# Patient Record
Sex: MALE | Race: WHITE | NOT HISPANIC OR LATINO | Employment: UNEMPLOYED | URBAN - METROPOLITAN AREA
[De-identification: names, ages, dates, MRNs, and addresses within clinical notes are randomized per-mention and may not be internally consistent; named-entity substitution may affect disease eponyms.]

---

## 2017-01-31 ENCOUNTER — OFFICE VISIT (OUTPATIENT)
Dept: FAMILY MEDICINE | Facility: CLINIC | Age: 42
End: 2017-01-31
Payer: MEDICARE

## 2017-01-31 VITALS
RESPIRATION RATE: 14 BRPM | SYSTOLIC BLOOD PRESSURE: 101 MMHG | TEMPERATURE: 98.1 F | DIASTOLIC BLOOD PRESSURE: 64 MMHG | HEART RATE: 68 BPM

## 2017-01-31 DIAGNOSIS — Z96.651 STATUS POST TOTAL RIGHT KNEE REPLACEMENT: ICD-10-CM

## 2017-01-31 DIAGNOSIS — G89.18 ACUTE POST-OPERATIVE PAIN: Primary | ICD-10-CM

## 2017-01-31 PROCEDURE — 99213 OFFICE O/P EST LOW 20 MIN: CPT | Performed by: PHYSICIAN ASSISTANT

## 2017-01-31 RX ORDER — CYCLOBENZAPRINE HCL 5 MG
TABLET ORAL
Refills: 0 | COMMUNITY
Start: 2017-01-06 | End: 2017-05-02

## 2017-01-31 RX ORDER — TRAMADOL HYDROCHLORIDE 50 MG/1
TABLET ORAL
Refills: 0 | COMMUNITY
Start: 2017-01-25 | End: 2017-04-18

## 2017-01-31 ASSESSMENT — ANXIETY QUESTIONNAIRES
7. FEELING AFRAID AS IF SOMETHING AWFUL MIGHT HAPPEN: NOT AT ALL
6. BECOMING EASILY ANNOYED OR IRRITABLE: NEARLY EVERY DAY
IF YOU CHECKED OFF ANY PROBLEMS ON THIS QUESTIONNAIRE, HOW DIFFICULT HAVE THESE PROBLEMS MADE IT FOR YOU TO DO YOUR WORK, TAKE CARE OF THINGS AT HOME, OR GET ALONG WITH OTHER PEOPLE: SOMEWHAT DIFFICULT
5. BEING SO RESTLESS THAT IT IS HARD TO SIT STILL: NOT AT ALL
1. FEELING NERVOUS, ANXIOUS, OR ON EDGE: SEVERAL DAYS
GAD7 TOTAL SCORE: 6
3. WORRYING TOO MUCH ABOUT DIFFERENT THINGS: SEVERAL DAYS
2. NOT BEING ABLE TO STOP OR CONTROL WORRYING: SEVERAL DAYS

## 2017-01-31 ASSESSMENT — PATIENT HEALTH QUESTIONNAIRE - PHQ9: 5. POOR APPETITE OR OVEREATING: NOT AT ALL

## 2017-01-31 NOTE — Clinical Note
[facility]  [id number] [place] [id number]  Phone: [phone]    January 31, 2017        Andrea Uribe  [address] [facility] [address]          To whom it may concern:    Gabriella Uribe was seen in clinic today for a visit for her 's appointment. She missed work secondary to this. Please excuse her for this.     Please contact me for questions or concerns.      Sincerely,        Alberto Medley PA-C

## 2017-01-31 NOTE — PROGRESS NOTES
SUBJECTIVE:                                                    Andrea Uribe is a 41 year old male who presents to clinic today for the following health issues:      Joint Pain     Onset:     Description:   Location: rt knee pain  Character:sharp/stabbing, unable to put any pressure, leg is not straight, close to fall this morning    Intensity: constant; severe pain    Progression of Symptoms: worse    Accompanying Signs & Symptoms:  Other symptoms: swelling   History:   Previous similar pain: YES- recently had surgery. Saw surgeon on 1/25. Notes are not available to review.       Precipitating factors:   Trauma or overuse: no     Alleviating factors:  Improved by: nothing       Therapies Tried and outcome: patient currently in phystical thearpy, oxycodone and tramadol       Problem list and histories reviewed & adjusted, as indicated.  Additional history: as documented    Problem list, Medication list, Allergies, and Medical/Social/Surgical histories reviewed in EPIC and updated as appropriate.    ROS:  Constitutional, msk, skin, neuro systems are negative, except as otherwise noted.    OBJECTIVE:                                                    /64 mmHg  Pulse 68  Temp(Src) 98.1  F (36.7  C) (Oral)  Resp 14  There is no weight on file to calculate BMI.  GENERAL: healthy, alert and no distress  MS: right knee can actively extend to 165 degrees and flex to 90 degrees. Passively flex to 110 degrees with pain.   PSYCH: mentation appears normal, affect normal/bright    Diagnostic Test Results:  none      ASSESSMENT/PLAN:                                                      (G89.18) Acute post-operative pain  (primary encounter diagnosis)  Comment: Though unable to refer to surgeon's notes, discussed with patient how this is post-operative pain and management is typically done through the surgeon. If the surgeon deems this inappropriate, a pain clinic can be consulted. Recommending follow up with surgeon  and also to sign release to have notes sent to us.   Plan:     (Z96.651) Status post total right knee replacement  Comment: as above. ROM seems appropriate in my opinion at this time out of surgery. Especially given this being a revision. To follow ortho.   Plan:     Follow up: per ortho    Alberto Medley PA-C  [facility]

## 2017-01-31 NOTE — NURSING NOTE
"Chief Complaint   Patient presents with     Knee Pain     Initial /64 mmHg  Pulse 68  Temp(Src) 98.1  F (36.7  C) (Oral)  Resp 14 Estimated body mass index is 29.44 kg/(m^2) as calculated from the following:    Height as of 12/13/16: 5' 11\" (1.803 m).    Weight as of 12/2/16: 211 lb (95.709 kg)..  BP completed using cuff size large-RA      "

## 2017-02-01 ASSESSMENT — PATIENT HEALTH QUESTIONNAIRE - PHQ9: SUM OF ALL RESPONSES TO PHQ QUESTIONS 1-9: 5

## 2017-02-01 ASSESSMENT — ASTHMA QUESTIONNAIRES: ACT_TOTALSCORE: 17

## 2017-02-01 ASSESSMENT — ANXIETY QUESTIONNAIRES: GAD7 TOTAL SCORE: 6

## 2017-04-18 ENCOUNTER — TELEPHONE (OUTPATIENT)
Dept: NURSING | Facility: CLINIC | Age: 42
End: 2017-04-18

## 2017-04-18 ENCOUNTER — HOSPITAL ENCOUNTER (EMERGENCY)
Facility: CLINIC | Age: 42
Discharge: HOME | End: 2017-04-18
Attending: EMERGENCY MEDICINE | Admitting: EMERGENCY MEDICINE
Payer: MEDICARE

## 2017-04-18 ENCOUNTER — APPOINTMENT (OUTPATIENT)
Dept: CT IMAGING | Facility: CLINIC | Age: 42
End: 2017-04-18
Attending: EMERGENCY MEDICINE
Payer: MEDICARE

## 2017-04-18 VITALS
DIASTOLIC BLOOD PRESSURE: 74 MMHG | BODY MASS INDEX: 29.29 KG/M2 | WEIGHT: 210 LBS | SYSTOLIC BLOOD PRESSURE: 110 MMHG | HEART RATE: 71 BPM | TEMPERATURE: 98 F | RESPIRATION RATE: 18 BRPM | OXYGEN SATURATION: 91 %

## 2017-04-18 DIAGNOSIS — R10.84 ABDOMINAL PAIN, GENERALIZED: ICD-10-CM

## 2017-04-18 LAB
ALBUMIN SERPL-MCNC: 3.1 G/DL (ref 3.4–5)
ALP SERPL-CCNC: 162 U/L (ref 40–150)
ALT SERPL W P-5'-P-CCNC: 18 U/L (ref 0–70)
ANION GAP SERPL CALCULATED.3IONS-SCNC: 6 MMOL/L (ref 3–14)
AST SERPL W P-5'-P-CCNC: 16 U/L (ref 0–45)
BASOPHILS # BLD AUTO: 0 10E9/L (ref 0–0.2)
BASOPHILS NFR BLD AUTO: 0.7 %
BILIRUB SERPL-MCNC: 0.2 MG/DL (ref 0.2–1.3)
BUN SERPL-MCNC: 13 MG/DL (ref 7–30)
CALCIUM SERPL-MCNC: 8.4 MG/DL (ref 8.5–10.1)
CHLORIDE SERPL-SCNC: 105 MMOL/L (ref 94–109)
CO2 SERPL-SCNC: 29 MMOL/L (ref 20–32)
CREAT SERPL-MCNC: 0.88 MG/DL (ref 0.66–1.25)
DIFFERENTIAL METHOD BLD: ABNORMAL
EOSINOPHIL # BLD AUTO: 0.2 10E9/L (ref 0–0.7)
EOSINOPHIL NFR BLD AUTO: 3.5 %
ERYTHROCYTE [DISTWIDTH] IN BLOOD BY AUTOMATED COUNT: 14.5 % (ref 10–15)
GFR SERPL CREATININE-BSD FRML MDRD: ABNORMAL ML/MIN/1.7M2
GLUCOSE SERPL-MCNC: 87 MG/DL (ref 70–99)
HCT VFR BLD AUTO: 39.5 % (ref 40–53)
HGB BLD-MCNC: 12.5 G/DL (ref 13.3–17.7)
IMM GRANULOCYTES # BLD: 0 10E9/L (ref 0–0.4)
IMM GRANULOCYTES NFR BLD: 0.2 %
LIPASE SERPL-CCNC: 89 U/L (ref 73–393)
LYMPHOCYTES # BLD AUTO: 1.8 10E9/L (ref 0.8–5.3)
LYMPHOCYTES NFR BLD AUTO: 32.6 %
MCH RBC QN AUTO: 29.1 PG (ref 26.5–33)
MCHC RBC AUTO-ENTMCNC: 31.6 G/DL (ref 31.5–36.5)
MCV RBC AUTO: 92 FL (ref 78–100)
MONOCYTES # BLD AUTO: 0.5 10E9/L (ref 0–1.3)
MONOCYTES NFR BLD AUTO: 9.1 %
NEUTROPHILS # BLD AUTO: 2.9 10E9/L (ref 1.6–8.3)
NEUTROPHILS NFR BLD AUTO: 53.9 %
NRBC # BLD AUTO: 0 10*3/UL
NRBC BLD AUTO-RTO: 0 /100
PLATELET # BLD AUTO: 195 10E9/L (ref 150–450)
POTASSIUM SERPL-SCNC: 4 MMOL/L (ref 3.4–5.3)
PROT SERPL-MCNC: 7.9 G/DL (ref 6.8–8.8)
RBC # BLD AUTO: 4.29 10E12/L (ref 4.4–5.9)
SODIUM SERPL-SCNC: 140 MMOL/L (ref 133–144)
WBC # BLD AUTO: 5.4 10E9/L (ref 4–11)

## 2017-04-18 PROCEDURE — 80053 COMPREHEN METABOLIC PANEL: CPT | Performed by: EMERGENCY MEDICINE

## 2017-04-18 PROCEDURE — 25000128 H RX IP 250 OP 636: Performed by: EMERGENCY MEDICINE

## 2017-04-18 PROCEDURE — 83690 ASSAY OF LIPASE: CPT | Performed by: EMERGENCY MEDICINE

## 2017-04-18 PROCEDURE — 85025 COMPLETE CBC W/AUTO DIFF WBC: CPT | Performed by: EMERGENCY MEDICINE

## 2017-04-18 PROCEDURE — 99285 EMERGENCY DEPT VISIT HI MDM: CPT | Mod: 25

## 2017-04-18 PROCEDURE — 96374 THER/PROPH/DIAG INJ IV PUSH: CPT | Mod: 59

## 2017-04-18 PROCEDURE — 25500064 ZZH RX 255 OP 636: Performed by: EMERGENCY MEDICINE

## 2017-04-18 PROCEDURE — 36415 COLL VENOUS BLD VENIPUNCTURE: CPT | Performed by: EMERGENCY MEDICINE

## 2017-04-18 PROCEDURE — 74177 CT ABD & PELVIS W/CONTRAST: CPT

## 2017-04-18 PROCEDURE — 96375 TX/PRO/DX INJ NEW DRUG ADDON: CPT

## 2017-04-18 RX ORDER — ACETAMINOPHEN 500 MG
TABLET ORAL
Status: DISCONTINUED
Start: 2017-04-18 | End: 2017-04-18 | Stop reason: HOSPADM

## 2017-04-18 RX ORDER — LIDOCAINE 40 MG/G
CREAM TOPICAL
Status: DISCONTINUED | OUTPATIENT
Start: 2017-04-18 | End: 2017-04-18 | Stop reason: HOSPADM

## 2017-04-18 RX ORDER — MORPHINE SULFATE 4 MG/ML
4 INJECTION, SOLUTION INTRAMUSCULAR; INTRAVENOUS ONCE
Status: COMPLETED | OUTPATIENT
Start: 2017-04-18 | End: 2017-04-18

## 2017-04-18 RX ORDER — IOPAMIDOL 755 MG/ML
500 INJECTION, SOLUTION INTRAVASCULAR ONCE
Status: COMPLETED | OUTPATIENT
Start: 2017-04-18 | End: 2017-04-18

## 2017-04-18 RX ORDER — TRAMADOL HYDROCHLORIDE 50 MG/1
50-100 TABLET ORAL EVERY 6 HOURS PRN
Qty: 20 TABLET | Refills: 0 | Status: SHIPPED | OUTPATIENT
Start: 2017-04-18 | End: 2017-05-10

## 2017-04-18 RX ORDER — POLYETHYLENE GLYCOL 3350 17 G/17G
1 POWDER, FOR SOLUTION ORAL DAILY
Qty: 527 G | Refills: 0 | Status: SHIPPED | OUTPATIENT
Start: 2017-04-18 | End: 2017-05-18

## 2017-04-18 RX ORDER — ONDANSETRON 2 MG/ML
4 INJECTION INTRAMUSCULAR; INTRAVENOUS ONCE
Status: COMPLETED | OUTPATIENT
Start: 2017-04-18 | End: 2017-04-18

## 2017-04-18 RX ADMIN — MORPHINE SULFATE 4 MG: 4 INJECTION, SOLUTION INTRAMUSCULAR; INTRAVENOUS at 16:44

## 2017-04-18 RX ADMIN — SODIUM CHLORIDE 65 ML: 9 INJECTION, SOLUTION INTRAVENOUS at 18:14

## 2017-04-18 RX ADMIN — ONDANSETRON 4 MG: 2 INJECTION INTRAMUSCULAR; INTRAVENOUS at 16:44

## 2017-04-18 RX ADMIN — IOPAMIDOL 100 ML: 755 INJECTION, SOLUTION INTRAVENOUS at 18:14

## 2017-04-18 ASSESSMENT — ENCOUNTER SYMPTOMS
ABDOMINAL PAIN: 1
VOMITING: 1
FEVER: 0
WHEEZING: 1
SHORTNESS OF BREATH: 1
LIGHT-HEADEDNESS: 1
NAUSEA: 1
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
## 2017-04-18 NOTE — TELEPHONE ENCOUNTER
Call Type: Triage Call    Presenting Problem:  Had implant in vocal . Had  hernia repair- last  spring of 2016  by surgeon John Mcfarlane @ [facility] .  72  hours ago  started  having abdominal pain and bulging  at this  surgery site , that is interfering with his  breathing- painful with  deep breath or cough or some movements   .  Page    Gabe gave  him surgeon's phone number =  [phone] .  Triage Note:  Guideline Title: Postoperative Problems  Recommended Disposition: See ED Immediately  Original Inclination: Did not know what to do  Override Disposition:  Intended Action: Go to Hospital / ED  Physician Contacted: No  New or worsening breathing problems ?  YES  Unconscious now ? NO  Abdominal bloating ? NO  Depression and no other symptoms ? NO  Severe breathing problems ? NO  Wound separation AND internal organs protrude through wound or surgical incision  (evisceration) ? NO  Hives /Urticaria /Rash ? NO  New or worsening signs and symptoms that may indicate shock ? NO  Neck lump/swelling ? NO  Coughing up large amount of obvious blood (not blood-streaked sputum) ? NO  New seizure now or within last 6 hours ? NO  Continuous or heavy bleeding from operative site and NOT controlled with 10  minutes of steady pressure ? NO  Passing red, black or tarry material from rectum AND onset of new signs and  symptoms of hypovolemia ? NO  Vomiting red, bloody or coffee-ground material, more than streaks of blood or  scant amount (not following nosebleed within past day) ? NO  New onset severe pain and pale, discolored or cool below the surgical site  compared to the other extremity ? NO  Chest discomfort associated with shortness of breath, sweating, odd heartbeats or  different heart rate, nausea, vomiting, lightheadedness, or fainting lasting 5 or  more minutes now or within the last hour ? NO  Chest pain spreading to the shoulders, neck, jaw, in one or both arms, stomach or  back lasting 5 or more  minutes now or within the last hour. Pain is NOT  associated with taking a deep breath or a productive cough, movement, or touch to  a localized area. ? NO  Signs and symptoms of anaphylaxis ? NO  Questions or concerns about postoperative wound ? NO  Pressure, fullness, squeezing sensation or pain anywhere in the chest lasting 5 or  more minutes now or within the last hour. Pain is NOT associated with taking a  deep breath or a productive cough, movement, or touch to a localized area on the  chest. ? NO  Sudden onset of focal neurological changes (difficulty speaking, numbness,  weakness, paralysis, loss of coordination, or change in vision such as double  vision or loss of visual field) ? NO  Sudden onset of shortness of breath, chest pain and cough with blood tinged sputum  ? NO  Physician Instructions:  Care Advice: Another adult should drive.  Call  if sudden onset or sudden worsening of breathing problems,  struggling to breathe, high pitched noise when breathing in (stridor),  unable to speak, grasping at throat, or panic/anxiety because of breathing  problems.  IMMEDIATE ACTION  Take sips of clear liquids (such as water, clear fruit juices without pulp,  soda, tea or coffee without dairy or non-dairy creamer, clear broth or  bouillon, oral hydration solution, or plain gelatin, fruit ices/popsicles,  hard candy) as tolerated. Sucking on ice chips is another option.

## 2017-04-18 NOTE — ED AVS SNAPSHOT
[facility] Emergency Department    [address]    [facility] [address]    Phone:  [phone]    Fax:  [phone]                                       Andrea Uribe   MRN: [id number]    Department:  [facility] Emergency Department   Date of Visit:  4/18/2017           After Visit Summary Signature Page     I have received my discharge instructions, and my questions have been answered. I have discussed any challenges I see with this plan with the nurse or doctor.    ..........................................................................................................................................  Patient/Patient Representative Signature      ..........................................................................................................................................  Patient Representative Print Name and Relationship to Patient    ..................................................               ................................................  Date                                            Time    ..........................................................................................................................................  Reviewed by Signature/Title    ...................................................              ..............................................  Date                                                            Time

## 2017-04-18 NOTE — ED AVS SNAPSHOT
[facility] Emergency Department    [address]    [facility] [address]    Phone:  [phone]    Fax:  [phone]                                       Andrea Uribe   MRN: [id number]    Department:  [facility] Emergency Department   Date of Visit:  4/18/2017           Patient Information     Date Of Birth          1975        Your diagnoses for this visit were:     Abdominal pain, generalized        You were seen by Eliud Porter DO.      Follow-up Information     Follow up with Alberto Medley PA-C. Call in 2 days.    Specialty:  Physician Assistant    Why:  As needed    Contact information:    [facility]  [address] [address] [phone]          Follow up with [facility] Emergency Department.    Specialty:  EMERGENCY MEDICINE    Why:  If symptoms worsen    Contact information:    [address]  [facility] [id number] [phone]        Discharge Instructions         * Abdominal Pain,Uncertain Cause [Male]  Based on your visit today, the exact cause of your abdominal (stomach) pain is not clear. Your exam and tests do not indicate a dangerous cause at this time. However, the signs of a serious problem may take more time to appear. Although your exam was reassuring today, sometimes early in the course of many conditions, exam and lab tests can appear normal. Therefore, it is important for you to watch for any new symptoms or worsening of your condition.  Causes:  It may not be obvious what caused your symptoms. Pay attention to things that do seem to make your symptoms worse or better and discuss this with your doctor when you follow up.  Diagnosis:  The evaluation of abdominal pain in the emergency department may only require an exam by the doctor or it may include blood, urine or imaging studies, depending on many factors. Sometimes exams and tests can identify a cause but in  many cases, a clear cause is not found. Further testing at follow up visits may help to suggest a clear diagnosis.  Home Care:    Rest as much as possible until your next exam.    Try to avoid any medications (unless otherwise directed by your doctor), foods, activities, or other factors that you may have contributed to your symptoms.    Try to eat foods that you know that you have tolerated well in the past. Certain diets may be recommended for some conditions that cause abdominal pain. However, since the cause of your symptoms may not be clear, discuss your diet more with your primary care provider or specialist for further recommendations.     Eating several small meals per day as opposed to 2 or 3 larger meals may help.    Monitor closely for anything that may make your symptoms worse or better. Pay close attention to symptoms below that may indicate worsening of your condition.  Follow Up And Precautions:  See your doctor or this facility as instructed (or sooner, if your symptoms are not improving). In some cases, you may need more testing.  Contact Your Doctor Or Seek Medical Attention  if any of the following occur:    Pain is becoming worse    You are unable to take your medications because of too much vomiting    Swelling of the abdomen    Fever of 101 F (38.3 C) or higher, or as directed by your health care provider    Blood in vomit or bowel movements (dark red or black color)    Jaundice (yellow color of eyes and skin)    New onset of weakness, dizziness or fainting    New onset of chest, arm, back, neck or jaw pain    1064-1531 [facility], [address] [place] [address]. All rights reserved. This information is not intended as a substitute for professional medical care. Always follow your healthcare professional's instructions.          24 Hour Appointment Hotline       To make an appointment at any [facility], call [phone] ([phone]). If you don't have a family doctor  or clinic, we will help you find one. [facility] are conveniently located to serve the needs of you and your family.             Review of your medicines      START taking        Dose / Directions Last dose taken    polyethylene glycol powder   Commonly known as:  MIRALAX   Dose:  1 capful   Quantity:  527 g        Take 17 g (1 capful) by mouth daily   Refills:  0          CONTINUE these medicines which may have CHANGED, or have new prescriptions. If we are uncertain of the size of tablets/capsules you have at home, strength may be listed as something that might have changed.        Dose / Directions Last dose taken    traMADol 50 MG tablet   Commonly known as:  ULTRAM   Dose:   mg   What changed:  See the new instructions.   Quantity:  20 tablet        Take 1-2 tablets ( mg) by mouth every 6 hours as needed for moderate pain Do not drive or operate machinery while taking this medication.   Refills:  0          Our records show that you are taking the medicines listed below. If these are incorrect, please call your family doctor or clinic.        Dose / Directions Last dose taken    albuterol 108 (90 BASE) MCG/ACT Inhaler   Commonly known as:  PROAIR HFA/PROVENTIL HFA/VENTOLIN HFA   Dose:  2 puff   Quantity:  1 Inhaler        Inhale 2 puffs into the lungs every 6 hours as needed for shortness of breath / dyspnea   Refills:  3        cyclobenzaprine 5 MG tablet   Commonly known as:  FLEXERIL        TAKE 1 TABLET BY MOUTH 3 TIMES A DAY AS NEEDED   Refills:  0        DILANTIN 100 MG CR capsule   Dose:  300 mg   Generic drug:  phenytoin        Take 300 mg by mouth 2 times daily at 900 and 2100   Refills:  0        KEPPRA 1000 MG Tabs   Dose:  2000 mg   Generic drug:  levETIRAcetam        Take 2,000 mg by mouth 2 times daily At 0900 and 2100   Refills:  0        lisinopril 5 MG tablet   Commonly known as:  PRINIVIL/ZESTRIL   Dose:  5 mg   Quantity:  90 tablet        Take 1 tablet (5 mg) by mouth daily    Refills:  2        oxyCODONE-acetaminophen 5-325 MG per tablet   Commonly known as:  PERCOCET   Dose:  1-2 tablet   Quantity:  75 tablet        Take 1-2 tablets by mouth every 4 hours as needed for pain (moderate to severe)   Refills:  0        simvastatin 20 MG tablet   Commonly known as:  ZOCOR   Dose:  20 mg   Quantity:  90 tablet        Take 1 tablet (20 mg) by mouth At Bedtime   Refills:  3        venlafaxine 37.5 MG Tb24 24 hr tablet   Commonly known as:  EFFEXOR-ER   Dose:  37.5 mg   Quantity:  90 tablet        Take 1 tablet (37.5 mg) by mouth daily (with breakfast) Overdue for visit.  #30 only   Refills:  3                Prescriptions were sent or printed at these locations (2 Prescriptions)                   Other Prescriptions                Printed at Department/Unit printer (2 of 2)         traMADol (ULTRAM) 50 MG tablet               polyethylene glycol (MIRALAX) powder                Procedures and tests performed during your visit     CBC with platelets differential    CT Abdomen Pelvis w Contrast    Comprehensive metabolic panel    Lipase    Peripheral IV catheter      Orders Needing Specimen Collection     None      Pending Results     No orders found from 4/16/2017 to 4/19/2017.            Pending Culture Results     No orders found from 4/16/2017 to 4/19/2017.            Test Results From Your Hospital Stay        4/18/2017  7:22 PM      Narrative     CT ABDOMEN AND PELVIS WITH CONTRAST  4/18/2017 6:30 PM    HISTORY: Right-sided abdominal pain with recent hernia surgery.    COMPARISON: A CT on 5/17/2016.    TECHNIQUE: Routine transverse CT imaging of the abdomen and pelvis was  performed following the administration of 100mL Isovue-370 intravenous  contrast. Apparently only 70 mL of contrast was administered before  the IV access failed. Radiation dose for this scan was reduced using  automated exposure control, adjustment of the mA and/or kV according  to patient size, or iterative  reconstruction technique.    FINDINGS: The visualized lung bases are clear. The liver, spleen, and  pancreas are normal. Again seen is an approximately 1 cm gallstone. No  other gallbladder pathology is demonstrated. No enlarged lymph node or  other abnormal mass is demonstrated. No free fluid is seen. No free  intraperitoneal gas is identified. There are a few scattered  diverticula of the colon with no evidence of diverticulitis. No other  gastrointestinal tract pathology is seen. The appendix is not  definitely seen. There is no additional evidence of appendicitis.  Again seen is an IVC filter. No other vascular pathology is  demonstrated. There are degenerative changes in the spine. There has  been interval repair of the previously seen paraumbilical hernia. No  other abdominal or pelvic wall pathology is seen.         Impression     IMPRESSION:  1. Interval repair of the previously seen paraumbilical hernia.  2. Mild colonic diverticulosis with no evidence of diverticulitis.  3. Cholelithiasis with no evidence of cholecystitis.    CONNOR FINN MD         4/18/2017  5:11 PM      Component Results     Component Value Ref Range & Units Status    WBC 5.4 4.0 - 11.0 10e9/L Final    RBC Count 4.29 (L) 4.4 - 5.9 10e12/L Final    Hemoglobin 12.5 (L) 13.3 - 17.7 g/dL Final    Hematocrit 39.5 (L) 40.0 - 53.0 % Final    MCV 92 78 - 100 fl Final    MCH 29.1 26.5 - 33.0 pg Final    MCHC 31.6 31.5 - 36.5 g/dL Final    RDW 14.5 10.0 - 15.0 % Final    Platelet Count 195 150 - 450 10e9/L Final    Diff Method Automated Method  Final    % Neutrophils 53.9 % Final    % Lymphocytes 32.6 % Final    % Monocytes 9.1 % Final    % Eosinophils 3.5 % Final    % Basophils 0.7 % Final    % Immature Granulocytes 0.2 % Final    Nucleated RBCs 0 0 /100 Final    Absolute Neutrophil 2.9 1.6 - 8.3 10e9/L Final    Absolute Lymphocytes 1.8 0.8 - 5.3 10e9/L Final    Absolute Monocytes 0.5 0.0 - 1.3 10e9/L Final    Absolute Eosinophils 0.2 0.0  - 0.7 10e9/L Final    Absolute Basophils 0.0 0.0 - 0.2 10e9/L Final    Abs Immature Granulocytes 0.0 0 - 0.4 10e9/L Final    Absolute Nucleated RBC 0.0  Final         4/18/2017  5:28 PM      Component Results     Component Value Ref Range & Units Status    Sodium 140 133 - 144 mmol/L Final    Potassium 4.0 3.4 - 5.3 mmol/L Final    Chloride 105 94 - 109 mmol/L Final    Carbon Dioxide 29 20 - 32 mmol/L Final    Anion Gap 6 3 - 14 mmol/L Final    Glucose 87 70 - 99 mg/dL Final    Urea Nitrogen 13 7 - 30 mg/dL Final    Creatinine 0.88 0.66 - 1.25 mg/dL Final    GFR Estimate >90  Non  GFR Calc   >60 mL/min/1.7m2 Final    GFR Estimate If Black >90   GFR Calc   >60 mL/min/1.7m2 Final    Calcium 8.4 (L) 8.5 - 10.1 mg/dL Final    Bilirubin Total 0.2 0.2 - 1.3 mg/dL Final    Albumin 3.1 (L) 3.4 - 5.0 g/dL Final    Protein Total 7.9 6.8 - 8.8 g/dL Final    Alkaline Phosphatase 162 (H) 40 - 150 U/L Final    ALT 18 0 - 70 U/L Final    AST 16 0 - 45 U/L Final         4/18/2017  5:28 PM      Component Results     Component Value Ref Range & Units Status    Lipase 89 73 - 393 U/L Final                Clinical Quality Measure: Blood Pressure Screening     Your blood pressure was checked while you were in the emergency department today. The last reading we obtained was  BP: 117/86 . Please read the guidelines below about what these numbers mean and what you should do about them.  If your systolic blood pressure (the top number) is less than 120 and your diastolic blood pressure (the bottom number) is less than 80, then your blood pressure is normal. There is nothing more that you need to do about it.  If your systolic blood pressure (the top number) is 120-139 or your diastolic blood pressure (the bottom number) is 80-89, your blood pressure may be higher than it should be. You should have your blood pressure rechecked within a year by a primary care provider.  If your systolic blood pressure (the top  "number) is 140 or greater or your diastolic blood pressure (the bottom number) is 90 or greater, you may have high blood pressure. High blood pressure is treatable, but if left untreated over time it can put you at risk for heart attack, stroke, or kidney failure. You should have your blood pressure rechecked by a primary care provider within the next 4 weeks.  If your provider in the emergency department today gave you specific instructions to follow-up with your doctor or provider even sooner than that, you should follow that instruction and not wait for up to 4 weeks for your follow-up visit.        Thank you for choosing [facility]       Thank you for choosing [facility] for your care. Our goal is always to provide you with excellent care. Hearing back from our patients is one way we can continue to improve our services. Please take a few minutes to complete the written survey that you may receive in the mail after you visit with us. Thank you!        [employer] Information     [employer] lets you send messages to your doctor, view your test results, renew your prescriptions, schedule appointments and more. To sign up, go to [email or web address] . Click on \"Log in\" on the left side of the screen, which will take you to the Welcome page. Then click on \"Sign up Now\" on the right side of the page.     You will be asked to enter the access code listed below, as well as some personal information. Please follow the directions to create your username and password.     Your access code is: [email or web address]  Expires: 2017  8:40 PM     Your access code will  in 90 days. If you need help or a new code, please call your [facility] clinic or [phone].        Care EveryWhere ID     This is your Care EveryWhere ID. This could be used by other organizations to access your [facility] medical records  [id number]        After Visit Summary       This is your record. Keep this with you and show to your community pharmacist(s) and " doctor(s) at your next visit.

## 2017-04-18 NOTE — ED NOTES
CT Tech brought patient back from scan and noted that the patients IV had infiltrated with CT contrast. IV had been pulled and was wrapped with Kerlix. Patient reports pain at the site and requested warm pack versus cold pack. Warm pack was given to the patient and offered to discuss medication with the MD. Patient denied requests for medication, despite spouse encouraging. MD notified that patient was upset. Will continue to monitor left arm and treat as patient allows.

## 2017-04-18 NOTE — ED NOTES
Pt presents to ED reporting beginning of march he had 7 hernias repaired, reports three days ago he began developing abdominal discomfort and decreased appetite, pt reports today his pain is much worse, concerned that his hernia has popped open or also reports he has an IVC filter and concerned that that has broke and punctured his intestine as this has happened in the past. Pt reports he has been having increased BM that are very skinny in nature. Hx of TBI. ABCs intact. A/Ox3

## 2017-04-18 NOTE — LETTER
[facility] EMERGENCY DEPARTMENT  [address] [facility] [address]  [phone]    2017    Andrea Uribe  [address] [facility] [address]  [phone] (home) NONE (work)    : 1975      To Whom it may concern:    Andrea Uribe was seen in our Emergency Department today, 2017.  I expect his condition to improve over the next 1-2 days.  He may return to work when improved.    Sincerely,        Eliud Porter D.O.

## 2017-04-19 NOTE — DISCHARGE INSTRUCTIONS
* Abdominal Pain,Uncertain Cause [Male]  Based on your visit today, the exact cause of your abdominal (stomach) pain is not clear. Your exam and tests do not indicate a dangerous cause at this time. However, the signs of a serious problem may take more time to appear. Although your exam was reassuring today, sometimes early in the course of many conditions, exam and lab tests can appear normal. Therefore, it is important for you to watch for any new symptoms or worsening of your condition.  Causes:  It may not be obvious what caused your symptoms. Pay attention to things that do seem to make your symptoms worse or better and discuss this with your doctor when you follow up.  Diagnosis:  The evaluation of abdominal pain in the emergency department may only require an exam by the doctor or it may include blood, urine or imaging studies, depending on many factors. Sometimes exams and tests can identify a cause but in many cases, a clear cause is not found. Further testing at follow up visits may help to suggest a clear diagnosis.  Home Care:    Rest as much as possible until your next exam.    Try to avoid any medications (unless otherwise directed by your doctor), foods, activities, or other factors that you may have contributed to your symptoms.    Try to eat foods that you know that you have tolerated well in the past. Certain diets may be recommended for some conditions that cause abdominal pain. However, since the cause of your symptoms may not be clear, discuss your diet more with your primary care provider or specialist for further recommendations.     Eating several small meals per day as opposed to 2 or 3 larger meals may help.    Monitor closely for anything that may make your symptoms worse or better. Pay close attention to symptoms below that may indicate worsening of your condition.  Follow Up And Precautions:  See your doctor or this facility as instructed (or sooner, if your symptoms are not  improving). In some cases, you may need more testing.  Contact Your Doctor Or Seek Medical Attention  if any of the following occur:    Pain is becoming worse    You are unable to take your medications because of too much vomiting    Swelling of the abdomen    Fever of 101 F (38.3 C) or higher, or as directed by your health care provider    Blood in vomit or bowel movements (dark red or black color)    Jaundice (yellow color of eyes and skin)    New onset of weakness, dizziness or fainting    New onset of chest, arm, back, neck or jaw pain    [id number] [address] [address]. All rights reserved. This information is not intended as a substitute for professional medical care. Always follow your healthcare professional's instructions.

## 2017-04-19 NOTE — ED NOTES
"Patient laying on cot appears to be upset. Requesting to speak to management. States, \" I am upset with with what happened with my arm and that lady at CT scan. She was a trainee, she couldn't seen what she was doing\" This RN kindly explained to patient, he would be able to speak to charge nurse, offered patient care representitive number. Patient requested number. This RN kindly educated patient in regards to infiltration to L arm from IV. Verbalized understanding. Offered reassurance, elevated L arm and warm back was given.   Significant other at bedside appears to be upset, report pain is not being addressed. This RN kindly explained and educated significant other POC that included several options for pain, but patient refused. Unable to make patient take medication if he does not want to. Patient and significant verbalized understanding. This RN kindly asked patient rate pain, and if he would now like pain medication. Patient refused, patient once again offered comfort care, by giving him warm blanket.   Patient care representative number given, by HUBER Sol  "

## 2017-04-20 ENCOUNTER — HOSPITAL ENCOUNTER (EMERGENCY)
Facility: CLINIC | Age: 42
Discharge: HOME | End: 2017-04-21
Attending: EMERGENCY MEDICINE | Admitting: EMERGENCY MEDICINE
Payer: MEDICARE

## 2017-04-20 DIAGNOSIS — R10.11 RUQ ABDOMINAL PAIN: ICD-10-CM

## 2017-04-20 PROCEDURE — 96375 TX/PRO/DX INJ NEW DRUG ADDON: CPT

## 2017-04-20 PROCEDURE — 99285 EMERGENCY DEPT VISIT HI MDM: CPT | Mod: 25

## 2017-04-20 PROCEDURE — 96361 HYDRATE IV INFUSION ADD-ON: CPT

## 2017-04-20 PROCEDURE — 84484 ASSAY OF TROPONIN QUANT: CPT | Performed by: EMERGENCY MEDICINE

## 2017-04-20 PROCEDURE — 96374 THER/PROPH/DIAG INJ IV PUSH: CPT

## 2017-04-20 PROCEDURE — 83690 ASSAY OF LIPASE: CPT | Performed by: EMERGENCY MEDICINE

## 2017-04-20 PROCEDURE — 96376 TX/PRO/DX INJ SAME DRUG ADON: CPT

## 2017-04-20 PROCEDURE — 93005 ELECTROCARDIOGRAM TRACING: CPT

## 2017-04-20 PROCEDURE — 80053 COMPREHEN METABOLIC PANEL: CPT | Performed by: EMERGENCY MEDICINE

## 2017-04-20 PROCEDURE — 85025 COMPLETE CBC W/AUTO DIFF WBC: CPT | Performed by: EMERGENCY MEDICINE

## 2017-04-20 RX ORDER — HYDROMORPHONE HYDROCHLORIDE 1 MG/ML
0.5 INJECTION, SOLUTION INTRAMUSCULAR; INTRAVENOUS; SUBCUTANEOUS ONCE
Status: COMPLETED | OUTPATIENT
Start: 2017-04-20 | End: 2017-04-21

## 2017-04-20 RX ORDER — ONDANSETRON 2 MG/ML
4 INJECTION INTRAMUSCULAR; INTRAVENOUS ONCE
Status: COMPLETED | OUTPATIENT
Start: 2017-04-20 | End: 2017-04-21

## 2017-04-20 ASSESSMENT — ENCOUNTER SYMPTOMS
FEVER: 0
HEMATURIA: 0
CONSTIPATION: 0
VOMITING: 1
DYSURIA: 0
ABDOMINAL DISTENTION: 1
NAUSEA: 1
DIARRHEA: 0
ABDOMINAL PAIN: 1

## 2017-04-20 NOTE — LETTER
[facility] EMERGENCY DEPARTMENT  [address]  [facility] [address]  [phone]    2017    Andrea Uribe  [address] [facility] [address]  [phone] (home) NONE (work)    : 1975      To Whom it may concern:    Andrea Uribe was seen in our Emergency Department today, 2017 and 17. He may return to work on 17.    Sincerely,        Mckenzie Dee

## 2017-04-20 NOTE — LETTER
[facility] EMERGENCY DEPARTMENT  [address]  [facility] [address]  [phone]    2017    Andrea Uribe  [address] [employer] [address]  [phone] (home) NONE (work)    : 1975      To Whom it may concern:    Andrea Uribe was seen in our Emergency Department today, 2017.  He may return to work Monday, 2017.     Sincerely,        Nathalie Barahona, RN, BSN

## 2017-04-20 NOTE — ED AVS SNAPSHOT
[facility] Emergency Department    [address]    [facility] [address]    Phone:  [phone]    Fax:  [phone]                                       Andrea Uribe   MRN: [id number]    Department:  [facility] Emergency Department   Date of Visit:  4/20/2017           Patient Information     Date Of Birth          1975        Your diagnoses for this visit were:     RUQ abdominal pain        You were seen by Mckenzie Dee MD.      Follow-up Information     Follow up with Alberto Medley PA-C In 3 days.    Specialty:  Physician Assistant    Contact information:    [facility]  [address] [facility] [address] [phone]          Follow up with [facility] In 1 week.    Contact information:    [address]  [facility] [address]  [phone]          Discharge Instructions       Please follow up closely with your regular physician. Please return to the ED if your symptoms worsen or if you develop new or concerning symptoms.     Discharge Instructions  Abdominal Pain    Abdominal pain can be caused by many things. Your evaluation today does not show the exact cause for your pain. Your doctor today has decided that it is unlikely your pain is due to a life threatening problem, or a problem requiring surgery or hospital admission. Sometimes those problems cannot be found right away, so it is very important that you follow up as directed.  Sometimes only the changes which occur over time allow the cause of your pain to be found.    Return to the Emergency Department for a recheck in 8-12 hours if your pain continues.  If your pain gets worse, changes in location, or feels different, return to the Emergency Department right away.    ADULTS:  Return to the Emergency Department right away if:      You get an oral temperature above 102oF or as directed by your doctor.    You have blood in your stools (bright  red or black, tarry stools).    You keep throwing up or can t drink liquids.    You see blood when you throw up.    You can t have a bowel movement or you can t pass gas.    Your stomach gets bloated or bigger.    Your skin or the whites of your eyes look yellow.    You faint.    You have bloody, frequent or painful urination.    You have new symptoms or anything that worries you.    CHILDREN:  Return to the Emergency Department right away if your child has any of the above-listed symptoms or the following:      Pushes your hand away or screams/cries when his/her belly is touched.    You notice your child is very fussy or weak.    Your child is very tired and is too tired to eat or drink.    Your child is dehydrated.  Signs of dehydration can be:  o Your infant has had no wet diapers in 4-5 hours.  o Your older child has not passed urine in 6-8 hours.  o Your infant or child starts to have dry mouth and lips, or no saliva or tears.    PREGNANT WOMEN:  Return to the Emergency Department right away if you have any of the above-listed symptoms or the following:      You have bleeding, leaking fluid or passing tissue from the vagina.    You have worse pain or cramping, or pain in your shoulder or back.    You have vomiting that will not stop.    You have painful or bloody urination.    You have a temperature of 100oF or more.    Your baby is not moving as much as usual.    You faint.    You get a bad headache with or without eye problems and abdominal pain.    You have a convulsion or seizure.    You have unusual discharge from your vagina and abdominal pain.    Abdominal pain is pretty common during pregnancy.  Your pain may or may not be related to your pregnancy. You should follow-up closely with your OB doctor so they can evaluate you and your baby.  Until you follow-up with your regular doctor, do the following:       Avoid sex and do not put anything in your vagina.    Drink clear fluids.    Only take medications  "approved by your doctor.    MORE INFORMATION:    Appendicitis:  A possible cause of abdominal pain in any person who still has their appendix is acute appendicitis. Appendicitis is often hard to diagnose.  Testing does not always rule out early appendicitis or other causes of abdominal pain. Close follow-up with your doctor and re-evaluations may be needed to figure out the reason for your abdominal pain.    Follow-up:  It is very important that you make an appointment with your clinic and go to the appointment.  If you do not follow-up with your primary doctor, it may result in missing an important development which could result in permanent injury or disability and/or lasting pain.  If there is any problem keeping your appointment, call your doctor or return to the Emergency Department.    Medications:  Take your medications as directed by your doctor today.  Before using over-the-counter medications, ask your doctor and make sure to take the medications as directed.  If you have any questions about medications, ask your doctor.    Diet:  Resume your normal diet as much as possible, but do not eat fried, fatty or spicy foods while you have pain.  Do not drink alcohol or have caffeine.  Do not smoke tobacco.    Probiotics: If you have been given an antibiotic, you may want to also take a probiotic pill or eat yogurt with live cultures. Probiotics have \"good bacteria\" to help your intestines stay healthy. Studies have shown that probiotics help prevent diarrhea and other intestine problems (including C. diff infection) when you take antibiotics. You can buy these without a prescription in the pharmacy section of the store.     If you were given a prescription for medicine here today, be sure to read all of the information (including the package insert) that comes with your prescription.  This will include important information about the medicine, its side effects, and any warnings that you need to know about.  The " pharmacist who fills the prescription can provide more information and answer questions you may have about the medicine.  If you have questions or concerns that the pharmacist cannot address, please call or return to the Emergency Department.         Opioid Medication Information    Pain medications are among the most commonly prescribed medicines, so we are including this information for all our patients. If you did not receive pain medication or get a prescription for pain medicine, you can ignore it.     You may have been given a prescription for an opioid (narcotic) pain medicine and/or have received a pain medicine while here in the Emergency Department. These medicines can make you drowsy or impaired. You must not drive, operate dangerous equipment, or engage in any other dangerous activities while taking these medications. If you drive while taking these medications, you could be arrested for DUI, or driving under the influence. Do not drink any alcohol while you are taking these medications.     Opioid pain medications can cause addiction. If you have a history of chemical dependency of any type, you are at a higher risk of becoming addicted to pain medications.  Only take these prescribed medications to treat your pain when all other options have been tried. Take it for as short a time and as few doses as possible. Store your pain pills in a secure place, as they are frequently stolen and provide a dangerous opportunity for children or visitors in your house to start abusing these powerful medications. We will not replace any lost or stolen medicine.  As soon as your pain is better, you should flush all your remaining medication.     Many prescription pain medications contain Tylenol  (acetaminophen), including Vicodin , Tylenol #3 , Norco , Lortab , and Percocet .  You should not take any extra pills of Tylenol  if you are using these prescription medications or you can get very sick.  Do not ever take  more than 3000 mg of acetaminophen in any 24 hour period.    All opioids tend to cause constipation. Drink plenty of water and eat foods that have a lot of fiber, such as fruits, vegetables, prune juice, apple juice and high fiber cereal.  Take a laxative if you don t move your bowels at least every other day. Miralax , Milk of Magnesia, Colace , or Senna  can be used to keep you regular.      Remember that you can always come back to the Emergency Department if you are not able to see your regular doctor in the amount of time listed above, if you get any new symptoms, or if there is anything that worries you.        Gallstones with Biliary Colic    You have abdominal pain due to irritation and spasm of the gallbladder. This is called biliary colic. The gallbladder is a small sac under the liver, which stores and releases a fluid that aids in the digestion of fat. A collection of crystals may form stones inside the gallbladder (gallstones). Gallstones can cause the gallbladder to spasm. If they block the duct out of the gallbladder, they can cause pain and even an infection.   A number of factors increase the risk for having gallstones:    Being female    Being severely overweight (obese)    Older age    Losing or gaining weight quickly    Eating a high-calorie diet    Being pregnant    Taking hormone therapy    Having diabetes  Home care    Rest in bed.    Drink only clear liquids until you feel better.    You may have been prescribed medicine for pain or nausea. Take these as directed.    Fat in your diet makes the gallbladder contract and may cause increased pain. Avoid foods that are high in fat (such as full-fat dairy, fried foods, and fatty meats) for at least two days.    If you are overweight, talk to your healthcare provider about losing weight.  Follow-up care  Follow up with your healthcare provider or as advise. There is a chance that you will have another episode of pain from your gallstones at some  point. Removal of the gallbladder is an option to prevent this. Talk with your healthcare provider about your treatment options.  When to seek medical advice  Call your healthcare provider if any of the following occur:    Worsening pain or pain lasting for longer than 6 hours    Pain moving to the right lower abdomen    Repeated vomiting    Swollen abdomen    Fever of 100.4 F (38 C) or higher, or as directed by your healthcare provider    Very dark urine, light colored stools, or yellow color of the skin or eyes    Chest, arm, back, neck or jaw pain    2716-6444 [employer] [address] [place] [address]. All rights reserved. This information is not intended as a substitute for professional medical care. Always follow your healthcare professional's instructions.          Future Appointments        Provider Department Dept Phone Center    4/21/2017 11:30 AM Bella Reece NP [facility] [phone] [facility]      24 Hour Appointment Hotline       To make an appointment at any [facility], call [phone] ([phone]). If you don't have a family doctor or clinic, we will help you find one. [facility] are conveniently located to serve the needs of you and your family.             Review of your medicines      START taking        Dose / Directions Last dose taken    oxyCODONE 5 MG IR tablet   Commonly known as:  ROXICODONE   Dose:  5 mg   Quantity:  12 tablet        Take 1 tablet (5 mg) by mouth every 6 hours as needed for pain   Refills:  0          Our records show that you are taking the medicines listed below. If these are incorrect, please call your family doctor or clinic.        Dose / Directions Last dose taken    albuterol 108 (90 BASE) MCG/ACT Inhaler   Commonly known as:  PROAIR HFA/PROVENTIL HFA/VENTOLIN HFA   Dose:  2 puff   Quantity:  1 Inhaler        Inhale 2 puffs into the lungs every 6 hours as needed for shortness of breath / dyspnea    Refills:  3        cyclobenzaprine 5 MG tablet   Commonly known as:  FLEXERIL        TAKE 1 TABLET BY MOUTH 3 TIMES A DAY AS NEEDED   Refills:  0        DILANTIN 100 MG CR capsule   Dose:  300 mg   Generic drug:  phenytoin        Take 300 mg by mouth 2 times daily at 900 and 2100   Refills:  0        KEPPRA 1000 MG Tabs   Dose:  2000 mg   Generic drug:  levETIRAcetam        Take 2,000 mg by mouth 2 times daily At 0900 and 2100   Refills:  0        lisinopril 5 MG tablet   Commonly known as:  PRINIVIL/ZESTRIL   Dose:  5 mg   Quantity:  90 tablet        Take 1 tablet (5 mg) by mouth daily   Refills:  2        oxyCODONE-acetaminophen 5-325 MG per tablet   Commonly known as:  PERCOCET   Dose:  1-2 tablet   Quantity:  75 tablet        Take 1-2 tablets by mouth every 4 hours as needed for pain (moderate to severe)   Refills:  0        polyethylene glycol powder   Commonly known as:  MIRALAX   Dose:  1 capful   Quantity:  527 g        Take 17 g (1 capful) by mouth daily   Refills:  0        simvastatin 20 MG tablet   Commonly known as:  ZOCOR   Dose:  20 mg   Quantity:  90 tablet        Take 1 tablet (20 mg) by mouth At Bedtime   Refills:  3        traMADol 50 MG tablet   Commonly known as:  ULTRAM   Dose:   mg   Quantity:  20 tablet        Take 1-2 tablets ( mg) by mouth every 6 hours as needed for moderate pain Do not drive or operate machinery while taking this medication.   Refills:  0        venlafaxine 37.5 MG Tb24 24 hr tablet   Commonly known as:  EFFEXOR-ER   Dose:  37.5 mg   Quantity:  90 tablet        Take 1 tablet (37.5 mg) by mouth daily (with breakfast) Overdue for visit.  #30 only   Refills:  3                Prescriptions were sent or printed at these locations (1 Prescription)                   Other Prescriptions                Printed at Department/Unit printer (1 of 1)         oxyCODONE (ROXICODONE) 5 MG IR tablet                Procedures and tests performed during your visit      Procedure/Test Number of Times Performed    Abdomen US, limited (RUQ only) 1    CBC with platelets differential 1    Comprehensive metabolic panel 1    EKG 12 lead 1    Lipase 1    Troponin I 1    UA with Microscopic 2      Orders Needing Specimen Collection     None      Pending Results     Date and Time Order Name Status Description    4/20/2017 2335 Abdomen US, limited (RUQ only) Preliminary             Pending Culture Results     No orders found for last 3 day(s).            Test Results From Your Hospital Stay        4/21/2017  2:48 AM      Narrative     ULTRASOUND ABDOMEN LIMITED RIGHT UPPER QUADRANT  4/21/2017 12:52 AM     HISTORY: Right upper quadrant pain.    COMPARISON: 4/18/2017 - CT abdomen and pelvis.    FINDINGS: Normal hepatic echogenicity. No hepatic masses. 1.5 cm  gallstone within a nondistended gallbladder. No gallbladder wall  thickening, pericholecystic fluid, or focal tenderness over the  gallbladder. No intra- or extrahepatic biliary dilatation. The common  duct measures 0.5 cm in diameter. The right kidney has normal  echogenicity, measuring 8.7 cm in length. No right intrarenal  collecting system dilatation, calculi or masses. No free fluid in the  upper right hemiabdomen.        Impression     IMPRESSION:  1. 1.5 cm gallstone in the gallbladder. No evidence of acute  cholecystitis.  2. Unremarkable appearance of the liver.  3. No biliary dilatation.         4/21/2017 12:18 AM      Component Results     Component Value Ref Range & Units Status    WBC 9.0 4.0 - 11.0 10e9/L Final    RBC Count 4.03 (L) 4.4 - 5.9 10e12/L Final    Hemoglobin 11.9 (L) 13.3 - 17.7 g/dL Final    Hematocrit 36.9 (L) 40.0 - 53.0 % Final    MCV 92 78 - 100 fl Final    MCH 29.5 26.5 - 33.0 pg Final    MCHC 32.2 31.5 - 36.5 g/dL Final    RDW 14.6 10.0 - 15.0 % Final    Platelet Count 202 150 - 450 10e9/L Final    Diff Method Automated Method  Final    % Neutrophils 64.4 % Final    % Lymphocytes 24.6 % Final    %  Monocytes 7.3 % Final    % Eosinophils 3.2 % Final    % Basophils 0.3 % Final    % Immature Granulocytes 0.2 % Final    Nucleated RBCs 0 0 /100 Final    Absolute Neutrophil 5.8 1.6 - 8.3 10e9/L Final    Absolute Lymphocytes 2.2 0.8 - 5.3 10e9/L Final    Absolute Monocytes 0.7 0.0 - 1.3 10e9/L Final    Absolute Eosinophils 0.3 0.0 - 0.7 10e9/L Final    Absolute Basophils 0.0 0.0 - 0.2 10e9/L Final    Abs Immature Granulocytes 0.0 0 - 0.4 10e9/L Final    Absolute Nucleated RBC 0.0  Final         4/21/2017 12:35 AM      Component Results     Component Value Ref Range & Units Status    Sodium 141 133 - 144 mmol/L Final    Potassium 3.8 3.4 - 5.3 mmol/L Final    Chloride 105 94 - 109 mmol/L Final    Carbon Dioxide 29 20 - 32 mmol/L Final    Anion Gap 7 3 - 14 mmol/L Final    Glucose 96 70 - 99 mg/dL Final    Urea Nitrogen 20 7 - 30 mg/dL Final    Creatinine 1.00 0.66 - 1.25 mg/dL Final    GFR Estimate 82 >60 mL/min/1.7m2 Final    Non  GFR Calc    GFR Estimate If Black >90   GFR Calc   >60 mL/min/1.7m2 Final    Calcium 8.2 (L) 8.5 - 10.1 mg/dL Final    Bilirubin Total 0.1 (L) 0.2 - 1.3 mg/dL Final    Albumin 3.0 (L) 3.4 - 5.0 g/dL Final    Protein Total 7.4 6.8 - 8.8 g/dL Final    Alkaline Phosphatase 153 (H) 40 - 150 U/L Final    ALT 17 0 - 70 U/L Final    AST 11 0 - 45 U/L Final         4/21/2017 12:35 AM      Component Results     Component Value Ref Range & Units Status    Lipase 141 73 - 393 U/L Final         4/21/2017  2:50 AM      Component Results     Component Value Ref Range & Units Status    Troponin I ES  0.000 - 0.045 ug/L Final    <0.015  The 99th percentile for upper reference range is 0.045 ug/L.  Troponin values in   the range of 0.045 - 0.120 ug/L may be associated with risks of adverse   clinical events.           4/21/2017  2:54 AM      Component Results     Component Value Ref Range & Units Status    Color Urine Yellow  Final    Appearance Urine Clear  Final    Glucose  Urine Negative NEG mg/dL Final    Bilirubin Urine Negative NEG Final    Ketones Urine Negative NEG mg/dL Final    Specific Gravity Urine 1.021 1.003 - 1.035 Final    Blood Urine Negative NEG Final    pH Urine 5.0 5.0 - 7.0 pH Final    Protein Albumin Urine Negative NEG mg/dL Final    Urobilinogen mg/dL 0.0 0.0 - 2.0 mg/dL Final    Nitrite Urine Negative NEG Final    Leukocyte Esterase Urine Negative NEG Final    Source Midstream Urine  Final    WBC Urine <1 0 - 2 /HPF Final    RBC Urine <1 0 - 2 /HPF Final    Bacteria Urine Few (A) NEG /HPF Final    Squamous Epithelial /HPF Urine <1 0 - 1 /HPF Final    Mucous Urine Present (A) NEG /LPF Final    Hyaline Casts 3 (H) 0 - 2 /LPF Final                Clinical Quality Measure: Blood Pressure Screening     Your blood pressure was checked while you were in the emergency department today. The last reading we obtained was  BP: 102/68 . Please read the guidelines below about what these numbers mean and what you should do about them.  If your systolic blood pressure (the top number) is less than 120 and your diastolic blood pressure (the bottom number) is less than 80, then your blood pressure is normal. There is nothing more that you need to do about it.  If your systolic blood pressure (the top number) is 120-139 or your diastolic blood pressure (the bottom number) is 80-89, your blood pressure may be higher than it should be. You should have your blood pressure rechecked within a year by a primary care provider.  If your systolic blood pressure (the top number) is 140 or greater or your diastolic blood pressure (the bottom number) is 90 or greater, you may have high blood pressure. High blood pressure is treatable, but if left untreated over time it can put you at risk for heart attack, stroke, or kidney failure. You should have your blood pressure rechecked by a primary care provider within the next 4 weeks.  If your provider in the emergency department today gave you  "specific instructions to follow-up with your doctor or provider even sooner than that, you should follow that instruction and not wait for up to 4 weeks for your follow-up visit.        Thank you for choosing [facility]       Thank you for choosing [facility] for your care. Our goal is always to provide you with excellent care. Hearing back from our patients is one way we can continue to improve our services. Please take a few minutes to complete the written survey that you may receive in the mail after you visit with us. Thank you!        [employer] Information     [employer] lets you send messages to your doctor, view your test results, renew your prescriptions, schedule appointments and more. To sign up, go to [email or web address] . Click on \"Log in\" on the left side of the screen, which will take you to the Welcome page. Then click on \"Sign up Now\" on the right side of the page.     You will be asked to enter the access code listed below, as well as some personal information. Please follow the directions to create your username and password.     Your access code is: [email or web address]  Expires: 2017  8:40 PM     Your access code will  in 90 days. If you need help or a new code, please call your [facility] clinic or [phone].        Care EveryWhere ID     This is your Care EveryWhere ID. This could be used by other organizations to access your [facility] medical records  [id number]        After Visit Summary       This is your record. Keep this with you and show to your community pharmacist(s) and doctor(s) at your next visit.                  "

## 2017-04-20 NOTE — ED AVS SNAPSHOT
[facility] Emergency Department    [address]    [facility] [address]    Phone:  [phone]    Fax:  [phone]                                       Andrea Uribe   MRN: [id number]    Department:  [facility] Emergency Department   Date of Visit:  4/20/2017           After Visit Summary Signature Page     I have received my discharge instructions, and my questions have been answered. I have discussed any challenges I see with this plan with the nurse or doctor.    ..........................................................................................................................................  Patient/Patient Representative Signature      ..........................................................................................................................................  Patient Representative Print Name and Relationship to Patient    ..................................................               ................................................  Date                                            Time    ..........................................................................................................................................  Reviewed by Signature/Title    ...................................................              ..............................................  Date                                                            Time

## 2017-04-21 ENCOUNTER — APPOINTMENT (OUTPATIENT)
Dept: ULTRASOUND IMAGING | Facility: CLINIC | Age: 42
End: 2017-04-21
Attending: EMERGENCY MEDICINE
Payer: MEDICARE

## 2017-04-21 ENCOUNTER — OFFICE VISIT (OUTPATIENT)
Dept: FAMILY MEDICINE | Facility: CLINIC | Age: 42
End: 2017-04-21
Payer: MEDICARE

## 2017-04-21 VITALS
SYSTOLIC BLOOD PRESSURE: 100 MMHG | HEIGHT: 71 IN | WEIGHT: 210 LBS | HEART RATE: 73 BPM | DIASTOLIC BLOOD PRESSURE: 70 MMHG | OXYGEN SATURATION: 96 % | BODY MASS INDEX: 29.4 KG/M2

## 2017-04-21 VITALS
RESPIRATION RATE: 15 BRPM | DIASTOLIC BLOOD PRESSURE: 68 MMHG | SYSTOLIC BLOOD PRESSURE: 102 MMHG | TEMPERATURE: 98 F | HEART RATE: 60 BPM | OXYGEN SATURATION: 95 %

## 2017-04-21 DIAGNOSIS — K80.20 GALLSTONES: Primary | ICD-10-CM

## 2017-04-21 LAB
ALBUMIN SERPL-MCNC: 3 G/DL (ref 3.4–5)
ALBUMIN UR-MCNC: NEGATIVE MG/DL
ALP SERPL-CCNC: 153 U/L (ref 40–150)
ALT SERPL W P-5'-P-CCNC: 17 U/L (ref 0–70)
ANION GAP SERPL CALCULATED.3IONS-SCNC: 7 MMOL/L (ref 3–14)
APPEARANCE UR: CLEAR
AST SERPL W P-5'-P-CCNC: 11 U/L (ref 0–45)
BACTERIA #/AREA URNS HPF: ABNORMAL /HPF
BASOPHILS # BLD AUTO: 0 10E9/L (ref 0–0.2)
BASOPHILS NFR BLD AUTO: 0.3 %
BILIRUB SERPL-MCNC: 0.1 MG/DL (ref 0.2–1.3)
BILIRUB UR QL STRIP: NEGATIVE
BUN SERPL-MCNC: 20 MG/DL (ref 7–30)
CALCIUM SERPL-MCNC: 8.2 MG/DL (ref 8.5–10.1)
CHLORIDE SERPL-SCNC: 105 MMOL/L (ref 94–109)
CO2 SERPL-SCNC: 29 MMOL/L (ref 20–32)
COLOR UR AUTO: YELLOW
CREAT SERPL-MCNC: 1 MG/DL (ref 0.66–1.25)
DIFFERENTIAL METHOD BLD: ABNORMAL
EOSINOPHIL # BLD AUTO: 0.3 10E9/L (ref 0–0.7)
EOSINOPHIL NFR BLD AUTO: 3.2 %
ERYTHROCYTE [DISTWIDTH] IN BLOOD BY AUTOMATED COUNT: 14.6 % (ref 10–15)
GFR SERPL CREATININE-BSD FRML MDRD: 82 ML/MIN/1.7M2
GLUCOSE SERPL-MCNC: 96 MG/DL (ref 70–99)
GLUCOSE UR STRIP-MCNC: NEGATIVE MG/DL
HCT VFR BLD AUTO: 36.9 % (ref 40–53)
HGB BLD-MCNC: 11.9 G/DL (ref 13.3–17.7)
HGB UR QL STRIP: NEGATIVE
HYALINE CASTS #/AREA URNS LPF: 3 /LPF (ref 0–2)
IMM GRANULOCYTES # BLD: 0 10E9/L (ref 0–0.4)
IMM GRANULOCYTES NFR BLD: 0.2 %
INTERPRETATION ECG - MUSE: NORMAL
KETONES UR STRIP-MCNC: NEGATIVE MG/DL
LEUKOCYTE ESTERASE UR QL STRIP: NEGATIVE
LIPASE SERPL-CCNC: 141 U/L (ref 73–393)
LYMPHOCYTES # BLD AUTO: 2.2 10E9/L (ref 0.8–5.3)
LYMPHOCYTES NFR BLD AUTO: 24.6 %
MCH RBC QN AUTO: 29.5 PG (ref 26.5–33)
MCHC RBC AUTO-ENTMCNC: 32.2 G/DL (ref 31.5–36.5)
MCV RBC AUTO: 92 FL (ref 78–100)
MONOCYTES # BLD AUTO: 0.7 10E9/L (ref 0–1.3)
MONOCYTES NFR BLD AUTO: 7.3 %
MUCOUS THREADS #/AREA URNS LPF: PRESENT /LPF
NEUTROPHILS # BLD AUTO: 5.8 10E9/L (ref 1.6–8.3)
NEUTROPHILS NFR BLD AUTO: 64.4 %
NITRATE UR QL: NEGATIVE
NRBC # BLD AUTO: 0 10*3/UL
NRBC BLD AUTO-RTO: 0 /100
PH UR STRIP: 5 PH (ref 5–7)
PLATELET # BLD AUTO: 202 10E9/L (ref 150–450)
POTASSIUM SERPL-SCNC: 3.8 MMOL/L (ref 3.4–5.3)
PROT SERPL-MCNC: 7.4 G/DL (ref 6.8–8.8)
RBC # BLD AUTO: 4.03 10E12/L (ref 4.4–5.9)
RBC #/AREA URNS AUTO: <1 /HPF (ref 0–2)
SODIUM SERPL-SCNC: 141 MMOL/L (ref 133–144)
SP GR UR STRIP: 1.02 (ref 1–1.03)
SQUAMOUS #/AREA URNS AUTO: <1 /HPF (ref 0–1)
TROPONIN I SERPL-MCNC: NORMAL UG/L (ref 0–0.04)
URN SPEC COLLECT METH UR: ABNORMAL
UROBILINOGEN UR STRIP-MCNC: 0 MG/DL (ref 0–2)
WBC # BLD AUTO: 9 10E9/L (ref 4–11)
WBC #/AREA URNS AUTO: <1 /HPF (ref 0–2)

## 2017-04-21 PROCEDURE — 25000128 H RX IP 250 OP 636: Performed by: EMERGENCY MEDICINE

## 2017-04-21 PROCEDURE — 96374 THER/PROPH/DIAG INJ IV PUSH: CPT

## 2017-04-21 PROCEDURE — 99214 OFFICE O/P EST MOD 30 MIN: CPT | Performed by: NURSE PRACTITIONER

## 2017-04-21 PROCEDURE — 96375 TX/PRO/DX INJ NEW DRUG ADDON: CPT

## 2017-04-21 PROCEDURE — 81001 URINALYSIS AUTO W/SCOPE: CPT | Performed by: EMERGENCY MEDICINE

## 2017-04-21 PROCEDURE — 76705 ECHO EXAM OF ABDOMEN: CPT

## 2017-04-21 PROCEDURE — 96361 HYDRATE IV INFUSION ADD-ON: CPT

## 2017-04-21 RX ORDER — OXYCODONE HYDROCHLORIDE 5 MG/1
5 TABLET ORAL EVERY 6 HOURS PRN
Qty: 12 TABLET | Refills: 0 | Status: ON HOLD | OUTPATIENT
Start: 2017-04-21 | End: 2017-05-04

## 2017-04-21 RX ORDER — OXYCODONE AND ACETAMINOPHEN 5; 325 MG/1; MG/1
1 TABLET ORAL EVERY 6 HOURS PRN
Qty: 30 TABLET | Refills: 0 | Status: ON HOLD | OUTPATIENT
Start: 2017-04-21 | End: 2017-05-04

## 2017-04-21 RX ADMIN — SODIUM CHLORIDE 1000 ML: 9 INJECTION, SOLUTION INTRAVENOUS at 00:01

## 2017-04-21 RX ADMIN — ONDANSETRON 4 MG: 2 INJECTION INTRAMUSCULAR; INTRAVENOUS at 00:01

## 2017-04-21 RX ADMIN — HYDROMORPHONE HYDROCHLORIDE 0.5 MG: 1 INJECTION, SOLUTION INTRAMUSCULAR; INTRAVENOUS; SUBCUTANEOUS at 00:02

## 2017-04-21 NOTE — MR AVS SNAPSHOT
After Visit Summary   4/21/2017    Andrea Uribe    MRN: [id number]           Patient Information     Date Of Birth          1975        Visit Information        Provider Department      4/21/2017 11:30 AM Bella Reece NP [facility]        Today's Diagnoses     Gallstones    -  1       Follow-ups after your visit        Additional Services     GENERAL SURG ADULT REFERRAL       Your provider has referred you to: [facility]: [facility] [phone]   [email or web address]    Please be aware that coverage of these services is subject to the terms and limitations of your health insurance plan.  Call member services at your health plan with any benefit or coverage questions.      Please bring the following with you to your appointment:    (1) Any X-Rays, CTs or MRIs which have been performed.  Contact the facility where they were done to arrange for  prior to your scheduled appointment.   (2) List of current medications   (3) This referral request   (4) Any documents/labs given to you for this referral                  Who to contact     If you have questions or need follow up information about today's clinic visit or your schedule please contact [facility] directly at [phone].  Normal or non-critical lab and imaging results will be communicated to you by MyChart, letter or phone within 4 business days after the clinic has received the results. If you do not hear from us within 7 days, please contact the clinic through MyChart or phone. If you have a critical or abnormal lab result, we will notify you by phone as soon as possible.  Submit refill requests through [employer] or call your pharmacy and they will forward the refill request to us. Please allow 3 business days for your refill to be completed.          Additional Information About Your Visit        [employer] Information     [employer] lets you send  "messages to your doctor, view your test results, renew your prescriptions, schedule appointments and more. To sign up, go to [email or web address] . Click on \"Log in\" on the left side of the screen, which will take you to the Welcome page. Then click on \"Sign up Now\" on the right side of the page.     You will be asked to enter the access code listed below, as well as some personal information. Please follow the directions to create your username and password.     Your access code is: [email or web address]  Expires: 2017  8:40 PM     Your access code will  in 90 days. If you need help or a new code, please call your [place] clinic or [phone].        Care EveryWhere ID     This is your Care EveryWhere ID. This could be used by other organizations to access your [place] medical records  [id number]        Your Vitals Were     Pulse Height Pulse Oximetry BMI (Body Mass Index)          73 5' 11\" (1.803 m) 96% 29.29 kg/m2         Blood Pressure from Last 3 Encounters:   17 100/70   17 102/68   17 110/74    Weight from Last 3 Encounters:   17 210 lb (95.3 kg)   17 210 lb (95.3 kg)   16 211 lb (95.7 kg)              We Performed the Following     GENERAL SURG ADULT REFERRAL          Today's Medication Changes          These changes are accurate as of: 17 11:38 AM.  If you have any questions, ask your nurse or doctor.               These medicines have changed or have updated prescriptions.        Dose/Directions    * oxyCODONE-acetaminophen 5-325 MG per tablet   Commonly known as:  PERCOCET   This may have changed:  Another medication with the same name was added. Make sure you understand how and when to take each.   Used for:  Status post total right knee replacement        Dose:  1-2 tablet   Take 1-2 tablets by mouth every 4 hours as needed for pain (moderate to severe)   Quantity:  75 tablet   Refills:  0       * oxyCODONE-acetaminophen 5-325 MG per tablet   Commonly " known as:  PERCOCET   This may have changed:  You were already taking a medication with the same name, and this prescription was added. Make sure you understand how and when to take each.   Used for:  Gallstones        Dose:  1 tablet   Take 1 tablet by mouth every 6 hours as needed for pain maximum 4 tablet(s) per day   Quantity:  30 tablet   Refills:  0       * Notice:  This list has 2 medication(s) that are the same as other medications prescribed for you. Read the directions carefully, and ask your doctor or other care provider to review them with you.         Where to get your medicines      Some of these will need a paper prescription and others can be bought over the counter.  Ask your nurse if you have questions.     Bring a paper prescription for each of these medications     oxyCODONE-acetaminophen 5-325 MG per tablet                Primary Care Provider Office Phone # Fax #    Alberto Shon Medley PA-C [phone]       [address] [address]        Thank you!     Thank you for choosing [facility]  for your care. Our goal is always to provide you with excellent care. Hearing back from our patients is one way we can continue to improve our services. Please take a few minutes to complete the written survey that you may receive in the mail after your visit with us. Thank you!             Your Updated Medication List - Protect others around you: Learn how to safely use, store and throw away your medicines at www.disposemymeds.org.          This list is accurate as of: 4/21/17 11:38 AM.  Always use your most recent med list.                   Brand Name Dispense Instructions for use    albuterol 108 (90 BASE) MCG/ACT Inhaler    PROAIR HFA/PROVENTIL HFA/VENTOLIN HFA    1 Inhaler    Inhale 2 puffs into the lungs every 6 hours as needed for shortness of breath / dyspnea       cyclobenzaprine 5 MG tablet    FLEXERIL     TAKE 1 TABLET BY  MOUTH 3 TIMES A DAY AS NEEDED       DILANTIN 100 MG CR capsule   Generic drug:  phenytoin      Take 300 mg by mouth 2 times daily at 900 and 2100       KEPPRA 1000 MG Tabs   Generic drug:  levETIRAcetam      Take 2,000 mg by mouth 2 times daily At 0900 and 2100       lisinopril 5 MG tablet    PRINIVIL/ZESTRIL    90 tablet    Take 1 tablet (5 mg) by mouth daily       oxyCODONE 5 MG IR tablet    ROXICODONE    12 tablet    Take 1 tablet (5 mg) by mouth every 6 hours as needed for pain       * oxyCODONE-acetaminophen 5-325 MG per tablet    PERCOCET    75 tablet    Take 1-2 tablets by mouth every 4 hours as needed for pain (moderate to severe)       * oxyCODONE-acetaminophen 5-325 MG per tablet    PERCOCET    30 tablet    Take 1 tablet by mouth every 6 hours as needed for pain maximum 4 tablet(s) per day       polyethylene glycol powder    MIRALAX    527 g    Take 17 g (1 capful) by mouth daily       simvastatin 20 MG tablet    ZOCOR    90 tablet    Take 1 tablet (20 mg) by mouth At Bedtime       traMADol 50 MG tablet    ULTRAM    20 tablet    Take 1-2 tablets ( mg) by mouth every 6 hours as needed for moderate pain Do not drive or operate machinery while taking this medication.       venlafaxine 37.5 MG Tb24 24 hr tablet    EFFEXOR-ER    90 tablet    Take 1 tablet (37.5 mg) by mouth daily (with breakfast) Overdue for visit.  #30 only       * Notice:  This list has 2 medication(s) that are the same as other medications prescribed for you. Read the directions carefully, and ask your doctor or other care provider to review them with you.

## 2017-04-21 NOTE — PROGRESS NOTES
SUBJECTIVE:                                                    Andrea Uribe is a 41 year old male who presents to clinic today for the following health issues:      ED/UC Followup:    Facility:  [facility]   Date of visit: 04/18 and 04/20/2017  Reason for visit: right sided abdominal pain   Current Status: severe pain    Patient was seen in the ER at [facility] for the past two days. CT of abdomen shows gallstones. Worsening abdominal pain and nausea for the past two days. Unable to eat but is able to drink fluids. Pain is in the right upper quadrant and radiating towards the back and to the right flank. History of hernia repair (7 at one time). Given tramadol for pain in the ER and sent home. Family is very upset.         Problem list and histories reviewed & adjusted, as indicated.  Additional history: none    Patient Active Problem List   Diagnosis     Seizure disorder (H)     TBI (traumatic brain injury) (H)     Moderate major depression (H)     Obesity     Anxiety     Sleep apnea     GERD (gastroesophageal reflux disease)     Intermittent asthma     CKD (chronic kidney disease) stage 2, GFR 60-89 ml/min     MRSA cellulitis     Anemia     Femur fracture, right (H)     Physical deconditioning     S/P total knee replacement     Health Care Home     Hyperlipidemia LDL goal <130     Incisional hernia, without obstruction or gangrene     Ataxic gait     S/P total knee arthroplasty     Acute post-operative pain     Past Surgical History:   Procedure Laterality Date     APPENDECTOMY OPEN  8/11/2012    Procedure: APPENDECTOMY OPEN;  Exploratory Laparotomy, Appendectomy, Remove part IVC filter from duodenum with repair;  Surgeon: Michael Jimenez MD;  Location:  OR     ARTHROPLASTY KNEE Right 8/27/2014    Procedure: ARTHROPLASTY KNEE;  Surgeon: David Mckay MD;  Location:  OR     ARTHROPLASTY REVISION KNEE Right 12/13/2016    Procedure: ARTHROPLASTY REVISION KNEE;  Surgeon: David Mckay  MD Lai;  Location:  OR     ENT SURGERY      tracheostomy     ESOPHAGOSCOPY, GASTROSCOPY, DUODENOSCOPY (EGD), COMBINED  8/11/2012    Procedure: COMBINED ESOPHAGOSCOPY, GASTROSCOPY, DUODENOSCOPY (EGD);   ESOPHAGOSCOPY, GASTROSCOPY, DUODENOSCOPY (EGD);  Surgeon: Holland Lawson MD;  Location:  GI     HERNIORRHAPHY INCISIONAL (LOCATION) N/A 5/26/2016    Procedure: HERNIORRHAPHY INCISIONAL (LOCATION);  Surgeon: John Mcfarlane MD;  Location:  OR     IMPLANT STIMULATOR VAGUS NERVE  1/16/2012    Procedure:IMPLANT STIMULATOR VAGUS NERVE; VAGUS NERVE STIMULATOR IMPLANT; Surgeon:LEILANI CORTÉS; Location: [facility]     LAPAROTOMY EXPLORATORY  8/11/2012    Procedure: LAPAROTOMY EXPLORATORY;;  Surgeon: Michael Jimenez MD;  Location:  OR     OPEN REDUCTION INTERNAL FIXATION FEMUR DISTAL  1/22/2014    Procedure: OPEN REDUCTION INTERNAL FIXATION FEMUR DISTAL;  right distal femur Open reduction internal fixation        ORTHOPEDIC SURGERY      removal of femur bone growth,hand surgery, knee surgery     REMOVE HARDWARE KNEE Right 8/27/2014    Procedure: REMOVE HARDWARE KNEE;  Surgeon: David Mckay MD;  Location:  OR     REMOVE HARDWARE LOWER EXTREMITY Right 10/14/2016    Procedure: REMOVE HARDWARE LOWER EXTREMITY;  Surgeon: David Mckay MD;  Location:  OR     spleen surgery      repaired     SURGICAL HISTORY OF -  Left 2009    selam in left femur     SURGICAL HISTORY OF -       screws in right knee     SURGICAL HISTORY OF -       .IVC filter, parts removed       Social History   Substance Use Topics     Smoking status: Never Smoker     Smokeless tobacco: Never Used     Alcohol use No     Family History   Problem Relation Age of Onset     Cardiovascular Mother      GASTROINTESTINAL DISEASE Father      Colitis     DIABETES Sister      CEREBROVASCULAR DISEASE Mother            Reviewed and updated as needed this visit by clinical staff  Allergies       Reviewed and updated as needed this  "visit by Provider         ROS:  Constitutional, HEENT, cardiovascular, pulmonary, gi and gu systems are negative, except as otherwise noted.    OBJECTIVE:                                                    /70 (BP Location: Right arm, Patient Position: Chair, Cuff Size: Adult Large)  Pulse 73  Ht 5' 11\" (1.803 m)  Wt 210 lb (95.3 kg)  SpO2 96%  BMI 29.29 kg/m2  Body mass index is 29.29 kg/(m^2).  GENERAL: alert, mild distress, over weight and fatigued  RESP: lungs clear to auscultation - no rales, rhonchi or wheezes  CV: regular rate and rhythm, normal S1 S2, no S3 or S4, no murmur, click or rub, no peripheral edema and peripheral pulses strong  ABDOMEN: bowel sounds hypoactive, tender in the RUQ,   PSYCH: anxious and fatigued         ASSESSMENT/PLAN:                                                            1. Gallstones  Percocet for pain and have called [facility] surgery consultants for an appointment as soon as possible. Going on Monday to see his surgeon who knows him the best and has done numerous surgeries on him. - GENERAL SURG ADULT REFERRAL  - oxyCODONE-acetaminophen (PERCOCET) 5-325 MG per tablet; Take 1 tablet by mouth every 6 hours as needed for pain maximum 4 tablet(s) per day  Dispense: 30 tablet; Refill: 0    If pain worsens, will need to go back to the ER once again.     Bella Reece, NP  [facility]    "

## 2017-04-21 NOTE — LETTER
[facility]   [address] [place] [address]  [phone]      April 21, 2017    RE:Andrea Uribe  [address] [facility] [id number]    1975                To whom it may concern:    Andrea Uribe is under my professional care for gallstones and severe pain. He will need his wife to help bring him to the surgeon on Monday. She may need to be absent from work as likely  will need surgery. .        Sincerely,      Bella Reece NP

## 2017-04-21 NOTE — ED NOTES
Reports worsening RUQ pain to right flank pain worsening since seen in ED Tuesday and found to have gallstones; states pain and nausea is much worse tonight; ABCs intact.

## 2017-04-21 NOTE — NURSING NOTE
"Chief Complaint   Patient presents with     Hospital F/U       Initial /70 (BP Location: Right arm, Patient Position: Chair, Cuff Size: Adult Large)  Pulse 73  Ht 5' 11\" (1.803 m)  Wt 210 lb (95.3 kg)  SpO2 96%  BMI 29.29 kg/m2 Estimated body mass index is 29.29 kg/(m^2) as calculated from the following:    Height as of this encounter: 5' 11\" (1.803 m).    Weight as of this encounter: 210 lb (95.3 kg).  Medication Reconciliation: complete   BROCK Caruso      "

## 2017-04-21 NOTE — DISCHARGE INSTRUCTIONS
Please follow up closely with your regular physician. Please return to the ED if your symptoms worsen or if you develop new or concerning symptoms.     Discharge Instructions  Abdominal Pain    Abdominal pain can be caused by many things. Your evaluation today does not show the exact cause for your pain. Your doctor today has decided that it is unlikely your pain is due to a life threatening problem, or a problem requiring surgery or hospital admission. Sometimes those problems cannot be found right away, so it is very important that you follow up as directed.  Sometimes only the changes which occur over time allow the cause of your pain to be found.    Return to the Emergency Department for a recheck in 8-12 hours if your pain continues.  If your pain gets worse, changes in location, or feels different, return to the Emergency Department right away.    ADULTS:  Return to the Emergency Department right away if:      You get an oral temperature above 102oF or as directed by your doctor.    You have blood in your stools (bright red or black, tarry stools).    You keep throwing up or can t drink liquids.    You see blood when you throw up.    You can t have a bowel movement or you can t pass gas.    Your stomach gets bloated or bigger.    Your skin or the whites of your eyes look yellow.    You faint.    You have bloody, frequent or painful urination.    You have new symptoms or anything that worries you.    CHILDREN:  Return to the Emergency Department right away if your child has any of the above-listed symptoms or the following:      Pushes your hand away or screams/cries when his/her belly is touched.    You notice your child is very fussy or weak.    Your child is very tired and is too tired to eat or drink.    Your child is dehydrated.  Signs of dehydration can be:  o Your infant has had no wet diapers in 4-5 hours.  o Your older child has not passed urine in 6-8 hours.  o Your infant or child starts to have dry  mouth and lips, or no saliva or tears.    PREGNANT WOMEN:  Return to the Emergency Department right away if you have any of the above-listed symptoms or the following:      You have bleeding, leaking fluid or passing tissue from the vagina.    You have worse pain or cramping, or pain in your shoulder or back.    You have vomiting that will not stop.    You have painful or bloody urination.    You have a temperature of 100oF or more.    Your baby is not moving as much as usual.    You faint.    You get a bad headache with or without eye problems and abdominal pain.    You have a convulsion or seizure.    You have unusual discharge from your vagina and abdominal pain.    Abdominal pain is pretty common during pregnancy.  Your pain may or may not be related to your pregnancy. You should follow-up closely with your OB doctor so they can evaluate you and your baby.  Until you follow-up with your regular doctor, do the following:       Avoid sex and do not put anything in your vagina.    Drink clear fluids.    Only take medications approved by your doctor.    MORE INFORMATION:    Appendicitis:  A possible cause of abdominal pain in any person who still has their appendix is acute appendicitis. Appendicitis is often hard to diagnose.  Testing does not always rule out early appendicitis or other causes of abdominal pain. Close follow-up with your doctor and re-evaluations may be needed to figure out the reason for your abdominal pain.    Follow-up:  It is very important that you make an appointment with your clinic and go to the appointment.  If you do not follow-up with your primary doctor, it may result in missing an important development which could result in permanent injury or disability and/or lasting pain.  If there is any problem keeping your appointment, call your doctor or return to the Emergency Department.    Medications:  Take your medications as directed by your doctor today.  Before using over-the-counter  "medications, ask your doctor and make sure to take the medications as directed.  If you have any questions about medications, ask your doctor.    Diet:  Resume your normal diet as much as possible, but do not eat fried, fatty or spicy foods while you have pain.  Do not drink alcohol or have caffeine.  Do not smoke tobacco.    Probiotics: If you have been given an antibiotic, you may want to also take a probiotic pill or eat yogurt with live cultures. Probiotics have \"good bacteria\" to help your intestines stay healthy. Studies have shown that probiotics help prevent diarrhea and other intestine problems (including C. diff infection) when you take antibiotics. You can buy these without a prescription in the pharmacy section of the store.     If you were given a prescription for medicine here today, be sure to read all of the information (including the package insert) that comes with your prescription.  This will include important information about the medicine, its side effects, and any warnings that you need to know about.  The pharmacist who fills the prescription can provide more information and answer questions you may have about the medicine.  If you have questions or concerns that the pharmacist cannot address, please call or return to the Emergency Department.         Opioid Medication Information    Pain medications are among the most commonly prescribed medicines, so we are including this information for all our patients. If you did not receive pain medication or get a prescription for pain medicine, you can ignore it.     You may have been given a prescription for an opioid (narcotic) pain medicine and/or have received a pain medicine while here in the Emergency Department. These medicines can make you drowsy or impaired. You must not drive, operate dangerous equipment, or engage in any other dangerous activities while taking these medications. If you drive while taking these medications, you could be " arrested for DUI, or driving under the influence. Do not drink any alcohol while you are taking these medications.     Opioid pain medications can cause addiction. If you have a history of chemical dependency of any type, you are at a higher risk of becoming addicted to pain medications.  Only take these prescribed medications to treat your pain when all other options have been tried. Take it for as short a time and as few doses as possible. Store your pain pills in a secure place, as they are frequently stolen and provide a dangerous opportunity for children or visitors in your house to start abusing these powerful medications. We will not replace any lost or stolen medicine.  As soon as your pain is better, you should flush all your remaining medication.     Many prescription pain medications contain Tylenol  (acetaminophen), including Vicodin , Tylenol #3 , Norco , Lortab , and Percocet .  You should not take any extra pills of Tylenol  if you are using these prescription medications or you can get very sick.  Do not ever take more than 3000 mg of acetaminophen in any 24 hour period.    All opioids tend to cause constipation. Drink plenty of water and eat foods that have a lot of fiber, such as fruits, vegetables, prune juice, apple juice and high fiber cereal.  Take a laxative if you don t move your bowels at least every other day. Miralax , Milk of Magnesia, Colace , or Senna  can be used to keep you regular.      Remember that you can always come back to the Emergency Department if you are not able to see your regular doctor in the amount of time listed above, if you get any new symptoms, or if there is anything that worries you.        Gallstones with Biliary Colic    You have abdominal pain due to irritation and spasm of the gallbladder. This is called biliary colic. The gallbladder is a small sac under the liver, which stores and releases a fluid that aids in the digestion of fat. A collection of crystals  may form stones inside the gallbladder (gallstones). Gallstones can cause the gallbladder to spasm. If they block the duct out of the gallbladder, they can cause pain and even an infection.   A number of factors increase the risk for having gallstones:    Being female    Being severely overweight (obese)    Older age    Losing or gaining weight quickly    Eating a high-calorie diet    Being pregnant    Taking hormone therapy    Having diabetes  Home care    Rest in bed.    Drink only clear liquids until you feel better.    You may have been prescribed medicine for pain or nausea. Take these as directed.    Fat in your diet makes the gallbladder contract and may cause increased pain. Avoid foods that are high in fat (such as full-fat dairy, fried foods, and fatty meats) for at least two days.    If you are overweight, talk to your healthcare provider about losing weight.  Follow-up care  Follow up with your healthcare provider or as advise. There is a chance that you will have another episode of pain from your gallstones at some point. Removal of the gallbladder is an option to prevent this. Talk with your healthcare provider about your treatment options.  When to seek medical advice  Call your healthcare provider if any of the following occur:    Worsening pain or pain lasting for longer than 6 hours    Pain moving to the right lower abdomen    Repeated vomiting    Swollen abdomen    Fever of 100.4 F (38 C) or higher, or as directed by your healthcare provider    Very dark urine, light colored stools, or yellow color of the skin or eyes    Chest, arm, back, neck or jaw pain    7475-1842 [employer]. [address] [place] [address], [place] [address]. All rights reserved. This information is not intended as a substitute for professional medical care. Always follow your healthcare professional's instructions.

## 2017-04-21 NOTE — ED PROVIDER NOTES
History     Chief Complaint:  Abdominal pain    HPI   Andrea Uribe is a 41 year old male with a history of GERD, CKD, seizure disorder, and TBI who presents with abdominal pain. A week ago, the patient began experiencing waxing and waning right upper quadrant abdominal pain that radiates to his right flank and abdominal distention. Eating exacerbates the pain. He was seen in the ED on 4/18 underwent imaging and blood work, was prescribed tramadol, and was told to come back if his symptoms worsened. Since that time, the patient's pain increased and he has started experiencing nausea and vomiting. No pain with urination or blood in his urine. No fevers. No diarrhea or constipation. No other concerns noted at this time.    CT Abdomen Pelvis with contrast (4/18):  IMPRESSION:  1. Interval repair of the previously seen paraumbilical hernia.  2. Mild colonic diverticulosis with no evidence of diverticulitis.  3. Cholelithiasis with no evidence of cholecystitis.  Preliminary radiology read.     Laboratory (4/18):  CBC: WBC 5.4, HGB 12.5 (L),    CMP: Ca 8.4 (L), albumin 3.1 (L), alkphos 162 (H) otherwise WNL (Creatinine 0.88)   Lipase: 89    Cardiac Risk Factors   Sex: male   Tobacco: Negative   Hypertension: positive  Diabetes: Negative  Hyperlipidemia: positive  Family History: positive    Allergies:  Iodine  Aspirin  Ibuprofen sodium     Medications:    Tramadol  Miralax  Flexeril  Percocet  Dilantin  Lisinopril  Simvastatin  Effexor  Albuterol inhaler  Keppra     Past Medical History:    Hyperlipidemia  Chronic infection  CKD  Complication of anesthesia  CSAP  History of blood transfusion   Hypertension  MRSA  Obesity  Osteoarthritis  Ptosis, right eyelid  Renal insufficiency  Seizure disorder  Sleep apnea  TBI  Thyroid disease  Asthma  GERD    Past Surgical History:    Appendectomy  Arthroplasty knee  Arthroplasty revision knee  Tracheostomy  EDG  Herniorrhaphy incisional  Implant stimulator vagus  nerve  Laparotomy exploratory  Open reduction internal fixation femur distal  Orthopedic surgery  Remove hardware knee  Spleen surgery  Lonnie in left femur  Screws in right knee  IVC filter, parts removed    Family History:    Cardiovascular - mother  GI disease - father  Diabetes - sister  Cerebrovascular disease - mother    Social History:  Marital Status:   Presents to the ED with his wife   Tobacco Use: negative  Alcohol Use: negative  PCP: Alberto Medley     Review of Systems   Constitutional: Negative for fever.   Gastrointestinal: Positive for abdominal distention, abdominal pain, nausea and vomiting. Negative for constipation and diarrhea.   Genitourinary: Negative for dysuria and hematuria.   All other systems reviewed and are negative.    Physical Exam   First Vitals:  BP: 120/72  Pulse: 77  Heart Rate: 77  Temp: 98  F (36.7  C)  Resp: 20  SpO2: 93 %    Physical Exam  Constitutional: The patient is oriented to person, place, and time. Alert and cooperative.  HENT:   Right Ear: External ear normal.   Left Ear: External ear normal.   Nose: Nose normal.   Mouth/Throat: Uvula is midline, oropharynx is clear and moist and mucous membranes are normal. No posterior oropharyngeal edema or erythema.   Eyes: Conjunctivae, EOM and lids are normal. Pupils are equal, round, and reactive to light.   Neck: Trachea normal. Normal range of motion. Neck supple.   Cardiovascular: Normal rate, regular rhythm, normal heart sounds, and intact distal pulses.    Pulmonary/Chest: Effort normal and breath sounds equal bilaterally. No crackles or wheezing.   Abdominal: Soft. Tenderness to palpation in the RUQ. No rebound and no guarding.   Musculoskeletal: Normal range of motion.  No extremity tenderness or edema.  Neurological: Alert and Oriented. Strength 5/5 in upper and lower extremities bilaterally. Sensation intact to light touch throughout.  Skin: Skin is dry. No rash noted.          Emergency Department Course    ECG:  @ 0118  Indication: abdominal pain  Vent. Rate 60 bpm. MN interval 174 ms. QRS duration 110 ms. QT/QTc 400/400 ms. P-R-T axis 56 38 40.   Normal sinus rhythm. Normal ECG.    Read @ 0124 by Dr. Dee.    Imaging:  Radiographic findings were communicated with the patient who voiced understanding of the findings.    Abdomen US, limited (RUQ only)  1. 1.5 cm gallstone in the gallbladder. No evidence of acute  cholecystitis.  2. Unremarkable appearance of the liver.  3. No biliary dilatation.  Preliminary radiology read.      Laboratory:  CBC:  WBC 9.0, HGB 11.9 (L),   CMP: calcium 8.2 (L), bilirubin total 0.1 (L), albumin 3.0 (L), alkphos 153 (H), otherwise WNL (Creatinine 1.00)  Lipase: 141   Troponin: <0.015    UA: Clear yellow urine, bacteria few, mucous present, hyaline casts 3 (H), otherwise WNL    Interventions:  (0002) Dilaudid, 0.5 mg, IV injection  (0159) Normal Saline, 1 liter, IV bolus  (0001) Zofran, 4 mg, IV injection    Emergency Department Course:  Nursing notes and vitals reviewed.  I performed an exam of the patient as documented above.   EKG was done, interpretation as above.  A peripheral IV was established. Blood was drawn from the patient. This was sent for laboratory testing, findings above.   The patient was sent for a abdomen ultrasound while in the emergency department, findings above.   (0029) I rechecked the patient.  Findings and plan explained to the patient. Patient discharged home with instructions regarding supportive care, medications, and reasons to return. The importance of close follow-up was reviewed. The patient was prescribed oxycodone  I personally reviewed the laboratory results with the patient and answered all related questions prior to discharge.     Impression & Plan    Medical Decision Making:  Andrea Uribe is a 41 year old male with a history of TBI and seizure disorder who presents to the ED for evaluation of right upper quadrant abdominal pain. Upon  presentation to the ED, the patient is nontoxic appearing and vital signs are within normal limits and stable. On exam, he is well appearing. He is alert, oriented, and neuro exam is nonfocal. Cardiopulmonary exam is unremarkable. On abdominal examination, he does endorse tenderness to palpation in the right upper quadrant. There is no rebound or guarding. The rest of his exam is as mentioned above. EKG was obtained and demonstrates sinus rhythm. There are no concerning acute ischemic changes. Labs were obtained and are as mentioned above. Notably, he does not have a leukocytosis. Ultrasound of the right upper quadrant was obtained and demonstrates a 1.5 cm gallstone in the gallbladder, but there is no evidence of acute cholecystitis. There is no biliary dilatation. These results were discussed with the patient and he notes understanding. Of note, per review of records, the patient was evaluated in the emergency department for similar symptoms 2 days ago. At that time, he did have a relatively unremarkable lab evaluation and CT of the abdomen demonstrated no evidence of an acute abnormality. Given that the patient had a CT scan 2 days ago for the same symptoms that was unremrakable, I do not feel that this needs to be repeated at this time. Overall, the patient's work up is relatively unremarkable. It does demonstrate findings consistent with cholelithiasis, but no other acute findings. These results were discussed with the patient and he notes understanding. Upon my repeat evaluation, the patient does note improvement in his symptoms. Given that he is well appearing, I do feel that he can be discharged to home. However, I did discuss with the patient that I recommend close follow up with his primary care physician, and he notes understanding and agreement with this plan. He was provided a referral to follow up closely with general surgery regarding his gallstones. Return instructions were given. He was  stable/improved at the time of discharge.    Diagnosis:    ICD-10-CM    1. RUQ abdominal pain R10.11        Disposition:  Discharge to home.    Discharge Medications:  New Prescriptions    OXYCODONE (ROXICODONE) 5 MG IR TABLET    Take 1 tablet (5 mg) by mouth every 6 hours as needed for pain     Oseas SWEENEY, am serving as a scribe on 4/20/2017 at 11:24 PM to personally document services performed by Dr. Dee based on my observations and the provider's statements to me.        Mckenzie Dee MD  04/21/17 1945

## 2017-04-21 NOTE — ED NOTES
Oxygen saturation 88% on room air. Patient asleep, arouses to writer entering room, but falls asleep again quickly. Oxygen applied at 2 liters via NC, and saturations improved to 95%. MD notified.

## 2017-04-24 ENCOUNTER — OFFICE VISIT (OUTPATIENT)
Dept: SURGERY | Facility: CLINIC | Age: 42
End: 2017-04-24
Payer: MEDICARE

## 2017-04-24 VITALS
SYSTOLIC BLOOD PRESSURE: 120 MMHG | WEIGHT: 228 LBS | DIASTOLIC BLOOD PRESSURE: 68 MMHG | HEART RATE: 63 BPM | BODY MASS INDEX: 31.8 KG/M2 | OXYGEN SATURATION: 94 %

## 2017-04-24 DIAGNOSIS — K80.20 GALLSTONES: Primary | ICD-10-CM

## 2017-04-24 PROCEDURE — 99214 OFFICE O/P EST MOD 30 MIN: CPT | Performed by: SURGERY
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
## 2017-04-24 NOTE — MR AVS SNAPSHOT
"              After Visit Summary   2017    Andrea Uribe    MRN: [id number]           Patient Information     Date Of Birth          1975        Visit Information        Provider Department      2017 2:00 PM John Mcfarlane MD [facility] General Surgery      Today's Diagnoses     Gallstones    -  1       Follow-ups after your visit        Who to contact     If you have questions or need follow up information about today's clinic visit or your schedule please contact [facility] directly at [phone].  Normal or non-critical lab and imaging results will be communicated to you by One Codex[employer], letter or phone within 4 business days after the clinic has received the results. If you do not hear from us within 7 days, please contact the clinic through Lumenist or phone. If you have a critical or abnormal lab result, we will notify you by phone as soon as possible.  Submit refill requests through [employer] or call your pharmacy and they will forward the refill request to us. Please allow 3 business days for your refill to be completed.          Additional Information About Your Visit        MyChart Information     [employer] lets you send messages to your doctor, view your test results, renew your prescriptions, schedule appointments and more. To sign up, go to [email or web address] . Click on \"Log in\" on the left side of the screen, which will take you to the Welcome page. Then click on \"Sign up Now\" on the right side of the page.     You will be asked to enter the access code listed below, as well as some personal information. Please follow the directions to create your username and password.     Your access code is: [email or web address]  Expires: 2017  8:40 PM     Your access code will  in 90 days. If you need help or a new code, please call your [place] clinic or [phone].        Care EveryWhere ID     This is your Care EveryWhere ID. " This could be used by other organizations to access your [place] medical records  [id number]        Your Vitals Were     Pulse Pulse Oximetry BMI (Body Mass Index)             63 94% 31.8 kg/m2          Blood Pressure from Last 3 Encounters:   04/24/17 120/68   04/21/17 100/70   04/21/17 102/68    Weight from Last 3 Encounters:   04/24/17 228 lb (103.4 kg)   04/21/17 210 lb (95.3 kg)   04/18/17 210 lb (95.3 kg)              Today, you had the following     No orders found for display       Primary Care Provider Office Phone # Fax #    Alberto Shon Medley PA-C [phone]       [address] [address]        Thank you!     Thank you for choosing [facility]  for your care. Our goal is always to provide you with excellent care. Hearing back from our patients is one way we can continue to improve our services. Please take a few minutes to complete the written survey that you may receive in the mail after your visit with us. Thank you!             Your Updated Medication List - Protect others around you: Learn how to safely use, store and throw away your medicines at www.disposemymeds.org.          This list is accurate as of: 4/24/17  2:10 PM.  Always use your most recent med list.                   Brand Name Dispense Instructions for use    albuterol 108 (90 BASE) MCG/ACT Inhaler    PROAIR HFA/PROVENTIL HFA/VENTOLIN HFA    1 Inhaler    Inhale 2 puffs into the lungs every 6 hours as needed for shortness of breath / dyspnea       cyclobenzaprine 5 MG tablet    FLEXERIL     TAKE 1 TABLET BY MOUTH 3 TIMES A DAY AS NEEDED       DILANTIN 100 MG CR capsule   Generic drug:  phenytoin      Take 300 mg by mouth 2 times daily at 900 and 2100       KEPPRA 1000 MG Tabs   Generic drug:  levETIRAcetam      Take 2,000 mg by mouth 2 times daily At 0900 and 2100       lisinopril 5 MG tablet    PRINIVIL/ZESTRIL    90 tablet    Take 1 tablet (5 mg) by mouth  daily       oxyCODONE 5 MG IR tablet    ROXICODONE    12 tablet    Take 1 tablet (5 mg) by mouth every 6 hours as needed for pain       oxyCODONE-acetaminophen 5-325 MG per tablet    PERCOCET    30 tablet    Take 1 tablet by mouth every 6 hours as needed for pain maximum 4 tablet(s) per day       polyethylene glycol powder    MIRALAX    527 g    Take 17 g (1 capful) by mouth daily       simvastatin 20 MG tablet    ZOCOR    90 tablet    Take 1 tablet (20 mg) by mouth At Bedtime       traMADol 50 MG tablet    ULTRAM    20 tablet    Take 1-2 tablets ( mg) by mouth every 6 hours as needed for moderate pain Do not drive or operate machinery while taking this medication.       venlafaxine 37.5 MG Tb24 24 hr tablet    EFFEXOR-ER    90 tablet    Take 1 tablet (37.5 mg) by mouth daily (with breakfast) Overdue for visit.  #30 only
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
## 2017-04-24 NOTE — NURSING NOTE
"Chief Complaint   Patient presents with     Consult       Initial /68  Pulse 63  Wt 228 lb (103.4 kg)  SpO2 94%  BMI 31.8 kg/m2 Estimated body mass index is 31.8 kg/(m^2) as calculated from the following:    Height as of 4/21/17: 5' 11\" (1.803 m).    Weight as of this encounter: 228 lb (103.4 kg).  Medication Reconciliation: complete       Pain on right side      Jazzmine Pacheco CMA       "

## 2017-04-26 ENCOUNTER — TELEPHONE (OUTPATIENT)
Dept: NURSING | Facility: CLINIC | Age: 42
End: 2017-04-26

## 2017-04-26 NOTE — TELEPHONE ENCOUNTER
"Call Type: Triage Call    Presenting Problem: \"Watery stools and that is new; I am dizzy and  short of breath, I think I'm dehydrated.\"  Triage Note:  Guideline Title: Diarrhea or Other Change in Bowel Habits  Recommended Disposition: See ED Immediately  Original Inclination: Wanted to speak with a nurse  Override Disposition:  Intended Action: Follow advice given  Physician Contacted: No  Signs of dehydration ?  YES  New or worsening signs and symptoms that may indicate shock ? NO  Passing red, black or tarry material from rectum AND onset of new signs and  symptoms of hypovolemia ? NO  Rectal bleeding (blood in or on the stool, more than scant; black tarry stools) ?  NO  Known diabetic and diarrhea or other change in bowel habits ? NO  High to low (but not zero) risk of exposure to Ebola within the past 21 days ? NO  Physician Instructions:  Care Advice: Another adult should drive.  Call  if signs and symptoms of shock develop (such as unable to  stand due to faintness, dizziness, or lightheadedness  new onset of confusion  slow to respond or difficult to awaken  skin is pale, gray, cool, or moist to touch  severe weakness  loss of consciousness).  Change position slowly.  Sit for a couple of minutes before standing to  walk.  Quick position changes may cause or worsen symptoms.  Take sips of clear liquids (such as water, clear fruit juices without pulp,  soda, tea or coffee without dairy or non-dairy creamer, clear broth or  bouillon, oral hydration solution, or plain gelatin, fruit ices/popsicles,  hard candy) as tolerated. Sucking on ice chips is another option.  "

## 2017-04-26 NOTE — TELEPHONE ENCOUNTER
"Call Type: Triage Call    Presenting Problem: Caller is patient's wife following up on previous  triage; she states that patient has newly diagnosed gall stones and  for 2 days has had diarrhea stool after eating; continues to eat  regular diet that consist of \"[employer]\" sodas and meats, fruits  andvegetables.  Abdominal pain persits but is not using prescribed  roxicodone.  Urine has been  erin colored but shethought that was  from gall bladder problem.  Advised to change diet to BRAT; avoid  sugary fluids and  stay hydrated with water, diluted gatorade, herb  tea, etc.  to increase urine output . Advised to take pain meds s  prescribed.   Contact PCP in a.m. if diarrhea persists ;call if  fever, increased pain or any other new or wosrening symptoms.  Triage Note:  Guideline Title: Abdominal or Pelvic Pain  Recommended Disposition: See Provider within 24 hours  Original Inclination: Wanted to speak with a nurse  Override Disposition:  Intended Action: Call PCP/HCP  Physician Contacted: No  New onset constant mild or aching lower abdominal pain not improving or worsening  after 24 hours of home care                      See Provider within 24 hours ?  YES  Pain described as deep, boring, or  tearing ? NO  Swallowed alkali or button battery ? NO  Signs of dehydration ? NO  Swallowed sharp object (pin, needle, piece of glass) ? NO  Following a therapeutic OR elective  ? NO  New or worsening signs and symptoms that may indicate shock ? NO  Pregnant AND injury to abdomen ? NO  Pregnant AND abdominal pain/cramping OR back pain ? NO  Pregnant, less than 20 weeks gestation AND vaginal bleeding ? NO  Pregnant, more than 20 weeks gestation AND vaginal bleeding ? NO  Pregnant, gestation less than 20 weeks AND signs of labor ? NO  Pregnant, 20-37 weeks gestation AND signs of labor ? NO  Pregnant, gestation more than 37 weeks AND signs of labor ? NO  Pregnant AND heartburn ? NO  Female of childbearing age having " "unprotected sexual intercourse or inconsistently  using birth control AND absent, delayed or unusual menstrual period or abnormal  vaginal bleeding ? NO  Sudden onset of pain in testicle(s), groin, or lower abdomen AND testicle(s)  swollen or tender to touch ? NO  Known or suspected food poisoning and symptoms do not improve after 24 hours of  home care ? NO  Following ingestion of toxic or caustic substance ? NO  Pain/discomfort over bladder AND urinary tract symptoms ? NO  Diarrhea lasting longer than 72 hours AND not improving with home care ? NO  Over 50 years of age AND new onset (first episode) unbearable back or abdominal  pain ? NO  Known abdominal aneurysm (swollen or ballooning aorta) AND sudden onset of  unbearable abdominal pain ? NO  Unable to reduce diagnosed hernia AND has severe pain, nausea/vomiting or any  fever ? NO  Abdominal pain and sickle cell disease, porphyria, or diabetes ? NO  Diagnosed hernia with new or increasing bulging/swelling AND continues to bulge  even when lying down ? NO  Unbearable abdominal/pelvic pain ? NO  Temperature of 101.5 F (38.6 C) or greater that has not responded to 24 hours of  home care measures ? NO  Generalized abdominal pain that progresses to localized pain AND any of the  following: loss of appetite, vomiting starting after pain, any fever, OR unable  to carry out normal activities ? NO  Abdominal pain that has steadily worsened over hours OR has been continuous for 3  hours or more AND any of the following: loss of appetite, vomiting starting after  pain, any fever, OR unable to carry out normal activities ? NO  Sudden onset or recurring abdominal pain that radiates to upper back or shoulder  AND any of the following: loss of appetite, vomiting starting after pain, any  fever, OR unable to carry out normal activities ? NO  Pain in flank, low back, over bladder, groin or perineum AND sharp pain with  urination, or felt something \"pass\" with urination, or blood " in urine ? NO  Food or foreign body feels stuck in esophagus. ? NO  Foul smelling or unusual vaginal discharge (such as change in color, odor, or  amount of vaginal discharge) ? NO  Jaundice (yellowish tint to skin or whites of eyes) that is worsening or not  previously evaluated. ? NO  GI bleeding, more than streaks or scant amount of blood or passing black tarry  stool(s) ? NO  Chest discomfort associated with shortness of breath, sweating, odd heartbeats or  different heart rate, nausea, vomiting, lightheadedness, or fainting lasting 5 or  more minutes now or within the last hour ? NO  Chest pain spreading to the shoulders, neck, jaw, in one or both arms, stomach or  back lasting 5 or more minutes now or within the last hour. Pain is NOT  associated with taking a deep breath or a productive cough, movement, or touch to  a localized area. ? NO  Pressure, fullness, squeezing sensation or pain anywhere in the chest lasting 5 or  more minutes now or within the last hour. Pain is NOT associated with taking a  deep breath or a productive cough, movement, or touch to a localized area on the  chest. ? NO  Recurring episodes of hiccups or an episode that can't be stopped (more than 24  hours) ? NO  Abdominal pain, any blood in vomitus or stool, abdominal bloating, new fever or  other cautionary symptoms began after GI surgery or procedure and is lasting  longer than defined in provider specified discharge information. ? NO  Swallowed an object longer than 2 inches (5 cm) or wider than 3/4 inch (2 cm) wide  ? NO  Colicky pain followed by episodes of vomiting over 8 hours or more. ? NO  Generalized or localized pain that becomes severe with movement, standing up  straight or coughing ? NO  Injury to abdomen or pelvis ? NO  New or worsening breathing problems that have not been evaluated OR without a  treatment plan ? NO  Recent childbirth or miscarriage ? NO  Any temperature elevation in an immunocompromised individual OR  frail elderly with  signs of dehydration ? NO  Physician Instructions:  Care Advice: Pain medication or laxatives should not be taken until symptoms  are evaluated.  Call provider immediately if pain gets worse, localized to one spot or  lasts more than 4 hours and prevents normal activity  tell provider if vomiting begins after onset of pain or if having any  fever.

## 2017-04-26 NOTE — TELEPHONE ENCOUNTER
"Call Type: Triage Call    Presenting Problem: Caller is patient's wife following up on previous  triage; she states that patient has newly diagnosed gall stones and  for 2 days has had diarrhea stool after eating; continues to eat  regular diet that consist of \"[employer]\" sodas and meats, fruits  andvegetables.  Abdominal pain persits but is not using prescribed  roxicodone.  Urine has been  erin colored but shethought that was  from gall bladder problem.  Advised to change diet to BRAT; avoid  sugary fluids and  stay hydrated with water, diluted gatorade, herb  tea, etc.  to increase urine output . Advised to take pain meds s  prescribed.   Contact PCP in a.m. if diarrhea persists ;call if  fever, increased pain or any other new or wosrening symptoms.  Triage Note:  Guideline Title: Diarrhea or Other Change in Bowel Habits  Recommended Disposition: See Provider within 24 hours  Original Inclination:  Override Disposition:  Intended Action:  Physician Contacted: No  Evaluated by provider AND symptoms not improving or worsening with suggested  treatment or home care ?  YES  Signs of dehydration ? NO  New or worsening signs and symptoms that may indicate shock ? NO  New onset of diarrhea AND any temperature elevation in an immunocompromised  individual or frail elderly ? NO  Severe pain/cramping in abdomen or rectum that interferes with ability to carry  out normal activities or sleep ? NO  Diarrhea associated with new abdominal bloating, distension or swelling ? NO  Diarrhea lasting longer than 72 hours AND not improving with home care ? NO  Passing red, black or tarry material from rectum AND onset of new signs and  symptoms of hypovolemia ? NO  Diarrhea (without blood in stool) following crampy abdominal pain AND repeated  vomiting that has not improved with 3 days of home care ? NO  Worms visible in stool/toilet AND not previously evaluated ? NO  Rectal bleeding (blood in or on the stool, more than scant; black " tarry stools) ?  NO  Diarrhea began 3-5 days after starting an antibiotic ? NO  Bloody diarrhea AND has not been evaluated ? NO  Diarrhea began after any gastrointestinal (GI) surgery or procedure and is lasting  longer than defined in provider specified discharge information ? NO  Known diabetic and diarrhea or other change in bowel habits ? NO  High to low (but not zero) risk of exposure to Ebola within the past 21 days ? NO  Traveled out of country in past 2 months and new onset of unexplained symptom(s) ?  NO  New onset of diarrhea with temperature of 101.5 F (38.6 C) or greater AND symptoms  not improving with 12 hours of home care ? NO  Diarrhea began with current or recent radiation or chemotherapy AND is not  improving with 24 hours of home care ? NO  Physician Instructions:  Care Advice: Do not take aspirin, ibuprofen, ketoprofen, naproxen, etc., or  other pain relieving medications until consulting with provider.  Call provider if symptoms worsen, such as high fever, dizziness, severe  abdominal cramping, or confusion or lethargy.  CAUTIONS  SYMPTOM / CONDITION MANAGEMENT  Go to the ED if you have developed signs and symptoms of dehydration such  as very dry mouth and tongue  increased pulse rate at rest  no urine output for 12 hours or more  increasing weakness or drowsiness, or lightheadedness when trying to sit  upright or standing.  Diarrheal Care - Intake: - Required fluid intake is based on individual  needs. If you are taking in enough fluid to maintain hydration urine will  be light yellow in color. - Drink water, over the counter oral hydration  solution, sports drinks, diluted fruit juice, and soft drinks supplemented  with broth and soup to replace the fluid and sodium losses associated with  diarrhea. Drink 1 cup of fluid each time you have a loose bowel movement.  - If you are also vomiting, take the fluids in frequent small sips or suck  on ice chips. - Continue to eat solid foods. Eat easily  digested foods  (such as bananas, rice, applesauce, toast, cooked cereals, soup, crackers,  baked or boiled potato, or baked chicken or turkey without skin for a  couple of days). - High fiber, high fat, high sugar content foods, or  highly seasoned foods may worsen diarrhea. - Do not drink caffeinated or  alcoholic beverages. - Avoid milk and milk products containing lactose  while having symptoms. As symptoms improve, gradually add back to your  diet. Yogurt is more easily tolerated.  Diarrheal Care - Medications - Antidiarrheal medications are usually  unnecessary. If symptoms are severe, consider nonprescription antidiarrheal  and anti-motility drugs as directed by label or a provider. Do not take  these medications if have high fever or bloody diarrhea. If pregnant, do  not take any medications not approved by your provider. - Consult your  provider for advice regarding continuing prescription medication.

## 2017-04-27 ENCOUNTER — TELEPHONE (OUTPATIENT)
Dept: NURSING | Facility: CLINIC | Age: 42
End: 2017-04-27

## 2017-04-27 ENCOUNTER — HOSPITAL ENCOUNTER (EMERGENCY)
Facility: CLINIC | Age: 42
Discharge: HOME | End: 2017-04-27
Attending: EMERGENCY MEDICINE | Admitting: EMERGENCY MEDICINE
Payer: MEDICARE

## 2017-04-27 VITALS
SYSTOLIC BLOOD PRESSURE: 118 MMHG | OXYGEN SATURATION: 96 % | WEIGHT: 221 LBS | HEIGHT: 71 IN | HEART RATE: 68 BPM | TEMPERATURE: 98 F | RESPIRATION RATE: 18 BRPM | BODY MASS INDEX: 30.94 KG/M2 | DIASTOLIC BLOOD PRESSURE: 76 MMHG

## 2017-04-27 DIAGNOSIS — K80.50 BILIARY COLIC: ICD-10-CM

## 2017-04-27 LAB
ALBUMIN SERPL-MCNC: 3.6 G/DL (ref 3.4–5)
ALP SERPL-CCNC: 182 U/L (ref 40–150)
ALT SERPL W P-5'-P-CCNC: 15 U/L (ref 0–70)
ANION GAP SERPL CALCULATED.3IONS-SCNC: 6 MMOL/L (ref 3–14)
AST SERPL W P-5'-P-CCNC: 12 U/L (ref 0–45)
BASOPHILS # BLD AUTO: 0.1 10E9/L (ref 0–0.2)
BASOPHILS NFR BLD AUTO: 0.9 %
BILIRUB SERPL-MCNC: 0.2 MG/DL (ref 0.2–1.3)
BUN SERPL-MCNC: 15 MG/DL (ref 7–30)
CALCIUM SERPL-MCNC: 8.8 MG/DL (ref 8.5–10.1)
CHLORIDE SERPL-SCNC: 103 MMOL/L (ref 94–109)
CO2 SERPL-SCNC: 28 MMOL/L (ref 20–32)
CREAT SERPL-MCNC: 0.98 MG/DL (ref 0.66–1.25)
DIFFERENTIAL METHOD BLD: NORMAL
EOSINOPHIL # BLD AUTO: 0.2 10E9/L (ref 0–0.7)
EOSINOPHIL NFR BLD AUTO: 3.4 %
ERYTHROCYTE [DISTWIDTH] IN BLOOD BY AUTOMATED COUNT: 14.7 % (ref 10–15)
GFR SERPL CREATININE-BSD FRML MDRD: 84 ML/MIN/1.7M2
GLUCOSE SERPL-MCNC: 83 MG/DL (ref 70–99)
HCT VFR BLD AUTO: 42.7 % (ref 40–53)
HGB BLD-MCNC: 13.8 G/DL (ref 13.3–17.7)
IMM GRANULOCYTES # BLD: 0 10E9/L (ref 0–0.4)
IMM GRANULOCYTES NFR BLD: 0.4 %
LIPASE SERPL-CCNC: 104 U/L (ref 73–393)
LYMPHOCYTES # BLD AUTO: 2.1 10E9/L (ref 0.8–5.3)
LYMPHOCYTES NFR BLD AUTO: 30.8 %
MCH RBC QN AUTO: 29.5 PG (ref 26.5–33)
MCHC RBC AUTO-ENTMCNC: 32.3 G/DL (ref 31.5–36.5)
MCV RBC AUTO: 91 FL (ref 78–100)
MONOCYTES # BLD AUTO: 0.6 10E9/L (ref 0–1.3)
MONOCYTES NFR BLD AUTO: 8.3 %
NEUTROPHILS # BLD AUTO: 3.8 10E9/L (ref 1.6–8.3)
NEUTROPHILS NFR BLD AUTO: 56.2 %
NRBC # BLD AUTO: 0 10*3/UL
NRBC BLD AUTO-RTO: 0 /100
PLATELET # BLD AUTO: 219 10E9/L (ref 150–450)
POTASSIUM SERPL-SCNC: 4.2 MMOL/L (ref 3.4–5.3)
PROT SERPL-MCNC: 8.8 G/DL (ref 6.8–8.8)
RBC # BLD AUTO: 4.68 10E12/L (ref 4.4–5.9)
SODIUM SERPL-SCNC: 137 MMOL/L (ref 133–144)
WBC # BLD AUTO: 6.7 10E9/L (ref 4–11)

## 2017-04-27 PROCEDURE — 96361 HYDRATE IV INFUSION ADD-ON: CPT

## 2017-04-27 PROCEDURE — 25000128 H RX IP 250 OP 636: Performed by: EMERGENCY MEDICINE

## 2017-04-27 PROCEDURE — A9270 NON-COVERED ITEM OR SERVICE: HCPCS | Mod: GY | Performed by: EMERGENCY MEDICINE

## 2017-04-27 PROCEDURE — 99285 EMERGENCY DEPT VISIT HI MDM: CPT | Mod: 25

## 2017-04-27 PROCEDURE — 96374 THER/PROPH/DIAG INJ IV PUSH: CPT

## 2017-04-27 PROCEDURE — 96376 TX/PRO/DX INJ SAME DRUG ADON: CPT

## 2017-04-27 PROCEDURE — 85025 COMPLETE CBC W/AUTO DIFF WBC: CPT | Performed by: EMERGENCY MEDICINE

## 2017-04-27 PROCEDURE — 80053 COMPREHEN METABOLIC PANEL: CPT | Performed by: EMERGENCY MEDICINE

## 2017-04-27 PROCEDURE — 83690 ASSAY OF LIPASE: CPT | Performed by: EMERGENCY MEDICINE

## 2017-04-27 PROCEDURE — 96375 TX/PRO/DX INJ NEW DRUG ADDON: CPT

## 2017-04-27 PROCEDURE — 25000132 ZZH RX MED GY IP 250 OP 250 PS 637: Mod: GY | Performed by: EMERGENCY MEDICINE

## 2017-04-27 RX ORDER — OXYCODONE AND ACETAMINOPHEN 5; 325 MG/1; MG/1
2 TABLET ORAL ONCE
Status: COMPLETED | OUTPATIENT
Start: 2017-04-27 | End: 2017-04-27

## 2017-04-27 RX ORDER — ONDANSETRON 2 MG/ML
8 INJECTION INTRAMUSCULAR; INTRAVENOUS ONCE
Status: COMPLETED | OUTPATIENT
Start: 2017-04-27 | End: 2017-04-27

## 2017-04-27 RX ORDER — ONDANSETRON 4 MG/1
4 TABLET, ORALLY DISINTEGRATING ORAL EVERY 6 HOURS PRN
Qty: 15 TABLET | Refills: 0 | Status: SHIPPED | OUTPATIENT
Start: 2017-04-27 | End: 2017-04-30

## 2017-04-27 RX ORDER — HYDROMORPHONE HYDROCHLORIDE 1 MG/ML
0.5 INJECTION, SOLUTION INTRAMUSCULAR; INTRAVENOUS; SUBCUTANEOUS ONCE
Status: COMPLETED | OUTPATIENT
Start: 2017-04-27 | End: 2017-04-27

## 2017-04-27 RX ORDER — PROMETHAZINE HYDROCHLORIDE 25 MG/1
25 TABLET ORAL EVERY 6 HOURS PRN
Qty: 15 TABLET | Refills: 0 | Status: SHIPPED | OUTPATIENT
Start: 2017-04-27 | End: 2017-09-08

## 2017-04-27 RX ORDER — OXYCODONE AND ACETAMINOPHEN 5; 325 MG/1; MG/1
1-2 TABLET ORAL EVERY 4 HOURS PRN
Qty: 15 TABLET | Refills: 0 | Status: ON HOLD | OUTPATIENT
Start: 2017-04-27 | End: 2017-05-04

## 2017-04-27 RX ADMIN — OXYCODONE HYDROCHLORIDE AND ACETAMINOPHEN 2 TABLET: 5; 325 TABLET ORAL at 21:59

## 2017-04-27 RX ADMIN — ONDANSETRON 8 MG: 2 INJECTION INTRAMUSCULAR; INTRAVENOUS at 19:09

## 2017-04-27 RX ADMIN — HYDROMORPHONE HYDROCHLORIDE 1 MG: 1 INJECTION, SOLUTION INTRAMUSCULAR; INTRAVENOUS; SUBCUTANEOUS at 19:08

## 2017-04-27 RX ADMIN — SODIUM CHLORIDE 1000 ML: 9 INJECTION, SOLUTION INTRAVENOUS at 19:08

## 2017-04-27 RX ADMIN — HYDROMORPHONE HYDROCHLORIDE 0.5 MG: 1 INJECTION, SOLUTION INTRAMUSCULAR; INTRAVENOUS; SUBCUTANEOUS at 20:50

## 2017-04-27 ASSESSMENT — ENCOUNTER SYMPTOMS
DIARRHEA: 1
NAUSEA: 1
VOMITING: 1
ABDOMINAL PAIN: 1

## 2017-04-27 NOTE — ED AVS SNAPSHOT
[facility] Emergency Department    [address]    [facility] [address]    Phone:  [phone]    Fax:  [phone]                                       Andrea Uribe   MRN: [id number]    Department:  [facility] Emergency Department   Date of Visit:  4/27/2017           After Visit Summary Signature Page     I have received my discharge instructions, and my questions have been answered. I have discussed any challenges I see with this plan with the nurse or doctor.    ..........................................................................................................................................  Patient/Patient Representative Signature      ..........................................................................................................................................  Patient Representative Print Name and Relationship to Patient    ..................................................               ................................................  Date                                            Time    ..........................................................................................................................................  Reviewed by Signature/Title    ...................................................              ..............................................  Date                                                            Time

## 2017-04-27 NOTE — LETTER
[facility] EMERGENCY DEPARTMENT  [address]  [facility] [address]  [phone]    2017    Andrea Uribe  [address] [facility] [place] [address]  [phone] (home) NONE (work)    : 1975      To Whom it may concern:    Andrea Uribe was seen in our Emergency Department today, 2017.   He may return to work on 17.    Sincerely,        Arben Dangelo
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
## 2017-04-27 NOTE — LETTER
[facility] EMERGENCY DEPARTMENT  [address]  [facility] [address]  [phone]    Andrea Uribe  [address] [facility] [address]  [phone] (home) NONE (work)    : 1975      To Whom it may concern:    Andrea Uribe was seen in our Emergency Department today, 2017.  I expect his condition to improve over the next 2 days.  He may return to work/school when improved.    Sincerely,        Qian Mishra

## 2017-04-27 NOTE — ED AVS SNAPSHOT
[facility] Emergency Department    [address]    [facility] [address]    Phone:  [phone]    Fax:  [phone]                                       Andrea Uribe   MRN: [id number]    Department:  [facility] Emergency Department   Date of Visit:  4/27/2017           Patient Information     Date Of Birth          1975        Your diagnoses for this visit were:     Biliary colic        You were seen by Arben Dangelo MD.      Follow-up Information     Follow up with Alberto Medley PA-C. Schedule an appointment as soon as possible for a visit in 2 days.    Specialty:  Physician Assistant    Contact information:    [facility]  [id number] [place] [id number] [phone]          Discharge Instructions         Discharge Instructions  Biliary Colic    You have been seen today for biliary colic. Biliary colic is the pain that happens when gallstones block the normal flow of bile from the gallbladder.  It usually is a steady or crampy pain in the upper abdomen, most often under the right side of the rib cage where the gallbladder is. Sometimes you get pain from the gallbladder in your back or shoulder. It is common to have nausea and vomiting with biliary colic.    Bile is a liquid the body makes to help with digesting fat.  It is made by the liver and stored in the gallbladder and released from the gall bladder when you eat fatty foods. Gallstones can form for a variety of reasons. Risk factors for gallstones include being female, having a family history of gallstones, being older, being pregnant or having been pregnant, having diabetes, having rapid weight loss, and others.      Once gallstones form, surgeons usually tell you to have your gallbladder removed. There is medicine that can dissolve gallstones, but it can be unpleasant to take, and gallstones tend to come back when you quit taking the medicine. Your regular physician  can help decide on the right treatment for you, and may refer you to a surgeon to discuss whether surgery is right in your case.     Complications of gallstones include infection, jaundice, inflammation of the pancreas, and rupture of the gallbladder. One of these complications will happen to about one out of every four patients with gallstones over the next 10-20 years if they are not treated.       Return to the Emergency Department if you develop:    Fever greater than 100.5 degrees Fahrenheit, or 38.1 degrees Celsius.    Persistent nausea and vomiting.    Pain that won t go away with the medicines you were given here.    Yellow skin or eye color (jaundice).    Other new concerning symptoms.    What can I do to help myself?    Eat regular meals at least three times a day, to make the gallbladder empty before it gets too full.    Avoid fatty foods.    Drink plenty of clear fluids.    Take over the counter or prescribed pain medications.    See your doctor within 1 week (or as directed by your doctor today) for follow up.            If you were given a prescription for medicine here today, be sure to read all of the information (including the package insert) that comes with your prescription.  This will include important information about the medicine, its side effects, and any warnings that you need to know about.  The pharmacist who fills the prescription can provide more information and answer questions you may have about the medicine.  If you have questions or concerns that the pharmacist cannot address, please call or return to the Emergency Department.   Opioid Medication Information    Pain medications are among the most commonly prescribed medicines, so we are including this information for all our patients. If you did not receive pain medication or get a prescription for pain medicine, you can ignore it.     You may have been given a prescription for an opioid (narcotic) pain medicine and/or have received  a pain medicine while here in the Emergency Department. These medicines can make you drowsy or impaired. You must not drive, operate dangerous equipment, or engage in any other dangerous activities while taking these medications. If you drive while taking these medications, you could be arrested for DUI, or driving under the influence. Do not drink any alcohol while you are taking these medications.     Opioid pain medications can cause addiction. If you have a history of chemical dependency of any type, you are at a higher risk of becoming addicted to pain medications.  Only take these prescribed medications to treat your pain when all other options have been tried. Take it for as short a time and as few doses as possible. Store your pain pills in a secure place, as they are frequently stolen and provide a dangerous opportunity for children or visitors in your house to start abusing these powerful medications. We will not replace any lost or stolen medicine.  As soon as your pain is better, you should flush all your remaining medication.     Many prescription pain medications contain Tylenol  (acetaminophen), including Vicodin , Tylenol #3 , Norco , Lortab , and Percocet .  You should not take any extra pills of Tylenol  if you are using these prescription medications or you can get very sick.  Do not ever take more than 3000 mg of acetaminophen in any 24 hour period.    All opioids tend to cause constipation. Drink plenty of water and eat foods that have a lot of fiber, such as fruits, vegetables, prune juice, apple juice and high fiber cereal.  Take a laxative if you don t move your bowels at least every other day. Miralax , Milk of Magnesia, Colace , or Senna  can be used to keep you regular.      Remember that you can always come back to the Emergency Department if you are not able to see your regular doctor in the amount of time listed above, if you get any new symptoms, or if there is anything that worries  you.        Future Appointments        Provider Department Dept Phone Center    4/28/2017 1:30 PM Bella Reece NP [facility] [phone] [facility]    5/4/2017 10:00 AM Kayla Reed PA-C; John Mcfarlane MD [facility] Surgery Scheduling [phone] [facility]      24 Hour Appointment Hotline       To make an appointment at any [facility] call [phone] ([phone]). If you don't have a family doctor or clinic, we will help you find one. [facility] are conveniently located to serve the needs of you and your family.             Review of your medicines      START taking        Dose / Directions Last dose taken    ondansetron 4 MG ODT tab   Commonly known as:  ZOFRAN ODT   Dose:  4 mg   Quantity:  15 tablet        Take 1 tablet (4 mg) by mouth every 6 hours as needed for nausea   Refills:  0        promethazine 25 MG tablet   Commonly known as:  PHENERGAN   Dose:  25 mg   Quantity:  15 tablet        Take 1 tablet (25 mg) by mouth every 6 hours as needed for nausea   Refills:  0          CONTINUE these medicines which may have CHANGED, or have new prescriptions. If we are uncertain of the size of tablets/capsules you have at home, strength may be listed as something that might have changed.        Dose / Directions Last dose taken    * oxyCODONE-acetaminophen 5-325 MG per tablet   Commonly known as:  PERCOCET   Dose:  1 tablet   What changed:  Another medication with the same name was added. Make sure you understand how and when to take each.   Quantity:  30 tablet        Take 1 tablet by mouth every 6 hours as needed for pain maximum 4 tablet(s) per day   Refills:  0        * oxyCODONE-acetaminophen 5-325 MG per tablet   Commonly known as:  PERCOCET   Dose:  1-2 tablet   What changed:  You were already taking a medication with the same name, and this prescription was added. Make sure you understand how and when to take each.   Quantity:  15 tablet        Take  1-2 tablets by mouth every 4 hours as needed for pain   Refills:  0        * Notice:  This list has 2 medication(s) that are the same as other medications prescribed for you. Read the directions carefully, and ask your doctor or other care provider to review them with you.      Our records show that you are taking the medicines listed below. If these are incorrect, please call your family doctor or clinic.        Dose / Directions Last dose taken    albuterol 108 (90 BASE) MCG/ACT Inhaler   Commonly known as:  PROAIR HFA/PROVENTIL HFA/VENTOLIN HFA   Dose:  2 puff   Quantity:  1 Inhaler        Inhale 2 puffs into the lungs every 6 hours as needed for shortness of breath / dyspnea   Refills:  3        cyclobenzaprine 5 MG tablet   Commonly known as:  FLEXERIL        TAKE 1 TABLET BY MOUTH 3 TIMES A DAY AS NEEDED   Refills:  0        DILANTIN 100 MG CR capsule   Dose:  300 mg   Generic drug:  phenytoin        Take 300 mg by mouth 2 times daily at 900 and 2100   Refills:  0        KEPPRA 1000 MG Tabs   Dose:  2000 mg   Generic drug:  levETIRAcetam        Take 2,000 mg by mouth 2 times daily At 0900 and 2100   Refills:  0        lisinopril 5 MG tablet   Commonly known as:  PRINIVIL/ZESTRIL   Dose:  5 mg   Quantity:  90 tablet        Take 1 tablet (5 mg) by mouth daily   Refills:  2        oxyCODONE 5 MG IR tablet   Commonly known as:  ROXICODONE   Dose:  5 mg   Quantity:  12 tablet        Take 1 tablet (5 mg) by mouth every 6 hours as needed for pain   Refills:  0        polyethylene glycol powder   Commonly known as:  MIRALAX   Dose:  1 capful   Quantity:  527 g        Take 17 g (1 capful) by mouth daily   Refills:  0        simvastatin 20 MG tablet   Commonly known as:  ZOCOR   Dose:  20 mg   Quantity:  90 tablet        Take 1 tablet (20 mg) by mouth At Bedtime   Refills:  3        traMADol 50 MG tablet   Commonly known as:  ULTRAM   Dose:   mg   Quantity:  20 tablet        Take 1-2 tablets ( mg) by mouth  every 6 hours as needed for moderate pain Do not drive or operate machinery while taking this medication.   Refills:  0        venlafaxine 37.5 MG Tb24 24 hr tablet   Commonly known as:  EFFEXOR-ER   Dose:  37.5 mg   Quantity:  90 tablet        Take 1 tablet (37.5 mg) by mouth daily (with breakfast) Overdue for visit.  #30 only   Refills:  3                Prescriptions were sent or printed at these locations (3 Prescriptions)                   Other Prescriptions                Printed at Department/Unit printer (3 of 3)         oxyCODONE-acetaminophen (PERCOCET) 5-325 MG per tablet               ondansetron (ZOFRAN ODT) 4 MG ODT tab               promethazine (PHENERGAN) 25 MG tablet                Procedures and tests performed during your visit     CBC with platelets differential    Comprehensive metabolic panel    Lipase      Orders Needing Specimen Collection     None      Pending Results     No orders found from 4/25/2017 to 4/28/2017.            Pending Culture Results     No orders found from 4/25/2017 to 4/28/2017.            Test Results From Your Hospital Stay        4/27/2017  6:46 PM      Component Results     Component Value Ref Range & Units Status    WBC 6.7 4.0 - 11.0 10e9/L Final    RBC Count 4.68 4.4 - 5.9 10e12/L Final    Hemoglobin 13.8 13.3 - 17.7 g/dL Final    Hematocrit 42.7 40.0 - 53.0 % Final    MCV 91 78 - 100 fl Final    MCH 29.5 26.5 - 33.0 pg Final    MCHC 32.3 31.5 - 36.5 g/dL Final    RDW 14.7 10.0 - 15.0 % Final    Platelet Count 219 150 - 450 10e9/L Final    Diff Method Automated Method  Final    % Neutrophils 56.2 % Final    % Lymphocytes 30.8 % Final    % Monocytes 8.3 % Final    % Eosinophils 3.4 % Final    % Basophils 0.9 % Final    % Immature Granulocytes 0.4 % Final    Nucleated RBCs 0 0 /100 Final    Absolute Neutrophil 3.8 1.6 - 8.3 10e9/L Final    Absolute Lymphocytes 2.1 0.8 - 5.3 10e9/L Final    Absolute Monocytes 0.6 0.0 - 1.3 10e9/L Final    Absolute Eosinophils 0.2  0.0 - 0.7 10e9/L Final    Absolute Basophils 0.1 0.0 - 0.2 10e9/L Final    Abs Immature Granulocytes 0.0 0 - 0.4 10e9/L Final    Absolute Nucleated RBC 0.0  Final         4/27/2017  7:05 PM      Component Results     Component Value Ref Range & Units Status    Sodium 137 133 - 144 mmol/L Final    Potassium 4.2 3.4 - 5.3 mmol/L Final    Chloride 103 94 - 109 mmol/L Final    Carbon Dioxide 28 20 - 32 mmol/L Final    Anion Gap 6 3 - 14 mmol/L Final    Glucose 83 70 - 99 mg/dL Final    Urea Nitrogen 15 7 - 30 mg/dL Final    Creatinine 0.98 0.66 - 1.25 mg/dL Final    GFR Estimate 84 >60 mL/min/1.7m2 Final    Non  GFR Calc    GFR Estimate If Black >90   GFR Calc   >60 mL/min/1.7m2 Final    Calcium 8.8 8.5 - 10.1 mg/dL Final    Bilirubin Total 0.2 0.2 - 1.3 mg/dL Final    Albumin 3.6 3.4 - 5.0 g/dL Final    Protein Total 8.8 6.8 - 8.8 g/dL Final    Alkaline Phosphatase 182 (H) 40 - 150 U/L Final    ALT 15 0 - 70 U/L Final    AST 12 0 - 45 U/L Final         4/27/2017  7:05 PM      Component Results     Component Value Ref Range & Units Status    Lipase 104 73 - 393 U/L Final                Clinical Quality Measure: Blood Pressure Screening     Your blood pressure was checked while you were in the emergency department today. The last reading we obtained was  BP: 118/76 . Please read the guidelines below about what these numbers mean and what you should do about them.  If your systolic blood pressure (the top number) is less than 120 and your diastolic blood pressure (the bottom number) is less than 80, then your blood pressure is normal. There is nothing more that you need to do about it.  If your systolic blood pressure (the top number) is 120-139 or your diastolic blood pressure (the bottom number) is 80-89, your blood pressure may be higher than it should be. You should have your blood pressure rechecked within a year by a primary care provider.  If your systolic blood pressure (the top  "number) is 140 or greater or your diastolic blood pressure (the bottom number) is 90 or greater, you may have high blood pressure. High blood pressure is treatable, but if left untreated over time it can put you at risk for heart attack, stroke, or kidney failure. You should have your blood pressure rechecked by a primary care provider within the next 4 weeks.  If your provider in the emergency department today gave you specific instructions to follow-up with your doctor or provider even sooner than that, you should follow that instruction and not wait for up to 4 weeks for your follow-up visit.        Thank you for choosing [facility]       Thank you for choosing [facility] for your care. Our goal is always to provide you with excellent care. Hearing back from our patients is one way we can continue to improve our services. Please take a few minutes to complete the written survey that you may receive in the mail after you visit with us. Thank you!        [employer] Information     [employer] lets you send messages to your doctor, view your test results, renew your prescriptions, schedule appointments and more. To sign up, go to [email or web address] . Click on \"Log in\" on the left side of the screen, which will take you to the Welcome page. Then click on \"Sign up Now\" on the right side of the page.     You will be asked to enter the access code listed below, as well as some personal information. Please follow the directions to create your username and password.     Your access code is: [email or web address]  Expires: 2017  8:40 PM     Your access code will  in 90 days. If you need help or a new code, please call your [facility] clinic or [phone].        Care EveryWhere ID     This is your Care EveryWhere ID. This could be used by other organizations to access your [facility] medical records  [id number]        After Visit Summary       This is your record. Keep this with you and show to your community pharmacist(s) and " doctor(s) at your next visit.

## 2017-04-27 NOTE — ED NOTES
RUQ pain, Unable to keep food or fluids down x 36 hours. Blood in water and on tissue after stooling. Hst of gallstones. ABCD's intact.

## 2017-04-27 NOTE — TELEPHONE ENCOUNTER
"Call Type: Triage Call    Presenting Problem: \"I am having my gallbladder out of 5/4. But the  pain (upper right) is getting a lot worse. It hurts to even talk.  Rates pain 9/10.I have been vomiting(yellow) and had diarrhea(times  2) yellow with blood in it. I feel hot, but havent' check for a  fever.\" Denies other sx at this time. Is urinating but not as much  as usual. Trying to eat crackers, not staying down. Is taking both  Tramadol and Oxycodone with no relief. Triaged and advised ER.  Triage Note:  Guideline Title: Abdominal or Pelvic Pain  Recommended Disposition: See ED Immediately  Original Inclination: Wanted to speak with a nurse  Override Disposition:  Intended Action: Follow advice given  Physician Contacted: No  Unbearable abdominal/pelvic pain ?  YES  Following a therapeutic OR elective  ? NO  New or worsening signs and symptoms that may indicate shock ? NO  Pregnant AND injury to abdomen ? NO  Pregnant AND abdominal pain/cramping OR back pain ? NO  Pregnant, less than 20 weeks gestation AND vaginal bleeding ? NO  Pregnant, more than 20 weeks gestation AND vaginal bleeding ? NO  Pregnant, gestation less than 20 weeks AND signs of labor ? NO  Pregnant, 20-37 weeks gestation AND signs of labor ? NO  Pregnant, gestation more than 37 weeks AND signs of labor ? NO  Pregnant AND heartburn ? NO  Following ingestion of toxic or caustic substance ? NO  Over 50 years of age AND new onset (first episode) unbearable back or abdominal  pain ? NO  Known abdominal aneurysm (swollen or ballooning aorta) AND sudden onset of  unbearable abdominal pain ? NO  Food or foreign body feels stuck in esophagus. ? NO  GI bleeding, more than streaks or scant amount of blood or passing black tarry  stool(s) ? NO  Chest discomfort associated with shortness of breath, sweating, odd heartbeats or  different heart rate, nausea, vomiting, lightheadedness, or fainting lasting 5 or  more minutes now or within the last hour ? " NO  Chest pain spreading to the shoulders, neck, jaw, in one or both arms, stomach or  back lasting 5 or more minutes now or within the last hour. Pain is NOT  associated with taking a deep breath or a productive cough, movement, or touch to  a localized area. ? NO  Pressure, fullness, squeezing sensation or pain anywhere in the chest lasting 5 or  more minutes now or within the last hour. Pain is NOT associated with taking a  deep breath or a productive cough, movement, or touch to a localized area on the  chest. ? NO  Injury to abdomen or pelvis ? NO  Recent childbirth or miscarriage ? NO  Physician Instructions:  Care Advice: IMMEDIATE ACTION

## 2017-04-28 ENCOUNTER — OFFICE VISIT (OUTPATIENT)
Dept: FAMILY MEDICINE | Facility: CLINIC | Age: 42
End: 2017-04-28
Payer: MEDICARE

## 2017-04-28 VITALS
TEMPERATURE: 98.2 F | WEIGHT: 221 LBS | SYSTOLIC BLOOD PRESSURE: 106 MMHG | BODY MASS INDEX: 30.94 KG/M2 | RESPIRATION RATE: 18 BRPM | HEIGHT: 71 IN | HEART RATE: 64 BPM | DIASTOLIC BLOOD PRESSURE: 60 MMHG | OXYGEN SATURATION: 97 %

## 2017-04-28 DIAGNOSIS — Z01.818 PREOP GENERAL PHYSICAL EXAM: Primary | ICD-10-CM

## 2017-04-28 DIAGNOSIS — K81.0 ACUTE CHOLECYSTITIS: ICD-10-CM

## 2017-04-28 PROCEDURE — 99214 OFFICE O/P EST MOD 30 MIN: CPT | Performed by: NURSE PRACTITIONER
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
## 2017-04-28 NOTE — MR AVS SNAPSHOT
After Visit Summary   4/28/2017    Andrea Uribe    MRN: [id number]           Patient Information     Date Of Birth          1975        Visit Information        Provider Department      4/28/2017 1:30 PM Bella Reece NP [facility]        Today's Diagnoses     Preop general physical exam    -  1    Acute cholecystitis          Care Instructions      Before Your Surgery      Call your surgeon if there is any change in your health. This includes signs of a cold or flu (such as a sore throat, runny nose, cough, rash or fever).    Do not smoke, drink alcohol or take over the counter medicine (unless your surgeon or primary care doctor tells you to) for the 24 hours before and after surgery.    If you take prescribed drugs: Follow your doctor s orders about which medicines to take and which to stop until after surgery.    Eating and drinking prior to surgery: follow the instructions from your surgeon    Take a shower or bath the night before surgery. Use the soap your surgeon gave you to gently clean your skin. If you do not have soap from your surgeon, use your regular soap. Do not shave or scrub the surgery site.  Wear clean pajamas and have clean sheets on your bed.         Follow-ups after your visit        Your next 10 appointments already scheduled     May 04, 2017   Procedure with John Mcfarlane MD   [facility] (--)    [address] [facility] [address]   [phone]            May 04, 2017 10:00 AM CDT   [facility] Same Day Surgery with John Mcfarlane MD, Kayla Reed PA-C   [facility] ([facility])    [facility] ([facility])   [phone]              Who to contact     If you have questions or need follow up information about today's clinic visit or your schedule please contact [facility] directly at  "[phone].  Normal or non-critical lab and imaging results will be communicated to you by MyChart, letter or phone within 4 business days after the clinic has received the results. If you do not hear from us within 7 days, please contact the clinic through [facility] or phone. If you have a critical or abnormal lab result, we will notify you by phone as soon as possible.  Submit refill requests through [employer] or call your pharmacy and they will forward the refill request to us. Please allow 3 business days for your refill to be completed.          Additional Information About Your Visit        [facility]ar[facility]eb[facility] Information     [employer] lets you send messages to your doctor, view your test results, renew your prescriptions, schedule appointments and more. To sign up, go to [email or web address] . Click on \"Log in\" on the left side of the screen, which will take you to the Welcome page. Then click on \"Sign up Now\" on the right side of the page.     You will be asked to enter the access code listed below, as well as some personal information. Please follow the directions to create your username and password.     Your access code is: [email or web address]  Expires: 2017  8:40 PM     Your access code will  in 90 days. If you need help or a new code, please call your [facility] clinic or [phone].        Care EveryWhere ID     This is your Care EveryWhere ID. This could be used by other organizations to access your [facility] medical records  [id number]        Your Vitals Were     Pulse Temperature Respirations Height Pulse Oximetry BMI (Body Mass Index)    64 98.2  F (36.8  C) (Oral) 18 5' 11\" (1.803 m) 97% 30.82 kg/m2       Blood Pressure from Last 3 Encounters:   17 106/60   17 118/76   17 120/68    Weight from Last 3 Encounters:   17 221 lb (100.2 kg)   17 221 lb (100.2 kg)   17 228 lb (103.4 kg)              Today, you had the following     No orders found for display       Primary " Care Provider Office Phone # Fax #    Alberto Shon Medley PA-C [phone]       [facility] [address] [place] [id number]        Thank you!     Thank you for choosing [facility]  for your care. Our goal is always to provide you with excellent care. Hearing back from our patients is one way we can continue to improve our services. Please take a few minutes to complete the written survey that you may receive in the mail after your visit with us. Thank you!             Your Updated Medication List - Protect others around you: Learn how to safely use, store and throw away your medicines at www.disposemymeds.org.          This list is accurate as of: 4/28/17  4:05 PM.  Always use your most recent med list.                   Brand Name Dispense Instructions for use    albuterol 108 (90 BASE) MCG/ACT Inhaler    PROAIR HFA/PROVENTIL HFA/VENTOLIN HFA    1 Inhaler    Inhale 2 puffs into the lungs every 6 hours as needed for shortness of breath / dyspnea       cyclobenzaprine 5 MG tablet    FLEXERIL     TAKE 1 TABLET BY MOUTH 3 TIMES A DAY AS NEEDED       DILANTIN 100 MG CR capsule   Generic drug:  phenytoin      Take 300 mg by mouth 2 times daily at 900 and 2100       KEPPRA 1000 MG Tabs   Generic drug:  levETIRAcetam      Take 2,000 mg by mouth 2 times daily At 0900 and 2100       lisinopril 5 MG tablet    PRINIVIL/ZESTRIL    90 tablet    Take 1 tablet (5 mg) by mouth daily       ondansetron 4 MG ODT tab    ZOFRAN ODT    15 tablet    Take 1 tablet (4 mg) by mouth every 6 hours as needed for nausea       oxyCODONE 5 MG IR tablet    ROXICODONE    12 tablet    Take 1 tablet (5 mg) by mouth every 6 hours as needed for pain       * oxyCODONE-acetaminophen 5-325 MG per tablet    PERCOCET    30 tablet    Take 1 tablet by mouth every 6 hours as needed for pain maximum 4 tablet(s) per day       * oxyCODONE-acetaminophen 5-325 MG per tablet    PERCOCET    15 tablet    Take  1-2 tablets by mouth every 4 hours as needed for pain       polyethylene glycol powder    MIRALAX    527 g    Take 17 g (1 capful) by mouth daily       promethazine 25 MG tablet    PHENERGAN    15 tablet    Take 1 tablet (25 mg) by mouth every 6 hours as needed for nausea       simvastatin 20 MG tablet    ZOCOR    90 tablet    Take 1 tablet (20 mg) by mouth At Bedtime       traMADol 50 MG tablet    ULTRAM    20 tablet    Take 1-2 tablets ( mg) by mouth every 6 hours as needed for moderate pain Do not drive or operate machinery while taking this medication.       venlafaxine 37.5 MG Tb24 24 hr tablet    EFFEXOR-ER    90 tablet    Take 1 tablet (37.5 mg) by mouth daily (with breakfast) Overdue for visit.  #30 only       * Notice:  This list has 2 medication(s) that are the same as other medications prescribed for you. Read the directions carefully, and ask your doctor or other care provider to review them with you.

## 2017-04-28 NOTE — NURSING NOTE
"Chief Complaint   Patient presents with     Pre-Op Exam       Initial /60 (BP Location: Right arm, Patient Position: Chair, Cuff Size: Adult Large)  Pulse 64  Temp 98.2  F (36.8  C) (Oral)  Resp 18  Ht 5' 11\" (1.803 m)  Wt 221 lb (100.2 kg)  SpO2 97%  BMI 30.82 kg/m2 Estimated body mass index is 30.82 kg/(m^2) as calculated from the following:    Height as of this encounter: 5' 11\" (1.803 m).    Weight as of this encounter: 221 lb (100.2 kg).  Medication Reconciliation: complete.Suzanne KUMAR MA      "

## 2017-04-30 ASSESSMENT — ENCOUNTER SYMPTOMS
FEVER: 0
SHORTNESS OF BREATH: 0
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
## 2017-05-04 ENCOUNTER — SURGERY (OUTPATIENT)
Age: 42
End: 2017-05-04

## 2017-05-04 ENCOUNTER — APPOINTMENT (OUTPATIENT)
Dept: SURGERY | Facility: PHYSICIAN GROUP | Age: 42
End: 2017-05-04
Payer: MEDICARE

## 2017-05-04 ENCOUNTER — ANESTHESIA (OUTPATIENT)
Dept: SURGERY | Facility: CLINIC | Age: 42
End: 2017-05-04
Payer: MEDICARE

## 2017-05-04 ENCOUNTER — ANESTHESIA EVENT (OUTPATIENT)
Dept: SURGERY | Facility: CLINIC | Age: 42
End: 2017-05-04
Payer: MEDICARE

## 2017-05-04 ENCOUNTER — HOSPITAL ENCOUNTER (OUTPATIENT)
Facility: CLINIC | Age: 42
Discharge: HOME | End: 2017-05-04
Attending: SURGERY | Admitting: SURGERY
Payer: MEDICARE

## 2017-05-04 VITALS
DIASTOLIC BLOOD PRESSURE: 63 MMHG | TEMPERATURE: 98.6 F | HEIGHT: 71 IN | SYSTOLIC BLOOD PRESSURE: 109 MMHG | BODY MASS INDEX: 31.78 KG/M2 | OXYGEN SATURATION: 95 % | WEIGHT: 227 LBS | RESPIRATION RATE: 16 BRPM

## 2017-05-04 DIAGNOSIS — K80.20 GALLSTONES: Primary | ICD-10-CM

## 2017-05-04 PROCEDURE — 71000013 ZZH RECOVERY PHASE 1 LEVEL 1 EA ADDTL HR: Performed by: SURGERY

## 2017-05-04 PROCEDURE — 37000008 ZZH ANESTHESIA TECHNICAL FEE, 1ST 30 MIN: Performed by: SURGERY

## 2017-05-04 PROCEDURE — 88304 TISSUE EXAM BY PATHOLOGIST: CPT | Mod: 26 | Performed by: SURGERY

## 2017-05-04 PROCEDURE — 40000306 ZZH STATISTIC PRE PROC ASSESS II: Performed by: SURGERY

## 2017-05-04 PROCEDURE — 25000128 H RX IP 250 OP 636: Performed by: NURSE ANESTHETIST, CERTIFIED REGISTERED

## 2017-05-04 PROCEDURE — 27210794 ZZH OR GENERAL SUPPLY STERILE: Performed by: SURGERY

## 2017-05-04 PROCEDURE — 37000009 ZZH ANESTHESIA TECHNICAL FEE, EACH ADDTL 15 MIN: Performed by: SURGERY

## 2017-05-04 PROCEDURE — 25000128 H RX IP 250 OP 636: Performed by: SURGERY

## 2017-05-04 PROCEDURE — 36000056 ZZH SURGERY LEVEL 3 1ST 30 MIN: Performed by: SURGERY

## 2017-05-04 PROCEDURE — 25800025 ZZH RX 258: Performed by: ANESTHESIOLOGY

## 2017-05-04 PROCEDURE — 25000125 ZZHC RX 250: Performed by: ANESTHESIOLOGY

## 2017-05-04 PROCEDURE — 25000125 ZZHC RX 250: Performed by: SURGERY

## 2017-05-04 PROCEDURE — 47562 LAPAROSCOPIC CHOLECYSTECTOMY: CPT | Performed by: SURGERY

## 2017-05-04 PROCEDURE — 47562 LAPAROSCOPIC CHOLECYSTECTOMY: CPT | Mod: AS | Performed by: PHYSICIAN ASSISTANT

## 2017-05-04 PROCEDURE — 25000132 ZZH RX MED GY IP 250 OP 250 PS 637: Mod: GY | Performed by: SURGERY

## 2017-05-04 PROCEDURE — 71000027 ZZH RECOVERY PHASE 2 EACH 15 MINS: Performed by: SURGERY

## 2017-05-04 PROCEDURE — 25000125 ZZHC RX 250: Performed by: NURSE ANESTHETIST, CERTIFIED REGISTERED

## 2017-05-04 PROCEDURE — 36000058 ZZH SURGERY LEVEL 3 EA 15 ADDTL MIN: Performed by: SURGERY

## 2017-05-04 PROCEDURE — A9270 NON-COVERED ITEM OR SERVICE: HCPCS | Mod: GY | Performed by: SURGERY

## 2017-05-04 PROCEDURE — 71000012 ZZH RECOVERY PHASE 1 LEVEL 1 FIRST HR: Performed by: SURGERY

## 2017-05-04 PROCEDURE — 88304 TISSUE EXAM BY PATHOLOGIST: CPT | Performed by: SURGERY

## 2017-05-04 PROCEDURE — 25000566 ZZH SEVOFLURANE, EA 15 MIN: Performed by: SURGERY

## 2017-05-04 RX ORDER — PROPOFOL 10 MG/ML
INJECTION, EMULSION INTRAVENOUS PRN
Status: DISCONTINUED | OUTPATIENT
Start: 2017-05-04 | End: 2017-05-04

## 2017-05-04 RX ORDER — SODIUM CHLORIDE, SODIUM LACTATE, POTASSIUM CHLORIDE, CALCIUM CHLORIDE 600; 310; 30; 20 MG/100ML; MG/100ML; MG/100ML; MG/100ML
INJECTION, SOLUTION INTRAVENOUS CONTINUOUS
Status: DISCONTINUED | OUTPATIENT
Start: 2017-05-04 | End: 2017-05-04 | Stop reason: HOSPADM

## 2017-05-04 RX ORDER — LIDOCAINE HYDROCHLORIDE 10 MG/ML
INJECTION, SOLUTION INFILTRATION; PERINEURAL PRN
Status: DISCONTINUED | OUTPATIENT
Start: 2017-05-04 | End: 2017-05-04

## 2017-05-04 RX ORDER — LIDOCAINE 40 MG/G
CREAM TOPICAL
Status: DISCONTINUED | OUTPATIENT
Start: 2017-05-04 | End: 2017-05-04 | Stop reason: HOSPADM

## 2017-05-04 RX ORDER — OXYCODONE AND ACETAMINOPHEN 5; 325 MG/1; MG/1
1-2 TABLET ORAL
Status: COMPLETED | OUTPATIENT
Start: 2017-05-04 | End: 2017-05-04

## 2017-05-04 RX ORDER — NEOSTIGMINE METHYLSULFATE 1 MG/ML
VIAL (ML) INJECTION PRN
Status: DISCONTINUED | OUTPATIENT
Start: 2017-05-04 | End: 2017-05-04

## 2017-05-04 RX ORDER — HYDROMORPHONE HYDROCHLORIDE 1 MG/ML
.3-.5 INJECTION, SOLUTION INTRAMUSCULAR; INTRAVENOUS; SUBCUTANEOUS EVERY 10 MIN PRN
Status: DISCONTINUED | OUTPATIENT
Start: 2017-05-04 | End: 2017-05-04 | Stop reason: HOSPADM

## 2017-05-04 RX ORDER — FENTANYL CITRATE 50 UG/ML
25-50 INJECTION, SOLUTION INTRAMUSCULAR; INTRAVENOUS
Status: DISCONTINUED | OUTPATIENT
Start: 2017-05-04 | End: 2017-05-04 | Stop reason: HOSPADM

## 2017-05-04 RX ORDER — ONDANSETRON 2 MG/ML
4 INJECTION INTRAMUSCULAR; INTRAVENOUS EVERY 30 MIN PRN
Status: DISCONTINUED | OUTPATIENT
Start: 2017-05-04 | End: 2017-05-04 | Stop reason: HOSPADM

## 2017-05-04 RX ORDER — ONDANSETRON 2 MG/ML
INJECTION INTRAMUSCULAR; INTRAVENOUS PRN
Status: DISCONTINUED | OUTPATIENT
Start: 2017-05-04 | End: 2017-05-04

## 2017-05-04 RX ORDER — OXYCODONE AND ACETAMINOPHEN 5; 325 MG/1; MG/1
1-2 TABLET ORAL EVERY 4 HOURS PRN
Qty: 30 TABLET | Refills: 0 | Status: SHIPPED | OUTPATIENT
Start: 2017-05-04 | End: 2017-09-08

## 2017-05-04 RX ORDER — DIMENHYDRINATE 50 MG/ML
25 INJECTION, SOLUTION INTRAMUSCULAR; INTRAVENOUS
Status: DISCONTINUED | OUTPATIENT
Start: 2017-05-04 | End: 2017-05-04 | Stop reason: HOSPADM

## 2017-05-04 RX ORDER — NALOXONE HYDROCHLORIDE 0.4 MG/ML
.1-.4 INJECTION, SOLUTION INTRAMUSCULAR; INTRAVENOUS; SUBCUTANEOUS
Status: DISCONTINUED | OUTPATIENT
Start: 2017-05-04 | End: 2017-05-04 | Stop reason: HOSPADM

## 2017-05-04 RX ORDER — LABETALOL HYDROCHLORIDE 5 MG/ML
10 INJECTION, SOLUTION INTRAVENOUS
Status: DISCONTINUED | OUTPATIENT
Start: 2017-05-04 | End: 2017-05-04 | Stop reason: HOSPADM

## 2017-05-04 RX ORDER — HYDRALAZINE HYDROCHLORIDE 20 MG/ML
2.5-5 INJECTION INTRAMUSCULAR; INTRAVENOUS EVERY 10 MIN PRN
Status: DISCONTINUED | OUTPATIENT
Start: 2017-05-04 | End: 2017-05-04 | Stop reason: HOSPADM

## 2017-05-04 RX ORDER — DEXAMETHASONE SODIUM PHOSPHATE 4 MG/ML
INJECTION, SOLUTION INTRA-ARTICULAR; INTRALESIONAL; INTRAMUSCULAR; INTRAVENOUS; SOFT TISSUE PRN
Status: DISCONTINUED | OUTPATIENT
Start: 2017-05-04 | End: 2017-05-04

## 2017-05-04 RX ORDER — BUPIVACAINE HYDROCHLORIDE AND EPINEPHRINE 5; 5 MG/ML; UG/ML
INJECTION, SOLUTION PERINEURAL PRN
Status: DISCONTINUED | OUTPATIENT
Start: 2017-05-04 | End: 2017-05-04 | Stop reason: HOSPADM

## 2017-05-04 RX ORDER — MEPERIDINE HYDROCHLORIDE 25 MG/ML
12.5 INJECTION INTRAMUSCULAR; INTRAVENOUS; SUBCUTANEOUS
Status: DISCONTINUED | OUTPATIENT
Start: 2017-05-04 | End: 2017-05-04 | Stop reason: HOSPADM

## 2017-05-04 RX ORDER — ONDANSETRON 4 MG/1
4 TABLET, ORALLY DISINTEGRATING ORAL EVERY 30 MIN PRN
Status: DISCONTINUED | OUTPATIENT
Start: 2017-05-04 | End: 2017-05-04 | Stop reason: HOSPADM

## 2017-05-04 RX ORDER — FENTANYL CITRATE 50 UG/ML
INJECTION, SOLUTION INTRAMUSCULAR; INTRAVENOUS PRN
Status: DISCONTINUED | OUTPATIENT
Start: 2017-05-04 | End: 2017-05-04

## 2017-05-04 RX ORDER — GLYCOPYRROLATE 0.2 MG/ML
INJECTION, SOLUTION INTRAMUSCULAR; INTRAVENOUS PRN
Status: DISCONTINUED | OUTPATIENT
Start: 2017-05-04 | End: 2017-05-04

## 2017-05-04 RX ADMIN — GLYCOPYRROLATE 0.8 MG: 0.2 INJECTION, SOLUTION INTRAMUSCULAR; INTRAVENOUS at 11:44

## 2017-05-04 RX ADMIN — FENTANYL CITRATE 50 MCG: 50 INJECTION, SOLUTION INTRAMUSCULAR; INTRAVENOUS at 11:48

## 2017-05-04 RX ADMIN — PHENYLEPHRINE HYDROCHLORIDE 100 MCG: 10 INJECTION, SOLUTION INTRAMUSCULAR; INTRAVENOUS; SUBCUTANEOUS at 10:48

## 2017-05-04 RX ADMIN — ROCURONIUM BROMIDE 10 MG: 10 INJECTION INTRAVENOUS at 11:21

## 2017-05-04 RX ADMIN — ROCURONIUM BROMIDE 10 MG: 10 INJECTION INTRAVENOUS at 11:29

## 2017-05-04 RX ADMIN — ROCURONIUM BROMIDE 35 MG: 10 INJECTION INTRAVENOUS at 10:37

## 2017-05-04 RX ADMIN — PROPOFOL 200 MG: 10 INJECTION, EMULSION INTRAVENOUS at 10:37

## 2017-05-04 RX ADMIN — ROCURONIUM BROMIDE 10 MG: 10 INJECTION INTRAVENOUS at 11:39

## 2017-05-04 RX ADMIN — DEXAMETHASONE SODIUM PHOSPHATE 4 MG: 4 INJECTION, SOLUTION INTRA-ARTICULAR; INTRALESIONAL; INTRAMUSCULAR; INTRAVENOUS; SOFT TISSUE at 10:37

## 2017-05-04 RX ADMIN — LIDOCAINE HYDROCHLORIDE 50 MG: 10 INJECTION, SOLUTION INFILTRATION; PERINEURAL at 10:37

## 2017-05-04 RX ADMIN — SODIUM CHLORIDE, POTASSIUM CHLORIDE, SODIUM LACTATE AND CALCIUM CHLORIDE: 600; 310; 30; 20 INJECTION, SOLUTION INTRAVENOUS at 10:32

## 2017-05-04 RX ADMIN — BUPIVACAINE HYDROCHLORIDE AND EPINEPHRINE BITARTRATE 20 ML: 5; .005 INJECTION, SOLUTION EPIDURAL; INTRACAUDAL; PERINEURAL at 11:42

## 2017-05-04 RX ADMIN — Medication 5 MG: at 11:44

## 2017-05-04 RX ADMIN — OXYCODONE HYDROCHLORIDE AND ACETAMINOPHEN 1 TABLET: 5; 325 TABLET ORAL at 13:56

## 2017-05-04 RX ADMIN — FENTANYL CITRATE 50 MCG: 50 INJECTION, SOLUTION INTRAMUSCULAR; INTRAVENOUS at 11:37

## 2017-05-04 RX ADMIN — FENTANYL CITRATE 50 MCG: 50 INJECTION INTRAMUSCULAR; INTRAVENOUS at 12:48

## 2017-05-04 RX ADMIN — ROCURONIUM BROMIDE 15 MG: 10 INJECTION INTRAVENOUS at 11:04

## 2017-05-04 RX ADMIN — FENTANYL CITRATE 50 MCG: 50 INJECTION INTRAMUSCULAR; INTRAVENOUS at 12:20

## 2017-05-04 RX ADMIN — GLYCOPYRROLATE 0.2 MG: 0.2 INJECTION, SOLUTION INTRAMUSCULAR; INTRAVENOUS at 10:37

## 2017-05-04 RX ADMIN — FENTANYL CITRATE 100 MCG: 50 INJECTION, SOLUTION INTRAMUSCULAR; INTRAVENOUS at 10:37

## 2017-05-04 RX ADMIN — FENTANYL CITRATE 50 MCG: 50 INJECTION INTRAMUSCULAR; INTRAVENOUS at 13:04

## 2017-05-04 RX ADMIN — FENTANYL CITRATE 50 MCG: 50 INJECTION INTRAMUSCULAR; INTRAVENOUS at 12:13

## 2017-05-04 RX ADMIN — VANCOMYCIN HYDROCHLORIDE 1500 MG: 10 INJECTION, POWDER, LYOPHILIZED, FOR SOLUTION INTRAVENOUS at 09:10

## 2017-05-04 RX ADMIN — SODIUM CHLORIDE, POTASSIUM CHLORIDE, SODIUM LACTATE AND CALCIUM CHLORIDE: 600; 310; 30; 20 INJECTION, SOLUTION INTRAVENOUS at 11:44

## 2017-05-04 RX ADMIN — FENTANYL CITRATE 50 MCG: 50 INJECTION INTRAMUSCULAR; INTRAVENOUS at 13:48

## 2017-05-04 RX ADMIN — FENTANYL CITRATE 50 MCG: 50 INJECTION, SOLUTION INTRAMUSCULAR; INTRAVENOUS at 11:56

## 2017-05-04 RX ADMIN — ONDANSETRON 4 MG: 2 INJECTION INTRAMUSCULAR; INTRAVENOUS at 10:37

## 2017-05-04 RX ADMIN — ROCURONIUM BROMIDE 10 MG: 10 INJECTION INTRAVENOUS at 11:12

## 2017-05-04 ASSESSMENT — LIFESTYLE VARIABLES: TOBACCO_USE: 0

## 2017-05-04 ASSESSMENT — ENCOUNTER SYMPTOMS
SEIZURES: 1
STRIDOR: 0
DYSRHYTHMIAS: 0

## 2017-05-04 ASSESSMENT — COPD QUESTIONNAIRES: COPD: 0

## 2017-05-04 NOTE — DISCHARGE INSTRUCTIONS
HOME CARE FOLLOWING LAPAROSCOPIC CHOLECYSTECTOMY  TI Martinez E. Gavin, N. Guttormson, D. Maurer, ANNELISE Conklin, RAUDEL Menendez    INCISIONAL CARE:  Replace the bandage over your incisions until all drainage stops, or if more comfortable to have in place.  If present, leave the steri-strips (white paper tapes) in place for 14 days after surgery.  If Dermabond (a type of skin glue) is present, leave in place until it wears/flakes off.     BATHING:  Avoid baths for 1 week after surgery.  Showers are okay.  You may wash your hair at any time.  Gently pat your incisions dry after bathing.    ACTIVITY:  Light Activity -- you may immediately be up and about as tolerated.  Driving -- you may drive when comfortable and off narcotic pain medications.  Light Work -- resume when comfortable off pain medications.  (If you can drive, you probably can work.)  Strenuous Work/Activity -- limit lifting to 20 pounds for 1 week.  Progressively increase with time.  Active Sports (running, biking, etc.) -- cautiously resume after 2 weeks.    DISCOMFORT:  Use pain medications as prescribed by your surgeon.  Take the pain medication with some food, when possible, to minimize side effects.  Intermittent use of ice packs at the incision sites may help during the first 48 hours.  Expect gradual improvement.  You may experience shoulder pain, which is due to the air placed within your abdomen during the procedure.  This is temporary and usually passes within 2 days.    DIET:  Drink plenty of fluids.  While taking pain medications, increase dietary fiber or add a fiber supplementation like Metamucil or Citrucel to help prevent constipation - a possible side effect of pain medications.  It is not uncommon to experience some bowel changes (loose stools or constipation) after surgery.  Your body has to adapt to you no longer having a gall bladder.  To help minimize this side effect, avoid fatty foods for the first week after surgery.   You may then slowly increase the amount of fatty foods in your diet.      NAUSEA:  If nauseated from the anesthetic/pain meds; rest in bed, get up cautiously with assistance, and drink clear liquids (juice, tea, broth).    RETURN APPOINTMENT:  Schedule a follow-up visit 2-3 weeks post-op.  Office Phone:  [phone]     CONTACT US IF THE FOLLOWING DEVELOPS:   1. A fever that is above 101     2. If there is a large amount of drainage, bleeding, or swelling.   3. Severe pain that is not relieved by your prescription.   4. Drainage that is thick, cloudy, yellow, green or white.   5. Any other questions not answered by  Frequently Asked Questions  sheet.      FREQUENTLY ASKED QUESTIONS:    Q:  How should my incision look?    A:  Normally your incision will appear slightly swollen with light redness directly along the incision itself as it heals.  It may feel like a bump or ridge as the healing/scarring happens, and over time (3-4 months) this bump or ridge feeling should slowly go away.  In general, clear or pink watery drainage can be normal at first as your incision heals, but should decrease over time.    Q:  How do I know if my incision is infected?  A:  Look at your incision for signs of infection, like redness around the incision spreading to surrounding skin, or drainage of cloudy or foul-smelling drainage.  If you feel warm, check your temperature to see if you are running a fever.    **If any of these things occur, please notify the nurse at our office.  We may need you to come into the office for an incision check.      Q:  How do I take care of my incision?  A:  If you have a dressing in place - Starting the day after surgery, replace the dressing 1-2 times a day until there is no further drainage from the incision.  At that time, a dressing is no longer needed.  Try to minimize tape on the skin if irritation is occurring at the tape sites.  If you have significant irritation from tape on the skin, please  call the office to discuss other method of dressing your incision.    Small pieces of tape called  steri-strips  may be present directly overlying your incision; these may be removed 10 days after surgery unless otherwise specified by your surgeon.  If these tapes start to loosen at the ends, you may trim them back until they fall off or are removed.    A:  If you had  Dermabond  tissue glue used as a dressing (this causes your incision to look shiny with a clear covering over it) - This type of dressing wears off with time and does not require more dressings over the top unless it is draining around the glue as it wears off.  Do not apply ointments or lotions over the incisions until the glue has completely worn off.    Q:  There is a piece of tape or a sticky  lead  still on my skin.  Can I remove this?  A:  Sometimes the sticky  leads  used for monitoring during surgery or for evaluation in the emergency department are not all removed while you are in the hospital.  These sometimes have a tab or metal dot on them.  You can easily remove these on your own, like taking off a band-aid.  If there is a gel substance under the  lead , simply wipe/clean it off with a washcloth or paper towel.      Q:  What can I do to minimize constipation (very hard stools, or lack of stools)?  A:  Stay well hydrated.  Increase your dietary fiber intake or take a fiber supplement -with plenty of water.  Walk around frequently.  You may consider an over-the-counter stool-softener.  Your Pharmacist can assist you with choosing one that is stocked at your pharmacy.  Constipation is also one of the most common side effects of pain medication.  If you are using pain medication, be pro-active and try to PREVENT problems with constipation by taking the steps above BEFORE constipation becomes a problem.    Q:  What do I do if I need more pain medications?  A:  Call the office to receive refills.  Be aware that certain pain meds cannot be  called into a pharmacy and actually require a paper prescription.  A change may be made in your pain med as you progress thru your recovery period or if you have side effects to certain meds.    --Pain meds are NOT refilled after 5pm on weekdays, and NOT AT ALL on the weekends, so please look ahead to prevent problems.      Q:  Why am I having a hard time sleeping now that I am at home?  A:  Many medications you receive while you are in the hospital can impact your sleep for a number of days after your surgery/hospitalization.  Decreased level of activity and naps during the day may also make sleeping at night difficult.  Try to minimize day-time naps, and get up frequently during the day to walk around your home during your recovery time.  Sleep aides may be of some help, but are not recommended for long-term use.      Q:  I am having some back discomfort.  What should I do?  A:  This may be related to certain positioning that was required for your surgery, extended periods of time in bed, or other changes in your overall activity level.  You may try ice, heat, acetaminophen, or ibuprofen to treat this temporarily.  Note that many pain medications have acetaminophen in them and would state this on the prescription bottle.  Be sure not to exceed the maximum of 4000mg per day of acetaminophen.     **If the pain you are having does not resolve, is severe, or is a flare of back pain you have had on other occasions prior to surgery, please contact your primary physician for further recommendations or for an appointment to be examined at their office.    Q:  Why am I having headaches?  A:  Headaches can be caused by many things:  caffeine withdrawal, use of pain meds, dehydration, high blood pressure, lack of sleep, over-activity/exhaustion, flare-up of usual migraine headaches.  If you feel this is related to muscle tension (a band-like feeling around the head, or a pressure at the low-back of the head) you may try ice  or heat to this area.  You may need to drink more fluids (try electrolyte drink like Gatorade), rest, or take your usual migraine medications.   **If your headaches do not resolve, worsen, are accompanied by other symptoms, or if your blood pressure is high, please call your primary physician for recommendation and/or examination.    Q:  I am unable to urinate.  What do I do?  A:  A small percentage of people can have difficulty urinating initially after surgery.  This includes being able to urinate only a very small amount at a time and feeling discomfort or pressure in the very low abdomen.  This is called  urinary retention , and is actually an urgent situation.  Proceed to your nearest Emergency department for evaluation (not an Urgent Care Center).  Sometimes the bladder does not work correctly after certain medications you receive during surgery, or related to certain procedures.  You may need to have a catheter placed until your bladder recovers.  When planning to go to an Emergency department, it may help to call the ER to let them know you are coming in for this problem after a surgery.  This may help you get in quicker to be evaluated.  **If you have symptoms of a urinary tract infection, please contact your primary physician for the proper evaluation and treatment.    If you have other questions, please call the office Monday thru Friday between 8am and 5pm to discuss with the nurse or physician assistant.  #[phone]    There is a surgeon ON CALL on weekday evenings and over the weekend in case of urgent need only, and may be contacted at the same number.    If you are having an emergency, call 911 or proceed to your nearest emergency department.      GENERAL ANESTHESIA OR SEDATION ADULT DISCHARGE INSTRUCTIONS   SPECIAL PRECAUTIONS FOR 24 HOURS AFTER SURGERY    IT IS NOT UNUSUAL TO FEEL LIGHT-HEADED OR FAINT, UP TO 24 HOURS AFTER SURGERY OR WHILE TAKING PAIN MEDICATION.  IF YOU HAVE THESE SYMPTOMS;  SIT FOR A FEW MINUTES BEFORE STANDING AND HAVE SOMEONE ASSIST YOU WHEN YOU GET UP TO WALK OR USE THE BATHROOM.    YOU SHOULD REST AND RELAX FOR THE NEXT 24 HOURS AND YOU MUST MAKE ARRANGEMENTS TO HAVE SOMEONE STAY WITH YOU FOR AT LEAST 24 HOURS AFTER YOUR DISCHARGE.  AVOID HAZARDOUS AND STRENUOUS ACTIVITIES.  DO NOT MAKE IMPORTANT DECISIONS FOR 24 HOURS.    DO NOT DRIVE ANY VEHICLE OR OPERATE MECHANICAL EQUIPMENT FOR 24 HOURS FOLLOWING THE END OF YOUR SURGERY.  EVEN THOUGH YOU MAY FEEL NORMAL, YOUR REACTIONS MAY BE AFFECTED BY THE MEDICATION YOU HAVE RECEIVED.    DO NOT DRINK ALCOHOLIC BEVERAGES FOR 24 HOURS FOLLOWING YOUR SURGERY.    DRINK CLEAR LIQUIDS (APPLE JUICE, GINGER ALE, 7-UP, BROTH, ETC.).  PROGRESS TO YOUR REGULAR DIET AS YOU FEEL ABLE.    YOU MAY HAVE A DRY MOUTH, A SORE THROAT, MUSCLES ACHES OR TROUBLE SLEEPING.  THESE SHOULD GO AWAY AFTER 24 HOURS.    CALL YOUR DOCTOR FOR ANY OF THE FOLLOWING:  SIGNS OF INFECTION (FEVER, GROWING TENDERNESS AT THE SURGERY SITE, A LARGE AMOUNT OF DRAINAGE OR BLEEDING, SEVERE PAIN, FOUL-SMELLING DRAINAGE, REDNESS OR SWELLING.    IT HAS BEEN OVER 8 TO 10 HOURS SINCE SURGERY AND YOU ARE STILL NOT ABLE TO URINATE (PASS WATER).       You had one percocet at 2:00 pm.

## 2017-05-04 NOTE — IP AVS SNAPSHOT
MRN:[id number]                      After Visit Summary   5/4/2017    Andrea Uribe    MRN: [id number]           Thank you!     Thank you for choosing [facility] for your care. Our goal is always to provide you with excellent care. Hearing back from our patients is one way we can continue to improve our services. Please take a few minutes to complete the written survey that you may receive in the mail after you visit. If you would like to speak to someone directly about your visit please contact Patient Relations at [phone]. Thank you!          Patient Information     Date Of Birth          1975        About your hospital stay     You were admitted on:  May 4, 2017 You last received care in the:  [facility] PreOP/PostOP    You were discharged on:  May 4, 2017       Who to Call     For medical emergencies, please call 911.  For non-urgent questions about your medical care, please call your primary care provider or clinic, [phone]  For questions related to your surgery, please call your surgery clinic        Attending Provider     Provider Specialty    John Mcfarlane MD General Surgery       Primary Care Provider Office Phone # Fax #    Alberto Shon Medley PA-C [phone]       [address] [address]        Further instructions from your care team       HOME CARE FOLLOWING LAPAROSCOPIC CHOLECYSTECTOMY  TI Martinez E. Gavin, N. Guttormson, D. Maurer, ANNELISE Conklin, RAUDEL Menendez    INCISIONAL CARE:  Replace the bandage over your incisions until all drainage stops, or if more comfortable to have in place.  If present, leave the steri-strips (white paper tapes) in place for 14 days after surgery.  If Dermabond (a type of skin glue) is present, leave in place until it wears/flakes off.     BATHING:  Avoid baths for 1 week after surgery.  Showers are okay.  You may wash your hair at any time.   Gently pat your incisions dry after bathing.    ACTIVITY:  Light Activity -- you may immediately be up and about as tolerated.  Driving -- you may drive when comfortable and off narcotic pain medications.  Light Work -- resume when comfortable off pain medications.  (If you can drive, you probably can work.)  Strenuous Work/Activity -- limit lifting to 20 pounds for 1 week.  Progressively increase with time.  Active Sports (running, biking, etc.) -- cautiously resume after 2 weeks.    DISCOMFORT:  Use pain medications as prescribed by your surgeon.  Take the pain medication with some food, when possible, to minimize side effects.  Intermittent use of ice packs at the incision sites may help during the first 48 hours.  Expect gradual improvement.  You may experience shoulder pain, which is due to the air placed within your abdomen during the procedure.  This is temporary and usually passes within 2 days.    DIET:  Drink plenty of fluids.  While taking pain medications, increase dietary fiber or add a fiber supplementation like Metamucil or Citrucel to help prevent constipation - a possible side effect of pain medications.  It is not uncommon to experience some bowel changes (loose stools or constipation) after surgery.  Your body has to adapt to you no longer having a gall bladder.  To help minimize this side effect, avoid fatty foods for the first week after surgery.  You may then slowly increase the amount of fatty foods in your diet.      NAUSEA:  If nauseated from the anesthetic/pain meds; rest in bed, get up cautiously with assistance, and drink clear liquids (juice, tea, broth).    RETURN APPOINTMENT:  Schedule a follow-up visit 2-3 weeks post-op.  Office Phone:  [phone]     CONTACT US IF THE FOLLOWING DEVELOPS:   1. A fever that is above 101     2. If there is a large amount of drainage, bleeding, or swelling.   3. Severe pain that is not relieved by your prescription.   4. Drainage that is thick, cloudy,  yellow, green or white.   5. Any other questions not answered by  Frequently Asked Questions  sheet.      FREQUENTLY ASKED QUESTIONS:    Q:  How should my incision look?    A:  Normally your incision will appear slightly swollen with light redness directly along the incision itself as it heals.  It may feel like a bump or ridge as the healing/scarring happens, and over time (3-4 months) this bump or ridge feeling should slowly go away.  In general, clear or pink watery drainage can be normal at first as your incision heals, but should decrease over time.    Q:  How do I know if my incision is infected?  A:  Look at your incision for signs of infection, like redness around the incision spreading to surrounding skin, or drainage of cloudy or foul-smelling drainage.  If you feel warm, check your temperature to see if you are running a fever.    **If any of these things occur, please notify the nurse at our office.  We may need you to come into the office for an incision check.      Q:  How do I take care of my incision?  A:  If you have a dressing in place - Starting the day after surgery, replace the dressing 1-2 times a day until there is no further drainage from the incision.  At that time, a dressing is no longer needed.  Try to minimize tape on the skin if irritation is occurring at the tape sites.  If you have significant irritation from tape on the skin, please call the office to discuss other method of dressing your incision.    Small pieces of tape called  steri-strips  may be present directly overlying your incision; these may be removed 10 days after surgery unless otherwise specified by your surgeon.  If these tapes start to loosen at the ends, you may trim them back until they fall off or are removed.    A:  If you had  Dermabond  tissue glue used as a dressing (this causes your incision to look shiny with a clear covering over it) - This type of dressing wears off with time and does not require more  dressings over the top unless it is draining around the glue as it wears off.  Do not apply ointments or lotions over the incisions until the glue has completely worn off.    Q:  There is a piece of tape or a sticky  lead  still on my skin.  Can I remove this?  A:  Sometimes the sticky  leads  used for monitoring during surgery or for evaluation in the emergency department are not all removed while you are in the hospital.  These sometimes have a tab or metal dot on them.  You can easily remove these on your own, like taking off a band-aid.  If there is a gel substance under the  lead , simply wipe/clean it off with a washcloth or paper towel.      Q:  What can I do to minimize constipation (very hard stools, or lack of stools)?  A:  Stay well hydrated.  Increase your dietary fiber intake or take a fiber supplement -with plenty of water.  Walk around frequently.  You may consider an over-the-counter stool-softener.  Your Pharmacist can assist you with choosing one that is stocked at your pharmacy.  Constipation is also one of the most common side effects of pain medication.  If you are using pain medication, be pro-active and try to PREVENT problems with constipation by taking the steps above BEFORE constipation becomes a problem.    Q:  What do I do if I need more pain medications?  A:  Call the office to receive refills.  Be aware that certain pain meds cannot be called into a pharmacy and actually require a paper prescription.  A change may be made in your pain med as you progress thru your recovery period or if you have side effects to certain meds.    --Pain meds are NOT refilled after 5pm on weekdays, and NOT AT ALL on the weekends, so please look ahead to prevent problems.      Q:  Why am I having a hard time sleeping now that I am at home?  A:  Many medications you receive while you are in the hospital can impact your sleep for a number of days after your surgery/hospitalization.  Decreased level of activity  and naps during the day may also make sleeping at night difficult.  Try to minimize day-time naps, and get up frequently during the day to walk around your home during your recovery time.  Sleep aides may be of some help, but are not recommended for long-term use.      Q:  I am having some back discomfort.  What should I do?  A:  This may be related to certain positioning that was required for your surgery, extended periods of time in bed, or other changes in your overall activity level.  You may try ice, heat, acetaminophen, or ibuprofen to treat this temporarily.  Note that many pain medications have acetaminophen in them and would state this on the prescription bottle.  Be sure not to exceed the maximum of 4000mg per day of acetaminophen.     **If the pain you are having does not resolve, is severe, or is a flare of back pain you have had on other occasions prior to surgery, please contact your primary physician for further recommendations or for an appointment to be examined at their office.    Q:  Why am I having headaches?  A:  Headaches can be caused by many things:  caffeine withdrawal, use of pain meds, dehydration, high blood pressure, lack of sleep, over-activity/exhaustion, flare-up of usual migraine headaches.  If you feel this is related to muscle tension (a band-like feeling around the head, or a pressure at the low-back of the head) you may try ice or heat to this area.  You may need to drink more fluids (try electrolyte drink like Gatorade), rest, or take your usual migraine medications.   **If your headaches do not resolve, worsen, are accompanied by other symptoms, or if your blood pressure is high, please call your primary physician for recommendation and/or examination.    Q:  I am unable to urinate.  What do I do?  A:  A small percentage of people can have difficulty urinating initially after surgery.  This includes being able to urinate only a very small amount at a time and feeling discomfort  or pressure in the very low abdomen.  This is called  urinary retention , and is actually an urgent situation.  Proceed to your nearest Emergency department for evaluation (not an Urgent Care Center).  Sometimes the bladder does not work correctly after certain medications you receive during surgery, or related to certain procedures.  You may need to have a catheter placed until your bladder recovers.  When planning to go to an Emergency department, it may help to call the ER to let them know you are coming in for this problem after a surgery.  This may help you get in quicker to be evaluated.  **If you have symptoms of a urinary tract infection, please contact your primary physician for the proper evaluation and treatment.    If you have other questions, please call the office Monday thru Friday between 8am and 5pm to discuss with the nurse or physician assistant.  #[phone]    There is a surgeon ON CALL on weekday evenings and over the weekend in case of urgent need only, and may be contacted at the same number.    If you are having an emergency, call 911 or proceed to your nearest emergency department.      GENERAL ANESTHESIA OR SEDATION ADULT DISCHARGE INSTRUCTIONS   SPECIAL PRECAUTIONS FOR 24 HOURS AFTER SURGERY    IT IS NOT UNUSUAL TO FEEL LIGHT-HEADED OR FAINT, UP TO 24 HOURS AFTER SURGERY OR WHILE TAKING PAIN MEDICATION.  IF YOU HAVE THESE SYMPTOMS; SIT FOR A FEW MINUTES BEFORE STANDING AND HAVE SOMEONE ASSIST YOU WHEN YOU GET UP TO WALK OR USE THE BATHROOM.    YOU SHOULD REST AND RELAX FOR THE NEXT 24 HOURS AND YOU MUST MAKE ARRANGEMENTS TO HAVE SOMEONE STAY WITH YOU FOR AT LEAST 24 HOURS AFTER YOUR DISCHARGE.  AVOID HAZARDOUS AND STRENUOUS ACTIVITIES.  DO NOT MAKE IMPORTANT DECISIONS FOR 24 HOURS.    DO NOT DRIVE ANY VEHICLE OR OPERATE MECHANICAL EQUIPMENT FOR 24 HOURS FOLLOWING THE END OF YOUR SURGERY.  EVEN THOUGH YOU MAY FEEL NORMAL, YOUR REACTIONS MAY BE AFFECTED BY THE MEDICATION YOU HAVE  "RECEIVED.    DO NOT DRINK ALCOHOLIC BEVERAGES FOR 24 HOURS FOLLOWING YOUR SURGERY.    DRINK CLEAR LIQUIDS (APPLE JUICE, GINGER ALE, 7-UP, BROTH, ETC.).  PROGRESS TO YOUR REGULAR DIET AS YOU FEEL ABLE.    YOU MAY HAVE A DRY MOUTH, A SORE THROAT, MUSCLES ACHES OR TROUBLE SLEEPING.  THESE SHOULD GO AWAY AFTER 24 HOURS.    CALL YOUR DOCTOR FOR ANY OF THE FOLLOWING:  SIGNS OF INFECTION (FEVER, GROWING TENDERNESS AT THE SURGERY SITE, A LARGE AMOUNT OF DRAINAGE OR BLEEDING, SEVERE PAIN, FOUL-SMELLING DRAINAGE, REDNESS OR SWELLING.    IT HAS BEEN OVER 8 TO 10 HOURS SINCE SURGERY AND YOU ARE STILL NOT ABLE TO URINATE (PASS WATER).       You had one percocet at 2:00 pm.            Pending Results     Date and Time Order Name Status Description    2017 1134 Surgical pathology exam In process             Admission Information     Date & Time Provider Department Dept. Phone    2017 John Mcfarlane MD [facility] PreOP/PostOP [phone]      Your Vitals Were     Blood Pressure Temperature Respirations Height Weight Pulse Oximetry    99/60 98.6  F (37  C) 18 1.803 m (5' 10.98\") 103 kg (227 lb) 95%    BMI (Body Mass Index)                   31.67 kg/m2           MyChart Information     [employer] lets you send messages to your doctor, view your test results, renew your prescriptions, schedule appointments and more. To sign up, go to [email or web address] . Click on \"Log in\" on the left side of the screen, which will take you to the Welcome page. Then click on \"Sign up Now\" on the right side of the page.     You will be asked to enter the access code listed below, as well as some personal information. Please follow the directions to create your username and password.     Your access code is: [email or web address]  Expires: 2017  8:40 PM     Your access code will  in 90 days. If you need help or a new code, please call your [place] clinic or [phone].        Care EveryWhere ID     This is your Care EveryWhere " ID. This could be used by other organizations to access your [place] medical records  [id number]           Review of your medicines      CONTINUE these medicines which may have CHANGED, or have new prescriptions. If we are uncertain of the size of tablets/capsules you have at home, strength may be listed as something that might have changed.        Dose / Directions    oxyCODONE-acetaminophen 5-325 MG per tablet   Commonly known as:  PERCOCET   This may have changed:    - how much to take  - when to take this  - reasons to take this  - additional instructions  - Another medication with the same name was removed. Continue taking this medication, and follow the directions you see here.   Used for:  Gallstones        Dose:  1-2 tablet   Take 1-2 tablets by mouth every 4 hours as needed for pain (moderate to severe)   Quantity:  30 tablet   Refills:  0         CONTINUE these medicines which have NOT CHANGED        Dose / Directions    albuterol 108 (90 BASE) MCG/ACT Inhaler   Commonly known as:  PROAIR HFA/PROVENTIL HFA/VENTOLIN HFA   Used for:  Intermittent asthma, uncomplicated        Dose:  2 puff   Inhale 2 puffs into the lungs every 6 hours as needed for shortness of breath / dyspnea   Quantity:  1 Inhaler   Refills:  3       DILANTIN 100 MG CR capsule   Generic drug:  phenytoin        Dose:  300 mg   Take 300 mg by mouth 2 times daily at 900 and 2100   Refills:  0       KEPPRA 1000 MG Tabs   Generic drug:  levETIRAcetam        Dose:  2000 mg   Take 2,000 mg by mouth 2 times daily At 0900 and 2100   Refills:  0       lisinopril 5 MG tablet   Commonly known as:  PRINIVIL/ZESTRIL   Used for:  CKD (chronic kidney disease) stage 3, GFR 30-59 ml/min        Dose:  5 mg   Take 1 tablet (5 mg) by mouth daily   Quantity:  90 tablet   Refills:  2       polyethylene glycol powder   Commonly known as:  MIRALAX        Dose:  1 capful   Take 17 g (1 capful) by mouth daily   Quantity:  527 g   Refills:  0       promethazine 25  MG tablet   Commonly known as:  PHENERGAN        Dose:  25 mg   Take 1 tablet (25 mg) by mouth every 6 hours as needed for nausea   Quantity:  15 tablet   Refills:  0       simvastatin 20 MG tablet   Commonly known as:  ZOCOR   Used for:  Hyperlipidemia LDL goal <100        Dose:  20 mg   Take 1 tablet (20 mg) by mouth At Bedtime   Quantity:  90 tablet   Refills:  3       traMADol 50 MG tablet   Commonly known as:  ULTRAM        Dose:   mg   Take 1-2 tablets ( mg) by mouth every 6 hours as needed for moderate pain Do not drive or operate machinery while taking this medication.   Quantity:  20 tablet   Refills:  0       venlafaxine 37.5 MG Tb24 24 hr tablet   Commonly known as:  EFFEXOR-ER   Used for:  Major depressive disorder, recurrent episode, moderate (H), Anxiety        Dose:  37.5 mg   Take 1 tablet (37.5 mg) by mouth daily (with breakfast) Overdue for visit.  #30 only   Quantity:  90 tablet   Refills:  3         STOP taking     oxyCODONE 5 MG IR tablet   Commonly known as:  ROXICODONE                Where to get your medicines      Some of these will need a paper prescription and others can be bought over the counter. Ask your nurse if you have questions.     Bring a paper prescription for each of these medications     oxyCODONE-acetaminophen 5-325 MG per tablet                Protect others around you: Learn how to safely use, store and throw away your medicines at www.disposemymeds.org.             Medication List: This is a list of all your medications and when to take them. Check marks below indicate your daily home schedule. Keep this list as a reference.      Medications           Morning Afternoon Evening Bedtime As Needed    albuterol 108 (90 BASE) MCG/ACT Inhaler   Commonly known as:  PROAIR HFA/PROVENTIL HFA/VENTOLIN HFA   Inhale 2 puffs into the lungs every 6 hours as needed for shortness of breath / dyspnea                                DILANTIN 100 MG CR capsule   Take 300 mg by  mouth 2 times daily at 900 and 2100   Generic drug:  phenytoin                                KEPPRA 1000 MG Tabs   Take 2,000 mg by mouth 2 times daily At 0900 and 2100   Generic drug:  levETIRAcetam                                lisinopril 5 MG tablet   Commonly known as:  PRINIVIL/ZESTRIL   Take 1 tablet (5 mg) by mouth daily                                oxyCODONE-acetaminophen 5-325 MG per tablet   Commonly known as:  PERCOCET   Take 1-2 tablets by mouth every 4 hours as needed for pain (moderate to severe)   Last time this was given:  1 tablet on 5/4/2017  1:56 PM                                polyethylene glycol powder   Commonly known as:  MIRALAX   Take 17 g (1 capful) by mouth daily                                promethazine 25 MG tablet   Commonly known as:  PHENERGAN   Take 1 tablet (25 mg) by mouth every 6 hours as needed for nausea                                simvastatin 20 MG tablet   Commonly known as:  ZOCOR   Take 1 tablet (20 mg) by mouth At Bedtime                                traMADol 50 MG tablet   Commonly known as:  ULTRAM   Take 1-2 tablets ( mg) by mouth every 6 hours as needed for moderate pain Do not drive or operate machinery while taking this medication.                                venlafaxine 37.5 MG Tb24 24 hr tablet   Commonly known as:  EFFEXOR-ER   Take 1 tablet (37.5 mg) by mouth daily (with breakfast) Overdue for visit.  #30 only

## 2017-05-04 NOTE — OP NOTE
General Surgery Operative Note    Pre-operative diagnosis:  Cholelithaisis   Post-operative diagnosis: same   Procedure: Laparoscopic Cholecystectomy   Surgeon: John Mcfarlane MD   Assistant(s): Kayla Reed PA-C - the physician assistant was medically necessary to assist in prepping, positioning, camera operation, retraction/exposure and closure of the port site.    Anesthesia: General    Estimated blood loss: 5 cc's   Drains placed: None   Complications:  None   Findings:  Significant adhesions over the gallbladder, suggesting previous inflammation.  Significant adhesions in the midline of the abdomen, necessitating all incisions made off of the midline.     INDICATIONS FOR OPERATION: This is a patient with upper abdominal pain and gallstones.  Laparoscopic cholecystectomy was recommended.  The patient's previous midline hernia repair, as well as his upper abdominal incision add degree of complexity to the case.  The procedure along with its risks and complications was discussed in detail and the patient agreed to proceed.    DETAILS OF THE OPERATION: After informed consent the patient was taken to the operating room where he underwent satisfactory induction of general anesthesia.  The patient was then sterilely prepped and draped.  An incision was made superior and to the right of the umbilicus, outside the expected profile of the patient's mesh.  The dissection was carried bluntly down to the fascia.  The anterior fascia was opened using electrocautery the rectus muscle fibers were spread, exposing the posterior fascia. The posterior fascia was opened using electrocautery, and the peritoneum was entered without difficulty. We did find a nice open space.  The Bejarano trocar was then inserted and fixed in place using stay sutures.  Pneumoperitoneum was achieved using CO2 insufflation, and under direct visualization  three 5 mm upper abdominal ports were placed.  There were dense adhesions over the  expected area of the gallbladder, and we proceeded to take down a dense veil of adhesions, eventually exposing the dome of the gallbladder.  The dome of the gallbladder was grasped.  It was pulled up over the liver and the cystic duct was exposed.  The cystic duct was skeletonized, triple clipped and divided.  The cystic artery was likewise triple clipped and divided.  The gallbladder was then dissected away from the liver using electrocautery.  The gallbladder was then placed in an Endo Catch bag and removed through the supraumbilical incision.  The gallbladder fossa was irrigated out.  There was excellent hemostasis and the clips were in good position.  The trocar sites were now infiltrated with half percent Marcaine with epinephrine.  The trochars were removed under direct visualization.  The Bejarano port fascia was then closed using 0 Vicryl suture in two layers.  Skin incisions were closed using 4-0 subcuticular Vicryl followed by Steri-Strips.    The patient was transferred to the recovery room in satisfactory condition.  Sponge and needle counts were correct at the close of the case.      Specimens:   ID Type Source Tests Collected by Time Destination   A : gallbladder and contents Tissue Gallbladder and Contents SURGICAL PATHOLOGY EXAM John Mcfarlane MD 5/4/2017 11:34 AM            John Mcfarlane MD

## 2017-05-04 NOTE — ANESTHESIA PREPROCEDURE EVALUATION
Anesthesia Evaluation     . Pt has had prior anesthetic. Type: General    No history of anesthetic complications          ROS/MED HX    ENT/Pulmonary:     (+)sleep apnea, Mild Persistent asthma uses CPAP , . .   (-) tobacco use, COPD and recent URI   Neurologic:  - neg neurologic ROS   (+)migraines, seizures last seizure: 2011    (-) CVA   Cardiovascular:     (+) Dyslipidemia, hypertension----. : . . . :. . Previous cardiac testing date:results:date: results:ECG reviewed date:4/17 results:NSR date: results:         (-) CAD, arrhythmias and valvular problems/murmurs   METS/Exercise Tolerance:     Hematologic: Comments: Lab Test        04/27/17 04/20/17 04/18/17      --          01/21/14      --          09/02/12      --          02/11/11                       1835          2353          1645           --           0620           --           1500           --           0620          WBC          6.7          9.0          5.4            < >         --            < >        5.5            < >        12.5*         HGB          13.8         11.9*        12.5*          < >         --            < >        10.8*          < >        13.8          MCV          91           92           92             < >         --            < >        94             < >        96            PLT          219          202          195            < >         --            < >        171            < >        180           INR           --           --           --           --          1.00          --          0.97          --          0.95           < > = values in this interval not displayed.                  Lab Test        04/27/17 04/20/17 04/18/17                       1835          2353          1645          NA           137          141          140           POTASSIUM    4.2          3.8          4.0           CHLORIDE     103          105          105           CO2          28           29           29             BUN          15           20           13            CR           0.98         1.00         0.88          ANIONGAP     6            7            6             LAURA          8.8          8.2*         8.4*          GLC          83           96           87              (+) History of Transfusion -      Musculoskeletal:   (+) arthritis, , , -       GI/Hepatic:     (+) GERD Asymptomatic on medication,      (-) hepatitis and liver disease   Renal/Genitourinary:     (+) chronic renal disease, type: CRI, Pt does not require dialysis, Pt has no history of transplant,       Endo:     (+) thyroid problem hypothyroidism, Obesity, .   (-) Type I DM and Type II DM   Psychiatric:  - neg psychiatric ROS       Infectious Disease:  - neg infectious disease ROS       Malignancy:      - no malignancy   Other:    (+) H/O Chronic Pain,                   Physical Exam  Normal systems: cardiovascular, pulmonary and dental    Airway   Mallampati: II  TM distance: >3 FB  Neck ROM: full    Dental     Cardiovascular   Rhythm and rate: regular and normal  (-) no friction rub, no systolic click and no murmur    Pulmonary    breath sounds clear to auscultation(-) no rhonchi, no decreased breath sounds, no wheezes, no rales and no stridor                    Anesthesia Plan      History & Physical Review  History and physical reviewed and following examination; no interval change.    ASA Status:  3 .    NPO Status:  > 8 hours    Plan for General and ETT with Intravenous induction. Maintenance will be Balanced.    PONV prophylaxis:  Ondansetron (or other 5HT-3) and Dexamethasone or Solumedrol       Postoperative Care  Postoperative pain management:  IV analgesics.      Consents  Anesthetic plan, risks, benefits and alternatives discussed with:  Patient or representative, Patient and Spouse..                          .

## 2017-05-04 NOTE — IP AVS SNAPSHOT
[facility] PreOP/PostOP    [address]    [facility] [address]    Phone:  [phone]    Fax:  [phone]                                       After Visit Summary   5/4/2017    Andrea Uribe    MRN: [id number]           After Visit Summary Signature Page     I have received my discharge instructions, and my questions have been answered. I have discussed any challenges I see with this plan with the nurse or doctor.    ..........................................................................................................................................  Patient/Patient Representative Signature      ..........................................................................................................................................  Patient Representative Print Name and Relationship to Patient    ..................................................               ................................................  Date                                            Time    ..........................................................................................................................................  Reviewed by Signature/Title    ...................................................              ..............................................  Date                                                            Time

## 2017-05-04 NOTE — ANESTHESIA CARE TRANSFER NOTE
Patient: Andrea Uribe    Procedure(s):  LAPAROSCOPIC CHOLECYSTECTOMY  - Wound Class: II-Clean Contaminated    Diagnosis: Cholelithaisis  Diagnosis Additional Information: No value filed.    Anesthesia Type:   General, ETT     Note:  Airway :Face Mask  Patient transferred to:PACU  Comments: Spont Resps. Follows commands. Extubated. Maintains Resps. Tx to pacu. Report to RN      Vitals: (Last set prior to Anesthesia Care Transfer)    CRNA VITALS  5/4/2017 1125 - 5/4/2017 1201      5/4/2017             Pulse: 87    SpO2: 90 %    Resp Rate (observed): 18                Electronically Signed By: JANNA Rashid CRNA  May 4, 2017  12:01 PM

## 2017-05-04 NOTE — ANESTHESIA POSTPROCEDURE EVALUATION
Patient: Andrea Uribe    Procedure(s):  LAPAROSCOPIC CHOLECYSTECTOMY  - Wound Class: II-Clean Contaminated    Diagnosis:Cholelithaisis  Diagnosis Additional Information: Cholelithaisis    Anesthesia Type:  General, ETT    Note:  Anesthesia Post Evaluation    Patient location during evaluation: PACU  Patient participation: Able to fully participate in evaluation  Level of consciousness: sleepy but conscious  Pain management: adequate  Airway patency: patent  Cardiovascular status: acceptable  Respiratory status: acceptable  Hydration status: acceptable  PONV: controlled     Anesthetic complications: None          Last vitals:  Vitals:    05/04/17 1230 05/04/17 1248 05/04/17 1256   BP: 97/56     Resp: 18 16 12   Temp:      SpO2: 95% 100% 95%         Electronically Signed By: Stew Downing MD  May 4, 2017  12:57 PM

## 2017-05-05 LAB — COPATH REPORT: NORMAL

## 2017-05-10 DIAGNOSIS — R10.84 ABDOMINAL PAIN, GENERALIZED: Primary | ICD-10-CM

## 2017-05-10 RX ORDER — TRAMADOL HYDROCHLORIDE 50 MG/1
50-100 TABLET ORAL EVERY 6 HOURS PRN
Qty: 20 TABLET | Refills: 0 | Status: SHIPPED | OUTPATIENT
Start: 2017-05-10 | End: 2017-09-08

## 2017-05-10 NOTE — TELEPHONE ENCOUNTER
RX [place] ([place]) reviewed:  reviewed- recommend provider review    [place]:  [email or web address]

## 2017-05-10 NOTE — TELEPHONE ENCOUNTER
traMADol (ULTRAM) 50 MG tablet      Last Written Prescription Date: 04/18/2017  Last Fill Quantity: 20,  # refills: 0   Last Office Visit with [facility], [facility] or [facility] prescribing provider: 04/28/2017

## 2017-05-19 DIAGNOSIS — F33.1 MAJOR DEPRESSIVE DISORDER, RECURRENT EPISODE, MODERATE (H): ICD-10-CM

## 2017-05-19 DIAGNOSIS — F41.9 ANXIETY: ICD-10-CM

## 2017-05-22 RX ORDER — VENLAFAXINE HYDROCHLORIDE 37.5 MG/1
CAPSULE, EXTENDED RELEASE ORAL
Qty: 90 CAPSULE | Refills: 0 | Status: SHIPPED | OUTPATIENT
Start: 2017-05-22 | End: 2017-08-30

## 2017-05-29 DIAGNOSIS — N18.30 CKD (CHRONIC KIDNEY DISEASE) STAGE 3, GFR 30-59 ML/MIN (H): ICD-10-CM

## 2017-05-30 NOTE — TELEPHONE ENCOUNTER
lisinopril (PRINIVIL/ZESTRIL) 5 MG tablet      Last Written Prescription Date: 9/9/16  Last Fill Quantity: 90, # refills: 2  Last Office Visit with G, P or [facility] prescribing provider: 4/28/2017         Potassium   Date Value Ref Range Status   04/27/2017 4.2 3.4 - 5.3 mmol/L Final     Creatinine   Date Value Ref Range Status   04/27/2017 0.98 0.66 - 1.25 mg/dL Final     BP Readings from Last 3 Encounters:   05/04/17 109/63   04/28/17 106/60   04/27/17 118/76         NABILA Heath  May 30, 2017  9:25 AM

## 2017-05-31 RX ORDER — LISINOPRIL 5 MG/1
5 TABLET ORAL DAILY
Qty: 90 TABLET | Refills: 1 | Status: SHIPPED | OUTPATIENT
Start: 2017-05-31 | End: 2017-11-12

## 2017-06-05 ENCOUNTER — OFFICE VISIT (OUTPATIENT)
Dept: FAMILY MEDICINE | Facility: CLINIC | Age: 42
End: 2017-06-05
Payer: MEDICARE

## 2017-06-05 VITALS
HEART RATE: 71 BPM | DIASTOLIC BLOOD PRESSURE: 74 MMHG | BODY MASS INDEX: 31.95 KG/M2 | TEMPERATURE: 97.9 F | SYSTOLIC BLOOD PRESSURE: 104 MMHG | WEIGHT: 229 LBS | OXYGEN SATURATION: 94 %

## 2017-06-05 DIAGNOSIS — L82.0 INFLAMED SEBORRHEIC KERATOSIS: Primary | ICD-10-CM

## 2017-06-05 DIAGNOSIS — R10.11 RUQ ABDOMINAL PAIN: ICD-10-CM

## 2017-06-05 PROCEDURE — 17110 DESTRUCTION B9 LES UP TO 14: CPT | Performed by: FAMILY MEDICINE

## 2017-06-05 PROCEDURE — 99203 OFFICE O/P NEW LOW 30 MIN: CPT | Mod: 25 | Performed by: FAMILY MEDICINE

## 2017-06-05 ASSESSMENT — ENCOUNTER SYMPTOMS
ABDOMINAL PAIN: 1
CONSTITUTIONAL NEGATIVE: 1

## 2017-06-05 NOTE — PROGRESS NOTES
HPI    SUBJECTIVE:                                                    Andrea Uribe is a 41 year old male who presents to clinic today for the following health issues:    Mole      Duration: changes in mole over past couple of months    Description (location/character/radiation): right cheek; used to be a pink flat mole    Intensity:  mild    Accompanying signs and symptoms: itching, darkness, raised in size    History (similar episodes/previous evaluation): None    Precipitating or alleviating factors: None    Therapies tried and outcome: None     R cheek/temple mole, has been present for many years, nut recently darker, more itchy.  No bleeding.  No change in weight. Will cafch this when shaving.     Noting some RUQ pain and irritation since GB surgery about one month ago.  Is due to f/u with surgeon any day now, needs to schedule.  Rates discomfort as 3-4/10.  No n/v, change in bowel habits    Review of Systems   Constitutional: Negative.    Gastrointestinal: Positive for abdominal pain.   Skin: Positive for itching. Negative for rash.         Physical Exam   Constitutional: He is well-developed, well-nourished, and in no distress.   Abdominal: Normal appearance and bowel sounds are normal. There is no hepatosplenomegaly. There is no tenderness. There is no CVA tenderness.   Skin: Skin is warm and dry.   3mm seborrheic keratosis on L zygomatic arch   Vitals reviewed.    (L82.0) Inflamed seborrheic keratosis  (primary encounter diagnosis)  Comment: Verbal consent obtained.Cryo treatment x3 with 15sec thaw.  Patient tolerated procedure well.    Plan: DESTRUCT BENIGN LESION, UP TO 14            (R10.11) RUQ abdominal pain  Comment:   Plan: advised this is still in healing phase, so pulling and mild pain are normal.  F/u with surgeon PRN      RTC in 1m    Yao Valencia MD

## 2017-06-05 NOTE — NURSING NOTE
"Chief Complaint   Patient presents with     Mole     evaluate mole on his right cheek       Initial /74 (BP Location: Right arm, Patient Position: Chair, Cuff Size: Adult Large)  Pulse 71  Temp 97.9  F (36.6  C) (Oral)  Wt 229 lb (103.9 kg)  SpO2 94%  BMI 31.95 kg/m2 Estimated body mass index is 31.95 kg/(m^2) as calculated from the following:    Height as of 5/4/17: 5' 10.98\" (1.803 m).    Weight as of this encounter: 229 lb (103.9 kg).  Medication Reconciliation: complete   Lorrie Amaro CMA (AAMA)      "

## 2017-06-05 NOTE — MR AVS SNAPSHOT
"              After Visit Summary   2017    Andrea Uribe    MRN: [id number]           Patient Information     Date Of Birth          1975        Visit Information        Provider Department      2017 1:40 PM Yao Valencia MD [facility]        Today's Diagnoses     Inflamed seborrheic keratosis    -  1    RUQ abdominal pain           Follow-ups after your visit        Follow-up notes from your care team     Return in about 2 weeks (around 2017), or if symptoms worsen or fail to improve.      Who to contact     If you have questions or need follow up information about today's clinic visit or your schedule please contact [facility] directly at [phone].  Normal or non-critical lab and imaging results will be communicated to you by MyChart, letter or phone within 4 business days after the clinic has received the results. If you do not hear from us within 7 days, please contact the clinic through [employer] or phone. If you have a critical or abnormal lab result, we will notify you by phone as soon as possible.  Submit refill requests through [employer] or call your pharmacy and they will forward the refill request to us. Please allow 3 business days for your refill to be completed.          Additional Information About Your Visit        MyChart Information     [employer] lets you send messages to your doctor, view your test results, renew your prescriptions, schedule appointments and more. To sign up, go to [email or web address] . Click on \"Log in\" on the left side of the screen, which will take you to the Welcome page. Then click on \"Sign up Now\" on the right side of the page.     You will be asked to enter the access code listed below, as well as some personal information. Please follow the directions to create your username and password.     Your access code is: [email or web address]  Expires: 2017  8:40 PM     Your access code will  in 90 days. If you need " help or a new code, please call your [place] clinic or [phone].        Care EveryWhere ID     This is your Care EveryWhere ID. This could be used by other organizations to access your [place] medical records  [id number]        Your Vitals Were     Pulse Temperature Pulse Oximetry BMI (Body Mass Index)          71 97.9  F (36.6  C) (Oral) 94% 31.95 kg/m2         Blood Pressure from Last 3 Encounters:   06/05/17 104/74   05/04/17 109/63   04/28/17 106/60    Weight from Last 3 Encounters:   06/05/17 229 lb (103.9 kg)   05/04/17 227 lb (103 kg)   04/28/17 221 lb (100.2 kg)              We Performed the Following     DESTRUCT BENIGN LESION, UP TO 14        Primary Care Provider Office Phone # Fax #    Alberto Shon Medley PA-C [phone]       [address] [address]        Thank you!     Thank you for choosing [facility]  for your care. Our goal is always to provide you with excellent care. Hearing back from our patients is one way we can continue to improve our services. Please take a few minutes to complete the written survey that you may receive in the mail after your visit with us. Thank you!             Your Updated Medication List - Protect others around you: Learn how to safely use, store and throw away your medicines at www.disposemymeds.org.          This list is accurate as of: 6/5/17  2:08 PM.  Always use your most recent med list.                   Brand Name Dispense Instructions for use    albuterol 108 (90 BASE) MCG/ACT Inhaler    PROAIR HFA/PROVENTIL HFA/VENTOLIN HFA    1 Inhaler    Inhale 2 puffs into the lungs every 6 hours as needed for shortness of breath / dyspnea       DILANTIN 100 MG CR capsule   Generic drug:  phenytoin      Take 300 mg by mouth 2 times daily at 900 and 2100       KEPPRA 1000 MG Tabs   Generic drug:  levETIRAcetam      Take 2,000 mg by mouth 2 times daily At 0900 and 2100       lisinopril 5 MG  tablet    PRINIVIL/ZESTRIL    90 tablet    Take 1 tablet (5 mg) by mouth daily       oxyCODONE-acetaminophen 5-325 MG per tablet    PERCOCET    30 tablet    Take 1-2 tablets by mouth every 4 hours as needed for pain (moderate to severe)       promethazine 25 MG tablet    PHENERGAN    15 tablet    Take 1 tablet (25 mg) by mouth every 6 hours as needed for nausea       simvastatin 20 MG tablet    ZOCOR    90 tablet    Take 1 tablet (20 mg) by mouth At Bedtime       traMADol 50 MG tablet    ULTRAM    20 tablet    Take 1-2 tablets ( mg) by mouth every 6 hours as needed for moderate pain Do not drive or operate machinery while taking this medication.       venlafaxine 37.5 MG 24 hr capsule    EFFEXOR-XR    90 capsule    TAKE 1 CAPSULE BY MOUTH DAILY (WITH BREAKFAST)

## 2017-06-05 NOTE — PROGRESS NOTES
SUBJECTIVE:                                                    Andrea Uribe is a 41 year old male who presents to clinic today for the following health issues:    Mole      Duration: changes in mole over past couple of months    Description (location/character/radiation): right cheek; used to be a pink flat mole    Intensity:  mild    Accompanying signs and symptoms: itching, darkness, raised in size    History (similar episodes/previous evaluation): None    Precipitating or alleviating factors: None    Therapies tried and outcome: None     R cheek/temple mole, has been present for many years, nut recently darker, more itchy.  No bleeding.  No change in weight.      Noting some RUQ pain and irritation since GB surgery about one month ago.  Is due to f/u with surgeon any day now, needs to schedule.  Rates discomfort as 3-4/10

## 2017-06-29 ENCOUNTER — TELEPHONE (OUTPATIENT)
Dept: FAMILY MEDICINE | Facility: CLINIC | Age: 42
End: 2017-06-29

## 2017-06-29 DIAGNOSIS — E78.5 HYPERLIPIDEMIA LDL GOAL <100: ICD-10-CM

## 2017-06-29 NOTE — TELEPHONE ENCOUNTER
Routing refill request to provider for review/approval because:  Last lipids 5/5/2015  Fasting OV or LO?  Marbin Danielle RN

## 2017-06-29 NOTE — TELEPHONE ENCOUNTER
Can we send to Orestes please, he is his primary now.  Romeo Mcfarland MD  [facility] [address]  [phone]  r

## 2017-06-29 NOTE — TELEPHONE ENCOUNTER
Simvastatin 20 mg      Last Written Prescription Date: 07/01/16  Last Fill Quantity: 90, # refills: 3  Last Office Visit with G, P or [facility] prescribing provider: 01/31/17 Alberto Medley        Lab Results   Component Value Date    CHOL 214 05/05/2015     Lab Results   Component Value Date    HDL 57 05/05/2015     Lab Results   Component Value Date     05/05/2015     Lab Results   Component Value Date    TRIG 137 05/05/2015     Lab Results   Component Value Date    CHOLHDLRATIO 3.8 05/05/2015

## 2017-06-29 NOTE — LETTER
[facility]  [address] [place], [address]  [phone]        July 7, 2017      Andrea [facility]ribe  [address] [facility] [address]        Dear Andrea,      We have tried contacting you by phone but have been unsuccessful. I have refilled your medication for one month. Please call us to schedule a fasting physical appointment before any further refills.      Thank you,    Orestes Medley PA-C

## 2017-07-06 DIAGNOSIS — N18.30 CKD (CHRONIC KIDNEY DISEASE) STAGE 3, GFR 30-59 ML/MIN (H): ICD-10-CM

## 2017-07-06 RX ORDER — SIMVASTATIN 20 MG
TABLET ORAL
Qty: 90 TABLET | Refills: 0 | Status: SHIPPED | OUTPATIENT
Start: 2017-07-06 | End: 2017-09-30

## 2017-07-06 NOTE — TELEPHONE ENCOUNTER
Lisinopril 5 mg tablet      Last Written Prescription Date: 05-  Last Fill Quantity: , #90fills: 1  Last Office Visit with [facility], [facility] or [facility] prescribing provider: 06- Dr. Valencia.       Potassium   Date Value Ref Range Status   04/27/2017 4.2 3.4 - 5.3 mmol/L Final     Creatinine   Date Value Ref Range Status   04/27/2017 0.98 0.66 - 1.25 mg/dL Final     BP Readings from Last 3 Encounters:   06/05/17 104/74   05/04/17 109/63   04/28/17 106/60

## 2017-07-07 RX ORDER — LISINOPRIL 5 MG/1
5 TABLET ORAL DAILY
Qty: 90 TABLET | Refills: 1
Start: 2017-07-07

## 2017-08-30 ENCOUNTER — TELEPHONE (OUTPATIENT)
Dept: FAMILY MEDICINE | Facility: CLINIC | Age: 42
End: 2017-08-30

## 2017-08-30 DIAGNOSIS — F33.1 MAJOR DEPRESSIVE DISORDER, RECURRENT EPISODE, MODERATE (H): ICD-10-CM

## 2017-08-30 DIAGNOSIS — F41.9 ANXIETY: ICD-10-CM

## 2017-08-30 NOTE — TELEPHONE ENCOUNTER
venlafaxine (EFFEXOR-XR) 37.5 MG 24 hr capsule    Last Written Prescription Date: 5/22/17  Last Fill Quantity: 90, # refills: 0  Last Office Visit with [facility], [facility] or [facility] prescribing provider: 6/5/2017          BP Readings from Last 3 Encounters:   06/05/17 104/74   05/04/17 109/63   04/28/17 106/60     Pulse: (for Fetzima)  Creatinine   Date Value Ref Range Status   04/27/2017 0.98 0.66 - 1.25 mg/dL Final   ]    Last PHQ-9 score on record=   PHQ-9 SCORE 1/31/2017   Total Score -   Total Score 5     NABILA Heath  August 30, 2017  10:59 AM

## 2017-08-31 RX ORDER — VENLAFAXINE HYDROCHLORIDE 37.5 MG/1
CAPSULE, EXTENDED RELEASE ORAL
Qty: 30 CAPSULE | Refills: 0 | Status: SHIPPED | OUTPATIENT
Start: 2017-08-31 | End: 2017-11-08

## 2017-08-31 NOTE — TELEPHONE ENCOUNTER
Spoke with patient and informed him that he needs a fasting px per recent refills and a letter was sent last month regarding this.  Tried to do the PHQ over the phone but patient states he has to do this on paper because of his head injury.    Please advise on refill    Jayda Lockwood RN, BSN

## 2017-09-06 NOTE — TELEPHONE ENCOUNTER
Spoke with patient's wife, wife was already informed about refill and so we just scheduled a med check with O[facility]

## 2017-09-08 ENCOUNTER — OFFICE VISIT (OUTPATIENT)
Dept: FAMILY MEDICINE | Facility: CLINIC | Age: 42
End: 2017-09-08
Payer: MEDICARE

## 2017-09-08 DIAGNOSIS — F41.9 ANXIETY: ICD-10-CM

## 2017-09-08 DIAGNOSIS — J45.20 INTERMITTENT ASTHMA, UNCOMPLICATED: ICD-10-CM

## 2017-09-08 DIAGNOSIS — F33.1 MAJOR DEPRESSIVE DISORDER, RECURRENT EPISODE, MODERATE (H): ICD-10-CM

## 2017-09-08 PROCEDURE — 99213 OFFICE O/P EST LOW 20 MIN: CPT | Performed by: PHYSICIAN ASSISTANT

## 2017-09-08 RX ORDER — VENLAFAXINE HYDROCHLORIDE 75 MG/1
75 CAPSULE, EXTENDED RELEASE ORAL DAILY
Qty: 90 CAPSULE | Refills: 1 | Status: SHIPPED | OUTPATIENT
Start: 2017-09-08 | End: 2018-03-07

## 2017-09-08 RX ORDER — ALBUTEROL SULFATE 90 UG/1
2 AEROSOL, METERED RESPIRATORY (INHALATION) EVERY 6 HOURS PRN
Qty: 2 INHALER | Refills: 3 | Status: SHIPPED | OUTPATIENT
Start: 2017-09-08 | End: 2018-06-14

## 2017-09-08 RX ORDER — VENLAFAXINE HYDROCHLORIDE 37.5 MG/1
CAPSULE, EXTENDED RELEASE ORAL
Qty: 30 CAPSULE | Refills: 0 | Status: CANCELLED | OUTPATIENT
Start: 2017-09-08

## 2017-09-08 ASSESSMENT — ANXIETY QUESTIONNAIRES
2. NOT BEING ABLE TO STOP OR CONTROL WORRYING: MORE THAN HALF THE DAYS
6. BECOMING EASILY ANNOYED OR IRRITABLE: NEARLY EVERY DAY
5. BEING SO RESTLESS THAT IT IS HARD TO SIT STILL: NOT AT ALL
1. FEELING NERVOUS, ANXIOUS, OR ON EDGE: SEVERAL DAYS
3. WORRYING TOO MUCH ABOUT DIFFERENT THINGS: MORE THAN HALF THE DAYS
IF YOU CHECKED OFF ANY PROBLEMS ON THIS QUESTIONNAIRE, HOW DIFFICULT HAVE THESE PROBLEMS MADE IT FOR YOU TO DO YOUR WORK, TAKE CARE OF THINGS AT HOME, OR GET ALONG WITH OTHER PEOPLE: NOT DIFFICULT AT ALL
7. FEELING AFRAID AS IF SOMETHING AWFUL MIGHT HAPPEN: NOT AT ALL
GAD7 TOTAL SCORE: 9

## 2017-09-08 ASSESSMENT — PATIENT HEALTH QUESTIONNAIRE - PHQ9
5. POOR APPETITE OR OVEREATING: SEVERAL DAYS
SUM OF ALL RESPONSES TO PHQ QUESTIONS 1-9: 3

## 2017-09-08 NOTE — NURSING NOTE
"Chief Complaint   Patient presents with     Recheck Medication     fasting       Initial There were no vitals taken for this visit. Estimated body mass index is 31.95 kg/(m^2) as calculated from the following:    Height as of 5/4/17: 5' 10.98\" (1.803 m).    Weight as of 6/5/17: 229 lb (103.9 kg).  Medication Reconciliation: complete rt arm Tiffanie Nichols MA      "

## 2017-09-08 NOTE — MR AVS SNAPSHOT
"              After Visit Summary   2017    Andrea Uribe    MRN: [id number]           Patient Information     Date Of Birth          1975        Visit Information        Provider Department      2017 1:00 PM Alberto Medley PA-C [facility]        Today's Diagnoses     Major depressive disorder, recurrent episode, moderate (H)        Anxiety        Intermittent asthma, uncomplicated           Follow-ups after your visit        Who to contact     If you have questions or need follow up information about today's clinic visit or your schedule please contact [facility] directly at [phone].  Normal or non-critical lab and imaging results will be communicated to you by [email or web address], letter or phone within 4 business days after the clinic has received the results. If you do not hear from us within 7 days, please contact the clinic through [email or web address] or phone. If you have a critical or abnormal lab result, we will notify you by phone as soon as possible.  Submit refill requests through [facility] or call your pharmacy and they will forward the refill request to us. Please allow 3 business days for your refill to be completed.          Additional Information About Your Visit        MyChart Information     [facility] lets you send messages to your doctor, view your test results, renew your prescriptions, schedule appointments and more. To sign up, go to [email or web address] . Click on \"Log in\" on the left side of the screen, which will take you to the Welcome page. Then click on \"Sign up Now\" on the right side of the page.     You will be asked to enter the access code listed below, as well as some personal information. Please follow the directions to create your username and password.     Your access code is: [email or web address]  Expires: 2017  1:33 PM     Your access code will  in 90 days. If you need help or a new code, please call your [facility] or " [phone].        Care EveryWhere ID     This is your Care EveryWhere ID. This could be used by other organizations to access your [facility] medical records  [id number]         Blood Pressure from Last 3 Encounters:   06/05/17 104/74   05/04/17 109/63   04/28/17 106/60    Weight from Last 3 Encounters:   06/05/17 229 lb (103.9 kg)   05/04/17 227 lb (103 kg)   04/28/17 221 lb (100.2 kg)              Today, you had the following     No orders found for display         Today's Medication Changes          These changes are accurate as of: 9/8/17  1:33 PM.  If you have any questions, ask your nurse or doctor.               These medicines have changed or have updated prescriptions.        Dose/Directions    * venlafaxine 37.5 MG 24 hr capsule   Commonly known as:  EFFEXOR-XR   This may have changed:  Another medication with the same name was added. Make sure you understand how and when to take each.   Used for:  Major depressive disorder, recurrent episode, moderate (H), Anxiety   Changed by:  Alberto Medley PA-C        TAKE 1 CAPSULE BY MOUTH DAILY (WITH BREAKFAST)   Quantity:  30 capsule   Refills:  0       * venlafaxine 75 MG 24 hr capsule   Commonly known as:  EFFEXOR-XR   This may have changed:  You were already taking a medication with the same name, and this prescription was added. Make sure you understand how and when to take each.   Used for:  Anxiety, Major depressive disorder, recurrent episode, moderate (H)   Changed by:  Alberto Medley PA-C        Dose:  75 mg   Take 1 capsule (75 mg) by mouth daily   Quantity:  90 capsule   Refills:  1       * Notice:  This list has 2 medication(s) that are the same as other medications prescribed for you. Read the directions carefully, and ask your doctor or other care provider to review them with you.         Where to get your medicines      These medications were sent to [facility]/pharmacy #[id number] - [place], [place] - [address]  [id number],  [facility] [address]    Hours:  [facility] Phone:  [phone]     albuterol 108 (90 BASE) MCG/ACT Inhaler    venlafaxine 75 MG 24 hr capsule                Primary Care Provider Office Phone # Fax #    Alberto Shon Medley PA-C [phone] [address]  [facility] [address]        Equal Access to Services     NILSON SEGUNDO : Hadii aad ku hadasho Soomaali, waaxda luqadaha, qaybta kaalmada adeegyada, waxay idiin hayaan adeeg khrozsh laorinn ah. So [facility] [phone].    ATENCIÓN: Si vikas salvador, tiene a ledesma disposición servicios gratuitos de asistencia lingüística. Llame al [phone].    We comply with applicable federal civil rights laws and [place] laws. We do not discriminate on the basis of race, color, national origin, age, disability sex, sexual orientation or gender identity.            Thank you!     Thank you for choosing [facility]  for your care. Our goal is always to provide you with excellent care. Hearing back from our patients is one way we can continue to improve our services. Please take a few minutes to complete the written survey that you may receive in the mail after your visit with us. Thank you!             Your Updated Medication List - Protect others around you: Learn how to safely use, store and throw away your medicines at www.disposemymeds.org.          This list is accurate as of: 9/8/17  1:33 PM.  Always use your most recent med list.                   Brand Name Dispense Instructions for use Diagnosis    albuterol 108 (90 BASE) MCG/ACT Inhaler    PROAIR HFA/PROVENTIL HFA/VENTOLIN HFA    2 Inhaler    Inhale 2 puffs into the lungs every 6 hours as needed for shortness of breath / dyspnea    Intermittent asthma, uncomplicated       DILANTIN 100 MG CR capsule   Generic drug:  phenytoin      Take 300 mg by mouth 2 times daily at 900 and 2100        KEPPRA 1000 MG Tabs   Generic drug:  levETIRAcetam      Take 2,000 mg by mouth  2 times daily At 0900 and 2100        lisinopril 5 MG tablet    PRINIVIL/ZESTRIL    90 tablet    Take 1 tablet (5 mg) by mouth daily    CKD (chronic kidney disease) stage 3, GFR 30-59 ml/min       simvastatin 20 MG tablet    ZOCOR    90 tablet    TAKE 1 TABLET (20 MG) BY MOUTH AT BEDTIME    Hyperlipidemia LDL goal <100       * venlafaxine 37.5 MG 24 hr capsule    EFFEXOR-XR    30 capsule    TAKE 1 CAPSULE BY MOUTH DAILY (WITH BREAKFAST)    Major depressive disorder, recurrent episode, moderate (H), Anxiety       * venlafaxine 75 MG 24 hr capsule    EFFEXOR-XR    90 capsule    Take 1 capsule (75 mg) by mouth daily    Anxiety, Major depressive disorder, recurrent episode, moderate (H)       * Notice:  This list has 2 medication(s) that are the same as other medications prescribed for you. Read the directions carefully, and ask your doctor or other care provider to review them with you.

## 2017-09-08 NOTE — PROGRESS NOTES
SUBJECTIVE:   Andrea Uribe is a 41 year old male who presents to clinic today for the following health issues:    Depression and Anxiety Follow-Up    Status since last visit: Worsened -is more irritable and significant other thinks he is more depressed.     Other associated symptoms:None    Complicating factors:     Significant life event: No     Current substance abuse: None    PHQ-9 SCORE 5/5/2016 10/10/2016 1/31/2017   Total Score - - -   Total Score 6 0 5     DAYAMI-7 SCORE 5/5/2016 10/10/2016 1/31/2017   Total Score - - -   Total Score 4 2 6       PHQ-9  English  PHQ-9   Any Language  GAD7  Asthma Follow-Up    Was ACT completed today?    Yes    ACT Total Scores 1/31/2017   ACT TOTAL SCORE -   ASTHMA ER VISITS -   ASTHMA HOSPITALIZATIONS -   ACT TOTAL SCORE (Goal Greater than or Equal to 20) 17   In the past 12 months, how many times did you visit the emergency room for your asthma without being admitted to the hospital? 0   In the past 12 months, how many times were you hospitalized overnight because of your asthma? 0       Recent asthma triggers that patient is dealing with: None            Amount of exercise or physical activity: None    Problems taking medications regularly: No    Medication side effects: none  Diet: regular (no restrictions)           Problem list and histories reviewed & adjusted, as indicated.  Additional history: as documented    Patient Active Problem List   Diagnosis     Seizure disorder (H)     TBI (traumatic brain injury) (H)     Moderate major depression (H)     Obesity     Anxiety     Sleep apnea     GERD (gastroesophageal reflux disease)     Intermittent asthma     CKD (chronic kidney disease) stage 2, GFR 60-89 ml/min     MRSA cellulitis     Anemia     Femur fracture, right (H)     Physical deconditioning     S/P total knee replacement     Health Care Home     Hyperlipidemia LDL goal <130     Incisional hernia, without obstruction or gangrene     Ataxic gait     S/P total knee  arthroplasty     Acute post-operative pain     Past Surgical History:   Procedure Laterality Date     APPENDECTOMY OPEN  8/11/2012    Procedure: APPENDECTOMY OPEN;  Exploratory Laparotomy, Appendectomy, Remove part IVC filter from duodenum with repair;  Surgeon: Michael Jimenez MD;  Location: [facility] OR     ARTHROPLASTY KNEE Right 8/27/2014    Procedure: ARTHROPLASTY KNEE;  Surgeon: David Mckay MD;  Location:  OR     ARTHROPLASTY REVISION KNEE Right 12/13/2016    Procedure: ARTHROPLASTY REVISION KNEE;  Surgeon: David Mckay MD;  Location:  OR     ENT SURGERY      tracheostomy     ESOPHAGOSCOPY, GASTROSCOPY, DUODENOSCOPY (EGD), COMBINED  8/11/2012    Procedure: COMBINED ESOPHAGOSCOPY, GASTROSCOPY, DUODENOSCOPY (EGD);   ESOPHAGOSCOPY, GASTROSCOPY, DUODENOSCOPY (EGD);  Surgeon: Holland Lawson MD;  Location:  GI     HERNIORRHAPHY INCISIONAL (LOCATION) N/A 5/26/2016    Procedure: HERNIORRHAPHY INCISIONAL (LOCATION);  Surgeon: John Mcfarlane MD;  Location:  OR     IMPLANT STIMULATOR VAGUS NERVE  1/16/2012    Procedure:IMPLANT STIMULATOR VAGUS NERVE; VAGUS NERVE STIMULATOR IMPLANT; Surgeon:LEILANI CORTÉS; Location: [facility]     LAPAROSCOPIC CHOLECYSTECTOMY N/A 5/4/2017    Procedure: LAPAROSCOPIC CHOLECYSTECTOMY;  LAPAROSCOPIC CHOLECYSTECTOMY ;  Surgeon: John Mcfarlane MD;  Location:  OR     LAPAROTOMY EXPLORATORY  8/11/2012    Procedure: LAPAROTOMY EXPLORATORY;;  Surgeon: Michael Jimenez MD;  Location:  OR     OPEN REDUCTION INTERNAL FIXATION FEMUR DISTAL  1/22/2014    Procedure: OPEN REDUCTION INTERNAL FIXATION FEMUR DISTAL;  right distal femur Open reduction internal fixation        ORTHOPEDIC SURGERY      removal of femur bone growth,hand surgery, knee surgery     REMOVE HARDWARE KNEE Right 8/27/2014    Procedure: REMOVE HARDWARE KNEE;  Surgeon: David Mckay MD;  Location:  OR     REMOVE HARDWARE LOWER EXTREMITY Right 10/14/2016    Procedure: REMOVE HARDWARE  LOWER EXTREMITY;  Surgeon: David Mckay MD;  Location: [facility] OR     spleen surgery      repaired     SURGICAL HISTORY OF -  Left 2009    selam in left femur     SURGICAL HISTORY OF -       screws in right knee     SURGICAL HISTORY OF -       .IVC filter, parts removed       Social History   Substance Use Topics     Smoking status: Never Smoker     Smokeless tobacco: Never Used     Alcohol use No     Family History   Problem Relation Age of Onset     Cardiovascular Mother      CEREBROVASCULAR DISEASE Mother      GASTROINTESTINAL DISEASE Father      Colitis     DIABETES Sister          Current Outpatient Prescriptions   Medication Sig Dispense Refill     albuterol (PROAIR HFA/PROVENTIL HFA/VENTOLIN HFA) 108 (90 BASE) MCG/ACT Inhaler Inhale 2 puffs into the lungs every 6 hours as needed for shortness of breath / dyspnea 2 Inhaler 3     venlafaxine (EFFEXOR-XR) 75 MG 24 hr capsule Take 1 capsule (75 mg) by mouth daily 90 capsule 1     venlafaxine (EFFEXOR-XR) 37.5 MG 24 hr capsule TAKE 1 CAPSULE BY MOUTH DAILY (WITH BREAKFAST) 30 capsule 0     simvastatin (ZOCOR) 20 MG tablet TAKE 1 TABLET (20 MG) BY MOUTH AT BEDTIME 90 tablet 0     lisinopril (PRINIVIL/ZESTRIL) 5 MG tablet Take 1 tablet (5 mg) by mouth daily 90 tablet 1     phenytoin (DILANTIN) 100 MG CR capsule Take 300 mg by mouth 2 times daily at 900 and 2100       KEPPRA 1000 MG TABS Take 2,000 mg by mouth 2 times daily At 0900 and 2100       [DISCONTINUED] albuterol (PROAIR HFA, PROVENTIL HFA, VENTOLIN HFA) 108 (90 BASE) MCG/ACT inhaler Inhale 2 puffs into the lungs every 6 hours as needed for shortness of breath / dyspnea 1 Inhaler 3     Allergies   Allergen Reactions     Iodine      Kidney disease per patient (high doses)     Asa [Aspirin]      Ibuprofen Sodium Other (See Comments)     Kidney problems (with high doses)     Seasonal Allergies      BP Readings from Last 3 Encounters:   06/05/17 104/74   05/04/17 109/63   04/28/17 106/60    Wt Readings from  Last 3 Encounters:   06/05/17 229 lb (103.9 kg)   05/04/17 227 lb (103 kg)   04/28/17 221 lb (100.2 kg)                        Reviewed and updated as needed this visit by clinical staffTobacco  Allergies  Meds  Problems  Med Hx  Surg Hx  Fam Hx  Soc Hx        Reviewed and updated as needed this visit by Provider  Allergies  Meds  Problems         ROS:  Constitutional, HEENT, psych, cardiovascular, pulmonary, gi and gu systems are negative, except as otherwise noted.      OBJECTIVE:   There were no vitals taken for this visit.  There is no height or weight on file to calculate BMI.  GENERAL: alert and no distress  RESP: lungs clear to auscultation - no rales, rhonchi or wheezes  CV: regular rates and rhythm, normal S1 S2, no S3 or S4 and no murmur, click or rub  PSYCH: mentation appears normal, affect normal/bright    Diagnostic Test Results:  none     ASSESSMENT/PLAN:     (F33.1) Major depressive disorder, recurrent episode, moderate (H)  Comment: will increase effexor given low dose. Follow up in 6 months.   Plan: venlafaxine (EFFEXOR-XR) 75 MG 24 hr capsule        -Medication use and side effects discussed with the patient. Patient is in complete understanding and agreement with plan.       (F41.9) Anxiety  Comment:   Plan: venlafaxine (EFFEXOR-XR) 75 MG 24 hr capsule            (J45.20) Intermittent asthma, uncomplicated  Comment: stable   Plan: albuterol (PROAIR HFA/PROVENTIL HFA/VENTOLIN         HFA) 108 (90 BASE) MCG/ACT Inhaler              Follow up: 6 months     Alberto Medley PA-C  [facility]

## 2017-09-09 ASSESSMENT — ASTHMA QUESTIONNAIRES: ACT_TOTALSCORE: 20

## 2017-09-09 ASSESSMENT — ANXIETY QUESTIONNAIRES: GAD7 TOTAL SCORE: 9

## 2017-09-30 ENCOUNTER — TELEPHONE (OUTPATIENT)
Dept: FAMILY MEDICINE | Facility: CLINIC | Age: 42
End: 2017-09-30

## 2017-09-30 DIAGNOSIS — E78.5 HYPERLIPIDEMIA LDL GOAL <100: ICD-10-CM

## 2017-10-02 RX ORDER — SIMVASTATIN 20 MG
20 TABLET ORAL AT BEDTIME
Qty: 30 TABLET | Refills: 0 | Status: SHIPPED | OUTPATIENT
Start: 2017-10-02 | End: 2017-11-11

## 2017-10-02 NOTE — TELEPHONE ENCOUNTER
SIMVASTATIN 20 MG TABLET       Last Written Prescription Date: 07/06/17  Last Fill Quantity: 90, # refills: 0  Last Office Visit with [facility], [facility] or [facility] prescribing provider: 09/08/17 Alberto Medley       Lab Results   Component Value Date    CHOL 214 05/05/2015     Lab Results   Component Value Date    HDL 57 05/05/2015     Lab Results   Component Value Date     05/05/2015     Lab Results   Component Value Date    TRIG 137 05/05/2015     Lab Results   Component Value Date    CHOLHDLRATIO 3.8 05/05/2015

## 2017-10-02 NOTE — TELEPHONE ENCOUNTER
Routing refill request to provider for review/approval because:  Labs not current:  JER WELLS RN, BSN, PHN  [place] Flex RN

## 2017-10-19 ENCOUNTER — OFFICE VISIT (OUTPATIENT)
Dept: FAMILY MEDICINE | Facility: CLINIC | Age: 42
End: 2017-10-19
Payer: MEDICARE

## 2017-10-19 VITALS
DIASTOLIC BLOOD PRESSURE: 78 MMHG | SYSTOLIC BLOOD PRESSURE: 122 MMHG | TEMPERATURE: 97.6 F | BODY MASS INDEX: 30.42 KG/M2 | HEART RATE: 76 BPM | WEIGHT: 218 LBS | RESPIRATION RATE: 18 BRPM

## 2017-10-19 DIAGNOSIS — G40.909 SEIZURE DISORDER (H): ICD-10-CM

## 2017-10-19 DIAGNOSIS — E78.5 HYPERLIPIDEMIA LDL GOAL <100: ICD-10-CM

## 2017-10-19 DIAGNOSIS — M67.01: ICD-10-CM

## 2017-10-19 DIAGNOSIS — Z01.818 PREOP GENERAL PHYSICAL EXAM: Primary | ICD-10-CM

## 2017-10-19 DIAGNOSIS — F32.1 MODERATE MAJOR DEPRESSION (H): ICD-10-CM

## 2017-10-19 DIAGNOSIS — N18.2 CKD (CHRONIC KIDNEY DISEASE) STAGE 2, GFR 60-89 ML/MIN: ICD-10-CM

## 2017-10-19 LAB
CHOLEST SERPL-MCNC: 213 MG/DL
ERYTHROCYTE [DISTWIDTH] IN BLOOD BY AUTOMATED COUNT: 14.2 % (ref 10–15)
HCT VFR BLD AUTO: 40 % (ref 40–53)
HDLC SERPL-MCNC: 63 MG/DL
HGB BLD-MCNC: 13 G/DL (ref 13.3–17.7)
LDLC SERPL CALC-MCNC: 127 MG/DL
MCH RBC QN AUTO: 30.7 PG (ref 26.5–33)
MCHC RBC AUTO-ENTMCNC: 32.5 G/DL (ref 31.5–36.5)
MCV RBC AUTO: 95 FL (ref 78–100)
NONHDLC SERPL-MCNC: 150 MG/DL
PHENYTOIN SERPL-MCNC: 26.5 MG/L (ref 10–20)
PLATELET # BLD AUTO: 171 10E9/L (ref 150–450)
RBC # BLD AUTO: 4.23 10E12/L (ref 4.4–5.9)
TRIGL SERPL-MCNC: 113 MG/DL
WBC # BLD AUTO: 7.1 10E9/L (ref 4–11)

## 2017-10-19 PROCEDURE — 80185 ASSAY OF PHENYTOIN TOTAL: CPT | Performed by: PHYSICIAN ASSISTANT

## 2017-10-19 PROCEDURE — 80177 DRUG SCRN QUAN LEVETIRACETAM: CPT | Mod: 90 | Performed by: PHYSICIAN ASSISTANT

## 2017-10-19 PROCEDURE — 85027 COMPLETE CBC AUTOMATED: CPT | Performed by: PHYSICIAN ASSISTANT

## 2017-10-19 PROCEDURE — 80048 BASIC METABOLIC PNL TOTAL CA: CPT | Performed by: PHYSICIAN ASSISTANT

## 2017-10-19 PROCEDURE — 36415 COLL VENOUS BLD VENIPUNCTURE: CPT | Performed by: PHYSICIAN ASSISTANT

## 2017-10-19 PROCEDURE — 99000 SPECIMEN HANDLING OFFICE-LAB: CPT | Performed by: PHYSICIAN ASSISTANT

## 2017-10-19 PROCEDURE — 99215 OFFICE O/P EST HI 40 MIN: CPT | Performed by: PHYSICIAN ASSISTANT

## 2017-10-19 PROCEDURE — 80061 LIPID PANEL: CPT | Performed by: PHYSICIAN ASSISTANT

## 2017-10-19 RX ORDER — ERGOCALCIFEROL 1.25 MG/1
CAPSULE, LIQUID FILLED ORAL
Refills: 2 | COMMUNITY
Start: 2017-10-04 | End: 2020-01-07
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
## 2017-10-19 NOTE — NURSING NOTE
"  Chief Complaint   Patient presents with     Pre-Op Exam       Initial /78 (BP Location: Right arm, Patient Position: Chair, Cuff Size: Adult Large)  Pulse 76  Temp 97.6  F (36.4  C) (Oral)  Resp 18  Wt 218 lb (98.9 kg)  BMI 30.42 kg/m2 Estimated body mass index is 30.42 kg/(m^2) as calculated from the following:    Height as of 5/4/17: 5' 10.98\" (1.803 m).    Weight as of this encounter: 218 lb (98.9 kg).  Medication Reconciliation: complete        BROCK Juarez    "

## 2017-10-19 NOTE — MR AVS SNAPSHOT
After Visit Summary   10/19/2017    Andrea Uribe    MRN: [id number]           Patient Information     Date Of Birth          1975        Visit Information        Provider Department      10/19/2017 10:30 AM Alberto Medley PA-C [facility]        Today's Diagnoses     Preop general physical exam    -  1    Seizure disorder (H)        Hyperlipidemia LDL goal <100          Care Instructions      Before Your Surgery      Call your surgeon if there is any change in your health. This includes signs of a cold or flu (such as a sore throat, runny nose, cough, rash or fever).    Do not smoke, drink alcohol or take over the counter medicine (unless your surgeon or primary care doctor tells you to) for the 24 hours before and after surgery.    If you take prescribed drugs: Follow your doctor s orders about which medicines to take and which to stop until after surgery.    Eating and drinking prior to surgery: follow the instructions from your surgeon    Take a shower or bath the night before surgery. Use the soap your surgeon gave you to gently clean your skin. If you do not have soap from your surgeon, use your regular soap. Do not shave or scrub the surgery site.  Wear clean pajamas and have clean sheets on your bed.           Follow-ups after your visit        Who to contact     If you have questions or need follow up information about today's clinic visit or your schedule please contact [facility] directly at [phone].  Normal or non-critical lab and imaging results will be communicated to you by MyChart, letter or phone within 4 business days after the clinic has received the results. If you do not hear from us within 7 days, please contact the clinic through [employer] or phone. If you have a critical or abnormal lab result, we will notify you by phone as soon as possible.  Submit refill requests through [employer] or call your pharmacy and they will forward  "the refill request to us. Please allow 3 business days for your refill to be completed.          Additional Information About Your Visit        MyChart Information     [employer] lets you send messages to your doctor, view your test results, renew your prescriptions, schedule appointments and more. To sign up, go to [email or web address] . Click on \"Log in\" on the left side of the screen, which will take you to the Welcome page. Then click on \"Sign up Now\" on the right side of the page.     You will be asked to enter the access code listed below, as well as some personal information. Please follow the directions to create your username and password.     Your access code is: [email or web address]  Expires: 2017  1:33 PM     Your access code will  in 90 days. If you need help or a new code, please call your [facility] clinic or [phone].        Care EveryWhere ID     This is your Care EveryWhere ID. This could be used by other organizations to access your [facility] medical records  [id number]        Your Vitals Were     Pulse Temperature Respirations BMI (Body Mass Index)          76 97.6  F (36.4  C) (Oral) 18 30.42 kg/m2         Blood Pressure from Last 3 Encounters:   10/19/17 122/78   17 104/74   17 109/63    Weight from Last 3 Encounters:   10/19/17 218 lb (98.9 kg)   17 229 lb (103.9 kg)   17 227 lb (103 kg)              We Performed the Following     CBC with platelets     Keppra (Levetiracetam) Level     Lipid panel reflex to direct LDL     Phenytoin level        Primary Care Provider Office Phone # Fax #    Alberto Medley PA-C [phone] [address] [facility] [address]        Equal Access to Services     NILSON SEGUNDO : Heather Dillard, waliz sanchez, qaybta kaalmadebra blas, sue mensah. [facility] [phone].    ATENCIÓN: Si habla español, tiene a ledesma disposición servicios gratuitos de asistencia " lingüísticaAdri Olsen al [phone].    We comply with applicable federal civil rights laws and [place] laws. We do not discriminate on the basis of race, color, national origin, age, disability, sex, sexual orientation, or gender identity.            Thank you!     Thank you for choosing [facility]  for your care. Our goal is always to provide you with excellent care. Hearing back from our patients is one way we can continue to improve our services. Please take a few minutes to complete the written survey that you may receive in the mail after your visit with us. Thank you!             Your Updated Medication List - Protect others around you: Learn how to safely use, store and throw away your medicines at www.disposemymeds.org.          This list is accurate as of: 10/19/17 10:31 AM.  Always use your most recent med list.                   Brand Name Dispense Instructions for use Diagnosis    albuterol 108 (90 BASE) MCG/ACT Inhaler    PROAIR HFA/PROVENTIL HFA/VENTOLIN HFA    2 Inhaler    Inhale 2 puffs into the lungs every 6 hours as needed for shortness of breath / dyspnea    Intermittent asthma, uncomplicated       DILANTIN 100 MG CR capsule   Generic drug:  phenytoin      Take 300 mg by mouth 2 times daily 300mg in the am and 200mg at pm        KEPPRA 1000 MG Tabs   Generic drug:  levETIRAcetam      Take 2,000 mg by mouth 2 times daily At 0900 and 2100        lisinopril 5 MG tablet    PRINIVIL/ZESTRIL    90 tablet    Take 1 tablet (5 mg) by mouth daily    CKD (chronic kidney disease) stage 3, GFR 30-59 ml/min       simvastatin 20 MG tablet    ZOCOR    30 tablet    Take 1 tablet (20 mg) by mouth At Bedtime DUE FOR FASTING LABS    Hyperlipidemia LDL goal <100       * venlafaxine 37.5 MG 24 hr capsule    EFFEXOR-XR    30 capsule    TAKE 1 CAPSULE BY MOUTH DAILY (WITH BREAKFAST)    Major depressive disorder, recurrent episode, moderate (H), Anxiety       * venlafaxine 75 MG 24 hr capsule     EFFEXOR-XR    90 capsule    Take 1 capsule (75 mg) by mouth daily    Anxiety, Major depressive disorder, recurrent episode, moderate (H)       vitamin D 18902 UNIT capsule    ERGOCALCIFEROL     TAKE 1 CAPSULE BY MOUTH ONCE WEEKLY        * Notice:  This list has 2 medication(s) that are the same as other medications prescribed for you. Read the directions carefully, and ask your doctor or other care provider to review them with you.

## 2017-10-20 ENCOUNTER — TELEPHONE (OUTPATIENT)
Dept: FAMILY MEDICINE | Facility: CLINIC | Age: 42
End: 2017-10-20

## 2017-10-20 DIAGNOSIS — N18.2 CKD (CHRONIC KIDNEY DISEASE) STAGE 2, GFR 60-89 ML/MIN: Primary | ICD-10-CM

## 2017-10-20 DIAGNOSIS — Z01.818 PREOP GENERAL PHYSICAL EXAM: ICD-10-CM

## 2017-10-20 LAB
ANION GAP SERPL CALCULATED.3IONS-SCNC: 5 MMOL/L (ref 3–14)
BUN SERPL-MCNC: 16 MG/DL (ref 7–30)
CALCIUM SERPL-MCNC: 8.8 MG/DL (ref 8.5–10.1)
CHLORIDE SERPL-SCNC: 105 MMOL/L (ref 94–109)
CO2 SERPL-SCNC: 30 MMOL/L (ref 20–32)
CREAT SERPL-MCNC: 0.99 MG/DL (ref 0.66–1.25)
GFR SERPL CREATININE-BSD FRML MDRD: 83 ML/MIN/1.7M2
GLUCOSE SERPL-MCNC: 102 MG/DL (ref 70–99)
LEVETIRACETAM SERPL-MCNC: 79 UG/ML (ref 12–46)
POTASSIUM SERPL-SCNC: 4.2 MMOL/L (ref 3.4–5.3)
SODIUM SERPL-SCNC: 140 MMOL/L (ref 133–144)

## 2017-10-20 NOTE — TELEPHONE ENCOUNTER
Summer Bains nurse navigator ph [phone] from [facility] was cleared for October 30 procedure.   Preop looks good except with H/O CKD3 anesthesiologist would like to see BUN and creatinine  Fax results to surg center [phone]   Marbin Danielle RN

## 2017-10-20 NOTE — TELEPHONE ENCOUNTER
Per Orestes CKD2 so lab was not indicated, normal in April   But he added BMP.  Lab verified can add on to yesterday's tubes.   Marbin Danielle RN

## 2017-11-08 ENCOUNTER — OFFICE VISIT (OUTPATIENT)
Dept: FAMILY MEDICINE | Facility: CLINIC | Age: 42
End: 2017-11-08
Payer: MEDICARE

## 2017-11-08 DIAGNOSIS — J06.9 VIRAL URI WITH COUGH: Primary | ICD-10-CM

## 2017-11-08 LAB
DEPRECATED S PYO AG THROAT QL EIA: NORMAL
SPECIMEN SOURCE: NORMAL

## 2017-11-08 PROCEDURE — 87880 STREP A ASSAY W/OPTIC: CPT | Performed by: PHYSICIAN ASSISTANT

## 2017-11-08 PROCEDURE — 87081 CULTURE SCREEN ONLY: CPT | Performed by: PHYSICIAN ASSISTANT

## 2017-11-08 PROCEDURE — 99213 OFFICE O/P EST LOW 20 MIN: CPT | Performed by: PHYSICIAN ASSISTANT

## 2017-11-08 RX ORDER — ALBUTEROL SULFATE 90 UG/1
2 AEROSOL, METERED RESPIRATORY (INHALATION) EVERY 6 HOURS PRN
Qty: 1 INHALER | Refills: 0 | Status: CANCELLED | OUTPATIENT
Start: 2017-11-08

## 2017-11-08 NOTE — PATIENT INSTRUCTIONS

## 2017-11-08 NOTE — LETTER
[facility]  [address] [place] [address]  Phone: [phone]    November 8, 2017        Andrea Uribe  [address] [facility] [address]          To whom it may concern:    RE: Andrea Uribe    Patient was seen and treated today at our clinic and missed work due to illness. I recommend taking 1-3 days off of work for rest and recovery.    Please contact me for questions or concerns.      Sincerely,        Alberto Medley PA-C

## 2017-11-08 NOTE — PROGRESS NOTES
SUBJECTIVE:   Andrea Uribe is a 42 year old male who presents to clinic today for the following health issues:    Acute Illness   Acute illness concerns:   Onset: 2 days    Fever: no    Chills/Sweats: YES- chills    Headache (location?): no    Sinus Pressure:no    Conjunctivitis:  no    Ear Pain: no    Rhinorrhea: YES- once in a while    Congestion: YES    Sore Throat: YES     Cough: YES    Wheeze: no    Decreased Appetite: YES    Nausea: no    Vomiting: no    Diarrhea:  no    Dysuria/Freq.: no    Fatigue/Achiness: yes - fatigue    Sick/Strep Exposure: YES- son     Therapies Tried and outcome: mucinex, vicks, dayquil, nyquil      Problem list and histories reviewed & adjusted, as indicated.  Additional history: as documented    Patient Active Problem List   Diagnosis     Seizure disorder (H)     TBI (traumatic brain injury) (H)     Moderate major depression (H)     Obesity     Anxiety     Sleep apnea     GERD (gastroesophageal reflux disease)     Intermittent asthma     CKD (chronic kidney disease) stage 2, GFR 60-89 ml/min     MRSA cellulitis     Anemia     Femur fracture, right (H)     Physical deconditioning     S/P total knee replacement     Health Care Home     Hyperlipidemia LDL goal <130     Incisional hernia, without obstruction or gangrene     Ataxic gait     S/P total knee arthroplasty     Acute post-operative pain     Short heel cord, right     Past Surgical History:   Procedure Laterality Date     APPENDECTOMY OPEN  8/11/2012    Procedure: APPENDECTOMY OPEN;  Exploratory Laparotomy, Appendectomy, Remove part IVC filter from duodenum with repair;  Surgeon: Michael Jimenez MD;  Location:  OR     ARTHROPLASTY KNEE Right 8/27/2014    Procedure: ARTHROPLASTY KNEE;  Surgeon: David Mckay MD;  Location:  OR     ARTHROPLASTY REVISION KNEE Right 12/13/2016    Procedure: ARTHROPLASTY REVISION KNEE;  Surgeon: David Mckay MD;  Location:  OR     ENT SURGERY      tracheostomy      ESOPHAGOSCOPY, GASTROSCOPY, DUODENOSCOPY (EGD), COMBINED  8/11/2012    Procedure: COMBINED ESOPHAGOSCOPY, GASTROSCOPY, DUODENOSCOPY (EGD);   ESOPHAGOSCOPY, GASTROSCOPY, DUODENOSCOPY (EGD);  Surgeon: Holland Lawson MD;  Location: [facility]     HERNIORRHAPHY INCISIONAL (LOCATION) N/A 5/26/2016    Procedure: HERNIORRHAPHY INCISIONAL (LOCATION);  Surgeon: John Mcfarlane MD;  Location: [facility] OR     IMPLANT STIMULATOR VAGUS NERVE  1/16/2012    Procedure:IMPLANT STIMULATOR VAGUS NERVE; VAGUS NERVE STIMULATOR IMPLANT; Surgeon:LEILANI CORTÉS; Location: [facility]     LAPAROSCOPIC CHOLECYSTECTOMY N/A 5/4/2017    Procedure: LAPAROSCOPIC CHOLECYSTECTOMY;  LAPAROSCOPIC CHOLECYSTECTOMY ;  Surgeon: John Mcfarlane MD;  Location: [facility]     LAPAROTOMY EXPLORATORY  8/11/2012    Procedure: LAPAROTOMY EXPLORATORY;;  Surgeon: Michael Jimenez MD;  Location:  OR     OPEN REDUCTION INTERNAL FIXATION FEMUR DISTAL  1/22/2014    Procedure: OPEN REDUCTION INTERNAL FIXATION FEMUR DISTAL;  right distal femur Open reduction internal fixation        ORTHOPEDIC SURGERY      removal of femur bone growth,hand surgery, knee surgery     ORTHOPEDIC SURGERY Right 10/30/2017    enlonging muscle     REMOVE HARDWARE KNEE Right 8/27/2014    Procedure: REMOVE HARDWARE KNEE;  Surgeon: David Mckay MD;  Location:  OR     REMOVE HARDWARE LOWER EXTREMITY Right 10/14/2016    Procedure: REMOVE HARDWARE LOWER EXTREMITY;  Surgeon: David Mckay MD;  Location: [facility] OR     spleen surgery      repaired     SURGICAL HISTORY OF -  Left 2009    selam in left femur     SURGICAL HISTORY OF -       screws in right knee     SURGICAL HISTORY OF -       .IVC filter, parts removed       Social History   Substance Use Topics     Smoking status: Never Smoker     Smokeless tobacco: Never Used     Alcohol use No     Family History   Problem Relation Age of Onset     Cardiovascular Mother      CEREBROVASCULAR DISEASE Mother      GASTROINTESTINAL DISEASE  Father      Colitis     DIABETES Sister          Current Outpatient Prescriptions   Medication Sig Dispense Refill     vitamin D (ERGOCALCIFEROL) 09421 UNIT capsule TAKE 1 CAPSULE BY MOUTH ONCE WEEKLY  2     simvastatin (ZOCOR) 20 MG tablet Take 1 tablet (20 mg) by mouth At Bedtime DUE FOR FASTING LABS 30 tablet 0     albuterol (PROAIR HFA/PROVENTIL HFA/VENTOLIN HFA) 108 (90 BASE) MCG/ACT Inhaler Inhale 2 puffs into the lungs every 6 hours as needed for shortness of breath / dyspnea 2 Inhaler 3     venlafaxine (EFFEXOR-XR) 75 MG 24 hr capsule Take 1 capsule (75 mg) by mouth daily 90 capsule 1     lisinopril (PRINIVIL/ZESTRIL) 5 MG tablet Take 1 tablet (5 mg) by mouth daily 90 tablet 1     phenytoin (DILANTIN) 100 MG CR capsule Take 300 mg by mouth 2 times daily 300mg in the am and 200mg at pm       KEPPRA 1000 MG TABS Take 2,000 mg by mouth 2 times daily At 0900 and 2100       [DISCONTINUED] venlafaxine (EFFEXOR-XR) 37.5 MG 24 hr capsule TAKE 1 CAPSULE BY MOUTH DAILY (WITH BREAKFAST) 30 capsule 0     Allergies   Allergen Reactions     Iodine      Kidney disease per patient (high doses)     Asa [Aspirin]      Ibuprofen Sodium Other (See Comments)     Kidney problems (with high doses)     Seasonal Allergies      BP Readings from Last 3 Encounters:   10/19/17 122/78   06/05/17 104/74   05/04/17 109/63    Wt Readings from Last 3 Encounters:   10/19/17 218 lb (98.9 kg)   06/05/17 229 lb (103.9 kg)   05/04/17 227 lb (103 kg)                        Reviewed and updated as needed this visit by clinical staffAllergies  Meds  Problems       Reviewed and updated as needed this visit by Provider  Allergies  Meds  Problems         ROS:  Constitutional, HEENT, cardiovascular, pulmonary, gi and gu systems are negative, except as otherwise noted.      OBJECTIVE:   There were no vitals taken for this visit.  There is no height or weight on file to calculate BMI.  GENERAL: alert and no distress  HENT: normal  cephalic/atraumatic, both ears: clear effusion, nasal mucosa edematous , oropharynx clear and oral mucous membranes moist  NECK: no adenopathy, no asymmetry, masses, or scars and thyroid normal to palpation  RESP: lungs clear to auscultation - no rales, rhonchi or wheezes  CV: regular rates and rhythm, normal S1 S2, no S3 or S4 and no murmur, click or rub  PSYCH: mentation appears normal, affect normal/bright    Diagnostic Test Results:  Results for orders placed or performed in visit on 11/08/17 (from the past 24 hour(s))   Rapid strep screen   Result Value Ref Range    Specimen Description Throat     Rapid Strep A Screen       NEGATIVE: No Group A streptococcal antigen detected by immunoassay, await culture report.       ASSESSMENT/PLAN:     (J06.9,  B97.89) Viral URI with cough  (primary encounter diagnosis)  Comment: rapid strep negative. No evidence of bacterial process or respiratory distress. Discussed otc supportive care measures. See patient instructions. If no improvement in 1 week, patient should RTC. Sooner if worsening.   Plan: Rapid strep screen, Beta strep group A culture              Follow up: as above     Alberto Medley PA-C  [facility]

## 2017-11-08 NOTE — MR AVS SNAPSHOT
After Visit Summary   11/8/2017    Andrea Uribe    MRN: [id number]           Patient Information     Date Of Birth          1975        Visit Information        Provider Department      11/8/2017 10:00 AM Alberto Medley PA-C [facility]        Today's Diagnoses     Viral URI with cough    -  1      Care Instructions      Viral Upper Respiratory Illness (Adult)  You have a viral upper respiratory illness (URI), which is another term for the common cold. This illness is contagious during the first few days. It is spread through the air by coughing and sneezing. It may also be spread by direct contact (touching the sick person and then touching your own eyes, nose, or mouth). Frequent handwashing will decrease risk of spread. Most viral illnesses go away within 7 to 10 days with rest and simple home remedies. Sometimes the illness may last for several weeks. Antibiotics will not kill a virus, and they are generally not prescribed for this condition.    Home care    If symptoms are severe, rest at home for the first 2 to 3 days. When you resume activity, don't let yourself get too tired.    Avoid being exposed to cigarette smoke (yours or others ).    You may use acetaminophen or ibuprofen to control pain and fever, unless another medicine was prescribed. (Note: If you have chronic liver or kidney disease, have ever had a stomach ulcer or gastrointestinal bleeding, or are taking blood-thinning medicines, talk with your healthcare provider before using these medicines.) Aspirin should never be given to anyone under 18 years of age who is ill with a viral infection or fever. It may cause severe liver or brain damage.    Your appetite may be poor, so a light diet is fine. Avoid dehydration by drinking 6 to 8 glasses of fluids per day (water, soft drinks, juices, tea, or soup). Extra fluids will help loosen secretions in the nose and lungs.    Over-the-counter cold medicines  will not shorten the length of time you re sick, but they may be helpful for the following symptoms: cough, sore throat, and nasal and sinus congestion. (Note: Do not use decongestants if you have high blood pressure.)  Follow-up care  Follow up with your healthcare provider, or as advised.  When to seek medical advice  Call your healthcare provider right away if any of these occur:    Cough with lots of colored sputum (mucus)    Severe headache; face, neck, or ear pain    Difficulty swallowing due to throat pain    Fever of 100.4 F (38 C)  Call 911, or get immediate medical care  Call emergency services right away if any of these occur:    Chest pain, shortness of breath, wheezing, or difficulty breathing    Coughing up blood    Inability to swallow due to throat pain  Date Last Reviewed: 9/13/2015 2000-2017 [employer]. [address] [place] [address]. All rights reserved. This information is not intended as a substitute for professional medical care. Always follow your healthcare professional's instructions.                Follow-ups after your visit        Who to contact     If you have questions or need follow up information about today's clinic visit or your schedule please contact [facility] directly at [phone].  Normal or non-critical lab and imaging results will be communicated to you by [employer], letter or phone within 4 business days after the clinic has received the results. If you do not hear from us within 7 days, please contact the clinic through [employer] or phone. If you have a critical or abnormal lab result, we will notify you by phone as soon as possible.  Submit refill requests through [employer] or call your pharmacy and they will forward the refill request to us. Please allow 3 business days for your refill to be completed.          Additional Information About Your Visit        [employer] Information     [employer] lets you send messages to your doctor,  "view your test results, renew your prescriptions, schedule appointments and more. To sign up, go to [email or web address] . Click on \"Log in\" on the left side of the screen, which will take you to the Welcome page. Then click on \"Sign up Now\" on the right side of the page.     You will be asked to enter the access code listed below, as well as some personal information. Please follow the directions to create your username and password.     Your access code is: [email or web address]  Expires: 2017 12:33 PM     Your access code will  in 90 days. If you need help or a new code, please call your [place] clinic or [phone].        Care EveryWhere ID     This is your Care EveryWhere ID. This could be used by other organizations to access your [facility] medical records  [id number]         Blood Pressure from Last 3 Encounters:   10/19/17 122/78   17 104/74   17 109/63    Weight from Last 3 Encounters:   10/19/17 218 lb (98.9 kg)   17 229 lb (103.9 kg)   17 227 lb (103 kg)              We Performed the Following     Beta strep group A culture     Rapid strep screen        Primary Care Provider Office Phone # Fax #    Alberto Shon Medley PA-C [phone] [address] [facility] [id number]        Equal Access to Services     NILSON SEGUNDO : Hadii maritza ku hadasho Sogelyali, waaxda luqadaha, qaybta kaalmada adeegyada, sue mensah. So [facility] [phone].    ATENCIÓN: Si habla español, tiene a ledesma disposición servicios gratuitos de asistencia lingüística. Llame al [phone].    We comply with applicable federal civil rights laws and [place] laws. We do not discriminate on the basis of race, color, national origin, age, disability, sex, sexual orientation, or gender identity.            Thank you!     Thank you for choosing [facility]  for your care. Our goal is always to provide you with excellent care. Hearing back from " our patients is one way we can continue to improve our services. Please take a few minutes to complete the written survey that you may receive in the mail after your visit with us. Thank you!             Your Updated Medication List - Protect others around you: Learn how to safely use, store and throw away your medicines at www.disposemymeds.org.          This list is accurate as of: 11/8/17 10:24 AM.  Always use your most recent med list.                   Brand Name Dispense Instructions for use Diagnosis    albuterol 108 (90 BASE) MCG/ACT Inhaler    PROAIR HFA/PROVENTIL HFA/VENTOLIN HFA    2 Inhaler    Inhale 2 puffs into the lungs every 6 hours as needed for shortness of breath / dyspnea    Intermittent asthma, uncomplicated       DILANTIN 100 MG CR capsule   Generic drug:  phenytoin      Take 300 mg by mouth 2 times daily 300mg in the am and 200mg at pm        KEPPRA 1000 MG Tabs   Generic drug:  levETIRAcetam      Take 2,000 mg by mouth 2 times daily At 0900 and 2100        lisinopril 5 MG tablet    PRINIVIL/ZESTRIL    90 tablet    Take 1 tablet (5 mg) by mouth daily    CKD (chronic kidney disease) stage 3, GFR 30-59 ml/min       simvastatin 20 MG tablet    ZOCOR    30 tablet    Take 1 tablet (20 mg) by mouth At Bedtime DUE FOR FASTING LABS    Hyperlipidemia LDL goal <100       venlafaxine 75 MG 24 hr capsule    EFFEXOR-XR    90 capsule    Take 1 capsule (75 mg) by mouth daily    Anxiety, Major depressive disorder, recurrent episode, moderate (H)       vitamin D 77933 UNIT capsule    ERGOCALCIFEROL     TAKE 1 CAPSULE BY MOUTH ONCE WEEKLY

## 2017-11-09 LAB
BACTERIA SPEC CULT: NORMAL
SPECIMEN SOURCE: NORMAL

## 2017-11-11 DIAGNOSIS — E78.5 HYPERLIPIDEMIA LDL GOAL <100: ICD-10-CM

## 2017-11-12 DIAGNOSIS — N18.30 CKD (CHRONIC KIDNEY DISEASE) STAGE 3, GFR 30-59 ML/MIN (H): ICD-10-CM

## 2017-11-13 ENCOUNTER — TRANSFERRED RECORDS (OUTPATIENT)
Dept: HEALTH INFORMATION MANAGEMENT | Facility: CLINIC | Age: 42
End: 2017-11-13

## 2017-11-14 RX ORDER — LISINOPRIL 5 MG/1
5 TABLET ORAL DAILY
Qty: 90 TABLET | Refills: 3 | Status: SHIPPED | OUTPATIENT
Start: 2017-11-14 | End: 2020-01-08

## 2017-11-14 RX ORDER — SIMVASTATIN 20 MG
20 TABLET ORAL AT BEDTIME
Qty: 90 TABLET | Refills: 3 | Status: SHIPPED | OUTPATIENT
Start: 2017-11-14 | End: 2018-12-27

## 2017-11-14 NOTE — TELEPHONE ENCOUNTER
Prescription approved per [facility]  Urszula Castaneda RN BS

## 2017-11-14 NOTE — TELEPHONE ENCOUNTER
Prescription approved per [facility] Refill Protocol  Urszula Castaneda RN BS

## 2017-11-30 ENCOUNTER — RADIANT APPOINTMENT (OUTPATIENT)
Dept: GENERAL RADIOLOGY | Facility: CLINIC | Age: 42
End: 2017-11-30
Attending: FAMILY MEDICINE
Payer: MEDICARE

## 2017-11-30 ENCOUNTER — OFFICE VISIT (OUTPATIENT)
Dept: FAMILY MEDICINE | Facility: CLINIC | Age: 42
End: 2017-11-30
Payer: MEDICARE

## 2017-11-30 VITALS
BODY MASS INDEX: 30.52 KG/M2 | DIASTOLIC BLOOD PRESSURE: 68 MMHG | SYSTOLIC BLOOD PRESSURE: 118 MMHG | HEIGHT: 71 IN | WEIGHT: 218 LBS | TEMPERATURE: 98.8 F | OXYGEN SATURATION: 98 % | HEART RATE: 95 BPM

## 2017-11-30 DIAGNOSIS — R05.9 COUGH: Primary | ICD-10-CM

## 2017-11-30 DIAGNOSIS — R05.9 COUGH: ICD-10-CM

## 2017-11-30 PROCEDURE — 71020 XR CHEST 2 VW: CPT

## 2017-11-30 PROCEDURE — 99214 OFFICE O/P EST MOD 30 MIN: CPT | Performed by: FAMILY MEDICINE

## 2017-11-30 RX ORDER — CEFDINIR 300 MG/1
300 CAPSULE ORAL 2 TIMES DAILY
Qty: 20 CAPSULE | Refills: 0 | Status: SHIPPED | OUTPATIENT
Start: 2017-11-30 | End: 2018-04-26

## 2017-11-30 RX ORDER — CODEINE PHOSPHATE AND GUAIFENESIN 10; 100 MG/5ML; MG/5ML
1 SOLUTION ORAL EVERY 4 HOURS PRN
Qty: 120 ML | Refills: 0 | Status: SHIPPED | OUTPATIENT
Start: 2017-11-30 | End: 2018-04-26

## 2017-11-30 NOTE — PROGRESS NOTES
SUBJECTIVE:   Andrea Uribe is a 42 year old male who presents to clinic today for the following health issues:      RESPIRATORY SYMPTOMS      Duration: 2 weeks    Description  Coughing, has 3 bottles of mucous at home, blood in mucous  Had a cold and went away and has now returned    Severity: moderate    Accompanying signs and symptoms: None    History (predisposing factors):  none    Precipitating or alleviating factors: None    Therapies tried and outcome:  nyquil            Problem list and histories reviewed & adjusted, as indicated.  Additional history:     Patient Active Problem List   Diagnosis     Seizure disorder (H)     TBI (traumatic brain injury) (H)     Moderate major depression (H)     Obesity     Anxiety     Sleep apnea     GERD (gastroesophageal reflux disease)     Intermittent asthma     CKD (chronic kidney disease) stage 2, GFR 60-89 ml/min     MRSA cellulitis     Anemia     Femur fracture, right (H)     Physical deconditioning     S/P total knee replacement     Health Care Home     Hyperlipidemia LDL goal <130     Incisional hernia, without obstruction or gangrene     Ataxic gait     S/P total knee arthroplasty     Acute post-operative pain     Short heel cord, right     Past Surgical History:   Procedure Laterality Date     APPENDECTOMY OPEN  8/11/2012    Procedure: APPENDECTOMY OPEN;  Exploratory Laparotomy, Appendectomy, Remove part IVC filter from duodenum with repair;  Surgeon: Michael Jimenez MD;  Location:  OR     ARTHROPLASTY KNEE Right 8/27/2014    Procedure: ARTHROPLASTY KNEE;  Surgeon: David Mckay MD;  Location:  OR     ARTHROPLASTY REVISION KNEE Right 12/13/2016    Procedure: ARTHROPLASTY REVISION KNEE;  Surgeon: David Mckay MD;  Location:  OR     ENT SURGERY      tracheostomy     ESOPHAGOSCOPY, GASTROSCOPY, DUODENOSCOPY (EGD), COMBINED  8/11/2012    Procedure: COMBINED ESOPHAGOSCOPY, GASTROSCOPY, DUODENOSCOPY (EGD);   ESOPHAGOSCOPY, GASTROSCOPY,  DUODENOSCOPY (EGD);  Surgeon: Holland Lawson MD;  Location:  GI     HERNIORRHAPHY INCISIONAL (LOCATION) N/A 5/26/2016    Procedure: HERNIORRHAPHY INCISIONAL (LOCATION);  Surgeon: Jhon Mcfarlane MD;  Location: [facility] OR     IMPLANT STIMULATOR VAGUS NERVE  1/16/2012    Procedure:IMPLANT STIMULATOR VAGUS NERVE; VAGUS NERVE STIMULATOR IMPLANT; Surgeon:LEILANI CORTÉS; Location: [facility]     LAPAROSCOPIC CHOLECYSTECTOMY N/A 5/4/2017    Procedure: LAPAROSCOPIC CHOLECYSTECTOMY;  LAPAROSCOPIC CHOLECYSTECTOMY ;  Surgeon: John Mcfarlane MD;  Location: [facility] OR     LAPAROTOMY EXPLORATORY  8/11/2012    Procedure: LAPAROTOMY EXPLORATORY;;  Surgeon: Michael Jimenez MD;  Location:  OR     OPEN REDUCTION INTERNAL FIXATION FEMUR DISTAL  1/22/2014    Procedure: OPEN REDUCTION INTERNAL FIXATION FEMUR DISTAL;  right distal femur Open reduction internal fixation        ORTHOPEDIC SURGERY      removal of femur bone growth,hand surgery, knee surgery     ORTHOPEDIC SURGERY Right 10/30/2017    enlonging muscle     REMOVE HARDWARE KNEE Right 8/27/2014    Procedure: REMOVE HARDWARE KNEE;  Surgeon: David Mckay MD;  Location:  OR     REMOVE HARDWARE LOWER EXTREMITY Right 10/14/2016    Procedure: REMOVE HARDWARE LOWER EXTREMITY;  Surgeon: David Mckay MD;  Location: [facility] OR     spleen surgery      repaired     SURGICAL HISTORY OF -  Left 2009    selam in left femur     SURGICAL HISTORY OF -       screws in right knee     SURGICAL HISTORY OF -       .IVC filter, parts removed       Social History   Substance Use Topics     Smoking status: Never Smoker     Smokeless tobacco: Never Used     Alcohol use No     Family History   Problem Relation Age of Onset     Cardiovascular Mother      CEREBROVASCULAR DISEASE Mother      GASTROINTESTINAL DISEASE Father      Colitis     DIABETES Sister              Reviewed and updated as needed this visit by clinical staff     Reviewed and updated as needed this visit by  "Provider         ROS:  Constitutional, HEENT, cardiovascular, pulmonary, gi and gu systems are negative, except as otherwise noted.      OBJECTIVE:   /68  Pulse 95  Temp 98.8  F (37.1  C) (Oral)  Ht 5' 11\" (1.803 m)  Wt 218 lb (98.9 kg)  SpO2 98%  BMI 30.4 kg/m2  Body mass index is 30.4 kg/(m^2).  GENERAL: alert and  mild distress  NECK: bilateral anterior cervical adenopathy, no asymmetry, masses, or scars and thyroid normal to palpation  RESP: coarse breath sounds  CV: regular rate and rhythm, normal S1 S2, no S3 or S4, no murmur, click or rub, no peripheral edema and peripheral pulses strong  ABDOMEN: soft, nontender, no hepatosplenomegaly, no masses and bowel sounds normal  MS: no gross musculoskeletal defects noted, no edema  SKIN: no suspicious lesions or rashes  NEURO: Normal strength and tone, mentation intact and speech normal.    Diagnostic Test Results:    Cxr; read as vascular congestion and cardiac slight enlargment.    ASSESSMENT/PLAN:               ICD-10-CM    1. Cough R05 XR Chest 2 Views     guaiFENesin-codeine (ROBITUSSIN AC) 100-10 MG/5ML SOLN solution     cefdinir (OMNICEF) 300 MG capsule       2.bronchits  3. Abnormal chest xray    Close follow could some early failure. Recheck on him tomorrow .    On lisinopril 5 mg  May increase to 10 mgcould be congestion from infection.     In addition he was given antibiotics.  This to help what we do think    Is probably the cause of his cough.  The infection leading to his bronchitis increased pressures and possibly some early failure.    Close follow-up as dictated above        Anastacio Vazquez MD  [facility]    "

## 2017-11-30 NOTE — MR AVS SNAPSHOT
"              After Visit Summary   2017    Andrea Uribe    MRN: [id number]           Patient Information     Date Of Birth          1975        Visit Information        Provider Department      2017 3:30 PM Anastacio Vazquez MD [facility]        Today's Diagnoses     Cough    -  1       Follow-ups after your visit        Who to contact     If you have questions or need follow up information about today's clinic visit or your schedule please contact [facility] directly at [phone].  Normal or non-critical lab and imaging results will be communicated to you by MyChart, letter or phone within 4 business days after the clinic has received the results. If you do not hear from us within 7 days, please contact the clinic through [employer] or phone. If you have a critical or abnormal lab result, we will notify you by phone as soon as possible.  Submit refill requests through [employer] or call your pharmacy and they will forward the refill request to us. Please allow 3 business days for your refill to be completed.          Additional Information About Your Visit        MyChart Information     Crowdtap lets you send messages to your doctor, view your test results, renew your prescriptions, schedule appointments and more. To sign up, go to [email or web address] . Click on \"Log in\" on the left side of the screen, which will take you to the Welcome page. Then click on \"Sign up Now\" on the right side of the page.     You will be asked to enter the access code listed below, as well as some personal information. Please follow the directions to create your username and password.     Your access code is: [email or web address]  Expires: 3/9/2018 11:49 AM     Your access code will  in 90 days. If you need help or a new code, please call your [facility]3] or [phone].        Care EveryWhere ID     This is your Care EveryWhere ID. This could be used by other organizations to access " "your [place] medical records  [id number]        Your Vitals Were     Pulse Temperature Height Pulse Oximetry BMI (Body Mass Index)       95 98.8  F (37.1  C) (Oral) 5' 11\" (1.803 m) 98% 30.4 kg/m2        Blood Pressure from Last 3 Encounters:   11/30/17 118/68   10/19/17 122/78   06/05/17 104/74    Weight from Last 3 Encounters:   11/30/17 218 lb (98.9 kg)   10/19/17 218 lb (98.9 kg)   06/05/17 229 lb (103.9 kg)                 Today's Medication Changes          These changes are accurate as of: 11/30/17 11:59 PM.  If you have any questions, ask your nurse or doctor.               Start taking these medicines.        Dose/Directions    cefdinir 300 MG capsule   Commonly known as:  OMNICEF   Used for:  Cough   Started by:  Anastacio Vazquez MD        Dose:  300 mg   Take 1 capsule (300 mg) by mouth 2 times daily   Quantity:  20 capsule   Refills:  0       guaiFENesin-codeine 100-10 MG/5ML Soln solution   Commonly known as:  ROBITUSSIN AC   Used for:  Cough   Started by:  Anastacio Vazquez MD        Dose:  1 tsp.   Take 5 mLs by mouth every 4 hours as needed for cough   Quantity:  120 mL   Refills:  0            Where to get your medicines      These medications were sent to [facility]/pharmacy #[id number] - [place], [place] - [address] [address] [address] [facility] [address]  [address] [address] [address]    Hours:  [facility] Phone:  [phone]     cefdinir 300 MG capsule         Some of these will need a paper prescription and others can be bought over the counter.  Ask your nurse if you have questions.     Bring a paper prescription for each of these medications     guaiFENesin-codeine 100-10 MG/5ML Soln solution                Primary Care Provider Office Phone # Fax #    Alberto Medley PA-C [phone] [address] [facility] [address]        Equal Access to Services     NILSON SEGUNDO AH: Heather Dillard, roland sanchez, qaybta kaalmada adesue delgadillo" laeliot mensah. So [facility] [phone].    ATENCIÓN: Si habla modesto, tiene a ledesma disposición servicios gratuitos de asistencia lingüística. Raúl al [phone].    We comply with applicable federal civil rights laws and [place] laws. We do not discriminate on the basis of race, color, national origin, age, disability, sex, sexual orientation, or gender identity.            Thank you!     Thank you for choosing [facility]  for your care. Our goal is always to provide you with excellent care. Hearing back from our patients is one way we can continue to improve our services. Please take a few minutes to complete the written survey that you may receive in the mail after your visit with us. Thank you!             Your Updated Medication List - Protect others around you: Learn how to safely use, store and throw away your medicines at www.disposemymeds.org.          This list is accurate as of: 11/30/17 11:59 PM.  Always use your most recent med list.                   Brand Name Dispense Instructions for use Diagnosis    albuterol 108 (90 BASE) MCG/ACT Inhaler    PROAIR HFA/PROVENTIL HFA/VENTOLIN HFA    2 Inhaler    Inhale 2 puffs into the lungs every 6 hours as needed for shortness of breath / dyspnea    Intermittent asthma, uncomplicated       cefdinir 300 MG capsule    OMNICEF    20 capsule    Take 1 capsule (300 mg) by mouth 2 times daily    Cough       DILANTIN 100 MG CR capsule   Generic drug:  phenytoin      Take 300 mg by mouth 2 times daily 300mg in the am and 200mg at pm        guaiFENesin-codeine 100-10 MG/5ML Soln solution    ROBITUSSIN AC    120 mL    Take 5 mLs by mouth every 4 hours as needed for cough    Cough       KEPPRA 1000 MG Tabs   Generic drug:  levETIRAcetam      Take 2,000 mg by mouth 2 times daily At 0900 and 2100        lisinopril 5 MG tablet    PRINIVIL/ZESTRIL    90 tablet    Take 1 tablet (5 mg) by mouth daily    CKD (chronic kidney disease) stage 3, GFR 30-59 ml/min        simvastatin 20 MG tablet    ZOCOR    90 tablet    Take 1 tablet (20 mg) by mouth At Bedtime    Hyperlipidemia LDL goal <100       venlafaxine 75 MG 24 hr capsule    EFFEXOR-XR    90 capsule    Take 1 capsule (75 mg) by mouth daily    Anxiety, Major depressive disorder, recurrent episode, moderate (H)       vitamin D 24630 UNIT capsule    ERGOCALCIFEROL     TAKE 1 CAPSULE BY MOUTH ONCE WEEKLY

## 2017-11-30 NOTE — NURSING NOTE
"Chief Complaint   Patient presents with     URI       Initial /68  Pulse 95  Temp 98.8  F (37.1  C) (Oral)  Ht 5' 11\" (1.803 m)  Wt 218 lb (98.9 kg)  SpO2 98%  BMI 30.4 kg/m2 Estimated body mass index is 30.4 kg/(m^2) as calculated from the following:    Height as of this encounter: 5' 11\" (1.803 m).    Weight as of this encounter: 218 lb (98.9 kg).  Medication Reconciliation: complete       Janina Montalvo  CMA      "

## 2017-11-30 NOTE — LETTER
[facility]  [id number] [place] [address]  Phone: [phone]    November 30, 2017        Andrea Uribe  [address] [address] [address]          To whom it may concern:    RE: Andrea Uribe    Patient was seen and treated today at our clinic. Unable to work on 12/1/17    Please contact me for questions or concerns.      Sincerely,        Anastacio Vazquez MD

## 2017-12-05 ENCOUNTER — HOSPITAL ENCOUNTER (OUTPATIENT)
Dept: ULTRASOUND IMAGING | Facility: CLINIC | Age: 42
Discharge: HOME | End: 2017-12-05
Attending: PHYSICIAN ASSISTANT | Admitting: PHYSICIAN ASSISTANT
Payer: MEDICARE

## 2017-12-05 DIAGNOSIS — K64.4 EXTERNAL HEMORRHOIDS: ICD-10-CM

## 2017-12-05 DIAGNOSIS — M79.89 PAIN AND SWELLING OF LOWER LEG, RIGHT: ICD-10-CM

## 2017-12-05 DIAGNOSIS — M79.661 PAIN AND SWELLING OF LOWER LEG, RIGHT: ICD-10-CM

## 2017-12-05 DIAGNOSIS — Z47.89 AFTERCARE FOLLOWING SURGERY OF THE MUSCULOSKELETAL SYSTEM: ICD-10-CM

## 2017-12-05 PROCEDURE — 93971 EXTREMITY STUDY: CPT | Mod: RT

## 2017-12-20 ENCOUNTER — ALLIED HEALTH/NURSE VISIT (OUTPATIENT)
Dept: NURSING | Facility: CLINIC | Age: 42
End: 2017-12-20
Payer: MEDICARE

## 2017-12-20 DIAGNOSIS — S61.219A LACERATION OF FINGER: Primary | ICD-10-CM

## 2017-12-20 PROCEDURE — 99207 ZZC NO CHARGE NURSE ONLY: CPT

## 2017-12-20 NOTE — MR AVS SNAPSHOT
"              After Visit Summary   2017    Andrea Uirbe    MRN: [id number]           Patient Information     Date Of Birth          1975        Visit Information        Provider Department      2017 9:30 AM LV RN [facility]        Today's Diagnoses     Laceration of finger    -  1       Follow-ups after your visit        Who to contact     If you have questions or need follow up information about today's clinic visit or your schedule please contact [facility] directly at [phone].  Normal or non-critical lab and imaging results will be communicated to you by [employer], letter or phone within 4 business days after the clinic has received the results. If you do not hear from us within 7 days, please contact the clinic through [employer] or phone. If you have a critical or abnormal lab result, we will notify you by phone as soon as possible.  Submit refill requests through [employer] or call your pharmacy and they will forward the refill request to us. Please allow 3 business days for your refill to be completed.          Additional Information About Your Visit        MyChart Information     [employer] lets you send messages to your doctor, view your test results, renew your prescriptions, schedule appointments and more. To sign up, go to [email or web address] . Click on \"Log in\" on the left side of the screen, which will take you to the Welcome page. Then click on \"Sign up Now\" on the right side of the page.     You will be asked to enter the access code listed below, as well as some personal information. Please follow the directions to create your username and password.     Your access code is: [email or web address]  Expires: 3/9/2018 11:49 AM     Your access code will  in 90 days. If you need help or a new code, please call your [facility] or [phone].        Care EveryWhere ID     This is your Care EveryWhere ID. This could be used by other organizations to access " your [place] medical records  [id number]         Blood Pressure from Last 3 Encounters:   11/30/17 118/68   10/19/17 122/78   06/05/17 104/74    Weight from Last 3 Encounters:   11/30/17 218 lb (98.9 kg)   10/19/17 218 lb (98.9 kg)   06/05/17 229 lb (103.9 kg)              Today, you had the following     No orders found for display       Primary Care Provider Office Phone # Fax #    Alberto Shon Medley PA-C [phone] [address] [facility] [address]        Equal Access to Services     [facility] : Hadii maritza Dillard, waaxda luedmaradaha, qaybta kaalmada wan, sue morfin . So [facility] [phone].    ATENCIÓN: Si habla español, tiene a ledesma disposición servicios gratuitos de asistencia lingüística. [facility] [phone].    We comply with applicable federal civil rights laws and [place] laws. We do not discriminate on the basis of race, color, national origin, age, disability, sex, sexual orientation, or gender identity.            Thank you!     Thank you for choosing [facility]  for your care. Our goal is always to provide you with excellent care. Hearing back from our patients is one way we can continue to improve our services. Please take a few minutes to complete the written survey that you may receive in the mail after your visit with us. Thank you!             Your Updated Medication List - Protect others around you: Learn how to safely use, store and throw away your medicines at www.disposemymeds.org.          This list is accurate as of: 12/20/17 10:09 AM.  Always use your most recent med list.                   Brand Name Dispense Instructions for use Diagnosis    albuterol 108 (90 BASE) MCG/ACT Inhaler    PROAIR HFA/PROVENTIL HFA/VENTOLIN HFA    2 Inhaler    Inhale 2 puffs into the lungs every 6 hours as needed for shortness of breath / dyspnea    Intermittent asthma, uncomplicated       cefdinir 300 MG capsule     OMNICEF    20 capsule    Take 1 capsule (300 mg) by mouth 2 times daily    Cough       DILANTIN 100 MG CR capsule   Generic drug:  phenytoin      Take 300 mg by mouth 2 times daily 300mg in the am and 200mg at pm        guaiFENesin-codeine 100-10 MG/5ML Soln solution    ROBITUSSIN AC    120 mL    Take 5 mLs by mouth every 4 hours as needed for cough    Cough       KEPPRA 1000 MG Tabs   Generic drug:  levETIRAcetam      Take 2,000 mg by mouth 2 times daily At 0900 and 2100        lisinopril 5 MG tablet    PRINIVIL/ZESTRIL    90 tablet    Take 1 tablet (5 mg) by mouth daily    CKD (chronic kidney disease) stage 3, GFR 30-59 ml/min       simvastatin 20 MG tablet    ZOCOR    90 tablet    Take 1 tablet (20 mg) by mouth At Bedtime    Hyperlipidemia LDL goal <100       venlafaxine 75 MG 24 hr capsule    EFFEXOR-XR    90 capsule    Take 1 capsule (75 mg) by mouth daily    Anxiety, Major depressive disorder, recurrent episode, moderate (H)       vitamin D 69994 UNIT capsule    ERGOCALCIFEROL     TAKE 1 CAPSULE BY MOUTH ONCE WEEKLY

## 2017-12-20 NOTE — PROGRESS NOTES
Cut right index finger on razor blade from knife while opening box.  Blade was brand new.   Neighbor did clean wound with water and peroxide.   Tried to close wound with steri strip    Tetanus was 10/5/2012.     Wound cleaned with normal saline- bleeding has stopped.     Will have provider do exam to see if needs sutures.      Per provider -   No sutures needed-  derma bond to laceration, then finger splint for work.   F/U as needed.    Advised wash finger gently, wear finger splint while working.   Be seen with any fever, or signs of infection.     Pt expressed understanding and acceptance of the plan.  Pt had no further questions at this time.  Advised can call back to clinic at any time with concerns.     Message handled by Nurse Triage with [employer] - provider name: Merary Orellana MD.    Gabriella Carvalho RN

## 2018-01-17 ENCOUNTER — TRANSFERRED RECORDS (OUTPATIENT)
Dept: HEALTH INFORMATION MANAGEMENT | Facility: CLINIC | Age: 43
End: 2018-01-17

## 2018-02-14 ENCOUNTER — TRANSFERRED RECORDS (OUTPATIENT)
Dept: HEALTH INFORMATION MANAGEMENT | Facility: CLINIC | Age: 43
End: 2018-02-14

## 2018-02-19 ENCOUNTER — TRANSFERRED RECORDS (OUTPATIENT)
Dept: HEALTH INFORMATION MANAGEMENT | Facility: CLINIC | Age: 43
End: 2018-02-19

## 2018-03-01 ENCOUNTER — TRANSFERRED RECORDS (OUTPATIENT)
Dept: HEALTH INFORMATION MANAGEMENT | Facility: CLINIC | Age: 43
End: 2018-03-01

## 2018-03-07 DIAGNOSIS — F41.9 ANXIETY: ICD-10-CM

## 2018-03-07 DIAGNOSIS — F33.1 MAJOR DEPRESSIVE DISORDER, RECURRENT EPISODE, MODERATE (H): ICD-10-CM

## 2018-03-07 RX ORDER — VENLAFAXINE HYDROCHLORIDE 75 MG/1
CAPSULE, EXTENDED RELEASE ORAL
Qty: 90 CAPSULE | Refills: 0 | Status: SHIPPED | OUTPATIENT
Start: 2018-03-07 | End: 2018-06-03

## 2018-03-07 NOTE — TELEPHONE ENCOUNTER
"Requested Prescriptions   Pending Prescriptions Disp Refills     venlafaxine (EFFEXOR-XR) 75 MG 24 hr capsule [Pharmacy Med Name: VENLAFAXINE HCL ER 75 MG CAP] 90 capsule 1    Last Written Prescription Date:  9/8/17  Last Fill Quantity: 90,  # refills: 1   Last Office Visit: 11/30/2017   Future Office Visit:      Sig: TAKE ONE CAPSULE BY MOUTH ONCE DAILY    Serotonin-Norepinephrine Reuptake Inhibitors  Passed    3/7/2018  2:13 PM       Passed - Blood pressure under 140/90 in past 12 months    BP Readings from Last 3 Encounters:   11/30/17 118/68   10/19/17 122/78   06/05/17 104/74          Passed - PHQ-9 score of less than 5 in past 6 months    PHQ-9 SCORE 10/10/2016 1/31/2017 9/8/2017   Total Score - - -   Total Score 0 5 3     DAYAMI-7 SCORE 10/10/2016 1/31/2017 9/8/2017   Total Score - - -   Total Score 2 6 9            Passed - Patient is age 18 or older       Passed - Normal serum creatinine on file in past 12 months    Recent Labs   Lab Test  10/19/17   1038   CR  0.99            Passed - Recent (6 mo) or future (30 days) visit within the authorizing provider's specialty    Patient had office visit in the last 6 months or has a visit in the next 30 days with authorizing provider.  See \"Patient Info\" tab in inbasket, or \"Choose Columns\" in Meds & Orders section of the refill encounter.              "

## 2018-04-26 ENCOUNTER — OFFICE VISIT (OUTPATIENT)
Dept: FAMILY MEDICINE | Facility: CLINIC | Age: 43
End: 2018-04-26
Payer: MEDICARE

## 2018-04-26 VITALS
TEMPERATURE: 98.6 F | DIASTOLIC BLOOD PRESSURE: 68 MMHG | SYSTOLIC BLOOD PRESSURE: 100 MMHG | BODY MASS INDEX: 33.5 KG/M2 | HEART RATE: 69 BPM | HEIGHT: 70 IN | WEIGHT: 234 LBS

## 2018-04-26 DIAGNOSIS — L81.9 ATYPICAL PIGMENTED SKIN LESION: Primary | ICD-10-CM

## 2018-04-26 PROCEDURE — 11401 EXC TR-EXT B9+MARG 0.6-1 CM: CPT | Performed by: FAMILY MEDICINE

## 2018-04-26 PROCEDURE — 88305 TISSUE EXAM BY PATHOLOGIST: CPT | Performed by: FAMILY MEDICINE

## 2018-04-26 PROCEDURE — 88305 TISSUE EXAM BY PATHOLOGIST: CPT | Mod: 26 | Performed by: FAMILY MEDICINE

## 2018-04-26 NOTE — NURSING NOTE
"Chief Complaint   Patient presents with     Derm Problem       Initial /68 (BP Location: Right arm, Patient Position: Sitting, Cuff Size: Adult Large)  Pulse 69  Temp 98.6  F (37  C) (Oral)  Ht 5' 10\" (1.778 m)  Wt 234 lb (106.1 kg)  BMI 33.58 kg/m2 Estimated body mass index is 33.58 kg/(m^2) as calculated from the following:    Height as of this encounter: 5' 10\" (1.778 m).    Weight as of this encounter: 234 lb (106.1 kg).  Medication Reconciliation: complete     Miki Alan CMA              "

## 2018-04-26 NOTE — LETTER
"May 2, 2018      Мария Uribe  [address] [facility] [address]        Dear ,    We are writing to inform you of your test results.    Your recent skin biopsy results showed we removed a pre-cancer. Fortunately, we removed everything. No need to worry about this further.    Resulted Orders   Surgical pathology exam   Result Value Ref Range    Copath Report       Patient Name: МАРИЯ URIBE  MR#: [id number]  Specimen #: [id number]  Collected: 4/26/2018  Received: 4/27/2018  Reported: 4/30/2018 08:37  Ordering Phy(s): ALEJANDRO MCCAIN    For improved result formatting, select 'View Enhanced Report Format' under   Linked Documents section.    SPECIMEN(S):  Skin, left forearm    FINAL DIAGNOSIS:  Skin, left forearm, shave biopsy.  - Consistent with actinic keratosis with mild chronic inflammation.    Negative for malignancy.    Electronically signed out by:    Marcus Meng M.D.    CLINICAL HISTORY:  Reoccurring skin lesion.    GROSS:  The specimen is received in formalin labeled with the patient's name,   identifying information and \"left  forearm\".  It consists of a 0.8 x 0.6 cm tan-brown, slightly bosselated   skin shave biopsy.  The margin is  inked black.  Serially sectioned and submitted entirely in 1 block.   (Dictated by: DB Roy 4/27/2018  09:04 AM)    MICROSCOPIC:  The biopsy consists of epidermis and superficial dermis.   There is sun   damage with dermal solar elastosis.  There is patchy superficial perivascular and interstitial chronic   inflammation with focal interface changes.  The overlying epidermis shows areas of mild thickening and hyperkeratosis   and focal hyperparakeratosis.  There  is slight keratinocyte atypia consistent with actinic keratosis.  There is   no evidence of malignancy..    CPT Codes:  A: 36044-PN3    TESTING LAB LOCATION:  [facility] [address] [place]  [address] [phone]    COLLECTION SITE:  Client: " [facility]  Location: FP (R)         If you have any questions or concerns, please call the clinic at the number listed above.       Sincerely,      Merary Orellana MD

## 2018-04-26 NOTE — LETTER
[facility]  [address] [address] [address]  Phone: [phone]    04/26/18    Andrea Uribe  [address] [address] [address] [facility] [address]      To whom it may concern:     Andrea was seen in clinic today from 10:00 - 12:00.       Sincerely,        Merary Orellana MD

## 2018-04-26 NOTE — PROGRESS NOTES
SUBJECTIVE:   Andrea Uribe is a 42 year old male who presents to clinic today for the following health issues:    Skin spot present for the past several years. Will scratch off and then it returns. No bleeding or cracking. Does get itchy.     Dad, grandfather with skin cancer.     He is cautious to avoid sun or apply 40 SPF sunscreen when he's outdoors.       Problem list and histories reviewed & adjusted, as indicated.  Additional history: none    Patient Active Problem List   Diagnosis     Seizure disorder (H)     TBI (traumatic brain injury) (H)     Moderate major depression (H)     Obesity     Anxiety     Sleep apnea     GERD (gastroesophageal reflux disease)     Intermittent asthma     CKD (chronic kidney disease) stage 2, GFR 60-89 ml/min     MRSA cellulitis     Anemia     Femur fracture, right (H)     Physical deconditioning     S/P total knee replacement     Health Care Home     Hyperlipidemia LDL goal <130     Incisional hernia, without obstruction or gangrene     Ataxic gait     S/P total knee arthroplasty     Acute post-operative pain     Short heel cord, right     Past Surgical History:   Procedure Laterality Date     APPENDECTOMY OPEN  8/11/2012    Procedure: APPENDECTOMY OPEN;  Exploratory Laparotomy, Appendectomy, Remove part IVC filter from duodenum with repair;  Surgeon: Michael Jimenez MD;  Location:  OR     ARTHROPLASTY KNEE Right 8/27/2014    Procedure: ARTHROPLASTY KNEE;  Surgeon: David Mckay MD;  Location:  OR     ARTHROPLASTY REVISION KNEE Right 12/13/2016    Procedure: ARTHROPLASTY REVISION KNEE;  Surgeon: David Mckay MD;  Location:  OR     ENT SURGERY      tracheostomy     ESOPHAGOSCOPY, GASTROSCOPY, DUODENOSCOPY (EGD), COMBINED  8/11/2012    Procedure: COMBINED ESOPHAGOSCOPY, GASTROSCOPY, DUODENOSCOPY (EGD);   ESOPHAGOSCOPY, GASTROSCOPY, DUODENOSCOPY (EGD);  Surgeon: Holland Lawson MD;  Location:  GI     HERNIORRHAPHY INCISIONAL (LOCATION) N/A  5/26/2016    Procedure: HERNIORRHAPHY INCISIONAL (LOCATION);  Surgeon: John Mcfarlane MD;  Location: [facility] OR     IMPLANT STIMULATOR VAGUS NERVE  1/16/2012    Procedure:IMPLANT STIMULATOR VAGUS NERVE; VAGUS NERVE STIMULATOR IMPLANT; Surgeon:LEILANI CORTÉS; Location: [facility]     LAPAROSCOPIC CHOLECYSTECTOMY N/A 5/4/2017    Procedure: LAPAROSCOPIC CHOLECYSTECTOMY;  LAPAROSCOPIC CHOLECYSTECTOMY ;  Surgeon: John Mcfarlane MD;  Location: [facility] OR     LAPAROTOMY EXPLORATORY  8/11/2012    Procedure: LAPAROTOMY EXPLORATORY;;  Surgeon: Michael Jimenez MD;  Location: [facility] OR     OPEN REDUCTION INTERNAL FIXATION FEMUR DISTAL  1/22/2014    Procedure: OPEN REDUCTION INTERNAL FIXATION FEMUR DISTAL;  right distal femur Open reduction internal fixation        ORTHOPEDIC SURGERY      removal of femur bone growth,hand surgery, knee surgery     ORTHOPEDIC SURGERY Right 10/30/2017    enlonging muscle     REMOVE HARDWARE KNEE Right 8/27/2014    Procedure: REMOVE HARDWARE KNEE;  Surgeon: David Mckay MD;  Location: [facility] OR     REMOVE HARDWARE LOWER EXTREMITY Right 10/14/2016    Procedure: REMOVE HARDWARE LOWER EXTREMITY;  Surgeon: David Mckay MD;  Location: [facility] OR     spleen surgery      repaired     SURGICAL HISTORY OF -  Left 2009    selam in left femur     SURGICAL HISTORY OF -       screws in right knee     SURGICAL HISTORY OF -       .IVC filter, parts removed       Social History   Substance Use Topics     Smoking status: Never Smoker     Smokeless tobacco: Never Used     Alcohol use No     Family History   Problem Relation Age of Onset     Cardiovascular Mother      CEREBROVASCULAR DISEASE Mother      GASTROINTESTINAL DISEASE Father      Colitis     DIABETES Sister      Other Cancer Other            Reviewed and updated as needed this visit by clinical staff       Reviewed and updated as needed this visit by Provider         ROS:  Constitutional, HEENT, cardiovascular, pulmonary, gi and gu systems are  "negative, except as otherwise noted.    OBJECTIVE:     /68 (BP Location: Right arm, Patient Position: Sitting, Cuff Size: Adult Large)  Pulse 69  Temp 98.6  F (37  C) (Oral)  Ht 5' 10\" (1.778 m)  Wt 234 lb (106.1 kg)  BMI 33.58 kg/m2  Body mass index is 33.58 kg/(m^2).  GENERAL: healthy, alert and no distress  SKIN: 0.75 cm irregular border brown lesion on left forearm    Diagnostic Test Results:  none     Procedure: shave biopsy  0.5 cc 1% lidocaine with epi injected beneath lesion after cleansing with alcohol pad. Flat razor used to remove lesion in entirely. Firm pressure for hemostasis. dressed with bacitracin and bandage, discussed aftercare.     ASSESSMENT/PLAN:     1. Atypical pigmented skin lesion - removed today, suspect seb K, but will await pathology results on this.   - Surgical pathology exam    Merary Orellana MD  [facility]    "

## 2018-04-26 NOTE — MR AVS SNAPSHOT
"              After Visit Summary   2018    Andrea Uribe    MRN: [id number]           Patient Information     Date Of Birth          1975        Visit Information        Provider Department      2018 10:20 AM Merary Orellana MD [facility]        Today's Diagnoses     Atypical pigmented skin lesion    -  1       Follow-ups after your visit        Who to contact     If you have questions or need follow up information about today's clinic visit or your schedule please contact [facility] directly at [phone].  Normal or non-critical lab and imaging results will be communicated to you by [employer], letter or phone within 4 business days after the clinic has received the results. If you do not hear from us within 7 days, please contact the clinic through [employer] or phone. If you have a critical or abnormal lab result, we will notify you by phone as soon as possible.  Submit refill requests through [employer] or call your pharmacy and they will forward the refill request to us. Please allow 3 business days for your refill to be completed.          Additional Information About Your Visit        MyChart Information     [employer] lets you send messages to your doctor, view your test results, renew your prescriptions, schedule appointments and more. To sign up, go to [email or web address] . Click on \"Log in\" on the left side of the screen, which will take you to the Welcome page. Then click on \"Sign up Now\" on the right side of the page.     You will be asked to enter the access code listed below, as well as some personal information. Please follow the directions to create your username and password.     Your access code is: [email or web address]  Expires: 2018 12:18 PM     Your access code will  in 90 days. If you need help or a new code, please call your [facility] or [phone].        Care EveryWhere ID     This is your Care EveryWhere ID. This could be used by " "other organizations to access your [place] medical records  [id number]        Your Vitals Were     Pulse Temperature Height BMI (Body Mass Index)          69 98.6  F (37  C) (Oral) 5' 10\" (1.778 m) 33.58 kg/m2         Blood Pressure from Last 3 Encounters:   04/26/18 100/68   11/30/17 118/68   10/19/17 122/78    Weight from Last 3 Encounters:   04/26/18 234 lb (106.1 kg)   11/30/17 218 lb (98.9 kg)   10/19/17 218 lb (98.9 kg)              We Performed the Following     BIOPSY SKIN/SUBQ/MUC MEM, SINGLE LESION     Surgical pathology exam          Today's Medication Changes          These changes are accurate as of 4/26/18 12:18 PM.  If you have any questions, ask your nurse or doctor.               Stop taking these medicines if you haven't already. Please contact your care team if you have questions.     cefdinir 300 MG capsule   Commonly known as:  OMNICEF   Stopped by:  Merary Orellana MD           guaiFENesin-codeine 100-10 MG/5ML Soln solution   Commonly known as:  ROBITUSSIN AC   Stopped by:  Merary Orellana MD                    Primary Care Provider Office Phone # Fax #    Alberto Shon Medley PA-C [phone] [address] [facility] [address]        Equal Access to Services     BOB SEGUNDO AH: Hadii maritza lopez hadasho Soomaali, waaxda luqadaha, qaybta kaalmada wan, sue mensah. So [facility] [phone].    ATENCIÓN: Si habla español, tiene a ledesma disposición servicios gratuitos de asistencia lingüística. Raúl al [phone].    We comply with applicable federal civil rights laws and [place] laws. We do not discriminate on the basis of race, color, national origin, age, disability, sex, sexual orientation, or gender identity.            Thank you!     Thank you for choosing [facility]  for your care. Our goal is always to provide you with excellent care. Hearing back from our patients is one way we can continue to improve our " services. Please take a few minutes to complete the written survey that you may receive in the mail after your visit with us. Thank you!             Your Updated Medication List - Protect others around you: Learn how to safely use, store and throw away your medicines at www.disposemymeds.org.          This list is accurate as of 4/26/18 12:18 PM.  Always use your most recent med list.                   Brand Name Dispense Instructions for use Diagnosis    albuterol 108 (90 Base) MCG/ACT Inhaler    PROAIR HFA/PROVENTIL HFA/VENTOLIN HFA    2 Inhaler    Inhale 2 puffs into the lungs every 6 hours as needed for shortness of breath / dyspnea    Intermittent asthma, uncomplicated       DILANTIN 100 MG CR capsule   Generic drug:  phenytoin      Take 300 mg by mouth 2 times daily 300mg in the am and 200mg at pm        KEPPRA 1000 MG Tabs   Generic drug:  levETIRAcetam      Take 2,000 mg by mouth 2 times daily At 0900 and 2100        lisinopril 5 MG tablet    PRINIVIL/ZESTRIL    90 tablet    Take 1 tablet (5 mg) by mouth daily    CKD (chronic kidney disease) stage 3, GFR 30-59 ml/min       simvastatin 20 MG tablet    ZOCOR    90 tablet    Take 1 tablet (20 mg) by mouth At Bedtime    Hyperlipidemia LDL goal <100       venlafaxine 75 MG 24 hr capsule    EFFEXOR-XR    90 capsule    TAKE ONE CAPSULE BY MOUTH ONCE DAILY    Anxiety, Major depressive disorder, recurrent episode, moderate (H)       vitamin D 43999 UNIT capsule    ERGOCALCIFEROL     TAKE 1 CAPSULE BY MOUTH ONCE WEEKLY

## 2018-04-30 LAB — COPATH REPORT: NORMAL

## 2018-05-23 ENCOUNTER — RADIANT APPOINTMENT (OUTPATIENT)
Dept: GENERAL RADIOLOGY | Facility: CLINIC | Age: 43
End: 2018-05-23
Attending: NURSE PRACTITIONER
Payer: MEDICARE

## 2018-05-23 ENCOUNTER — OFFICE VISIT (OUTPATIENT)
Dept: FAMILY MEDICINE | Facility: CLINIC | Age: 43
End: 2018-05-23
Payer: MEDICARE

## 2018-05-23 VITALS
RESPIRATION RATE: 16 BRPM | OXYGEN SATURATION: 96 % | HEART RATE: 62 BPM | WEIGHT: 226 LBS | SYSTOLIC BLOOD PRESSURE: 110 MMHG | BODY MASS INDEX: 32.35 KG/M2 | HEIGHT: 70 IN | DIASTOLIC BLOOD PRESSURE: 62 MMHG | TEMPERATURE: 97.7 F

## 2018-05-23 DIAGNOSIS — M54.41 ACUTE BILATERAL LOW BACK PAIN WITH RIGHT-SIDED SCIATICA: ICD-10-CM

## 2018-05-23 DIAGNOSIS — M54.41 ACUTE BILATERAL LOW BACK PAIN WITH RIGHT-SIDED SCIATICA: Primary | ICD-10-CM

## 2018-05-23 PROCEDURE — 99214 OFFICE O/P EST MOD 30 MIN: CPT | Performed by: NURSE PRACTITIONER

## 2018-05-23 PROCEDURE — 72100 X-RAY EXAM L-S SPINE 2/3 VWS: CPT

## 2018-05-23 RX ORDER — OXYCODONE AND ACETAMINOPHEN 5; 325 MG/1; MG/1
1 TABLET ORAL EVERY 6 HOURS PRN
Qty: 12 TABLET | Refills: 0 | Status: SHIPPED | OUTPATIENT
Start: 2018-05-23 | End: 2018-10-16

## 2018-05-23 ASSESSMENT — ANXIETY QUESTIONNAIRES
6. BECOMING EASILY ANNOYED OR IRRITABLE: NEARLY EVERY DAY
5. BEING SO RESTLESS THAT IT IS HARD TO SIT STILL: NOT AT ALL
IF YOU CHECKED OFF ANY PROBLEMS ON THIS QUESTIONNAIRE, HOW DIFFICULT HAVE THESE PROBLEMS MADE IT FOR YOU TO DO YOUR WORK, TAKE CARE OF THINGS AT HOME, OR GET ALONG WITH OTHER PEOPLE: NOT DIFFICULT AT ALL
2. NOT BEING ABLE TO STOP OR CONTROL WORRYING: NEARLY EVERY DAY
3. WORRYING TOO MUCH ABOUT DIFFERENT THINGS: NEARLY EVERY DAY
1. FEELING NERVOUS, ANXIOUS, OR ON EDGE: NOT AT ALL
GAD7 TOTAL SCORE: 12
7. FEELING AFRAID AS IF SOMETHING AWFUL MIGHT HAPPEN: NOT AT ALL

## 2018-05-23 ASSESSMENT — PATIENT HEALTH QUESTIONNAIRE - PHQ9: 5. POOR APPETITE OR OVEREATING: NEARLY EVERY DAY

## 2018-05-23 NOTE — LETTER
May 23, 2018      Andrea Uribe  [address] [facility] [address]        To Whom It May Concern,    Please excuse Gabriella Uribe from work this week to help care for her  who fell and is unable to walk at this time. She may return to work after their orthopedic appointment this Friday.           Sincerely,        Bella Reece, NP

## 2018-05-23 NOTE — LETTER
May 23, 2018      Andrea Uribe  [address] [facility] [address]        To Whom It May Concern,    Andrea Uribe has metal in him from surgeries so please be aware when going thru airport security.           Sincerely,        Bella Reece, NP

## 2018-05-23 NOTE — MR AVS SNAPSHOT
After Visit Summary   5/23/2018    Andrea Uribe    MRN: [id number]           Patient Information     Date Of Birth          1975        Visit Information        Provider Department      5/23/2018 9:15 AM Bella Reece NP [facility]        Today's Diagnoses     Acute bilateral low back pain with right-sided sciatica    -  1       Follow-ups after your visit        Additional Services     ORTHOPEDICS ADULT REFERRAL       Your provider has referred you to: [facility]: [facility]  [phone]   [email or web address]    Please be aware that coverage of these services is subject to the terms and limitations of your health insurance plan.  Call member services at your health plan with any benefit or coverage questions.      Please bring the following to your appointment:    >>   Any x-rays, CTs or MRIs which have been performed.  Contact the facility where they were done to arrange for  prior to your scheduled appointment.    >>   List of current medications   >>   This referral request   >>   Any documents/labs given to you for this referral                  Your next 10 appointments already scheduled     May 25, 2018 11:00 AM CDT   New Visit with Twin Guillen MD   [facility] [place] ([facility] [place])    [address] [place] [address] [phone]              Who to contact     If you have questions or need follow up information about today's clinic visit or your schedule please contact [facility] directly at [phone].  Normal or non-critical lab and imaging results will be communicated to you by MyChart, letter or phone within 4 business days after the clinic has received the results. If you do not hear from us within 7 days, please contact the  "clinic through [employer] or phone. If you have a critical or abnormal lab result, we will notify you by phone as soon as possible.  Submit refill requests through [employer] or call your pharmacy and they will forward the refill request to us. Please allow 3 business days for your refill to be completed.          Additional Information About Your Visit        Re[employer] Information     [employer] lets you send messages to your doctor, view your test results, renew your prescriptions, schedule appointments and more. To sign up, go to [email or web address] . Click on \"Log in\" on the left side of the screen, which will take you to the Welcome page. Then click on \"Sign up Now\" on the right side of the page.     You will be asked to enter the access code listed below, as well as some personal information. Please follow the directions to create your username and password.     Your access code is: [email or web address]  Expires: 2018 12:18 PM     Your access code will  in 90 days. If you need help or a new code, please call your [place] clinic or [phone].        Care EveryWhere ID     This is your Care EveryWhere ID. This could be used by other organizations to access your [place] medical records  [id number]        Your Vitals Were     Pulse Temperature Respirations Height Pulse Oximetry BMI (Body Mass Index)    62 97.7  F (36.5  C) (Oral) 16 5' 10\" (1.778 m) 96% 32.43 kg/m2       Blood Pressure from Last 3 Encounters:   18 110/62   18 100/68   17 118/68    Weight from Last 3 Encounters:   18 226 lb (102.5 kg)   18 234 lb (106.1 kg)   17 218 lb (98.9 kg)              We Performed the Following     ORTHOPEDICS ADULT REFERRAL        Primary Care Provider Office Phone # Fax #    Albertorodney Medley PA-C [phone] [address] [facility] [address]        Equal Access to Services     NILSON SEGUNDO AH: Heather Dillard, waaxda luleah franco" sue blasjeffrey singhaaag ah. So [facility] [phone].    ATENCIÓN: Si albertla modesto, tiene a ledesma disposición servicios gratuitos de asistencia lingüística. Raúl al [phone].    We comply with applicable federal civil rights laws and [place] laws. We do not discriminate on the basis of race, color, national origin, age, disability, sex, sexual orientation, or gender identity.            Thank you!     Thank you for choosing [facility]  for your care. Our goal is always to provide you with excellent care. Hearing back from our patients is one way we can continue to improve our services. Please take a few minutes to complete the written survey that you may receive in the mail after your visit with us. Thank you!             Your Updated Medication List - Protect others around you: Learn how to safely use, store and throw away your medicines at www.disposemymeds.org.          This list is accurate as of 5/23/18 10:14 AM.  Always use your most recent med list.                   Brand Name Dispense Instructions for use Diagnosis    albuterol 108 (90 Base) MCG/ACT Inhaler    PROAIR HFA/PROVENTIL HFA/VENTOLIN HFA    2 Inhaler    Inhale 2 puffs into the lungs every 6 hours as needed for shortness of breath / dyspnea    Intermittent asthma, uncomplicated       DILANTIN 100 MG CR capsule   Generic drug:  phenytoin      Take 300 mg by mouth 2 times daily 300mg in the am and 200mg at pm        KEPPRA 1000 MG Tabs   Generic drug:  levETIRAcetam      Take 2,000 mg by mouth 2 times daily At 0900 and 2100        lisinopril 5 MG tablet    PRINIVIL/ZESTRIL    90 tablet    Take 1 tablet (5 mg) by mouth daily    CKD (chronic kidney disease) stage 3, GFR 30-59 ml/min       simvastatin 20 MG tablet    ZOCOR    90 tablet    Take 1 tablet (20 mg) by mouth At Bedtime    Hyperlipidemia LDL goal <100       venlafaxine 75 MG 24 hr capsule    EFFEXOR-XR    90 capsule    TAKE ONE CAPSULE BY MOUTH ONCE  DAILY    Anxiety, Major depressive disorder, recurrent episode, moderate (H)       vitamin D 14800 UNIT capsule    ERGOCALCIFEROL     TAKE 1 CAPSULE BY MOUTH ONCE WEEKLY

## 2018-05-23 NOTE — NURSING NOTE
"Chief Complaint   Patient presents with     Back Pain       Initial /62 (BP Location: Right arm, Patient Position: Chair, Cuff Size: Adult Large)  Pulse 62  Temp 97.7  F (36.5  C) (Oral)  Resp 16  Ht 5' 10\" (1.778 m)  Wt 226 lb (102.5 kg)  SpO2 96%  BMI 32.43 kg/m2 Estimated body mass index is 32.43 kg/(m^2) as calculated from the following:    Height as of this encounter: 5' 10\" (1.778 m).    Weight as of this encounter: 226 lb (102.5 kg).  Medication Reconciliation: complete      Health Maintenance addressed:  NONE    Joel Escobedo CMA  .      "

## 2018-05-23 NOTE — PROGRESS NOTES
SUBJECTIVE:   Andrea Uribe is a 42 year old male who presents to clinic today for the following health issues:      Back Pain       Duration: yesterday        Specific cause: fall     Description:   Location of pain: low back bilateral  Character of pain: sharp and hard to walk  Pain radiation:radiates into the right leg  New numbness or weakness in legs, not attributed to pain:  YES- payton numbness in his right leg    Intensity: Currently 7/10    History:   Pain interferes with job: was going to work  History of back problems: no prior back problems  Any previous MRI or X-rays: None  Sees a specialist for back pain:  No  Therapies tried without relief: Tylenol    Alleviating factors:   Improved by: Tylenol      Precipitating factors:  Worsened by: Lying Flat and Walking , sitting, overuse  Pain in the right lower back with pain radiating into the right buttock and posterior thigh for the past day. Unsteady gait. Fell while trying to get his wheelchair out of the back of his truck. Landed directly on his back.           Problem list and histories reviewed & adjusted, as indicated.  Additional history: none    Patient Active Problem List   Diagnosis     Seizure disorder (H)     TBI (traumatic brain injury) (H)     Moderate major depression (H)     Obesity     Anxiety     Sleep apnea     GERD (gastroesophageal reflux disease)     Intermittent asthma     CKD (chronic kidney disease) stage 2, GFR 60-89 ml/min     MRSA cellulitis     Anemia     Femur fracture, right (H)     Physical deconditioning     S/P total knee replacement     Health Care Home     Hyperlipidemia LDL goal <130     Incisional hernia, without obstruction or gangrene     Ataxic gait     S/P total knee arthroplasty     Acute post-operative pain     Short heel cord, right     Past Surgical History:   Procedure Laterality Date     APPENDECTOMY OPEN  8/11/2012    Procedure: APPENDECTOMY OPEN;  Exploratory Laparotomy, Appendectomy, Remove part IVC filter  from duodenum with repair;  Surgeon: Michael Jimenez MD;  Location: [facility] OR     ARTHROPLASTY KNEE Right 8/27/2014    Procedure: ARTHROPLASTY KNEE;  Surgeon: David Mckay MD;  Location: [facility] OR     ARTHROPLASTY REVISION KNEE Right 12/13/2016    Procedure: ARTHROPLASTY REVISION KNEE;  Surgeon: David Mckay MD;  Location:  OR     ENT SURGERY      tracheostomy     ESOPHAGOSCOPY, GASTROSCOPY, DUODENOSCOPY (EGD), COMBINED  8/11/2012    Procedure: COMBINED ESOPHAGOSCOPY, GASTROSCOPY, DUODENOSCOPY (EGD);   ESOPHAGOSCOPY, GASTROSCOPY, DUODENOSCOPY (EGD);  Surgeon: Holland Lawson MD;  Location:  GI     HERNIORRHAPHY INCISIONAL (LOCATION) N/A 5/26/2016    Procedure: HERNIORRHAPHY INCISIONAL (LOCATION);  Surgeon: John Mcfarlane MD;  Location:  OR     IMPLANT STIMULATOR VAGUS NERVE  1/16/2012    Procedure:IMPLANT STIMULATOR VAGUS NERVE; VAGUS NERVE STIMULATOR IMPLANT; Surgeon:LEILANI CORTÉS; Location: [facility]     LAPAROSCOPIC CHOLECYSTECTOMY N/A 5/4/2017    Procedure: LAPAROSCOPIC CHOLECYSTECTOMY;  LAPAROSCOPIC CHOLECYSTECTOMY ;  Surgeon: John Mcfarlane MD;  Location:  OR     LAPAROTOMY EXPLORATORY  8/11/2012    Procedure: LAPAROTOMY EXPLORATORY;;  Surgeon: Michael Jimenez MD;  Location:  OR     OPEN REDUCTION INTERNAL FIXATION FEMUR DISTAL  1/22/2014    Procedure: OPEN REDUCTION INTERNAL FIXATION FEMUR DISTAL;  right distal femur Open reduction internal fixation        ORTHOPEDIC SURGERY      removal of femur bone growth,hand surgery, knee surgery     ORTHOPEDIC SURGERY Right 10/30/2017    enlonging muscle     REMOVE HARDWARE KNEE Right 8/27/2014    Procedure: REMOVE HARDWARE KNEE;  Surgeon: David Mckay MD;  Location:  OR     REMOVE HARDWARE LOWER EXTREMITY Right 10/14/2016    Procedure: REMOVE HARDWARE LOWER EXTREMITY;  Surgeon: David Mckay MD;  Location:  OR     spleen surgery      repaired     SURGICAL HISTORY OF -  Left 2009    selam in left femur  "    SURGICAL HISTORY OF -       screws in right knee     SURGICAL HISTORY OF -       .IVC filter, parts removed       Social History   Substance Use Topics     Smoking status: Never Smoker     Smokeless tobacco: Never Used     Alcohol use No     Family History   Problem Relation Age of Onset     Cardiovascular Mother      CEREBROVASCULAR DISEASE Mother      GASTROINTESTINAL DISEASE Father      Colitis     DIABETES Sister      Other Cancer Other            Reviewed and updated as needed this visit by clinical staff  Tobacco  Allergies  Meds  Problems  Med Hx  Surg Hx  Fam Hx  Soc Hx        Reviewed and updated as needed this visit by Provider  Allergies  Meds  Problems  Med Hx  Surg Hx  Fam Hx         ROS:  Constitutional, HEENT, cardiovascular, pulmonary, gi and gu systems are negative, except as otherwise noted.    OBJECTIVE:     /62 (BP Location: Right arm, Patient Position: Chair, Cuff Size: Adult Large)  Pulse 62  Temp 97.7  F (36.5  C) (Oral)  Resp 16  Ht 5' 10\" (1.778 m)  Wt 226 lb (102.5 kg)  SpO2 96%  BMI 32.43 kg/m2  Body mass index is 32.43 kg/(m^2).  GENERAL: alert, mild distress and over weight  MS: ataxia (not new), tenderness on the right posterior iliac crest, radiating pain into buttock and down posterior thigh  SKIN: no suspicious lesions or rashes  Comprehensive back pain exam:  Tenderness of right lower back. Limited forward flexion.   PSYCH: mentation appears normal and affect flat        ASSESSMENT/PLAN:             1. Acute bilateral low back pain with right-sided sciatica  Unable to do MRI due to metal in body. Xray is negative. Percocet for pain. Referral to orthopedics for evaluation.   - ORTHOPEDICS ADULT REFERRAL  - XR Lumbar Spine 2/3 Views; Future  - oxyCODONE-acetaminophen (PERCOCET) 5-325 MG per tablet; Take 1 tablet by mouth every 6 hours as needed for pain  Dispense: 12 tablet; Refill: 0        Bella Reece NP  [facility]    "

## 2018-05-23 NOTE — LETTER
[facility]  [id number] [place] [address] [phone]          May 23, 2018    RE:  Andrea Uribe                                                                                                                                                       [address] [facility] [address]            To whom it may concern:    Andrea Uribe is under my professional care for Acute bilateral low back pain with right-sided sciatica. Please excuse him from work until next week. He will be seen by the orthopedic specialist at the end of this week and reassessed at that time.     Sincerely,        Bella HARPERP

## 2018-05-24 ASSESSMENT — PATIENT HEALTH QUESTIONNAIRE - PHQ9: SUM OF ALL RESPONSES TO PHQ QUESTIONS 1-9: 4

## 2018-05-24 ASSESSMENT — ANXIETY QUESTIONNAIRES: GAD7 TOTAL SCORE: 12

## 2018-05-24 ASSESSMENT — ASTHMA QUESTIONNAIRES: ACT_TOTALSCORE: 25

## 2018-05-25 ENCOUNTER — OFFICE VISIT (OUTPATIENT)
Dept: ORTHOPEDICS | Facility: CLINIC | Age: 43
End: 2018-05-25
Payer: MEDICARE

## 2018-05-25 VITALS
DIASTOLIC BLOOD PRESSURE: 80 MMHG | BODY MASS INDEX: 32.35 KG/M2 | SYSTOLIC BLOOD PRESSURE: 122 MMHG | WEIGHT: 226 LBS | HEIGHT: 70 IN

## 2018-05-25 DIAGNOSIS — N18.2 CKD (CHRONIC KIDNEY DISEASE) STAGE 2, GFR 60-89 ML/MIN: ICD-10-CM

## 2018-05-25 DIAGNOSIS — M43.17 SPONDYLOLISTHESIS AT L5-S1 LEVEL: ICD-10-CM

## 2018-05-25 DIAGNOSIS — M54.50 ACUTE MIDLINE LOW BACK PAIN WITHOUT SCIATICA: Primary | ICD-10-CM

## 2018-05-25 DIAGNOSIS — M62.830 BACK MUSCLE SPASM: ICD-10-CM

## 2018-05-25 PROCEDURE — 99204 OFFICE O/P NEW MOD 45 MIN: CPT | Performed by: FAMILY MEDICINE

## 2018-05-25 RX ORDER — PREDNISONE 20 MG/1
20 TABLET ORAL 2 TIMES DAILY
Qty: 10 TABLET | Refills: 0 | Status: SHIPPED | OUTPATIENT
Start: 2018-05-25 | End: 2018-06-14

## 2018-05-25 RX ORDER — CYCLOBENZAPRINE HCL 5 MG
TABLET ORAL
Qty: 30 TABLET | Refills: 0 | Status: SHIPPED | OUTPATIENT
Start: 2018-05-25 | End: 2018-06-04

## 2018-05-25 ASSESSMENT — ENCOUNTER SYMPTOMS
SENSORY CHANGE: 1
BACK PAIN: 1
TINGLING: 1
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
## 2018-05-25 NOTE — LETTER
[facility] [address] [address]  Phone: [phone]  Fax: [phone]    05/25/18    Andrea Uribe  [address] [facility] [address]      To whom it may concern:     Andrea has an acute low back injury. He should lift no more than 10 lbs for the next 10 days. It is ok to use wheelchair and stand intermittently as he normally does.     Sincerely,      Twin Guillen MD

## 2018-05-25 NOTE — MR AVS SNAPSHOT
"              After Visit Summary   5/25/2018    Andrea Uribe    MRN: [id number]           Patient Information     Date Of Birth          1975        Visit Information        Provider Department      5/25/2018 11:00 AM Twin Guillen MD [facility]        Today's Diagnoses     Acute midline low back pain without sciatica    -  1    CKD (chronic kidney disease) stage 2, GFR 60-89 ml/min        Spondylolisthesis at L5-S1 level        Back muscle spasm           Follow-ups after your visit        Who to contact     If you have questions or need follow up information about today's clinic visit or your schedule please contact [facility] directly at [phone].  Normal or non-critical lab and imaging results will be communicated to you by Kul[employer], letter or phone within 4 business days after the clinic has received the results. If you do not hear from us within 7 days, please contact the clinic through Kulizahart or phone. If you have a critical or abnormal lab result, we will notify you by phone as soon as possible.  Submit refill requests through [employer] or call your pharmacy and they will forward the refill request to us. Please allow 3 business days for your refill to be completed.          Additional Information About Your Visit        Kulizahart Information     [employer] lets you send messages to your doctor, view your test results, renew your prescriptions, schedule appointments and more. To sign up, go to [email or web address] . Click on \"Log in\" on the left side of the screen, which will take you to the Welcome page. Then click on \"Sign up Now\" on the right side of the page.     You will be asked to enter the access code listed below, as well as some personal information. Please follow the directions to create your username and password.     Your access code is: [email or web address]  Expires: 7/25/2018 12:18 PM     Your access code " "will  in 90 days. If you need help or a new code, please call your [place] clinic or [phone].        Care EveryWhere ID     This is your Care EveryWhere ID. This could be used by other organizations to access your [place] medical records  [id number]        Your Vitals Were     Height BMI (Body Mass Index)                5' 10\" (1.778 m) 32.43 kg/m2           Blood Pressure from Last 3 Encounters:   18 122/80   18 110/62   18 100/68    Weight from Last 3 Encounters:   18 226 lb (102.5 kg)   18 226 lb (102.5 kg)   18 234 lb (106.1 kg)              Today, you had the following     No orders found for display         Today's Medication Changes          These changes are accurate as of 18 12:45 PM.  If you have any questions, ask your nurse or doctor.               Start taking these medicines.        Dose/Directions    cyclobenzaprine 5 MG tablet   Commonly known as:  FLEXERIL   Used for:  Acute midline low back pain without sciatica, Back muscle spasm   Started by:  Twin Guillen MD        Take 1-2 tabs at night as needed for lumbar spasms affecting sleep   Quantity:  30 tablet   Refills:  0       predniSONE 20 MG tablet   Commonly known as:  DELTASONE   Used for:  Acute midline low back pain without sciatica, Spondylolisthesis at L5-S1 level   Started by:  Twin Guillen MD        Dose:  20 mg   Take 1 tablet (20 mg) by mouth 2 times daily   Quantity:  10 tablet   Refills:  0            Where to get your medicines      These medications were sent to [facility]/pharmacy #[id number] - [place], [place] - [address] [address] [facility]  [address] [facility] [address]    Hours:  [facility] drug converted to [facility] Phone:  [phone]     cyclobenzaprine 5 MG tablet    predniSONE 20 MG tablet                Primary Care Provider Office Phone # Fax #    Alberto Medley PA-C [phone] [address] [facility] [address]        Equal Access to " Services     [facility]: Hadii aad ku jessicao Sogelyali, waaxda luqadaha, qaybta kaalmada adeegyada, sue vallerozjaci morfin . So [facility] [phone].    ATENCIÓN: Si albertla modesto, tiene a ledesma disposición servicios gratuitos de asistencia lingüística. Llame al [phone].    We comply with applicable federal civil rights laws and [place] laws. We do not discriminate on the basis of race, color, national origin, age, disability, sex, sexual orientation, or gender identity.            Thank you!     Thank you for choosing [facility]  for your care. Our goal is always to provide you with excellent care. Hearing back from our patients is one way we can continue to improve our services. Please take a few minutes to complete the written survey that you may receive in the mail after your visit with us. Thank you!             Your Updated Medication List - Protect others around you: Learn how to safely use, store and throw away your medicines at www.disposemymeds.org.          This list is accurate as of 5/25/18 12:45 PM.  Always use your most recent med list.                   Brand Name Dispense Instructions for use Diagnosis    albuterol 108 (90 Base) MCG/ACT Inhaler    PROAIR HFA/PROVENTIL HFA/VENTOLIN HFA    2 Inhaler    Inhale 2 puffs into the lungs every 6 hours as needed for shortness of breath / dyspnea    Intermittent asthma, uncomplicated       cyclobenzaprine 5 MG tablet    FLEXERIL    30 tablet    Take 1-2 tabs at night as needed for lumbar spasms affecting sleep    Acute midline low back pain without sciatica, Back muscle spasm       DILANTIN 100 MG CR capsule   Generic drug:  phenytoin      Take 300 mg by mouth 2 times daily 300mg in the am and 200mg at pm        KEPPRA 1000 MG Tabs   Generic drug:  levETIRAcetam      Take 2,000 mg by mouth 2 times daily At 0900 and 2100        lisinopril 5 MG tablet    PRINIVIL/ZESTRIL    90 tablet    Take 1 tablet  (5 mg) by mouth daily    CKD (chronic kidney disease) stage 3, GFR 30-59 ml/min       oxyCODONE-acetaminophen 5-325 MG per tablet    PERCOCET    12 tablet    Take 1 tablet by mouth every 6 hours as needed for pain    Acute bilateral low back pain with right-sided sciatica       predniSONE 20 MG tablet    DELTASONE    10 tablet    Take 1 tablet (20 mg) by mouth 2 times daily    Acute midline low back pain without sciatica, Spondylolisthesis at L5-S1 level       simvastatin 20 MG tablet    ZOCOR    90 tablet    Take 1 tablet (20 mg) by mouth At Bedtime    Hyperlipidemia LDL goal <100       venlafaxine 75 MG 24 hr capsule    EFFEXOR-XR    90 capsule    TAKE ONE CAPSULE BY MOUTH ONCE DAILY    Anxiety, Major depressive disorder, recurrent episode, moderate (H)       vitamin D 14844 UNIT capsule    ERGOCALCIFEROL     TAKE 1 CAPSULE BY MOUTH ONCE WEEKLY
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
## 2018-06-03 DIAGNOSIS — F33.1 MAJOR DEPRESSIVE DISORDER, RECURRENT EPISODE, MODERATE (H): ICD-10-CM

## 2018-06-03 DIAGNOSIS — F41.9 ANXIETY: ICD-10-CM

## 2018-06-03 NOTE — LETTER
[facility]  [address] [place], [place], [address]  [phone]        June 11, 2018    Andrea Uribe                                                                                                                                                       [address] [facility] [address]            Dear Andrea,      We recently received a call from your pharmacy requesting a refill of venlafaxine.    A review of your chart indicates that an appointment is required with your provider for medication check. Please call the clinic at [phone] to schedule your appointment.     We have authorized one refill of your medication to allow time for you to schedule your appointment.     Taking care of your health is important to us and ongoing visits with your provider are vital to your care. We look forward to seeing you in the near future.       Sincerely,     [facility]/AL

## 2018-06-04 DIAGNOSIS — M54.50 ACUTE MIDLINE LOW BACK PAIN WITHOUT SCIATICA: ICD-10-CM

## 2018-06-04 DIAGNOSIS — M62.830 BACK MUSCLE SPASM: ICD-10-CM

## 2018-06-04 RX ORDER — CYCLOBENZAPRINE HCL 5 MG
TABLET ORAL
Qty: 30 TABLET | Refills: 0 | Status: SHIPPED | OUTPATIENT
Start: 2018-06-04 | End: 2020-01-07

## 2018-06-04 NOTE — TELEPHONE ENCOUNTER
Medication request received:     cyclobenzaprine (FLEXERIL) 5 MG tablet     0 ordered  ReorderDiscontinue     Summary: Take 1-2 tabs at night as needed for lumbar spasms affecting sleep, Disp-30 tablet, R-0, E-Prescribe   Start: 5/25/2018  Ord/Sold: 5/25/2018 (O)  Report  Taking:   Long-term:   Pharmacy: [facility]/pharmacy #[id number] - [place], [place] - [address]  Med Dose History  Change       Patient Sig: Take 1-2 tabs at night as needed for lumbar spasms affecting sleep       Ordered on: 5/25/2018       Authorized by: SRINIVAS SIMS       Dispense: 30 tablet       Admin Instructions: Take 1-2 tabs at night as needed for lumbar spasms affecting sleep          No future ov scheduled. Last OV 5/25/18.     Selected pharmacy in [facility]     Please advise   Gloria Huber ATC

## 2018-06-07 RX ORDER — VENLAFAXINE HYDROCHLORIDE 75 MG/1
CAPSULE, EXTENDED RELEASE ORAL
Qty: 30 CAPSULE | Refills: 0 | Status: SHIPPED | OUTPATIENT
Start: 2018-06-07 | End: 2018-06-14

## 2018-06-14 ENCOUNTER — OFFICE VISIT (OUTPATIENT)
Dept: FAMILY MEDICINE | Facility: CLINIC | Age: 43
End: 2018-06-14
Payer: MEDICARE

## 2018-06-14 VITALS
TEMPERATURE: 98.1 F | DIASTOLIC BLOOD PRESSURE: 52 MMHG | SYSTOLIC BLOOD PRESSURE: 100 MMHG | HEART RATE: 73 BPM | WEIGHT: 222 LBS | RESPIRATION RATE: 18 BRPM | BODY MASS INDEX: 31.85 KG/M2

## 2018-06-14 DIAGNOSIS — J45.20 INTERMITTENT ASTHMA, UNCOMPLICATED: ICD-10-CM

## 2018-06-14 DIAGNOSIS — S06.9X9D TRAUMATIC BRAIN INJURY WITH LOSS OF CONSCIOUSNESS, SUBSEQUENT ENCOUNTER: ICD-10-CM

## 2018-06-14 DIAGNOSIS — F41.9 ANXIETY: ICD-10-CM

## 2018-06-14 DIAGNOSIS — J01.10 ACUTE NON-RECURRENT FRONTAL SINUSITIS: Primary | ICD-10-CM

## 2018-06-14 DIAGNOSIS — G40.909 SEIZURE DISORDER (H): ICD-10-CM

## 2018-06-14 DIAGNOSIS — F33.1 MAJOR DEPRESSIVE DISORDER, RECURRENT EPISODE, MODERATE (H): ICD-10-CM

## 2018-06-14 PROCEDURE — 99214 OFFICE O/P EST MOD 30 MIN: CPT | Performed by: PHYSICIAN ASSISTANT

## 2018-06-14 RX ORDER — VENLAFAXINE HYDROCHLORIDE 75 MG/1
CAPSULE, EXTENDED RELEASE ORAL
Qty: 90 CAPSULE | Refills: 1 | Status: SHIPPED | OUTPATIENT
Start: 2018-06-14 | End: 2019-01-16

## 2018-06-14 RX ORDER — ALBUTEROL SULFATE 90 UG/1
2 AEROSOL, METERED RESPIRATORY (INHALATION) EVERY 6 HOURS PRN
Qty: 2 INHALER | Refills: 3 | Status: SHIPPED | OUTPATIENT
Start: 2018-06-14 | End: 2020-01-08

## 2018-06-14 RX ORDER — FLUTICASONE PROPIONATE 50 MCG
1-2 SPRAY, SUSPENSION (ML) NASAL DAILY
Qty: 16 G | Refills: 3 | Status: SHIPPED | OUTPATIENT
Start: 2018-06-14 | End: 2020-01-07

## 2018-06-14 NOTE — MR AVS SNAPSHOT
After Visit Summary   6/14/2018    Andrea Uribe    MRN: [id number]           Patient Information     Date Of Birth          1975        Visit Information        Provider Department      6/14/2018 8:45 AM Alberto Medley PA-C [facility]        Today's Diagnoses     Acute non-recurrent frontal sinusitis    -  1    Intermittent asthma, uncomplicated        Anxiety        Major depressive disorder, recurrent episode, moderate (H)          Care Instructions      Sinusitis (No Antibiotics)    The sinuses are air-filled spaces within the bones of the face. They connect to the inside of the nose. Sinusitis is an inflammation of the tissue that lines the sinuses. Sinusitis can occur during a cold. It can also happen due to allergies to pollens and other particles in the air. It can cause symptoms such as sinus congestion, headache, sore throat, facial swelling, and a feeling of fullness. It may also cause a low-grade fever. Your sinusitis does not include an infection with bacteria. Because of this, antibiotics are not used to treat this problem.  Home care    Drink plenty of water, hot tea, and other liquids. This may help thin nasal mucus. It also may help your sinuses drain fluids.    Heat may help soothe painful areas of your face. Use a towel soaked in hot water. Or,  the shower and direct the warm spray onto your face. Using a vaporizer along with a menthol rub at night may also help soothe symptoms.     An expectorant with guaifenesin may help thin nasal mucus and help your sinuses drain fluids.    You can use an over-the-counter decongestant, unless a similar medicine was prescribed to you. Nasal sprays work the fastest. Use one that contains phenylephrine or oxymetazoline. First blow your nose gently. Then use the spray. Do not use these medicines more often than directed on the label. If you do, your symptoms may get worse. You may also take pills that contain  pseudoephedrine. Don t use products that combine multiple medicines. This is because side effects may be increased. Read all medicine labels. You can also ask the pharmacist for help. (People with high blood pressure should not use decongestants. They can raise blood pressure.)    Over-the-counter antihistamines may help if allergies contributed to your sinusitis.      Use acetaminophen or ibuprofen to control pain, unless another pain medicine was prescribed to you. If you have chronic liver or kidney disease or ever had a stomach ulcer, talk with your healthcare provider before using these medicines. (Aspirin should never be taken by anyone under age 18 who is ill with a fever. It may cause severe liver damage.)    Use nasal rinses or irrigation as instructed by your healthcare provider.    Don't smoke. This can make symptoms worse.  Follow-up care  Follow up with your healthcare provider or our staff if you are better in 1 week.  When to seek medical advice  Call your healthcare provider if any of these occur:    Green or yellow fluid draining from your nose or into your throat    Facial pain or headache that gets worse    Stiff neck    Unusual drowsiness or confusion    Swelling of your forehead or eyelids    Vision problems, such as blurred or double vision    Fever of 100.4 F (38 C) or higher, or as directed by your healthcare provider    Seizure    Breathing problems    Symptoms that don't go away in 10 days  Date Last Reviewed: 11/1/2017 2000-2017 [employer]. [address] [place] [address]. All rights reserved. This information is not intended as a substitute for professional medical care. Always follow your healthcare professional's instructions.                Follow-ups after your visit        Follow-up notes from your care team     Return in about 3 months (around 9/14/2018) for Physical Exam, Lab Work, Routine Visit.      Who to contact     If you have questions or need  follow up information about today's clinic visit or your schedule please contact [facility] directly at [phone].  Normal or non-critical lab and imaging results will be communicated to you by MyChart, letter or phone within 4 business days after the clinic has received the results. If you do not hear from us within 7 days, please contact the clinic through MyChart or phone. If you have a critical or abnormal lab result, we will notify you by phone as soon as possible.  Submit refill requests through [employer] or call your pharmacy and they will forward the refill request to us. Please allow 3 business days for your refill to be completed.          Additional Information About Your Visit        Care EveryWhere ID     This is your Care EveryWhere ID. This could be used by other organizations to access your [facility] medical records  [id number]        Your Vitals Were     Pulse Temperature Respirations BMI (Body Mass Index)          73 98.1  F (36.7  C) (Oral) 18 31.85 kg/m2         Blood Pressure from Last 3 Encounters:   06/14/18 100/52   05/25/18 122/80   05/23/18 110/62    Weight from Last 3 Encounters:   06/14/18 222 lb (100.7 kg)   05/25/18 226 lb (102.5 kg)   05/23/18 226 lb (102.5 kg)              Today, you had the following     No orders found for display         Today's Medication Changes          These changes are accurate as of 6/14/18  9:01 AM.  If you have any questions, ask your nurse or doctor.               Start taking these medicines.        Dose/Directions    fluticasone 50 MCG/ACT spray   Commonly known as:  FLONASE   Used for:  Acute non-recurrent frontal sinusitis   Started by:  Alberto Medley PA-C        Dose:  1-2 spray   Spray 1-2 sprays into both nostrils daily   Quantity:  16 g   Refills:  3       loratadine-pseudoePHEDrine  MG per 24 hr tablet   Commonly known as:  CLARITIN-D 24-hour   Used for:  Acute non-recurrent frontal sinusitis   Started by:   Alberto Medley PA-C        Dose:  1 tablet   Take 1 tablet by mouth daily   Quantity:  14 tablet   Refills:  0            Where to get your medicines      These medications were sent to [facility]/pharmacy #[id number] - [place], [place] - [address] [address]  [address] [address] [address]    Hours:  [facility] Phone:  [phone]     albuterol 108 (90 Base) MCG/ACT Inhaler    fluticasone 50 MCG/ACT spray    venlafaxine 75 MG 24 hr capsule         Some of these will need a paper prescription and others can be bought over the counter.  Ask your nurse if you have questions.     Bring a paper prescription for each of these medications     loratadine-pseudoePHEDrine  MG per 24 hr tablet                Primary Care Provider Office Phone # Fax #    Alberto Medley PA-C [phone] [address] [facility] [address]        Equal Access to Services     NILSON SEGUNDO AH: Hadii maritza lopez hadasho Soomaali, waaxda luqadaha, qaybta kaalmada adeegyada, waxay alex haysury morfin . So [facility] [phone].    ATENCIÓN: Si vikas salvador, tiene a ledesma disposición servicios gratuitos de asistencia lingüística. Llame al [phone].    We comply with applicable federal civil rights laws and [place] laws. We do not discriminate on the basis of race, color, national origin, age, disability, sex, sexual orientation, or gender identity.            Thank you!     Thank you for choosing [facility]  for your care. Our goal is always to provide you with excellent care. Hearing back from our patients is one way we can continue to improve our services. Please take a few minutes to complete the written survey that you may receive in the mail after your visit with us. Thank you!             Your Updated Medication List - Protect others around you: Learn how to safely use, store and throw away your medicines at www.disposemymeds.org.          This list is accurate as  of 6/14/18  9:01 AM.  Always use your most recent med list.                   Brand Name Dispense Instructions for use Diagnosis    albuterol 108 (90 Base) MCG/ACT Inhaler    PROAIR HFA/PROVENTIL HFA/VENTOLIN HFA    2 Inhaler    Inhale 2 puffs into the lungs every 6 hours as needed for shortness of breath / dyspnea    Intermittent asthma, uncomplicated       cyclobenzaprine 5 MG tablet    FLEXERIL    30 tablet    Take 1-2 tabs at night as needed for lumbar spasms affecting sleep. Appointment required for future refills    Acute midline low back pain without sciatica, Back muscle spasm       DILANTIN 100 MG CR capsule   Generic drug:  phenytoin      Take 300 mg by mouth 2 times daily 300mg in the am and 200mg at pm        fluticasone 50 MCG/ACT spray    FLONASE    16 g    Spray 1-2 sprays into both nostrils daily    Acute non-recurrent frontal sinusitis       KEPPRA 1000 MG Tabs   Generic drug:  levETIRAcetam      Take 2,000 mg by mouth 2 times daily At 0900 and 2100        lisinopril 5 MG tablet    PRINIVIL/ZESTRIL    90 tablet    Take 1 tablet (5 mg) by mouth daily    CKD (chronic kidney disease) stage 3, GFR 30-59 ml/min       loratadine-pseudoePHEDrine  MG per 24 hr tablet    CLARITIN-D 24-hour    14 tablet    Take 1 tablet by mouth daily    Acute non-recurrent frontal sinusitis       oxyCODONE-acetaminophen 5-325 MG per tablet    PERCOCET    12 tablet    Take 1 tablet by mouth every 6 hours as needed for pain    Acute bilateral low back pain with right-sided sciatica       simvastatin 20 MG tablet    ZOCOR    90 tablet    Take 1 tablet (20 mg) by mouth At Bedtime    Hyperlipidemia LDL goal <100       venlafaxine 75 MG 24 hr capsule    EFFEXOR-XR    90 capsule    TAKE ONE CAPSULE BY MOUTH ONCE DAILY    Anxiety, Major depressive disorder, recurrent episode, moderate (H)       vitamin D 35095 UNIT capsule    ERGOCALCIFEROL     TAKE 1 CAPSULE BY MOUTH ONCE WEEKLY

## 2018-06-14 NOTE — PATIENT INSTRUCTIONS
Sinusitis (No Antibiotics)    The sinuses are air-filled spaces within the bones of the face. They connect to the inside of the nose. Sinusitis is an inflammation of the tissue that lines the sinuses. Sinusitis can occur during a cold. It can also happen due to allergies to pollens and other particles in the air. It can cause symptoms such as sinus congestion, headache, sore throat, facial swelling, and a feeling of fullness. It may also cause a low-grade fever. Your sinusitis does not include an infection with bacteria. Because of this, antibiotics are not used to treat this problem.  Home care    Drink plenty of water, hot tea, and other liquids. This may help thin nasal mucus. It also may help your sinuses drain fluids.    Heat may help soothe painful areas of your face. Use a towel soaked in hot water. Or,  the shower and direct the warm spray onto your face. Using a vaporizer along with a menthol rub at night may also help soothe symptoms.     An expectorant with guaifenesin may help thin nasal mucus and help your sinuses drain fluids.    You can use an over-the-counter decongestant, unless a similar medicine was prescribed to you. Nasal sprays work the fastest. Use one that contains phenylephrine or oxymetazoline. First blow your nose gently. Then use the spray. Do not use these medicines more often than directed on the label. If you do, your symptoms may get worse. You may also take pills that contain pseudoephedrine. Don t use products that combine multiple medicines. This is because side effects may be increased. Read all medicine labels. You can also ask the pharmacist for help. (People with high blood pressure should not use decongestants. They can raise blood pressure.)    Over-the-counter antihistamines may help if allergies contributed to your sinusitis.      Use acetaminophen or ibuprofen to control pain, unless another pain medicine was prescribed to you. If you have chronic liver or kidney  disease or ever had a stomach ulcer, talk with your healthcare provider before using these medicines. (Aspirin should never be taken by anyone under age 18 who is ill with a fever. It may cause severe liver damage.)    Use nasal rinses or irrigation as instructed by your healthcare provider.    Don't smoke. This can make symptoms worse.  Follow-up care  Follow up with your healthcare provider or our staff if you are better in 1 week.  When to seek medical advice  Call your healthcare provider if any of these occur:    Green or yellow fluid draining from your nose or into your throat    Facial pain or headache that gets worse    Stiff neck    Unusual drowsiness or confusion    Swelling of your forehead or eyelids    Vision problems, such as blurred or double vision    Fever of 100.4 F (38 C) or higher, or as directed by your healthcare provider    Seizure    Breathing problems    Symptoms that don't go away in 10 days  Date Last Reviewed: 11/1/2017 2000-2017 [employer]. [address] [place] [address], [place] [address]. All rights reserved. This information is not intended as a substitute for professional medical care. Always follow your healthcare professional's instructions.

## 2018-06-14 NOTE — LETTER
[facility]  [id number] [place] [id number]  Phone: [phone]    June 14, 2018        Andrea Uribe  [address] [facility] [id number]          To whom it may concern:    RE: Andrea Uribe    Patient was seen and treated today at our clinic and missed work today and tomorrow due to illness.     Please contact me for questions or concerns.      Sincerely,        Alberto Medley PA-C

## 2018-06-14 NOTE — PROGRESS NOTES
SUBJECTIVE:   Andrea Uribe is a 42 year old male who presents to clinic today for the following health issues:      Headache  Onset: x 2 days    Description:   Location: bilateral in the temporal area   Character: sharp pain  Frequency:  daily  Duration:  constant    Intensity: 10/10    Progression of Symptoms:  same    Accompanying Signs & Symptoms:  Stiff neck: YES  Neck or upper back pain: YES  Fever: no   Sinus pressure: YES  Nausea or vomiting: no   Dizziness: no   Numbness: no   Weakness: YES  Visual changes: no     History:   Head trauma: YES-age 15  Family history of migraines: unknown  Previous tests for headaches: YES  Neurologist evaluations: YES  Able to do daily activities: YES  Wake with a headaches: YES  Do headaches wake you up: YES  Daily pain medication use: none  Work/school stressors/changes: no     Precipitating factors:   Does light make it worse: no   Does sound make it worse: YES     Alleviating factors:  Does sleep help: no-unable to sleep    Therapies Tried and outcome: tylenol    Of note, history of TBI as well as seizure disorder. Followed by neuro. Stable. No recent seizures.     Acute Illness   Acute illness concerns:   Onset: x 2 days    Fever: no     Chills/Sweats: YES- chills    Headache (location?): YES    Sinus Pressure:YES    Conjunctivitis:  no    Ear Pain: no    Rhinorrhea: YES    Congestion: YES    Sore Throat: YES     Cough: YES-productive of green sputum    Wheeze: no     Decreased Appetite: YES    Nausea: no     Vomiting: no     Diarrhea:  no     Dysuria/Freq.: no     Fatigue/Achiness: YES    Sick/Strep Exposure: YES- son and wife with similar sx     Therapies Tried and outcome: tylenol sinus and cold-no relief    Depression and Anxiety Followup    Status since last visit: Stable     See PHQ-9 for current symptoms.  Other associated symptoms: None    Complicating factors:   Significant life event:  No   Current substance abuse:  None  Anxiety or Panic symptoms:   No    PHQ-9 1/31/2017 9/8/2017 5/23/2018   Total Score 5 3 4   Q9: Suicide Ideation Not at all Not at all Not at all     PHQ-9  English  PHQ-9   Any Language  Suicide Assessment Five-step Evaluation and Treatment (SAFE-T)      Problems taking medications regularly: No    Medication side effects: none    Diet: regular (no restrictions)         Asthma Follow-Up    Was ACT completed today?  No      Respiratory symptoms:   Cough: Yes-  2 days as above    Wheezing: No   Shortness of breath: No    Use of short- acting(rescue) inhaler: yes. Currently 3 times daily. Rarely at baseline.     Taking controlled (daily) meds as prescribed: NA    ER/UC visits or hospital admissions since last visit: none     Recent asthma triggers that patient is dealing with: upper respiratory infections          Problem list and histories reviewed & adjusted, as indicated.  Additional history: as documented    Patient Active Problem List   Diagnosis     Seizure disorder (H)     TBI (traumatic brain injury) (H)     Moderate major depression (H)     Obesity     Anxiety     Sleep apnea     GERD (gastroesophageal reflux disease)     Intermittent asthma     CKD (chronic kidney disease) stage 2, GFR 60-89 ml/min     MRSA cellulitis     Anemia     Femur fracture, right (H)     Physical deconditioning     S/P total knee replacement     Health Care Home     Hyperlipidemia LDL goal <130     Incisional hernia, without obstruction or gangrene     Ataxic gait     S/P total knee arthroplasty     Acute post-operative pain     Short heel cord, right     Past Surgical History:   Procedure Laterality Date     APPENDECTOMY OPEN  8/11/2012    Procedure: APPENDECTOMY OPEN;  Exploratory Laparotomy, Appendectomy, Remove part IVC filter from duodenum with repair;  Surgeon: Michael Jimenez MD;  Location: [facility] OR     ARTHROPLASTY KNEE Right 8/27/2014    Procedure: ARTHROPLASTY KNEE;  Surgeon: David Mckay MD;  Location: [facility] OR     ARTHROPLASTY REVISION KNEE  Right 12/13/2016    Procedure: ARTHROPLASTY REVISION KNEE;  Surgeon: David Mckay MD;  Location: [facility] OR     ENT SURGERY      tracheostomy     ESOPHAGOSCOPY, GASTROSCOPY, DUODENOSCOPY (EGD), COMBINED  8/11/2012    Procedure: COMBINED ESOPHAGOSCOPY, GASTROSCOPY, DUODENOSCOPY (EGD);   ESOPHAGOSCOPY, GASTROSCOPY, DUODENOSCOPY (EGD);  Surgeon: Holland Lawson MD;  Location: [facility] GI     HERNIORRHAPHY INCISIONAL (LOCATION) N/A 5/26/2016    Procedure: HERNIORRHAPHY INCISIONAL (LOCATION);  Surgeon: John Mcfarlane MD;  Location:  OR     IMPLANT STIMULATOR VAGUS NERVE  1/16/2012    Procedure:IMPLANT STIMULATOR VAGUS NERVE; VAGUS NERVE STIMULATOR IMPLANT; Surgeon:LEILANI CORTÉS; Location: [facility]     LAPAROSCOPIC CHOLECYSTECTOMY N/A 5/4/2017    Procedure: LAPAROSCOPIC CHOLECYSTECTOMY;  LAPAROSCOPIC CHOLECYSTECTOMY ;  Surgeon: John Mcfarlane MD;  Location:  OR     LAPAROTOMY EXPLORATORY  8/11/2012    Procedure: LAPAROTOMY EXPLORATORY;;  Surgeon: Michael Jimenez MD;  Location:  OR     OPEN REDUCTION INTERNAL FIXATION FEMUR DISTAL  1/22/2014    Procedure: OPEN REDUCTION INTERNAL FIXATION FEMUR DISTAL;  right distal femur Open reduction internal fixation        ORTHOPEDIC SURGERY      removal of femur bone growth,hand surgery, knee surgery     ORTHOPEDIC SURGERY Right 10/30/2017    enlonging muscle     REMOVE HARDWARE KNEE Right 8/27/2014    Procedure: REMOVE HARDWARE KNEE;  Surgeon: David Mckay MD;  Location:  OR     REMOVE HARDWARE LOWER EXTREMITY Right 10/14/2016    Procedure: REMOVE HARDWARE LOWER EXTREMITY;  Surgeon: David Mckay MD;  Location: [facility] OR     spleen surgery      repaired     SURGICAL HISTORY OF -  Left 2009    selam in left femur     SURGICAL HISTORY OF -       screws in right knee     SURGICAL HISTORY OF -       .IVC filter, parts removed       Social History   Substance Use Topics     Smoking status: Never Smoker     Smokeless tobacco: Never Used     Alcohol  use No     Family History   Problem Relation Age of Onset     Cardiovascular Mother      CEREBROVASCULAR DISEASE Mother      GASTROINTESTINAL DISEASE Father      Colitis     DIABETES Sister      Other Cancer Other          Current Outpatient Prescriptions   Medication Sig Dispense Refill     albuterol (PROAIR HFA/PROVENTIL HFA/VENTOLIN HFA) 108 (90 Base) MCG/ACT Inhaler Inhale 2 puffs into the lungs every 6 hours as needed for shortness of breath / dyspnea 2 Inhaler 3     cyclobenzaprine (FLEXERIL) 5 MG tablet Take 1-2 tabs at night as needed for lumbar spasms affecting sleep. Appointment required for future refills 30 tablet 0     fluticasone (FLONASE) 50 MCG/ACT spray Spray 1-2 sprays into both nostrils daily 16 g 3     KEPPRA 1000 MG TABS Take 2,000 mg by mouth 2 times daily At 0900 and 2100       lisinopril (PRINIVIL/ZESTRIL) 5 MG tablet Take 1 tablet (5 mg) by mouth daily 90 tablet 3     loratadine-pseudoePHEDrine (CLARITIN-D 24-HOUR)  MG per 24 hr tablet Take 1 tablet by mouth daily 14 tablet 0     oxyCODONE-acetaminophen (PERCOCET) 5-325 MG per tablet Take 1 tablet by mouth every 6 hours as needed for pain 12 tablet 0     phenytoin (DILANTIN) 100 MG CR capsule Take 300 mg by mouth 2 times daily 300mg in the am and 200mg at pm       simvastatin (ZOCOR) 20 MG tablet Take 1 tablet (20 mg) by mouth At Bedtime 90 tablet 3     venlafaxine (EFFEXOR-XR) 75 MG 24 hr capsule TAKE ONE CAPSULE BY MOUTH ONCE DAILY 90 capsule 1     vitamin D (ERGOCALCIFEROL) 56256 UNIT capsule TAKE 1 CAPSULE BY MOUTH ONCE WEEKLY  2     [DISCONTINUED] albuterol (PROAIR HFA/PROVENTIL HFA/VENTOLIN HFA) 108 (90 BASE) MCG/ACT Inhaler Inhale 2 puffs into the lungs every 6 hours as needed for shortness of breath / dyspnea 2 Inhaler 3     [DISCONTINUED] venlafaxine (EFFEXOR-XR) 75 MG 24 hr capsule TAKE ONE CAPSULE BY MOUTH ONCE DAILY 30 capsule 0     Allergies   Allergen Reactions     Iodine      Kidney disease per patient (high doses)      Asa [Aspirin]      Ibuprofen Sodium Other (See Comments)     Kidney problems (with high doses)     Seasonal Allergies      Recent Labs   Lab Test  10/19/17   1038  04/27/17   1835  04/20/17   2353  04/18/17   1645   05/05/15   1245   08/04/14   1100   01/24/14   0120   08/26/13   1111   08/12/12   0435   A1C   --    --    --    --    --    --    --    --    --   5.9   --    --    --   5.4   LDL  127*   --    --    --    --   130*   --    --    --    --    --   145*   --    --    HDL  63   --    --    --    --   57   --    --    --    --    --   54   --    --    TRIG  113   --    --    --    --   137   --    --    --    --    --   213*   --    --    ALT   --   15  17  18   < >   --    --    --    --    --    < >   --    < >   --    CR  0.99  0.98  1.00  0.88   < >  1.06   < >  1.05   < >   --    < >  1.20   < >  1.32*   GFRESTIMATED  83  84  82  >90  Non  GFR Calc     < >  77   < >  78   < >   --    < >  68   < >  61   GFRESTBLACK  >90  >90  African American GFR Calc    >90   GFR Calc    >90   GFR Calc     < >  >90   GFR Calc     < >  >90   GFR Calc     < >   --    < >  82   < >  74   POTASSIUM  4.2  4.2  3.8  4.0   < >  4.4   < >  4.3   < >   --    < >  4.9   < >  5.1   TSH   --    --    --    --    --    --    --   2.12   --    --    --    --    --    --     < > = values in this interval not displayed.      BP Readings from Last 3 Encounters:   06/14/18 100/52   05/25/18 122/80   05/23/18 110/62    Wt Readings from Last 3 Encounters:   06/14/18 222 lb (100.7 kg)   05/25/18 226 lb (102.5 kg)   05/23/18 226 lb (102.5 kg)                    Reviewed and updated as needed this visit by clinical staff  Tobacco  Allergies  Meds  Problems  Med Hx  Surg Hx  Fam Hx  Soc Hx        Reviewed and updated as needed this visit by Provider  Allergies  Meds  Problems         ROS:  Constitutional, HEENT, neuro, psych, cardiovascular, pulmonary,  gi and gu systems are negative, except as otherwise noted.    OBJECTIVE:     /52 (BP Location: Right arm, Patient Position: Chair, Cuff Size: Adult Large)  Pulse 73  Temp 98.1  F (36.7  C) (Oral)  Resp 18  Wt 222 lb (100.7 kg)  BMI 31.85 kg/m2  Body mass index is 31.85 kg/(m^2).  GENERAL: alert and no distress  EYES: Eyes grossly normal to inspection, PERRL and conjunctivae and sclerae normal  HENT: normal cephalic/atraumatic, both ears: clear effusion, nasal mucosa edematous , oropharynx clear, oral mucous membranes moist and sinuses: maxillary, frontal tenderness on bilaterally  NECK: no adenopathy, no asymmetry, masses, or scars and thyroid normal to palpation  RESP: lungs clear to auscultation - no rales, rhonchi or wheezes  CV: regular rates and rhythm, normal S1 S2, no S3 or S4 and no murmur, click or rub  NEURO: Normal strength and tone, sensory exam grossly normal, mentation intact, speech normal and cranial nerves 2-12 intact  PSYCH: mentation appears normal, affect normal/bright    Diagnostic Test Results:  none     ASSESSMENT/PLAN:     (J01.10) Acute non-recurrent frontal sinusitis  (primary encounter diagnosis)  Comment: evident on history and exam. Exam benign for bacterial etiology or meningitis. Recommending claritin-d with flonase and fluids with rest. If no improvement in 5-7 days, patient should RTC. Sooner if worsening   Plan: loratadine-pseudoePHEDrine (CLARITIN-D 24-HOUR)         MG per 24 hr tablet, fluticasone         (FLONASE) 50 MCG/ACT spray        -Medication use and side effects discussed with the patient. Patient is in complete understanding and agreement with plan.       (G40.909) Seizure disorder (H)  Comment: history of this. Stable on current regimen. Followed by neuro. Of note, caution advised with stimulants as claritin-d given increased CNS potential. However, given severity of symptoms above, felt warranted.   Plan:     (S06.9X9D) Traumatic brain injury with loss  of consciousness, subsequent encounter  Comment: as above   Plan:     (F33.1) Major depressive disorder, recurrent episode, moderate (H)  Comment: stable. 4 month follow up at routine physical.   Plan: venlafaxine (EFFEXOR-XR) 75 MG 24 hr capsule        -Medication use and side effects discussed with the patient. Patient is in complete understanding and agreement with plan.       (F41.9) Anxiety  Comment: as above   Plan: venlafaxine (EFFEXOR-XR) 75 MG 24 hr capsule            (J45.20) Intermittent asthma, uncomplicated  Comment: stable. Maintain 6 month follow ups.   Plan: albuterol (PROAIR HFA/PROVENTIL HFA/VENTOLIN         HFA) 108 (90 Base) MCG/ACT Inhaler        -Medication use and side effects discussed with the patient. Patient is in complete understanding and agreement with plan.         Follow up: as above     Alberto Medley PA-C  [facility]

## 2018-10-16 ENCOUNTER — OFFICE VISIT (OUTPATIENT)
Dept: FAMILY MEDICINE | Facility: CLINIC | Age: 43
End: 2018-10-16
Payer: MEDICARE

## 2018-10-16 VITALS
HEART RATE: 89 BPM | DIASTOLIC BLOOD PRESSURE: 72 MMHG | RESPIRATION RATE: 20 BRPM | SYSTOLIC BLOOD PRESSURE: 114 MMHG | TEMPERATURE: 98.1 F

## 2018-10-16 DIAGNOSIS — E78.5 HYPERLIPIDEMIA LDL GOAL <130: ICD-10-CM

## 2018-10-16 DIAGNOSIS — N18.2 CKD (CHRONIC KIDNEY DISEASE) STAGE 2, GFR 60-89 ML/MIN: ICD-10-CM

## 2018-10-16 DIAGNOSIS — G40.909 SEIZURE DISORDER (H): ICD-10-CM

## 2018-10-16 DIAGNOSIS — E53.8 VITAMIN B12 DEFICIENCY (NON ANEMIC): ICD-10-CM

## 2018-10-16 DIAGNOSIS — R26.0 ATAXIC GAIT: ICD-10-CM

## 2018-10-16 DIAGNOSIS — S06.9X9D TRAUMATIC BRAIN INJURY WITH LOSS OF CONSCIOUSNESS, SUBSEQUENT ENCOUNTER: ICD-10-CM

## 2018-10-16 DIAGNOSIS — L02.531: Primary | ICD-10-CM

## 2018-10-16 DIAGNOSIS — Z23 NEED FOR PNEUMOCOCCAL VACCINE: ICD-10-CM

## 2018-10-16 LAB
ERYTHROCYTE [DISTWIDTH] IN BLOOD BY AUTOMATED COUNT: 14.2 % (ref 10–15)
HCT VFR BLD AUTO: 42.2 % (ref 40–53)
HGB BLD-MCNC: 13.6 G/DL (ref 13.3–17.7)
MCH RBC QN AUTO: 30.3 PG (ref 26.5–33)
MCHC RBC AUTO-ENTMCNC: 32.2 G/DL (ref 31.5–36.5)
MCV RBC AUTO: 94 FL (ref 78–100)
PLATELET # BLD AUTO: 209 10E9/L (ref 150–450)
RBC # BLD AUTO: 4.49 10E12/L (ref 4.4–5.9)
WBC # BLD AUTO: 7.1 10E9/L (ref 4–11)

## 2018-10-16 PROCEDURE — 80061 LIPID PANEL: CPT | Performed by: PHYSICIAN ASSISTANT

## 2018-10-16 PROCEDURE — 80177 DRUG SCRN QUAN LEVETIRACETAM: CPT | Mod: 90 | Performed by: PHYSICIAN ASSISTANT

## 2018-10-16 PROCEDURE — 80053 COMPREHEN METABOLIC PANEL: CPT | Performed by: PHYSICIAN ASSISTANT

## 2018-10-16 PROCEDURE — 80185 ASSAY OF PHENYTOIN TOTAL: CPT | Performed by: PHYSICIAN ASSISTANT

## 2018-10-16 PROCEDURE — 96372 THER/PROPH/DIAG INJ SC/IM: CPT | Performed by: PHYSICIAN ASSISTANT

## 2018-10-16 PROCEDURE — 85027 COMPLETE CBC AUTOMATED: CPT | Performed by: PHYSICIAN ASSISTANT

## 2018-10-16 PROCEDURE — 99000 SPECIMEN HANDLING OFFICE-LAB: CPT | Performed by: PHYSICIAN ASSISTANT

## 2018-10-16 PROCEDURE — G0009 ADMIN PNEUMOCOCCAL VACCINE: HCPCS | Performed by: PHYSICIAN ASSISTANT

## 2018-10-16 PROCEDURE — 99214 OFFICE O/P EST MOD 30 MIN: CPT | Mod: 25 | Performed by: PHYSICIAN ASSISTANT

## 2018-10-16 PROCEDURE — 36415 COLL VENOUS BLD VENIPUNCTURE: CPT | Performed by: PHYSICIAN ASSISTANT

## 2018-10-16 PROCEDURE — 82607 VITAMIN B-12: CPT | Performed by: PHYSICIAN ASSISTANT

## 2018-10-16 PROCEDURE — 90732 PPSV23 VACC 2 YRS+ SUBQ/IM: CPT | Performed by: PHYSICIAN ASSISTANT

## 2018-10-16 RX ORDER — CYANOCOBALAMIN 1000 UG/ML
1 INJECTION, SOLUTION INTRAMUSCULAR; SUBCUTANEOUS
Qty: 1 ML | Refills: 11 | OUTPATIENT
Start: 2018-10-16 | End: 2021-10-12

## 2018-10-16 RX ORDER — CEFUROXIME AXETIL 500 MG/1
500 TABLET ORAL 2 TIMES DAILY
Qty: 14 TABLET | Refills: 0 | Status: SHIPPED | OUTPATIENT
Start: 2018-10-16 | End: 2018-10-23

## 2018-10-16 RX ORDER — CYANOCOBALAMIN 1000 UG/ML
1 INJECTION, SOLUTION INTRAMUSCULAR; SUBCUTANEOUS
Qty: 1 ML | Refills: 11 | OUTPATIENT
Start: 2018-10-16 | End: 2018-10-16

## 2018-10-16 NOTE — PROGRESS NOTES
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 Requesting prescription for new wheelchair. Current one is at least 3 years only and \"falling apart\".     Also past history of vitamin b12 deficiency. Has not had injection for this in ~4 years. Requesting this today.     Problem list and histories reviewed & adjusted, as indicated.  Additional history: as documented    Patient Active Problem List   Diagnosis     Seizure disorder (H)     TBI (traumatic brain injury) (H)     Moderate major depression (H)     Obesity     Anxiety     Sleep apnea     GERD (gastroesophageal reflux disease)     Intermittent asthma     CKD (chronic kidney disease) stage 2, GFR 60-89 ml/min     MRSA cellulitis     Anemia     Femur fracture, right (H)     Physical deconditioning     S/P total knee replacement     Health Care Home     Hyperlipidemia LDL goal <130     Incisional hernia, without obstruction or gangrene     Ataxic gait     S/P total knee arthroplasty     Acute post-operative pain     Short heel cord, right     Past Surgical History:   Procedure Laterality Date     APPENDECTOMY OPEN  8/11/2012    Procedure: APPENDECTOMY OPEN;  Exploratory Laparotomy, Appendectomy, Remove part IVC filter from duodenum with repair;  Surgeon: Michael Jimenez MD;  Location: [facility] OR     ARTHROPLASTY KNEE Right 8/27/2014    Procedure: ARTHROPLASTY KNEE;  Surgeon: David Mckay MD;  " Location: [facility] OR     ARTHROPLASTY REVISION KNEE Right 12/13/2016    Procedure: ARTHROPLASTY REVISION KNEE;  Surgeon: David Mckay MD;  Location:  OR     ENT SURGERY      tracheostomy     ESOPHAGOSCOPY, GASTROSCOPY, DUODENOSCOPY (EGD), COMBINED  8/11/2012    Procedure: COMBINED ESOPHAGOSCOPY, GASTROSCOPY, DUODENOSCOPY (EGD);   ESOPHAGOSCOPY, GASTROSCOPY, DUODENOSCOPY (EGD);  Surgeon: Holland Lawson MD;  Location: [facility] GI     HERNIORRHAPHY INCISIONAL (LOCATION) N/A 5/26/2016    Procedure: HERNIORRHAPHY INCISIONAL (LOCATION);  Surgeon: John Mcfarlane MD;  Location:  OR     IMPLANT STIMULATOR VAGUS NERVE  1/16/2012    Procedure:IMPLANT STIMULATOR VAGUS NERVE; VAGUS NERVE STIMULATOR IMPLANT; Surgeon:LEILANI CORTÉS; Location: [facility]     LAPAROSCOPIC CHOLECYSTECTOMY N/A 5/4/2017    Procedure: LAPAROSCOPIC CHOLECYSTECTOMY;  LAPAROSCOPIC CHOLECYSTECTOMY ;  Surgeon: John Mcfarlane MD;  Location:  OR     LAPAROTOMY EXPLORATORY  8/11/2012    Procedure: LAPAROTOMY EXPLORATORY;;  Surgeon: Michael Jimenez MD;  Location:  OR     OPEN REDUCTION INTERNAL FIXATION FEMUR DISTAL  1/22/2014    Procedure: OPEN REDUCTION INTERNAL FIXATION FEMUR DISTAL;  right distal femur Open reduction internal fixation        ORTHOPEDIC SURGERY      removal of femur bone growth,hand surgery, knee surgery     ORTHOPEDIC SURGERY Right 10/30/2017    enlonging muscle     REMOVE HARDWARE KNEE Right 8/27/2014    Procedure: REMOVE HARDWARE KNEE;  Surgeon: David Mckay MD;  Location:  OR     REMOVE HARDWARE LOWER EXTREMITY Right 10/14/2016    Procedure: REMOVE HARDWARE LOWER EXTREMITY;  Surgeon: David Mckay MD;  Location: [facility] OR     spleen surgery      repaired     SURGICAL HISTORY OF -  Left 2009    selam in left femur     SURGICAL HISTORY OF -       screws in right knee     SURGICAL HISTORY OF -       .IVC filter, parts removed       Social History   Substance Use Topics     Smoking status: Never Smoker      Smokeless tobacco: Never Used     Alcohol use No     Family History   Problem Relation Age of Onset     Cardiovascular Mother      Cerebrovascular Disease Mother      GASTROINTESTINAL DISEASE Father      Colitis     Diabetes Sister      Other Cancer Other          Current Outpatient Prescriptions   Medication Sig Dispense Refill     albuterol (PROAIR HFA/PROVENTIL HFA/VENTOLIN HFA) 108 (90 Base) MCG/ACT Inhaler Inhale 2 puffs into the lungs every 6 hours as needed for shortness of breath / dyspnea 2 Inhaler 3     cefuroxime (CEFTIN) 500 MG tablet Take 1 tablet (500 mg) by mouth 2 times daily for 7 days 14 tablet 0     cyanocobalamin (VITAMIN B12) 1000 MCG/ML injection Inject 1 mL (1,000 mcg) into the muscle every 30 days 1 mL 11     cyclobenzaprine (FLEXERIL) 5 MG tablet Take 1-2 tabs at night as needed for lumbar spasms affecting sleep. Appointment required for future refills 30 tablet 0     fluticasone (FLONASE) 50 MCG/ACT spray Spray 1-2 sprays into both nostrils daily 16 g 3     KEPPRA 1000 MG TABS Take 2,000 mg by mouth 2 times daily At 0900 and 2100       lisinopril (PRINIVIL/ZESTRIL) 5 MG tablet Take 1 tablet (5 mg) by mouth daily 90 tablet 3     loratadine-pseudoePHEDrine (CLARITIN-D 24-HOUR)  MG per 24 hr tablet Take 1 tablet by mouth daily 14 tablet 0     order for DME Equipment being ordered: Wheelchair 1 Device 0     phenytoin (DILANTIN) 100 MG CR capsule Take 300 mg by mouth 2 times daily 300mg in the am and 200mg at pm       simvastatin (ZOCOR) 20 MG tablet Take 1 tablet (20 mg) by mouth At Bedtime 90 tablet 3     venlafaxine (EFFEXOR-XR) 75 MG 24 hr capsule TAKE ONE CAPSULE BY MOUTH ONCE DAILY 90 capsule 1     vitamin D (ERGOCALCIFEROL) 35890 UNIT capsule TAKE 1 CAPSULE BY MOUTH ONCE WEEKLY  2     Allergies   Allergen Reactions     Iodine      Kidney disease per patient (high doses)     Asa [Aspirin]      Ibuprofen Sodium Other (See Comments)     Kidney problems (with high doses)      Seasonal Allergies      BP Readings from Last 3 Encounters:   10/16/18 114/72   06/14/18 100/52   05/25/18 122/80    Wt Readings from Last 3 Encounters:   06/14/18 222 lb (100.7 kg)   05/25/18 226 lb (102.5 kg)   05/23/18 226 lb (102.5 kg)                    Reviewed and updated as needed this visit by clinical staff  Tobacco  Allergies  Meds  Problems  Med Hx  Surg Hx  Fam Hx  Soc Hx        Reviewed and updated as needed this visit by Provider  Allergies  Meds  Problems         ROS:  Constitutional, HEENT, msk, skin, cardiovascular, pulmonary, gi and gu systems are negative, except as otherwise noted.    OBJECTIVE:     /72 (BP Location: Right arm, Patient Position: Chair, Cuff Size: Adult Large)  Pulse 89  Temp 98.1  F (36.7  C) (Oral)  Resp 20  There is no height or weight on file to calculate BMI.  GENERAL: alert and no distress  RESP: lungs clear to auscultation - no rales, rhonchi or wheezes  CV: regular rates and rhythm, normal S1 S2, no S3 or S4 and no murmur, click or rub  SKIN: right carbuncle with erythema and warmth noted on right 5th finger with mild purulent drainage. Tenderness to palpation present.   PSYCH: mentation appears normal, affect normal/bright    Diagnostic Test Results:  none     ASSESSMENT/PLAN:     (L02.531) Carbuncle, finger, right  (primary encounter diagnosis)  Comment: noted on exam. Will treat with abx and warm compresses. Work note given. If no improvement in 3 days, patient should RTc. Sooner if worsening,  Plan: cefuroxime (CEFTIN) 500 MG tablet        -Medication use and side effects discussed with the patient. Patient is in complete understanding and agreement with plan.       (E53.8) Vitamin B12 deficiency (non anemic)  Comment: will give today and recheck b12.   Plan: Vitamin B12, CBC with platelets, cyanocobalamin        (VITAMIN B12) 1000 MCG/ML injection,         DISCONTINUED: cyanocobalamin (VITAMIN B12) 1000        MCG/ML injection        -Medication  use and side effects discussed with the patient. Patient is in complete understanding and agreement with plan.       (G40.909) Seizure disorder (H)  Comment: history of this. Failed to show up to get levels checked through neuro on Monday. However, states has not yet taken medication yet today. Will recheck today.   Plan: Phenytoin level, Keppra (Levetiracetam) Level            (N18.2) CKD (chronic kidney disease) stage 2, GFR 60-89 ml/min  Comment:   Plan: Comprehensive metabolic panel (BMP + Alb, Alk         Phos, ALT, AST, Total. Bili, TP)            (E78.5) Hyperlipidemia LDL goal <130  Comment: not fasting.   Plan: Lipid panel reflex to direct LDL Non-fasting            (S06.9X9D) Traumatic brain injury with loss of consciousness, subsequent encounter  Comment:   Plan: order for DME            (R26.0) Ataxic gait  Comment:   Plan: order for DME              Follow up: as above. Otherwise, 6 months     Alberto Medley PA-C  [facility]

## 2018-10-16 NOTE — MR AVS SNAPSHOT
After Visit Summary   10/16/2018    Andrea Uribe    MRN: [id number]           Patient Information     Date Of Birth          1975        Visit Information        Provider Department      10/16/2018 1:45 PM Alberto Medley PA-C [facility]        Today's Diagnoses     Vitamin B12 deficiency (non anemic)    -  1    Carbuncle, finger, right        Seizure disorder (H)        CKD (chronic kidney disease) stage 2, GFR 60-89 ml/min        Hyperlipidemia LDL goal <130          Care Instructions      Understanding Carbuncles    A carbuncle is a painful boil under the skin. It happens when a group of hair follicles become infected. Follicles are the tiny holes from which hair grows out of your skin.  How to say it  Donato   What causes carbuncles?  A carbuncle is caused by an infection with the bacteria Staphylococcus aureus. They are common on areas of the body where friction and sweat occur. They usually appear on the back of the neck, back, and thighs. This type of infection can also happen when the skin is injured, such as by a cut or bug bite.  The bacteria that causes carbuncles can spread easily from person to person. People at higher risk for boils are those with diabetes or a weak immune system. Drug users who use needles are also more likely to get them.  Symptoms of carbuncles  A carbuncle starts as a small painful bump. But it can grow quickly. It may become:    Red    Swollen    Tender  Carbuncles may ooze pus. They may also cause fever and a general feeling of illness.  Treatment for carbuncles  A carbuncle may heal on its own in a few weeks. But the pus within it needs to come out first. Treatment options include:    Warm compress. Putting a warm, wet washcloth on the boil will help the pus drain out. You should never try to pop a boil. That can cause the infection to spread.    Surgical drainage. If the boil doesn t drain on its own, your healthcare provider  may need to cut into it.    Antibiotics. In some cases, oral antibiotics may be prescribed to fight the infection, especially if the carbuncle returns. You will likely have to take the medicine for 5 to 7 days. You may need to take it longer for a severe case.    Good hygiene. Proper handwashing can prevent boils from spreading and coming back. Also wash things that have been in contact with the carbuncle in hot water. This includes items such as clothing and towels.  Complications of carbuncles  The main complication of a carbuncle is the spreading of the infection. The bacteria can infect the heart and bone. It can also lead to septic shock, an infection of the entire body.  When to call your healthcare provider  Call your healthcare provider right away if you have any of these:    Fever of 100.4 F (38 C) or higher, or as directed    Redness, swelling, or fluid leaking from a carbuncle that gets worse    Pain that gets worse    Symptoms that don t get better, or get worse    New symptoms   Date Last Reviewed: 5/1/2016 2000-2017 [employer]. [address] [place] [address]. All rights reserved. This information is not intended as a substitute for professional medical care. Always follow your healthcare professional's instructions.                Follow-ups after your visit        Who to contact     If you have questions or need follow up information about today's clinic visit or your schedule please contact [facility] directly at [phone].  Normal or non-critical lab and imaging results will be communicated to you by MyChart, letter or phone within 4 business days after the clinic has received the results. If you do not hear from us within 7 days, please contact the clinic through MyChart or phone. If you have a critical or abnormal lab result, we will notify you by phone as soon as possible.  Submit refill requests through yuback or call your pharmacy and they  "will forward the refill request to us. Please allow 3 business days for your refill to be completed.          Additional Information About Your Visit        [employer] Information     [email or web address] lets you send messages to your doctor, view your test results, renew your prescriptions, schedule appointments and more. To sign up, go to [email or web address] . Click on \"Log in\" on the left side of the screen, which will take you to the Welcome page. Then click on \"Sign up Now\" on the right side of the page.     You will be asked to enter the access code listed below, as well as some personal information. Please follow the directions to create your username and password.     Your access code is: [email or web address]  Expires: 2019  2:09 PM     Your access code will  in 90 days. If you need help or a new code, please call your [facility] clinic or [phone].        Care EveryWhere ID     This is your Care EveryWhere ID. This could be used by other organizations to access your [facility] medical records  [id number]        Your Vitals Were     Pulse Temperature Respirations             89 98.1  F (36.7  C) (Oral) 20          Blood Pressure from Last 3 Encounters:   10/16/18 114/72   18 100/52   18 122/80    Weight from Last 3 Encounters:   18 222 lb (100.7 kg)   18 226 lb (102.5 kg)   18 226 lb (102.5 kg)              We Performed the Following     CBC with platelets     Comprehensive metabolic panel (BMP + Alb, Alk Phos, ALT, AST, Total. Bili, TP)     Keppra (Levetiracetam) Level     Lipid panel reflex to direct LDL Non-fasting     Phenytoin level     Vitamin B12          Today's Medication Changes          These changes are accurate as of 10/16/18  2:09 PM.  If you have any questions, ask your nurse or doctor.               Start taking these medicines.        Dose/Directions    cefuroxime 500 MG tablet   Commonly known as:  CEFTIN   Used for:  Carbuncle, finger, right   Started by:  Jasmyne, " Alberto Menendez PA-C        Dose:  500 mg   Take 1 tablet (500 mg) by mouth 2 times daily for 7 days   Quantity:  14 tablet   Refills:  0       cyanocobalamin 1000 MCG/ML injection   Commonly known as:  VITAMIN B12   Used for:  Vitamin B12 deficiency (non anemic)   Started by:  Alberto Medley PA-C        Dose:  1 mL   Inject 1 mL (1,000 mcg) into the muscle every 30 days   Quantity:  1 mL   Refills:  11            Where to get your medicines      These medications were sent to [facility]/pharmacy #[id number] - [place], [place] - [address] [address]  [address] [facility] [address]    Hours:  [facility] drug converted to [facility] Phone:  [phone]     cefuroxime 500 MG tablet         Some of these will need a paper prescription and others can be bought over the counter.  Ask your nurse if you have questions.     You don't need a prescription for these medications     cyanocobalamin 1000 MCG/ML injection                Primary Care Provider Office Phone # Fax #    Alberto Medley PA-C [phone] [address] [facility] [address]        Equal Access to Services     BOB SEGUNOD AH: Hadii maritza lopez hadyumikoo Somarco, waaxda luqadaha, qaybta kaalmada adeelie, sue morfin . So [facility] [phone].    ATENCIÓN: Si habla español, tiene a ledesma disposición servicios gratuitos de asistencia lingüística. [facility] [phone].    We comply with applicable federal civil rights laws and [place] laws. We do not discriminate on the basis of race, color, national origin, age, disability, sex, sexual orientation, or gender identity.            Thank you!     Thank you for choosing [facility]  for your care. Our goal is always to provide you with excellent care. Hearing back from our patients is one way we can continue to improve our services. Please take a few minutes to complete the written survey that you may receive in the mail after your visit with us. Thank  you!             Your Updated Medication List - Protect others around you: Learn how to safely use, store and throw away your medicines at www.disposemymeds.org.          This list is accurate as of 10/16/18  2:09 PM.  Always use your most recent med list.                   Brand Name Dispense Instructions for use Diagnosis    albuterol 108 (90 Base) MCG/ACT inhaler    PROAIR HFA/PROVENTIL HFA/VENTOLIN HFA    2 Inhaler    Inhale 2 puffs into the lungs every 6 hours as needed for shortness of breath / dyspnea    Intermittent asthma, uncomplicated       cefuroxime 500 MG tablet    CEFTIN    14 tablet    Take 1 tablet (500 mg) by mouth 2 times daily for 7 days    Carbuncle, finger, right       cyanocobalamin 1000 MCG/ML injection    VITAMIN B12    1 mL    Inject 1 mL (1,000 mcg) into the muscle every 30 days    Vitamin B12 deficiency (non anemic)       cyclobenzaprine 5 MG tablet    FLEXERIL    30 tablet    Take 1-2 tabs at night as needed for lumbar spasms affecting sleep. Appointment required for future refills    Acute midline low back pain without sciatica, Back muscle spasm       DILANTIN 100 MG CR capsule   Generic drug:  phenytoin      Take 300 mg by mouth 2 times daily 300mg in the am and 200mg at pm        fluticasone 50 MCG/ACT spray    FLONASE    16 g    Spray 1-2 sprays into both nostrils daily    Acute non-recurrent frontal sinusitis       KEPPRA 1000 MG Tabs   Generic drug:  levETIRAcetam      Take 2,000 mg by mouth 2 times daily At 0900 and 2100        lisinopril 5 MG tablet    PRINIVIL/ZESTRIL    90 tablet    Take 1 tablet (5 mg) by mouth daily    CKD (chronic kidney disease) stage 3, GFR 30-59 ml/min (H)       loratadine-pseudoePHEDrine  MG per 24 hr tablet    CLARITIN-D 24-hour    14 tablet    Take 1 tablet by mouth daily    Acute non-recurrent frontal sinusitis       simvastatin 20 MG tablet    ZOCOR    90 tablet    Take 1 tablet (20 mg) by mouth At Bedtime    Hyperlipidemia LDL goal <100        venlafaxine 75 MG 24 hr capsule    EFFEXOR-XR    90 capsule    TAKE ONE CAPSULE BY MOUTH ONCE DAILY    Anxiety, Major depressive disorder, recurrent episode, moderate (H)       vitamin D 27532 UNIT capsule    ERGOCALCIFEROL     TAKE 1 CAPSULE BY MOUTH ONCE WEEKLY

## 2018-10-16 NOTE — LETTER
October 18, 2018      Andrea Uribe  [address] [facility] [address]        Dear ,    We are writing to inform you of your test results.    The results have of your recent labs have all returned.     Your complete blood count was normal.    Your vitamin B12 was normal as well. It does not appear you need to continue the vitamin b12 injections.     Your keppra level was a little high and your phenytoin was normal. I recommend discussing this with your neurologist.     Your liver function, electrolytes, kidney function, and blood sugar were normal. Your cholesterol was improved from last year as well.     Resulted Orders   Vitamin B12   Result Value Ref Range    Vitamin B12 >6000 (H) 193 - 986 pg/mL   CBC with platelets   Result Value Ref Range    WBC 7.1 4.0 - 11.0 10e9/L    RBC Count 4.49 4.4 - 5.9 10e12/L    Hemoglobin 13.6 13.3 - 17.7 g/dL    Hematocrit 42.2 40.0 - 53.0 %    MCV 94 78 - 100 fl    MCH 30.3 26.5 - 33.0 pg    MCHC 32.2 31.5 - 36.5 g/dL    RDW 14.2 10.0 - 15.0 %    Platelet Count 209 150 - 450 10e9/L   Lipid panel reflex to direct LDL Non-fasting   Result Value Ref Range    Cholesterol 199 <200 mg/dL    Triglycerides 230 (H) <150 mg/dL      Comment:      Borderline high:  150-199 mg/dl  High:             200-499 mg/dl  Very high:       >499 mg/dl  Non Fasting      HDL Cholesterol 62 >39 mg/dL    LDL Cholesterol Calculated 91 <100 mg/dL      Comment:      Desirable:       <100 mg/dl    Non HDL Cholesterol 137 (H) <130 mg/dL      Comment:      Above Desirable:  130-159 mg/dl  Borderline high:  160-189 mg/dl  High:             190-219 mg/dl  Very high:       >219 mg/dl     Comprehensive metabolic panel (BMP + Alb, Alk Phos, ALT, AST, Total. Bili, TP)   Result Value Ref Range    Sodium 140 133 - 144 mmol/L    Potassium 4.3 3.4 - 5.3 mmol/L    Chloride 106 94 - 109 mmol/L    Carbon Dioxide 28 20 - 32 mmol/L    Anion Gap 6 3 - 14 mmol/L    Glucose 80 70 - 99 mg/dL      Comment:      Non  Fasting    Urea Nitrogen 19 7 - 30 mg/dL    Creatinine 1.11 0.66 - 1.25 mg/dL    GFR Estimate 72 >60 mL/min/1.7m2      Comment:      Non  GFR Calc    GFR Estimate If Black 87 >60 mL/min/1.7m2      Comment:       GFR Calc    Calcium 9.0 8.5 - 10.1 mg/dL    Bilirubin Total 0.2 0.2 - 1.3 mg/dL    Albumin 3.7 3.4 - 5.0 g/dL    Protein Total 8.5 6.8 - 8.8 g/dL    Alkaline Phosphatase 144 40 - 150 U/L    ALT 20 0 - 70 U/L    AST 18 0 - 45 U/L   Phenytoin level   Result Value Ref Range    Phenytoin Level 18.0 10 - 20 mg/L   Keppra (Levetiracetam) Level   Result Value Ref Range    Keppra (Levetiracetam) Level 54 (H) 12 - 46 ug/mL      Comment:      (Note)  INTERPRETIVE INFORMATION: Keppra (Levetiracetam)  Therapeutic Range:  12-46 ug/mL             Toxic:  Not well Established  Pharmacokinetics of levetiracetam are affected by renal   function. Adverse effects may include somnolence, weakness,   headache and vomiting.  This levetiracetam (Keppra) immunoassay uses the [employer] reagents, which has known cross-reactivity with   the drug brivaracetam (Briviact) and may report inaccurate   results. Patients transitioning from levetiracetam to   brivaracetam or those who are using both medications should   not monitor drug concentrations with the [employer]   assay. These patients should be monitored using a validated   chromatographic methodology that distinguishes between   drugs to determine drug concentrations.  Performed by [facility],  [address] [place] [id number] [phone]  [email or web address], Compa Vazquez MD, Lab. Director         If you have any questions or concerns, please call the clinic at the number listed above.       Sincerely,        Alberto Medley PA-C/AL

## 2018-10-16 NOTE — PATIENT INSTRUCTIONS
Understanding Carbuncles    A carbuncle is a painful boil under the skin. It happens when a group of hair follicles become infected. Follicles are the tiny holes from which hair grows out of your skin.  How to say it  Donato   What causes carbuncles?  A carbuncle is caused by an infection with the bacteria Staphylococcus aureus. They are common on areas of the body where friction and sweat occur. They usually appear on the back of the neck, back, and thighs. This type of infection can also happen when the skin is injured, such as by a cut or bug bite.  The bacteria that causes carbuncles can spread easily from person to person. People at higher risk for boils are those with diabetes or a weak immune system. Drug users who use needles are also more likely to get them.  Symptoms of carbuncles  A carbuncle starts as a small painful bump. But it can grow quickly. It may become:    Red    Swollen    Tender  Carbuncles may ooze pus. They may also cause fever and a general feeling of illness.  Treatment for carbuncles  A carbuncle may heal on its own in a few weeks. But the pus within it needs to come out first. Treatment options include:    Warm compress. Putting a warm, wet washcloth on the boil will help the pus drain out. You should never try to pop a boil. That can cause the infection to spread.    Surgical drainage. If the boil doesn t drain on its own, your healthcare provider may need to cut into it.    Antibiotics. In some cases, oral antibiotics may be prescribed to fight the infection, especially if the carbuncle returns. You will likely have to take the medicine for 5 to 7 days. You may need to take it longer for a severe case.    Good hygiene. Proper handwashing can prevent boils from spreading and coming back. Also wash things that have been in contact with the carbuncle in hot water. This includes items such as clothing and towels.  Complications of carbuncles  The main complication of a carbuncle  is the spreading of the infection. The bacteria can infect the heart and bone. It can also lead to septic shock, an infection of the entire body.  When to call your healthcare provider  Call your healthcare provider right away if you have any of these:    Fever of 100.4 F (38 C) or higher, or as directed    Redness, swelling, or fluid leaking from a carbuncle that gets worse    Pain that gets worse    Symptoms that don t get better, or get worse    New symptoms   Date Last Reviewed: 5/1/2016 2000-2017 [employer]. [address] [place], [place] [address]. All rights reserved. This information is not intended as a substitute for professional medical care. Always follow your healthcare professional's instructions.

## 2018-10-16 NOTE — LETTER
[facility]  [id number] [place] [id number]  Phone: [phone]    October 16, 2018        Andrea Uribe  [address] [facility] [address]          To whom it may concern:    RE: Andrea Uribe    Patient was seen and treated today at our clinic and missed work due to finger infection. I recommend taking the remainder of the day off as well as tomorrow for pain.     Please contact me for questions or concerns.      Sincerely,        Alberto Medley PA-C

## 2018-10-17 LAB
ALBUMIN SERPL-MCNC: 3.7 G/DL (ref 3.4–5)
ALP SERPL-CCNC: 144 U/L (ref 40–150)
ALT SERPL W P-5'-P-CCNC: 20 U/L (ref 0–70)
ANION GAP SERPL CALCULATED.3IONS-SCNC: 6 MMOL/L (ref 3–14)
AST SERPL W P-5'-P-CCNC: 18 U/L (ref 0–45)
BILIRUB SERPL-MCNC: 0.2 MG/DL (ref 0.2–1.3)
BUN SERPL-MCNC: 19 MG/DL (ref 7–30)
CALCIUM SERPL-MCNC: 9 MG/DL (ref 8.5–10.1)
CHLORIDE SERPL-SCNC: 106 MMOL/L (ref 94–109)
CHOLEST SERPL-MCNC: 199 MG/DL
CO2 SERPL-SCNC: 28 MMOL/L (ref 20–32)
CREAT SERPL-MCNC: 1.11 MG/DL (ref 0.66–1.25)
GFR SERPL CREATININE-BSD FRML MDRD: 72 ML/MIN/1.7M2
GLUCOSE SERPL-MCNC: 80 MG/DL (ref 70–99)
HDLC SERPL-MCNC: 62 MG/DL
LDLC SERPL CALC-MCNC: 91 MG/DL
LEVETIRACETAM SERPL-MCNC: 54 UG/ML (ref 12–46)
NONHDLC SERPL-MCNC: 137 MG/DL
PHENYTOIN SERPL-MCNC: 18 MG/L (ref 10–20)
POTASSIUM SERPL-SCNC: 4.3 MMOL/L (ref 3.4–5.3)
PROT SERPL-MCNC: 8.5 G/DL (ref 6.8–8.8)
SODIUM SERPL-SCNC: 140 MMOL/L (ref 133–144)
TRIGL SERPL-MCNC: 230 MG/DL
VIT B12 SERPL-MCNC: >6000 PG/ML (ref 193–986)

## 2018-10-18 ENCOUNTER — TELEPHONE (OUTPATIENT)
Dept: FAMILY MEDICINE | Facility: CLINIC | Age: 43
End: 2018-10-18

## 2018-10-18 DIAGNOSIS — S06.9X9D TRAUMATIC BRAIN INJURY WITH LOSS OF CONSCIOUSNESS, SUBSEQUENT ENCOUNTER: ICD-10-CM

## 2018-10-18 DIAGNOSIS — R26.0 ATAXIC GAIT: Primary | ICD-10-CM

## 2018-10-18 DIAGNOSIS — M67.01: ICD-10-CM

## 2018-10-18 NOTE — TELEPHONE ENCOUNTER
Andrea calling and states [facility] did order for new wheelchair.  [facility] said they called the same day and need more specific information.  Going to [place] the 30th and wants new one by then.  Did we get a fax?  If not need to follow-up with [facility].  Andrea also he called yesterday and no note of this either??  Bea Dutton RN

## 2018-10-19 NOTE — TELEPHONE ENCOUNTER
Insurance is requiring more specific mobility evaluation. I have referred patient to undergo this. They should be in touch within 48 business hours. Form to be abstracted. In outbox.       -Orestes Medley, PAC

## 2018-10-19 NOTE — TELEPHONE ENCOUNTER
Form in [facility] in-basket----was in is in-basket with no name, placed a label on form    Rosalba King/DIGNA Higgins---[facility]

## 2018-11-07 ENCOUNTER — OFFICE VISIT (OUTPATIENT)
Dept: FAMILY MEDICINE | Facility: CLINIC | Age: 43
End: 2018-11-07
Payer: MEDICARE

## 2018-11-07 VITALS
DIASTOLIC BLOOD PRESSURE: 80 MMHG | TEMPERATURE: 97.9 F | BODY MASS INDEX: 34.37 KG/M2 | WEIGHT: 240.1 LBS | OXYGEN SATURATION: 95 % | SYSTOLIC BLOOD PRESSURE: 110 MMHG | HEIGHT: 70 IN | RESPIRATION RATE: 20 BRPM | HEART RATE: 63 BPM

## 2018-11-07 DIAGNOSIS — J20.9 ACUTE BRONCHITIS, UNSPECIFIED ORGANISM: Primary | ICD-10-CM

## 2018-11-07 DIAGNOSIS — Z86.69 HISTORY OF SEIZURE DISORDER: ICD-10-CM

## 2018-11-07 DIAGNOSIS — J02.9 SORE THROAT: ICD-10-CM

## 2018-11-07 PROCEDURE — 99213 OFFICE O/P EST LOW 20 MIN: CPT | Performed by: FAMILY MEDICINE
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
## 2018-11-07 NOTE — LETTER
November 7, 2018      Andrea Uribe  [address] [facility] [address]      To Whom It May Concern:      Andrea Uribe was seen in our clinic on 11/07/18. Please excuse patient from work 11/07/18.  Patient may return to work on 11/08/18, assuming he is afebrile (temperature < 100.4 degrees Fahrenheit).      Sincerely,      Marce Marinelli MD

## 2018-11-07 NOTE — PATIENT INSTRUCTIONS
Symptomatic cares, gargles, and lozenges, only as discussed.    Over-the-counter medications (e.g. Tylenol), only as discussed.    Albuterol every 4 hours as needed.    Follow-up if fever, worsening symptoms, frequent Albuterol use, or not gradually improving over the course of the week.    Follow up in the ER immediately if emergent symptoms develop (e.g. chest pain, shortness of breath, signs/symptoms of dehydration), as discussed.

## 2018-12-10 ENCOUNTER — TRANSFERRED RECORDS (OUTPATIENT)
Dept: HEALTH INFORMATION MANAGEMENT | Facility: CLINIC | Age: 43
End: 2018-12-10

## 2018-12-17 ENCOUNTER — OFFICE VISIT (OUTPATIENT)
Dept: URGENT CARE | Facility: URGENT CARE | Age: 43
End: 2018-12-17
Payer: MEDICARE

## 2018-12-17 VITALS
TEMPERATURE: 98 F | DIASTOLIC BLOOD PRESSURE: 78 MMHG | SYSTOLIC BLOOD PRESSURE: 112 MMHG | OXYGEN SATURATION: 97 % | HEART RATE: 60 BPM

## 2018-12-17 DIAGNOSIS — M53.3 SACROILIAC JOINT PAIN: Primary | ICD-10-CM

## 2018-12-17 DIAGNOSIS — R26.0 ATAXIC GAIT: ICD-10-CM

## 2018-12-17 DIAGNOSIS — S20.211D CHEST WALL CONTUSION, RIGHT, SUBSEQUENT ENCOUNTER: ICD-10-CM

## 2018-12-17 DIAGNOSIS — Z87.820 HISTORY OF TRAUMATIC BRAIN INJURY: ICD-10-CM

## 2018-12-17 PROCEDURE — 99214 OFFICE O/P EST MOD 30 MIN: CPT | Performed by: FAMILY MEDICINE

## 2018-12-17 RX ORDER — METHYLPREDNISOLONE 4 MG
4 TABLET, DOSE PACK ORAL SEE ADMIN INSTRUCTIONS
Qty: 21 TABLET | Refills: 0 | Status: SHIPPED | OUTPATIENT
Start: 2018-12-17 | End: 2019-02-06

## 2018-12-17 NOTE — LETTER
[facility]  [address] [facility] [address]  [phone]      December 17, 2018    RE:  Andrea Uribe                                                                                                                                                       [address] [facility] [address]            To whom it may concern:    Andrea Uribe is under my professional care for Sacroiliac joint pain.   He  may return to work  12/26/2018  with the following: Light duty-unable to lift more than 5 pounds for 1 week          Sincerely,        Vanessa Bullard MD    [facility]

## 2018-12-18 NOTE — PATIENT INSTRUCTIONS
Patient Education     Understanding Sacroiliac Strain    A joint is a place where 2 bones meet. The 2 sacroiliac joints are where the hip (iliac) bones meet the bottom part of the spine (sacrum). These joints are surrounded by muscle, connective tissue, and nerves. Normally, a sacroiliac joint (SIJ) does not move very much. But it can be pushed out of alignment. The tissues around an SIJ also can be stretched or torn. This can lead to pain in the low back.     How to say it  [id number] strain   Causes of sacroiliac strain  Causes of SIJ strain can include:    Stress on the SIJ from lifting weight incorrectly    Poor body mechanics and posture during sports or work activities    Damage from degenerative diseases such as arthritis    Increased pressure on the SIJ from pregnancy  Symptoms of sacroiliac strain  Symptoms of SIJ strain may include:    Aching in the low back, buttocks, or upper leg    Pain that gets worse with movement or standing for a long time, and gets better with rest    Inability to move as freely as usual    Muscle spasms in the low back  Treating sacroiliac strain  Treatment focuses on reducing pain and avoiding further injury. Treatments may include:    Prescription or over-the-counter pain medicines. These help reduce pain and swelling.    Cold packs or heat packs. These help reduce pain and swelling.    Stretching and other exercises. These improve flexibility and strength.    Physical therapy. This may include exercises or other treatments.    An SIJ belt. This medical device is worn around the hips, to make the SIJ more stable and reduce pain.    Injections of medicine. This may relieve symptoms.  Possible complications of sacroiliac strain  If the cause of the pain is not addressed, symptoms may return or get worse. Follow your healthcare provider s instructions on lifestyle changes and treating your SIJ strain.     When to call your healthcare provider  Call your healthcare provider  right away if you have any of these:    Fever of 100.4 F (38 C) or higher, or as directed    Redness or swelling    Pain that gets worse    Symptoms that don t get better with prescribed medicines, or get worse    New symptoms   Date Last Reviewed: 3/10/2016    5048-4872 [employer]. [address] [place] [address]. All rights reserved. This information is not intended as a substitute for professional medical care. Always follow your healthcare professional's instructions.
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
## 2018-12-20 ENCOUNTER — HOSPITAL ENCOUNTER (OUTPATIENT)
Dept: PHYSICAL THERAPY | Facility: CLINIC | Age: 43
Setting detail: THERAPIES SERIES
End: 2018-12-20
Attending: PHYSICIAN ASSISTANT
Payer: MEDICARE

## 2018-12-20 DIAGNOSIS — S06.9X9D TRAUMATIC BRAIN INJURY WITH LOSS OF CONSCIOUSNESS, SUBSEQUENT ENCOUNTER: ICD-10-CM

## 2018-12-20 DIAGNOSIS — R26.0 ATAXIC GAIT: ICD-10-CM

## 2018-12-20 DIAGNOSIS — M67.01: ICD-10-CM

## 2018-12-20 PROCEDURE — 97542 WHEELCHAIR MNGMENT TRAINING: CPT | Mod: GP | Performed by: PHYSICAL THERAPIST

## 2018-12-20 NOTE — PROGRESS NOTES
" SEATING AND WHEELED MOBILITY ASSESSMENT  12/20/18 1400   Quick Adds   Quick Adds Certification;Current Manual Wheelchair   General Information (PT: include personal factors and/or comorbidities that impact the POC; OT: include additional occupational profile info)   Rehab Discipline PT   Funding Medicare  (MA secondary)   Service Outpatient;Physical Therapy;Seating/Wheeled Mobility Evaluation   Height 5'11   Weight 237lbs   Start Of Care Date 12/20/18   Referring Physician Alberto Medley PA-C   Orders Evaluate And Treat As Indicated   Orders Date 10/19/18   Others Present at Evaluation wife and 2 sons   Patient/Caregiver Goals to get new manual w/c to replace current one   Rehabilitation Technology Supplier no ATP present as patient using [employer] as vendor   Current Community Support Family/Friend Caregiver   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 Yes  (4-5 falls)   Have you fallen and had an injury in the past year? Yes  (\"tore ligaments in back\")   Is patient a fall risk? Yes   Fall screen comments Falls occur when \"I am trying to maneuver\"   Medical History   Onset Of Illness/injury Or Date Of Surgery 4/4/1990   Medical Diagnosis ataxic gait, TBI, right short heel cord   Medical History Pertinent PMH: seizures, CKD, HTN, obesity, hyperthyroid, renal insufficiency, depression, anxiety, sleep apnea, intermittent asthma; Pertinent PSH: right TKA and revision (2012 and 2014), right achilles lengthening 10/30/2017   Cardio-Respiratory Status CPAP;Inhaler   Recent or Planned Surgeries (See above comments)   Current Manual Wheelchair   Age at least 4-5 years old    [id number]   Cushion (no cushion)   Wheelchair Back Upholstered   Footrest Style elevating and removable  (not present at eval; broken per pt)   Headrest No   Settings Used Outdoor Community " Mobility;Primary Means of Transportation;Work;Home   Condition Beyond Further Justifiable Repair   Current Equipment Requires Replacement   Rationale for Equipment Changes multiple repairs - broken leg rests, worn casters and wheels, patient self-modified arm rests with wood and pad, rusted, ripped upholstery   Manual Wheelchair Comments patient self-paid for current w/c   Home Accessibility   Living Environment [facility]   Primary Entrance Level   All Rooms Wheelchair Accessible Yes   Home Accessibility Comments Can stay on main level if needed.   Community ADL   Transportation (Truck)   Community Mobility Requirements Work;Medical Appointments;Shopping  (Travel)   Community ADL Comments son can lift chair in/out of truck   Cognitive/Visual/Hearing Status   Reported Problems Short term memory loss per patient and his wife   Observations No Problems Observed During Evaluation   Vision Intact   Hearing Intact   ADL Status   Feeding Independent   Grooming/Hygiene Independent   Dressing Requires Assist   Toileting Independent   Bathing Independent   Meal Preparation Requires Assist   Home Management Requires Assist   Fatigue   Reported Problems Patient reporting issues with fatigue; tired after working (works 5.5 hours Monday-Friday) and needing to rest when getting home.   Balance   Unsupported Sitting Balance Within Functional Limits   Sitting Balance in Chair Within Functional Limits   Standing Balance Uses Upper Extremities for Balance   Balance Comments limited tolerance for standing due to pain   Ambulation   Ambulation (See comments)   Ambulation Assist Requires Assist   Ambulation Equipment Cane  (and assistance from family)   Ambulation Comments Patient non-functional ambulator - will walk short distance 5-10 feet at most with SEC and assistance from family member.  Has been in w/c since 2009 injury.   Transfers   Transfer Assist Independent  (use of bilateral upper extremities)   Transfer Method Stand Pivot  "  Sleep/Rest   Sleep Surface/Equipment Regular pillow top mattress   Wheelchair Ability   Wheelchair Ability Quick Adds Manual Chair   Manual Wheelchair Propulsion   Manual Wheelchair Propulsion Independent   Comments Bilateral upper extremities and left foot at times   Neuromuscular   History of Pressure Sores No   Current Pressure Sores No   Pain Yes   Pain Location Left lower back, right knee pain which is constant   Pain Scale  7   Coordination UE Impaired;LE Impaired  (right side)   Sensory Deficits Reported numbness/tingling in right hand   Sensation on Seating Surface Intact per Report   Neuromuscular Comments in cold weather right leg \"feels like it turns into ice\"   Head and Neck   Head and Neck Position Functional   Head Control Good   Upper Extremities   Shoulder Position Functional Bilaterally   UE ROM Full active range of motion bilateral with the exception of impaired left shoulder internal rotation which is limited due to pain   UE Strength 5/5 bilateral with right  slightly less compared to left   Dominance (both per patient)   UE Comments Ataxic movement right upper extremity   Pelvis   Anterior/Posterior Pelvis Position Neutral   Trunk   Anterior/Posterior Trunk Position Increased Thoracic Kyphosis   Lower Extremities   LE ROM WNL left lower extremity, right active hip flexion at least 100 degrees, right knee lacking ~ 20 degrees from full extension, right knee flexion 90 degrees, right ankle to neutral (use of right AFO, tight heel cord)   LE Strength Left lower extremity 5/5 with the exception of left dorsiflexion 3-/5, right hip 2+/5, right knee 4+/5 in available range, right ankle not tested.   Foot Positioning (Right AFO, left WFL)   LE Comments edema right lower extremity, AFO right foot   Patient Measurements   Other current w/c 18 x 18 with patient requesting wanting same size   Education Assessment   Barriers to Learning No Barriers   Preferred Learning Style " Demonstration;Pictures/Video;Listening   Assessment/Plan   Criteria for Skilled Interventions Met Yes, Treatment Indicated   Treatment Diagnosis decreased safety and independence with MRADLs and functional mobility   Therapy Frequency Once   Planned Therapy Interventions Wheelchair Management/Propulsion Training   Planned Therapy Interventions Comments Discussed role of this provider with regards to obtaining a new w/c; patient wanting to pursue obtaining similar chair (18x18 with upholstery back, removable arm rests, elevating and removable leg rests)   Risks and benefits of treatment have been explained Yes   Patient/family & other staff in agreement with plan of care Yes   Comments Andrea has mobility limitations due to ataxic gait, TBI, right short heel cord that significantly impairs his ability to participate in all of his mobility-related activities of daily living (MRADL). Specifically affected are toileting (being able to get there in time to prevent accidents), dressing, and bathing (getting into the bathroom of designated place). Current equipment used is an [id number] that is beyond justifiable repair.  This patient needs the new equipment requested to be able to replace his current manual w/c in order to continue to complete all of his MRADLs and functional mobility safely and independently.  Fabis mobility limitation cannot be sufficiently and safely resolved by the use of an appropriately fitted cane or walker because Andrea is non-ambulatory.   This equipment is reasonable and necessary with reference to accepted standards of medical practice and treatment of this patient's condition and is not being recommended as a convenience item. Without this recommended equipment, Andrea is highly likely to sustain injuries from falls, develop pressure sores or postural compensation, and/or be bed confined, which those costs far exceed the cost of the requested equipment.  Session Time: Total Treatment Time: 75  minutes.  Total Session Time: 75 minutes   Certification   Certification date from 12/20/18   Certification date to 12/20/18   GOALS   PT Eval Goals 1   Goal 1   Goal Identifier manual w/c   Goal Description Patient to demonstrate a successful clinical trial of the recommended wheelchair   Electronically signed by:  Zulma Jefferson, PT, DPT  Physical Therapist  [phone]      Fax: [phone]     [email or web address]  [facility]  [place]              [address]

## 2018-12-24 NOTE — TELEPHONE ENCOUNTER
"Requested Prescriptions   Pending Prescriptions Disp Refills     venlafaxine (EFFEXOR-XR) 75 MG 24 hr capsule [Pharmacy Med Name: VENLAFAXINE HCL ER 75 MG CAP] 90 capsule 0    Last Written Prescription Date:  3/7/18  Last Fill Quantity: 90,  # refills: 0   Last Office Visit: 5/23/2018   Future Office Visit:      Sig: TAKE ONE CAPSULE BY MOUTH ONCE DAILY    Serotonin-Norepinephrine Reuptake Inhibitors  Passed    6/3/2018 12:39 PM       Passed - Blood pressure under 140/90 in past 12 months    BP Readings from Last 3 Encounters:   05/25/18 122/80   05/23/18 110/62   04/26/18 100/68                Passed - PHQ-9 score of less than 5 in past 6 months    PHQ-9 SCORE 1/31/2017 9/8/2017 5/23/2018   Total Score - - -   Total Score 5 3 4     DAYAMI-7 SCORE 1/31/2017 9/8/2017 5/23/2018   Total Score - - -   Total Score 6 9 12          Passed - Patient is age 18 or older       Passed - Normal serum creatinine on file in past 12 months    Recent Labs   Lab Test  10/19/17   1038   CR  0.99            Passed - Recent (6 mo) or future (30 days) visit within the authorizing provider's specialty    Patient had office visit in the last 6 months or has a visit in the next 30 days with authorizing provider or within the authorizing provider's specialty.  See \"Patient Info\" tab in inbasket, or \"Choose Columns\" in Meds & Orders section of the refill encounter.              "
LM for patient to call back to clinic.   
LM for patient to call back to clinic.   Multiple attempts-letter sent.   
Left message for patient to return call\  Ismael Chahal MA    
Routing refill request to provider for review/approval because:  DAYAMI 7 > 4    Urszula Castaneda RN, BS  Clinical Nurse Triage.          
Was to followed 6 months post sept visit. Refilled 1 month to allow for follow up.     -Orestes Medley, PAC  
old records/vital signs/nurses notes

## 2018-12-26 ENCOUNTER — TRANSFERRED RECORDS (OUTPATIENT)
Dept: HEALTH INFORMATION MANAGEMENT | Facility: CLINIC | Age: 43
End: 2018-12-26

## 2018-12-27 DIAGNOSIS — E78.5 HYPERLIPIDEMIA LDL GOAL <100: ICD-10-CM

## 2018-12-28 RX ORDER — SIMVASTATIN 20 MG
TABLET ORAL
Qty: 90 TABLET | Refills: 2 | Status: SHIPPED | OUTPATIENT
Start: 2018-12-28 | End: 2019-08-30

## 2018-12-28 NOTE — TELEPHONE ENCOUNTER
Prescription approved per [facility] Refill Protocol.  Marbin Danielle RN

## 2018-12-28 NOTE — TELEPHONE ENCOUNTER
"        Requested Prescriptions   Pending Prescriptions Disp Refills     simvastatin (ZOCOR) 20 MG tablet [Pharmacy Med Name: SIMVASTATIN 20 MG TABLET]    Last Written Prescription Date:  11/14/17  Last Fill Quantity: 90 tablet,  # refills: 3   Last office visit: 11/7/2018 with prescribing provider:  Isis   Future Office Visit:     90 tablet 3     Sig: TAKE 1 TABLET BY MOUTH AT BEDTIME    Statins Protocol Passed - 12/27/2018  9:05 AM       Passed - LDL on file in past 12 months    Recent Labs   Lab Test 10/16/18  1420   LDL 91            Passed - No abnormal creatine kinase in past 12 months    No lab results found.            Passed - Recent (12 mo) or future (30 days) visit within the authorizing provider's specialty    Patient had office visit in the last 12 months or has a visit in the next 30 days with authorizing provider or within the authorizing provider's specialty.  See \"Patient Info\" tab in inbasket, or \"Choose Columns\" in Meds & Orders section of the refill encounter.             Passed - Patient is age 18 or older           "

## 2019-01-14 ENCOUNTER — TRANSFERRED RECORDS (OUTPATIENT)
Dept: HEALTH INFORMATION MANAGEMENT | Facility: CLINIC | Age: 44
End: 2019-01-14

## 2019-01-16 DIAGNOSIS — F33.1 MAJOR DEPRESSIVE DISORDER, RECURRENT EPISODE, MODERATE (H): ICD-10-CM

## 2019-01-16 DIAGNOSIS — F41.9 ANXIETY: ICD-10-CM

## 2019-01-17 NOTE — TELEPHONE ENCOUNTER
"Requested Prescriptions   Pending Prescriptions Disp Refills     venlafaxine (EFFEXOR-XR) 75 MG 24 hr capsule [Pharmacy Med Name: VENLAFAXINE HCL ER 75 MG CAP] 90 capsule 1    Last Written Prescription Date:  6/14/18  Last Fill Quantity: 90,  # refills: 1   Last Office Visit: 11/7/2018 Isis  Future Office Visit:      Sig: TAKE 1 CAPSULE BY MOUTH EVERY DAY    Serotonin-Norepinephrine Reuptake Inhibitors  Failed - 1/16/2019  3:58 PM       Failed - PHQ-9 score of less than 5 in past 6 months    PHQ-9 SCORE 1/31/2017 9/8/2017 5/23/2018   PHQ-9 Total Score - - -   PHQ-9 Total Score 5 3 4     DAYAMI-7 SCORE 1/31/2017 9/8/2017 5/23/2018   Total Score - - -   Total Score 6 9 12              Passed - Blood pressure under 140/90 in past 12 months    BP Readings from Last 3 Encounters:   12/17/18 112/78   11/07/18 110/80   10/16/18 114/72                Passed - Medication is active on med list       Passed - Patient is age 18 or older       Passed - Normal serum creatinine on file in past 12 months    Recent Labs   Lab Test 10/16/18  1420  05/17/16  1104   CR 1.11   < >  --    CREAT  --   --  1.1    < > = values in this interval not displayed.            Passed - Recent (6 mo) or future (30 days) visit within the authorizing provider's specialty    Patient had office visit in the last 6 months or has a visit in the next 30 days with authorizing provider or within the authorizing provider's specialty.  See \"Patient Info\" tab in inbasket, or \"Choose Columns\" in Meds & Orders section of the refill encounter.            "

## 2019-01-18 RX ORDER — VENLAFAXINE HYDROCHLORIDE 75 MG/1
CAPSULE, EXTENDED RELEASE ORAL
Qty: 90 CAPSULE | Refills: 0 | Status: SHIPPED | OUTPATIENT
Start: 2019-01-18 | End: 2019-04-14

## 2019-01-18 NOTE — TELEPHONE ENCOUNTER
Prescription approved per [facility] Refill Protocol.   Return in about 6 months (around 4/16/2019) for Routine Visit, depression/anxiety follow up.     Marbin Danielle RN

## 2019-01-19 ENCOUNTER — APPOINTMENT (OUTPATIENT)
Dept: GENERAL RADIOLOGY | Facility: CLINIC | Age: 44
End: 2019-01-19
Payer: MEDICARE

## 2019-01-19 ENCOUNTER — HOSPITAL ENCOUNTER (EMERGENCY)
Facility: CLINIC | Age: 44
Discharge: HOME | End: 2019-01-19
Attending: EMERGENCY MEDICINE | Admitting: EMERGENCY MEDICINE
Payer: MEDICARE

## 2019-01-19 ENCOUNTER — APPOINTMENT (OUTPATIENT)
Dept: ULTRASOUND IMAGING | Facility: CLINIC | Age: 44
End: 2019-01-19
Payer: MEDICARE

## 2019-01-19 ENCOUNTER — NURSE TRIAGE (OUTPATIENT)
Dept: NURSING | Facility: CLINIC | Age: 44
End: 2019-01-19

## 2019-01-19 VITALS
HEART RATE: 71 BPM | RESPIRATION RATE: 18 BRPM | OXYGEN SATURATION: 95 % | TEMPERATURE: 97.8 F | BODY MASS INDEX: 33.74 KG/M2 | SYSTOLIC BLOOD PRESSURE: 123 MMHG | WEIGHT: 241 LBS | HEIGHT: 71 IN | DIASTOLIC BLOOD PRESSURE: 71 MMHG

## 2019-01-19 DIAGNOSIS — G89.29 CHRONIC PAIN OF RIGHT KNEE: ICD-10-CM

## 2019-01-19 DIAGNOSIS — M25.561 CHRONIC PAIN OF RIGHT KNEE: ICD-10-CM

## 2019-01-19 PROCEDURE — 99284 EMERGENCY DEPT VISIT MOD MDM: CPT | Mod: 25

## 2019-01-19 PROCEDURE — 93971 EXTREMITY STUDY: CPT | Mod: RT

## 2019-01-19 PROCEDURE — 25000132 ZZH RX MED GY IP 250 OP 250 PS 637: Mod: GY | Performed by: EMERGENCY MEDICINE

## 2019-01-19 PROCEDURE — A9270 NON-COVERED ITEM OR SERVICE: HCPCS | Mod: GY | Performed by: EMERGENCY MEDICINE

## 2019-01-19 PROCEDURE — 73562 X-RAY EXAM OF KNEE 3: CPT | Mod: RT

## 2019-01-19 RX ORDER — TRAMADOL HYDROCHLORIDE 50 MG/1
50 TABLET ORAL ONCE
Status: COMPLETED | OUTPATIENT
Start: 2019-01-19 | End: 2019-01-19

## 2019-01-19 RX ADMIN — TRAMADOL HYDROCHLORIDE 50 MG: 50 TABLET, COATED ORAL at 16:50

## 2019-01-19 ASSESSMENT — ENCOUNTER SYMPTOMS
FEVER: 0
CHILLS: 0
SHORTNESS OF BREATH: 0

## 2019-01-19 ASSESSMENT — MIFFLIN-ST. JEOR: SCORE: 2005.3

## 2019-01-19 NOTE — ED TRIAGE NOTES
Presents to the ED with right knee pain. Reports history of bilateral knee replacement with chronic pain since the surgery. Reports that has been unable to bear weight on the right knee for the past 3 weeks.

## 2019-01-19 NOTE — LETTER
01/19/19      To Whom it may concern:    Gabriella Uribe was in our Emergency Department today, 01/19/19. with a patient who needed their assistance and will need their assistance tomorrow as well. Please excuse her from work.      Sincerely,      _______________    Holland Francis MD   [employer]  6:50 PM 01/19/19

## 2019-01-19 NOTE — LETTER
January 19, 2019      To Whom It May Concern:      Andrea Uribe was seen in our Emergency Department today, 01/19/19.  I expect his condition to improve over the next 3 days.  He may return to work/school when improved.    Sincerely,        HUBER Penaloza

## 2019-01-19 NOTE — ED AVS SNAPSHOT
[facility] Emergency Department  [address]  [facility] [address]  Phone:  [phone]  Fax:  [phone]                                    Andrea Uribe   MRN: [id number]    Department:  [facility] Emergency Department   Date of Visit:  1/19/2019           After Visit Summary Signature Page    I have received my discharge instructions, and my questions have been answered. I have discussed any challenges I see with this plan with the nurse or doctor.    ..........................................................................................................................................  Patient/Patient Representative Signature      ..........................................................................................................................................  Patient Representative Print Name and Relationship to Patient    ..................................................               ................................................  Date                                   Time    ..........................................................................................................................................  Reviewed by Signature/Title    ...................................................              ..............................................  Date                                               Time          22EPIC Rev 08/18

## 2019-01-19 NOTE — ED PROVIDER NOTES
History     Chief Complaint:  Knee Pain      HPI   Andrea Uribe is a 43 year old male with right knee replacement who presents with right knee pain. The patient states that he has had multiple in the past which have required multiple orthopedic surgeries. Since these surgeries he has been having chronic pain that pain medications or therapy have not been able to improve. He notes his leg has had increased swelling in his right leg. Of note, the patient called [place] to make an appointment but he has been waiting for two weeks for a call back. He has only been taking Tylenol for his pain due to his allergies. The patient mentions that he recently had his legs imaged at [facility] at a orthopedic appointment. The patient denies fever, chills, shortness of breath, chest pain, or skin rash.      Allergies:  Iodine  Aspirin  Ibuprofen    Medications:    Albuterol  Flexeril  Keppra  Lisinopril  Medrol  Dilantin  Simvastatin  Effexor      Past Medical History:    CKD  CPAP  HTN  Osteoarthritis  Renal insufficiency  Seizure  Sleep apnea  TBI  Thyroid  Asthma  Chronic infection (MRSA)  Ptosis    Past Surgical History:    IVC Filter  Screws in right knee  Lonnie in left femur  Spleen repair  Cholecystectomy  Herniorrhaphy  Laparotomy exploration  Implant stimulator vagus nerve  Appendectomy  Arthroplasty knee (Bilateral)  Tracheostomy    Family History:    Cardiovascular disease  Colitis    Social History:  Marital Status:   [2]  Negative for tobacco use.  Negative for alcohol use.      Review of Systems   Constitutional: Negative for chills and fever.   Respiratory: Negative for shortness of breath.    Cardiovascular: Positive for leg swelling. Negative for chest pain.   Musculoskeletal:        Right knee pain   Skin: Negative for rash.   All other systems reviewed and are negative.      Physical Exam     Patient Vitals for the past 24 hrs:   BP Temp Pulse Heart Rate Resp SpO2 Height Weight   01/19/19 1645 117/71 --  "71 -- -- 97 % -- --   19 1630 117/66 -- 77 -- -- 95 % -- --   19 1615 128/75 -- 81 -- -- 95 % -- --   19 1608 142/84 97.8  F (36.6  C) 72 72 18 98 % 1.803 m (5' 11\") 109.3 kg (241 lb)         Physical Exam    Constitutional: Alert, attentive, GCS 15  HENT:    Nose: Nose normal.    Mouth/Throat: Oropharynx is clear, mucous membranes are dry  Eyes: EOM are normal, anicteric, conjugate gaze  CV: regular rate and rhythm; no murmurs  Chest: Effort normal and breath sounds clear without wheezing or rales, symmetric bilaterally   GI:  non tender. No distension. No guarding or rebound.    MSK: 2+ RLE edema, anterior knee incision, decreased ROM passively, no erythema, no crepitus, no significant effusion.  Neurological: Alert, attentive, moving all extremities equally.   Skin: Skin is warm and dry.      Emergency Department Course   Imaging:  XR Knee 3 views, Right:   IMPRESSION: No acute osseous abnormality demonstrated as per radiology.     US Lower Extremity Venous Duplex, Right:  IMPRESSION: No evidence of deep venous thrombosis within the right  lower extremity as per radiology.     Interventions:  0 Tramadol 50mg Oral    Emergency Department Course:  Nursing notes and vitals reviewed. (1600) I performed an exam of the patient as documented above.     Medicine administered as documented above.    The patient was sent for a XR Knee and US Doppler while in the emergency department, findings above.     (1830) I rechecked the patient and discussed the results of his workup thus far.     Findings and plan explained to the Patient. Patient discharged home with instructions regarding supportive care, medications, and reasons to return. The importance of close follow-up was reviewed.     I personally reviewed the laboratory results with the Patient and answered all related questions prior to discharge.        Impression & Plan      Medical Decision Makin-year-old male with chronic right knee pain 2 " years out from total knee arthroplasty which required revision who presents for increasing right knee pain and recurrent falls but no trauma to that knee.  He reports chronic with worsening pain in his right knee that has made him unsteady on his feet and he is undergoing secondary opinion at [facility] following total knee 2 years prior.  On exam, he has some pain with passive range of motion though no evidence of infection clinically low suspicion given his history.  X-ray of his knee shows no overt fx or hardware malpositioning. Right lower extremity ultrasound was obtained due to swelling which was negative for DVT making lymphedema most likely etiology.  Given this is long-standing pain with no acute etiology, referred him back to his orthopedist be that at [facility] orthopedics or at [facility] for his second opinion.  I continue to recommend Ace wrap, elevation, ice, Tylenol.    Diagnosis:    ICD-10-CM    1. Chronic pain of right knee M25.561     G89.29        Disposition:  discharged to home  Holland Francis MD   [employer]  7:31 PM 01/19/19       Scribe Disclosure:  I, Rashida Taylor, am serving as a scribe on 1/19/2019 at 4:00 PM to personally document services performed by Holland Francis MD based on my observations and the provider's statements to me.         Rashida Taylor  1/19/2019   [facility] EMERGENCY DEPARTMENT       Holland Francis MD  01/19/19 1931

## 2019-01-19 NOTE — TELEPHONE ENCOUNTER
"Pt has had knee surgery on both legs one in 2012 and one in 2015. He still has pain in both leg and refuses to go back to his surgeon. Would like a second opinion. Sent paperwork to [facility] but has not heard back from them yet. Advised to be seen in ED thi afternoon for severe pain in both legs that he \"can take anymore.\" Ask ED for ortho follow up referral and call [facility] Monday to see where paper work is at this point.     Fawn Rowland RN, [facility]    Reason for Disposition    [1] SEVERE pain (e.g., excruciating, unable to do any normal activities) AND [2] not improved after 2 hours of pain medicine    Additional Information    Negative: Looks like a broken bone or dislocated joint (e.g., crooked or deformed)    Negative: Sounds like a life-threatening emergency to the triager    Negative: Followed a leg injury    Negative: Leg swelling is main symptom    Negative: Back pain radiating (shooting) into leg(s)    Negative: Knee pain is main symptom    Negative: Ankle pain is main symptom    Negative: Pregnant    Negative: Chest pain    Negative: Difficulty breathing    Negative: Entire foot is cool or blue in comparison to other side    Negative: Unable to walk    Negative: [1] Red area or streak AND [2] fever    Negative: [1] Swollen joint AND [2] fever    Negative: [1] Cast on leg or ankle AND [2] now increased pain    Negative: Patient sounds very sick or weak to the triager    Protocols used: LEG PAIN-ADULT-AH      "

## 2019-01-20 NOTE — DISCHARGE INSTRUCTIONS
You should continue to use ice, and Ace wrap and Tylenol as needed for your pain.  You must make an appointment to follow-up with orthopedic surgery for repeat evaluation of your knee.  You should continue to use [facility] orthopedics as the know you well until your follow up with [facility] is arranged. You should return to the emergency department if you have increased, pain, redness to your leg.

## 2019-01-23 ENCOUNTER — HOSPITAL ENCOUNTER (OUTPATIENT)
Dept: NUCLEAR MEDICINE | Facility: CLINIC | Age: 44
Setting detail: NUCLEAR MEDICINE
End: 2019-01-23
Attending: PHYSICIAN ASSISTANT
Payer: MEDICARE

## 2019-01-23 ENCOUNTER — HOSPITAL ENCOUNTER (OUTPATIENT)
Dept: NUCLEAR MEDICINE | Facility: CLINIC | Age: 44
Setting detail: NUCLEAR MEDICINE
Discharge: HOME | End: 2019-01-23
Attending: PHYSICIAN ASSISTANT | Admitting: PHYSICIAN ASSISTANT
Payer: MEDICARE

## 2019-01-23 DIAGNOSIS — Z47.1 AFTERCARE FOLLOWING JOINT REPLACEMENT: ICD-10-CM

## 2019-01-23 PROCEDURE — 78315 BONE IMAGING 3 PHASE: CPT

## 2019-01-23 PROCEDURE — 34300033 ZZH RX 343: Performed by: PHYSICIAN ASSISTANT

## 2019-01-23 PROCEDURE — A9561 TC99M OXIDRONATE: HCPCS | Performed by: PHYSICIAN ASSISTANT

## 2019-01-23 RX ADMIN — Medication 25.5 MILLICURIE: at 13:26

## 2019-02-06 ENCOUNTER — OFFICE VISIT (OUTPATIENT)
Dept: FAMILY MEDICINE | Facility: CLINIC | Age: 44
End: 2019-02-06
Payer: MEDICARE

## 2019-02-06 VITALS
OXYGEN SATURATION: 94 % | TEMPERATURE: 97.9 F | DIASTOLIC BLOOD PRESSURE: 80 MMHG | HEART RATE: 61 BPM | WEIGHT: 241 LBS | BODY MASS INDEX: 33.61 KG/M2 | SYSTOLIC BLOOD PRESSURE: 132 MMHG

## 2019-02-06 DIAGNOSIS — Z01.818 PREOP GENERAL PHYSICAL EXAM: Primary | ICD-10-CM

## 2019-02-06 LAB — HGB BLD-MCNC: 13.5 G/DL (ref 13.3–17.7)

## 2019-02-06 PROCEDURE — 36415 COLL VENOUS BLD VENIPUNCTURE: CPT | Performed by: FAMILY MEDICINE

## 2019-02-06 PROCEDURE — 80048 BASIC METABOLIC PNL TOTAL CA: CPT | Performed by: FAMILY MEDICINE

## 2019-02-06 PROCEDURE — 85018 HEMOGLOBIN: CPT | Performed by: FAMILY MEDICINE

## 2019-02-06 PROCEDURE — 99214 OFFICE O/P EST MOD 30 MIN: CPT | Performed by: FAMILY MEDICINE

## 2019-02-06 PROCEDURE — 93000 ELECTROCARDIOGRAM COMPLETE: CPT | Performed by: FAMILY MEDICINE

## 2019-02-06 ASSESSMENT — ANXIETY QUESTIONNAIRES
GAD7 TOTAL SCORE: 9
4. TROUBLE RELAXING: NOT AT ALL
6. BECOMING EASILY ANNOYED OR IRRITABLE: NEARLY EVERY DAY
1. FEELING NERVOUS, ANXIOUS, OR ON EDGE: MORE THAN HALF THE DAYS
7. FEELING AFRAID AS IF SOMETHING AWFUL MIGHT HAPPEN: NOT AT ALL
7. FEELING AFRAID AS IF SOMETHING AWFUL MIGHT HAPPEN: NOT AT ALL
5. BEING SO RESTLESS THAT IT IS HARD TO SIT STILL: NOT AT ALL
2. NOT BEING ABLE TO STOP OR CONTROL WORRYING: MORE THAN HALF THE DAYS
GAD7 TOTAL SCORE: 9
GAD7 TOTAL SCORE: 9
3. WORRYING TOO MUCH ABOUT DIFFERENT THINGS: MORE THAN HALF THE DAYS

## 2019-02-06 ASSESSMENT — PATIENT HEALTH QUESTIONNAIRE - PHQ9
SUM OF ALL RESPONSES TO PHQ QUESTIONS 1-9: 5
SUM OF ALL RESPONSES TO PHQ QUESTIONS 1-9: 5
10. IF YOU CHECKED OFF ANY PROBLEMS, HOW DIFFICULT HAVE THESE PROBLEMS MADE IT FOR YOU TO DO YOUR WORK, TAKE CARE OF THINGS AT HOME, OR GET ALONG WITH OTHER PEOPLE: NOT DIFFICULT AT ALL
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
## 2019-02-06 NOTE — LETTER
My Asthma Action Plan  Name: Andrea Uribe   YOB: 1975  Date: 2/6/2019   My doctor: Mychal Aburto MD   My clinic: [facility]        Current Outpatient Medications   Medication     albuterol (PROAIR HFA/PROVENTIL HFA/VENTOLIN HFA) 108 (90 Base) MCG/ACT Inhaler     cyanocobalamin (VITAMIN B12) 1000 MCG/ML injection     KEPPRA 1000 MG TABS     lisinopril (PRINIVIL/ZESTRIL) 5 MG tablet     order for DME     phenytoin (DILANTIN) 100 MG CR capsule     simvastatin (ZOCOR) 20 MG tablet     venlafaxine (EFFEXOR-XR) 75 MG 24 hr capsule     vitamin D (ERGOCALCIFEROL) 89080 UNIT capsule     cyclobenzaprine (FLEXERIL) 5 MG tablet     fluticasone (FLONASE) 50 MCG/ACT spray     loratadine-pseudoePHEDrine (CLARITIN-D 24-HOUR)  MG per 24 hr tablet     No current facility-administered medications for this visit.       My Asthma Severity: mild persistent  Avoid your asthma triggers: dust mites and pollens  upper respiratory infections            GREEN ZONE   Good Control    I feel good    No cough or wheeze    Can work, sleep and play without asthma symptoms       Take your asthma control medicine every day.     1. If exercise triggers your asthma, take your rescue medication    15 minutes before exercise or sports, and    During exercise if you have asthma symptoms  2. Spacer to use with inhaler: If you have a spacer, make sure to use it with your inhaler             YELLOW ZONE Getting Worse  I have ANY of these:    I do not feel good    Cough or wheeze    Chest feels tight    Wake up at night   1. Keep taking your Green Zone medications  2. Start taking your rescue medicine:    every 20 minutes for up to 1 hour. Then every 4 hours for 24-48 hours.  3. If you stay in the Yellow Zone for more than 12-24 hours, contact your doctor.  4. If you do not return to the Green Zone in 12-24 hours or you get worse, start taking your oral steroid medicine if prescribed by your provider.            RED ZONE Medical Alert - Get Help  I have ANY of these:    I feel awful    Medicine is not helping    Breathing getting harder    Trouble walking or talking    Nose opens wide to breathe       1. Take your rescue medicine NOW  2. If your provider has prescribed an oral steroid medicine, start taking it NOW  3. Call your doctor NOW  4. If you are still in the Red Zone after 20 minutes and you have not reached your doctor:    Take your rescue medicine again and    Call 911 or go to the emergency room right away    See your regular doctor within 2 weeks of an Emergency Room or Urgent Care visit for follow-up treatment.          Annual Reminders:  Meet with Asthma Educator,  Flu Shot in the Fall, consider Pneumonia Vaccination for patients with asthma (aged 19 and older).    Pharmacy:    [facility] #[id number] - [place], [place] - [address] [place], [place] - [id number] [facility]                      Asthma Triggers  How To Control Things That Make Your Asthma Worse    Triggers are things that make your asthma worse.  Look at the list below to help you find your triggers and what you can do about them.  You can help prevent asthma flare-ups by staying away from your triggers.      Trigger                                                          What you can do   Cigarette Smoke  Tobacco smoke can make asthma worse. Do not allow smoking in your home, car or around you.  Be sure no one smokes at a child s day care or school.  If you smoke, ask your health care provider for ways to help you quit.  Ask family members to quit too.  Ask your health care provider for a referral to Quit Plan to help you quit smoking, or call 3-305-104-PLAN.     Colds, Flu, Bronchitis  These are common triggers of asthma. Wash your hands often.  Don t touch your eyes, nose or mouth.  Get a flu shot every year.     Dust Mites  These are tiny bugs that live in cloth or carpet. They are too small to see.  Wash sheets and blankets in hot water every week.   Encase pillows and mattress in dust mite proof covers.  Avoid having carpet if you can. If you have carpet, vacuum weekly.   Use a dust mask and HEPA vacuum.   Pollen and Outdoor Mold  Some people are allergic to trees, grass, or weed pollen, or molds. Try to keep your windows closed.  Limit time out doors when pollen count is high.   Ask you health care provider about taking medicine during allergy season.     Animal Dander  Some people are allergic to skin flakes, urine or saliva from pets with fur or feathers. Keep pets with fur or feathers out of your home.    If you can t keep the pet outdoors, then keep the pet out of your bedroom.  Keep the bedroom door closed.  Keep pets off cloth furniture and away from stuffed toys.     Mice, Rats, and Cockroaches  Some people are allergic to the waste from these pests.   Cover food and garbage.  Clean up spills and food crumbs.  Store grease in the refrigerator.   Keep food out of the bedroom.   Indoor Mold  This can be a trigger if your home has high moisture. Fix leaking faucets, pipes, or other sources of water.   Clean moldy surfaces.  Dehumidify basement if it is damp and smelly.   Smoke, Strong Odors, and Sprays  These can reduce air quality. Stay away from strong odors and sprays, such as perfume, powder, hair spray, paints, smoke incense, paint, cleaning products, candles and new carpet.   Exercise or Sports  Some people with asthma have this trigger. Be active!  Ask your doctor about taking medicine before sports or exercise to prevent symptoms.    Warm up for 5-10 minutes before and after sports or exercise.     Other Triggers of Asthma  Cold air:  Cover your nose and mouth with a scarf.  Sometimes laughing or crying can be a trigger.  Some medicines and food can trigger asthma.

## 2019-02-06 NOTE — LETTER
[facility]  [address] [place], [place], [address]  [phone]        February 6, 2019    Andrea Uribe                                                                                                                                                       [id number] [facility] [id number]    Continues off work for right leg pain until 2/18/2019. Limited to lifting less than 5 pounds until 3/18/2019.          Mychal Aburto MD

## 2019-02-07 LAB
ANION GAP SERPL CALCULATED.3IONS-SCNC: 5 MMOL/L (ref 3–14)
BUN SERPL-MCNC: 14 MG/DL (ref 7–30)
CALCIUM SERPL-MCNC: 8.9 MG/DL (ref 8.5–10.1)
CHLORIDE SERPL-SCNC: 103 MMOL/L (ref 94–109)
CO2 SERPL-SCNC: 29 MMOL/L (ref 20–32)
CREAT SERPL-MCNC: 1.14 MG/DL (ref 0.66–1.25)
GFR SERPL CREATININE-BSD FRML MDRD: 78 ML/MIN/{1.73_M2}
GLUCOSE SERPL-MCNC: 85 MG/DL (ref 70–99)
POTASSIUM SERPL-SCNC: 4.1 MMOL/L (ref 3.4–5.3)
SODIUM SERPL-SCNC: 137 MMOL/L (ref 133–144)

## 2019-02-08 ASSESSMENT — PATIENT HEALTH QUESTIONNAIRE - PHQ9: SUM OF ALL RESPONSES TO PHQ QUESTIONS 1-9: 5

## 2019-02-08 ASSESSMENT — ANXIETY QUESTIONNAIRES: GAD7 TOTAL SCORE: 9

## 2019-03-02 ENCOUNTER — TELEPHONE (OUTPATIENT)
Dept: FAMILY MEDICINE | Facility: CLINIC | Age: 44
End: 2019-03-02

## 2019-03-02 DIAGNOSIS — S06.9X9D TRAUMATIC BRAIN INJURY WITH LOSS OF CONSCIOUSNESS, SUBSEQUENT ENCOUNTER: Primary | ICD-10-CM

## 2019-03-02 DIAGNOSIS — R26.0 ATAXIC GAIT: ICD-10-CM

## 2019-03-02 NOTE — TELEPHONE ENCOUNTER
Received 8 page fax for Wheelchair Detailed Written Order for Orestes Medley to complete. Form in the in-basket at Z's desk.

## 2019-03-02 NOTE — LETTER
[facility]  [address] [place], [address]  [phone]        March 18, 2019    Andrea Uribe                                                                                                                                                       [id number] [facility] [id number]            Dear Andrea,      We have tried contacting you multiple times but have been unsuccessful.    We received a form for a wheelchair order from [facility]. There are a few things needed prior to the order being placed:    1. Physical therapy evaluation for a wheelchair evaluation.     2. A face to face office visit with myself after evaluation is complete.      I have placed the physical therapy order for the assessment. Please have that done and then schedule an office visit with myself for the face to face visit.     Sincerely,      Alberto Medley PA-C

## 2019-03-04 NOTE — TELEPHONE ENCOUNTER
I have reviewed form from [facility] on requirements of his insurance prior to obtaining a new wheelchair. Form states the following are needed prior to order being placed.     1. OT/PT evaluation for a wheelchair evaluation.     2. A face to face OV with myself after evaluation is complete assess the chief complaint, physical assessment with documentation on need for this, and the reason or prognosis expected for the use.     I have placed the physical therapy order for the assessment. I have placed form in my folder at [facility] to keep until assessment and face to face OV is performed.     -Orestes Medley, PAC

## 2019-04-14 DIAGNOSIS — F33.1 MAJOR DEPRESSIVE DISORDER, RECURRENT EPISODE, MODERATE (H): ICD-10-CM

## 2019-04-14 DIAGNOSIS — F41.9 ANXIETY: ICD-10-CM

## 2019-04-14 NOTE — TELEPHONE ENCOUNTER
"Requested Prescriptions   Pending Prescriptions Disp Refills     venlafaxine (EFFEXOR-XR) 75 MG 24 hr capsule [Pharmacy Med Name: VENLAFAXINE HCL ER 75 MG CAP]  Last Written Prescription Date:  1/18/2019  Last Fill Quantity: 90 capsule,  # refills: 0   Last office visit: 2/6/2019 with prescribing provider:  Criselda   Future Office Visit:     90 capsule 0     Sig: TAKE 1 CAPSULE BY MOUTH EVERY DAY       Serotonin-Norepinephrine Reuptake Inhibitors  Failed - 4/14/2019  7:32 AM        Failed - PHQ-9 score of less than 5 in past 6 months     PHQ-9 SCORE 9/8/2017 5/23/2018 2/6/2019   PHQ-9 Total Score - - -   PHQ-9 Total Score MyChart - - 5 (Mild depression)   PHQ-9 Total Score 3 4 5               Passed - Blood pressure under 140/90 in past 12 months     BP Readings from Last 3 Encounters:   02/06/19 132/80   01/19/19 123/71   12/17/18 112/78                 Passed - Medication is active on med list        Passed - Patient is age 18 or older        Passed - Normal serum creatinine on file in past 12 months     Recent Labs   Lab Test 02/06/19  1556  05/17/16  1104   CR 1.14   < >  --    CREAT  --   --  1.1    < > = values in this interval not displayed.             Passed - Recent (6 mo) or future (30 days) visit within the authorizing provider's specialty     Patient had office visit in the last 6 months or has a visit in the next 30 days with authorizing provider or within the authorizing provider's specialty.  See \"Patient Info\" tab in inbasket, or \"Choose Columns\" in Meds & Orders section of the refill encounter.              "

## 2019-04-15 RX ORDER — VENLAFAXINE HYDROCHLORIDE 75 MG/1
CAPSULE, EXTENDED RELEASE ORAL
Qty: 30 CAPSULE | Refills: 0 | Status: SHIPPED | OUTPATIENT
Start: 2019-04-15 | End: 2019-05-09

## 2019-04-15 NOTE — TELEPHONE ENCOUNTER
Routing refill request to provider for review/approval because:  Labs out of range:  PHQ9 >4.   * will be out of medication before appointment.   Patient set up appointment for 4/18/19    Maria Del Carmen Florian RN Flex

## 2019-04-24 ENCOUNTER — TELEPHONE (OUTPATIENT)
Dept: FAMILY MEDICINE | Facility: CLINIC | Age: 44
End: 2019-04-24

## 2019-04-24 NOTE — TELEPHONE ENCOUNTER
Patient called wants a new DME order written for a new wheelchair. 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     Mitra Valiente/MCKAYLA

## 2019-04-24 NOTE — TELEPHONE ENCOUNTER
See 3/2/19 telephone note. Tried calling and couldn't get a hold of patient. Last time this was requested it was denied due to needing an OT/PT evaluation, a face to face ov with myself in regards to this. I placed a referral for therapy but does not look like this has been done yet and will also need OV with me after this evaluation is needed. Insurance will not cover this without these requirements.     -Orestes Medley, PAC

## 2019-05-09 DIAGNOSIS — F41.9 ANXIETY: ICD-10-CM

## 2019-05-09 DIAGNOSIS — F33.1 MAJOR DEPRESSIVE DISORDER, RECURRENT EPISODE, MODERATE (H): ICD-10-CM

## 2019-05-09 NOTE — TELEPHONE ENCOUNTER
"Requested Prescriptions   Pending Prescriptions Disp Refills     venlafaxine (EFFEXOR-XR) 75 MG 24 hr capsule [Pharmacy Med Name: VENLAFAXINE HCL ER 75 MG CAP] 30 capsule 0     Sig: TAKE 1 CAPSULE BY MOUTH EVERY DAY    Last Written Prescription Date:  4/15/19  Last Fill Quantity: 30 capsules,  # refills: 0   Last office visit: 2/6/2019 with prescribing provider:  Criselda   Future Office Visit:           Serotonin-Norepinephrine Reuptake Inhibitors  Failed - 5/9/2019  9:36 AM        Failed - PHQ-9 score of less than 5 in past 6 months     Please review last PHQ-9 score.   PHQ-9 SCORE 9/8/2017 5/23/2018 2/6/2019   PHQ-9 Total Score - - -   PHQ-9 Total Score MyChart - - 5 (Mild depression)   PHQ-9 Total Score 3 4 5     DAYAMI-7 SCORE 9/8/2017 5/23/2018 2/6/2019   Total Score - - -   Total Score - - 9 (mild anxiety)   Total Score 9 12 9             Passed - Blood pressure under 140/90 in past 12 months     BP Readings from Last 3 Encounters:   02/06/19 132/80   01/19/19 123/71   12/17/18 112/78                 Passed - Medication is active on med list        Passed - Patient is age 18 or older        Passed - Normal serum creatinine on file in past 12 months     Recent Labs   Lab Test 02/06/19  1556  05/17/16  1104   CR 1.14   < >  --    CREAT  --   --  1.1    < > = values in this interval not displayed.             Passed - Recent (6 mo) or future (30 days) visit within the authorizing provider's specialty     Patient had office visit in the last 6 months or has a visit in the next 30 days with authorizing provider or within the authorizing provider's specialty.  See \"Patient Info\" tab in inbasket, or \"Choose Columns\" in Meds & Orders section of the refill encounter.              "

## 2019-05-10 NOTE — TELEPHONE ENCOUNTER
Routing refill request to provider for review/approval because:  PHQ-9 SCORE 9/8/2017 5/23/2018 2/6/2019   PHQ-9 Total Score - - -   PHQ-9 Total Score MyChart - - 5 (Mild depression)   PHQ-9 Total Score 3 4 5

## 2019-05-12 RX ORDER — VENLAFAXINE HYDROCHLORIDE 75 MG/1
CAPSULE, EXTENDED RELEASE ORAL
Qty: 30 CAPSULE | Refills: 0 | Status: SHIPPED | OUTPATIENT
Start: 2019-05-12 | End: 2020-01-08

## 2019-05-25 ENCOUNTER — OFFICE VISIT (OUTPATIENT)
Dept: URGENT CARE | Facility: URGENT CARE | Age: 44
End: 2019-05-25
Payer: MEDICARE

## 2019-05-25 VITALS
TEMPERATURE: 99 F | OXYGEN SATURATION: 96 % | SYSTOLIC BLOOD PRESSURE: 134 MMHG | HEART RATE: 79 BPM | DIASTOLIC BLOOD PRESSURE: 82 MMHG

## 2019-05-25 DIAGNOSIS — T30.0 BURN: Primary | ICD-10-CM

## 2019-05-25 PROCEDURE — 99214 OFFICE O/P EST MOD 30 MIN: CPT | Performed by: FAMILY MEDICINE

## 2019-05-25 RX ORDER — HYDROCODONE BITARTRATE AND ACETAMINOPHEN 5; 325 MG/1; MG/1
1 TABLET ORAL EVERY 6 HOURS PRN
Qty: 12 TABLET | Refills: 0 | Status: SHIPPED | OUTPATIENT
Start: 2019-05-25 | End: 2019-05-28

## 2019-05-25 NOTE — PROGRESS NOTES
Subjective     Andrea Uribe is a 43 year old male who presents to clinic today for the following health issues:    He burned his finger on a ball that had a while working on an automobile.    He is a     This does happen today couple of hours ago he is having significant pain.    He is in a wheelchair as he is seen for this visit he has a cast on his right leg he has orthopedic injuries.            Patient Active Problem List   Diagnosis     Seizure disorder (H)     TBI (traumatic brain injury) (H)     Moderate major depression (H)     Obesity     Anxiety     Sleep apnea     GERD (gastroesophageal reflux disease)     Intermittent asthma     CKD (chronic kidney disease) stage 2, GFR 60-89 ml/min     MRSA cellulitis     Anemia     Femur fracture, right (H)     Physical deconditioning     S/P total knee replacement     Health Care Home     Hyperlipidemia LDL goal <130     Incisional hernia, without obstruction or gangrene     Ataxic gait     S/P total knee arthroplasty     Acute post-operative pain     Short heel cord, right     Past Surgical History:   Procedure Laterality Date     APPENDECTOMY OPEN  8/11/2012    Procedure: APPENDECTOMY OPEN;  Exploratory Laparotomy, Appendectomy, Remove part IVC filter from duodenum with repair;  Surgeon: Michael Jimenez MD;  Location:  OR     ARTHROPLASTY KNEE Right 8/27/2014    Procedure: ARTHROPLASTY KNEE;  Surgeon: David Mckay MD;  Location:  OR     ARTHROPLASTY REVISION KNEE Right 12/13/2016    Procedure: ARTHROPLASTY REVISION KNEE;  Surgeon: David Mckay MD;  Location:  OR     ENT SURGERY      tracheostomy     ESOPHAGOSCOPY, GASTROSCOPY, DUODENOSCOPY (EGD), COMBINED  8/11/2012    Procedure: COMBINED ESOPHAGOSCOPY, GASTROSCOPY, DUODENOSCOPY (EGD);   ESOPHAGOSCOPY, GASTROSCOPY, DUODENOSCOPY (EGD);  Surgeon: Holland Lawson MD;  Location:  GI     HERNIORRHAPHY INCISIONAL (LOCATION) N/A 5/26/2016    Procedure: HERNIORRHAPHY  INCISIONAL (LOCATION);  Surgeon: John Mcfarlane MD;  Location: [facility] OR     IMPLANT STIMULATOR VAGUS NERVE  1/16/2012    Procedure:IMPLANT STIMULATOR VAGUS NERVE; VAGUS NERVE STIMULATOR IMPLANT; Surgeon:LEILANI CORTÉS; Location: [facility]     LAPAROSCOPIC CHOLECYSTECTOMY N/A 5/4/2017    Procedure: LAPAROSCOPIC CHOLECYSTECTOMY;  LAPAROSCOPIC CHOLECYSTECTOMY ;  Surgeon: John Mcfarlane MD;  Location: [facility] OR     LAPAROTOMY EXPLORATORY  8/11/2012    Procedure: LAPAROTOMY EXPLORATORY;;  Surgeon: Michael Jimenez MD;  Location: [facility] OR     OPEN REDUCTION INTERNAL FIXATION FEMUR DISTAL  1/22/2014    Procedure: OPEN REDUCTION INTERNAL FIXATION FEMUR DISTAL;  right distal femur Open reduction internal fixation        ORTHOPEDIC SURGERY      removal of femur bone growth,hand surgery, knee surgery     ORTHOPEDIC SURGERY Right 10/30/2017    enlonging muscle     REMOVE HARDWARE KNEE Right 8/27/2014    Procedure: REMOVE HARDWARE KNEE;  Surgeon: David Mckay MD;  Location:  OR     REMOVE HARDWARE LOWER EXTREMITY Right 10/14/2016    Procedure: REMOVE HARDWARE LOWER EXTREMITY;  Surgeon: David Mckay MD;  Location: [facility] OR     spleen surgery      repaired     SURGICAL HISTORY OF -  Left 2009    selam in left femur     SURGICAL HISTORY OF -       screws in right knee     SURGICAL HISTORY OF -       .IVC filter, parts removed       Social History     Tobacco Use     Smoking status: Never Smoker     Smokeless tobacco: Never Used   Substance Use Topics     Alcohol use: No     Alcohol/week: 0.0 oz     Family History   Problem Relation Age of Onset     Cardiovascular Mother      Cerebrovascular Disease Mother      Gastrointestinal Disease Father         Colitis     Diabetes Sister      Other Cancer Other          Current Outpatient Medications   Medication Sig Dispense Refill     albuterol (PROAIR HFA/PROVENTIL HFA/VENTOLIN HFA) 108 (90 Base) MCG/ACT Inhaler Inhale 2 puffs into the lungs every 6 hours as needed for  shortness of breath / dyspnea 2 Inhaler 3     cyanocobalamin (VITAMIN B12) 1000 MCG/ML injection Inject 1 mL (1,000 mcg) into the muscle every 30 days 1 mL 11     cyclobenzaprine (FLEXERIL) 5 MG tablet Take 1-2 tabs at night as needed for lumbar spasms affecting sleep. Appointment required for future refills 30 tablet 0     fluticasone (FLONASE) 50 MCG/ACT spray Spray 1-2 sprays into both nostrils daily 16 g 3     KEPPRA 1000 MG TABS Take 2,000 mg by mouth 2 times daily At 0900 and 2100       lisinopril (PRINIVIL/ZESTRIL) 5 MG tablet Take 1 tablet (5 mg) by mouth daily 90 tablet 3     phenytoin (DILANTIN) 100 MG CR capsule Take 300 mg by mouth 2 times daily 300mg in the am and 200mg at pm       simvastatin (ZOCOR) 20 MG tablet TAKE 1 TABLET BY MOUTH AT BEDTIME 90 tablet 2     vitamin D (ERGOCALCIFEROL) 14859 UNIT capsule TAKE 1 CAPSULE BY MOUTH ONCE WEEKLY  2     loratadine-pseudoePHEDrine (CLARITIN-D 24-HOUR)  MG per 24 hr tablet Take 1 tablet by mouth daily (Patient not taking: Reported on 5/25/2019) 14 tablet 0     order for DME Equipment being ordered: Wheelchair 1 Device 0     venlafaxine (EFFEXOR-XR) 75 MG 24 hr capsule TAKE 1 CAPSULE BY MOUTH EVERY DAY (Patient not taking: Reported on 5/25/2019) 30 capsule 0       Reviewed and updated as needed this visit by Provider         Review of Systems   ROS COMP: Constitutional, HEENT, cardiovascular, pulmonary, GI, , musculoskeletal, neuro, skin, endocrine and psych systems are negative, except as otherwise noted.      Objective    /82 (BP Location: Right arm, Patient Position: Chair, Cuff Size: Adult Small)   Pulse 79   Temp 99  F (37.2  C) (Oral)   SpO2 96%   There is no height or weight on file to calculate BMI.  Physical Exam   GENERAL: healthy, alert and significant distress. distress  NECK: no adenopathy, no asymmetry, masses, or scars and thyroid normal to palpation  RESP: lungs clear to auscultation - no rales, rhonchi or wheezes  CV: regular  "rate and rhythm, normal S1 S2, no S3 or S4, no murmur, click or rub, no peripheral edema and peripheral pulses strong  ABDOMEN: soft, nontender, no hepatosplenomegaly, no masses and bowel sounds normal  MS: no gross musculoskeletal defects noted, no edema  SKIN: no suspicious lesions or rashes and palmar aspect of the distal end of his fifth digit as an elongated burn ailyn.    Diagnostic Test Results:  Labs reviewed in Epic  none         Assessment & Plan   Assessment  1.  Second-degree burn    Plan  1.  Silvadene cream  2.   norco 12  Follow with your primary care in the coming week3.       BMI:   Estimated body mass index is 33.61 kg/m  as calculated from the following:    Height as of 1/19/19: 1.803 m (5' 11\").    Weight as of 2/6/19: 109.3 kg (241 lb).               No follow-ups on file.    Anastacio Vazquez MD  [facility]        "

## 2019-08-27 ENCOUNTER — TELEPHONE (OUTPATIENT)
Dept: FAMILY MEDICINE | Facility: CLINIC | Age: 44
End: 2019-08-27

## 2019-08-27 NOTE — TELEPHONE ENCOUNTER
Pt reports he has PT orders from [facility]. Prefers to have PT closer to home. He has written orders but does not have access to fax.    We connected via [employer]/AfterYes with Tami in [facility] in our building.   Pt will drop off and review [facility] PT orders with [facility] staff this morning.    Marbin Danielle RN

## 2019-08-28 ENCOUNTER — THERAPY VISIT (OUTPATIENT)
Dept: PHYSICAL THERAPY | Facility: CLINIC | Age: 44
End: 2019-08-28
Payer: MEDICARE

## 2019-08-28 ENCOUNTER — VIRTUAL VISIT (OUTPATIENT)
Dept: FAMILY MEDICINE | Facility: CLINIC | Age: 44
End: 2019-08-28
Payer: MEDICARE

## 2019-08-28 DIAGNOSIS — G89.29 CHRONIC PAIN OF RIGHT KNEE: ICD-10-CM

## 2019-08-28 DIAGNOSIS — M67.01 CONTRACTURE OF RIGHT ACHILLES TENDON: Primary | ICD-10-CM

## 2019-08-28 DIAGNOSIS — M25.561 CHRONIC PAIN OF RIGHT KNEE: ICD-10-CM

## 2019-08-28 DIAGNOSIS — M24.561 FLEXION CONTRACTURE OF RIGHT KNEE: ICD-10-CM

## 2019-08-28 PROCEDURE — 97110 THERAPEUTIC EXERCISES: CPT | Mod: GP | Performed by: PHYSICAL THERAPIST

## 2019-08-28 PROCEDURE — 97140 MANUAL THERAPY 1/> REGIONS: CPT | Mod: GP | Performed by: PHYSICAL THERAPIST

## 2019-08-28 PROCEDURE — G2012 BRIEF CHECK IN BY MD/QHP: HCPCS | Performed by: PHYSICIAN ASSISTANT

## 2019-08-28 PROCEDURE — 97161 PT EVAL LOW COMPLEX 20 MIN: CPT | Mod: GP | Performed by: PHYSICAL THERAPIST

## 2019-08-28 ASSESSMENT — ACTIVITIES OF DAILY LIVING (ADL)
RISE FROM A CHAIR: ACTIVITY IS SOMEWHAT DIFFICULT
WALK: ACTIVITY IS VERY DIFFICULT
HOW_WOULD_YOU_RATE_THE_OVERALL_FUNCTION_OF_YOUR_KNEE_DURING_YOUR_USUAL_DAILY_ACTIVITIES?: NEARLY NORMAL
SIT WITH YOUR KNEE BENT: I AM UNABLE TO DO THE ACTIVITY
GIVING WAY, BUCKLING OR SHIFTING OF KNEE: THE SYMPTOM AFFECTS MY ACTIVITY SLIGHTLY
WEAKNESS: THE SYMPTOM PREVENTS ME FROM ALL DAILY ACTIVITIES
GO DOWN STAIRS: ACTIVITY IS MINIMALLY DIFFICULT
SQUAT: I AM UNABLE TO DO THE ACTIVITY
KNEE_ACTIVITY_OF_DAILY_LIVING_SCORE: 34.29
HOW_WOULD_YOU_RATE_THE_CURRENT_FUNCTION_OF_YOUR_KNEE_DURING_YOUR_USUAL_DAILY_ACTIVITIES_ON_A_SCALE_FROM_0_TO_100_WITH_100_BEING_YOUR_LEVEL_OF_KNEE_FUNCTION_PRIOR_TO_YOUR_INJURY_AND_0_BEING_THE_INABILITY_TO_PERFORM_ANY_OF_YOUR_USUAL_DAILY_ACTIVITIES?: 40
KNEE_ACTIVITY_OF_DAILY_LIVING_SUM: 24
STAND: ACTIVITY IS VERY DIFFICULT
PAIN: THE SYMPTOM AFFECTS MY ACTIVITY MODERATELY
SWELLING: THE SYMPTOM AFFECTS MY ACTIVITY SEVERELY
RAW_SCORE: 24
STIFFNESS: THE SYMPTOM AFFECTS MY ACTIVITY MODERATELY
GO UP STAIRS: ACTIVITY IS MINIMALLY DIFFICULT
AS_A_RESULT_OF_YOUR_KNEE_INJURY,_HOW_WOULD_YOU_RATE_YOUR_CURRENT_LEVEL_OF_DAILY_ACTIVITY?: SEVERELY ABNORMAL
LIMPING: THE SYMPTOM AFFECTS MY ACTIVITY SLIGHTLY
KNEEL ON THE FRONT OF YOUR KNEE: I AM UNABLE TO DO THE ACTIVITY

## 2019-08-28 NOTE — PROGRESS NOTES
"Andrea Uribe is a 43 year old male who is being evaluated via a telephone visit.      The patient has been notified of following (by FLORENTIN Rutledge MA  [facility].           \"We have found that certain health care needs can be provided without the need for a physical exam.  This service lets us provide the care you need with a short phone conversation.  If a prescription is necessary we can send it directly to your pharmacy.  If lab work is needed we can place an order for that and you can then stop by our lab to have the test done at a later time.    This telephone visit will be conducted via 3 way call with the you (the patient) , the physician/provider, and a me all on the line at the same time.  This allows your physician/provider to have the phone conversation with you while I will be taking notes for your medical record.  We will have full access to your [place] medical record during this entire phone call.    Since this is like an office visit,  will bill your insurance company for this service.  Please check with your medical insurance if this type of telephone/virtual is covered . You may be responsible for the cost of this service if insurance coverage is denied.  The typical cost is $30 (10min), $59(11-20min) and $85 (21-30min)     If during the course of the call the physician/provider feels a telephone visit is not appropriate, you will not be charged for this service\"    Consent has been obtained for this service by care team member: yes.  See the scanned image in the medical record.    S: The history as provided by the patient to the provider during this 3 way call include below:    Pertinent parts of the the patient's medical history reviewed and confirmed by the provider included :     Patient has a history of contracture heel cord on the right and underwent surgery through [facility] for fixation. He is due for starting physical therapy through [facility] today and was recommended to have a " walker to aid with assistance throughout therapy and ambulation in the home. Currently has a wheelchair as well, but per patient has been encouraged by his surgeon to obtain a walker. He requests this to go to [facility].     Addendum: for further clarification of ambulation in the home, this walker would be needed to assist with using the bathroom to get to and from the toilet as well as too and from bed. It will also be used to complete his necessary physical therapy. Patient reports the walker can be used safely in the home. He states the wheelchair can be used in the home, but the walker would help with tighter corners and halls. A crutch or cane is not able to be used due to needing more stability for ambulation and ability to rest heel cord when needed.     Total time of call between patient and provider was 5 minutes     SUSANNA Mejia (MD signature)  ===================================================    I have reviewed the note as documented above.  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 Undergoing therapy through [facility]. dme to be sent for walker to [facility] and can also  hard copy at .   Plan:
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
## 2019-08-29 ENCOUNTER — MEDICAL CORRESPONDENCE (OUTPATIENT)
Dept: HEALTH INFORMATION MANAGEMENT | Facility: CLINIC | Age: 44
End: 2019-08-29

## 2019-08-29 ENCOUNTER — TELEPHONE (OUTPATIENT)
Dept: FAMILY MEDICINE | Facility: CLINIC | Age: 44
End: 2019-08-29

## 2019-08-29 DIAGNOSIS — M67.01 CONTRACTURE OF RIGHT ACHILLES TENDON: ICD-10-CM

## 2019-08-29 DIAGNOSIS — R26.81 GAIT INSTABILITY: Primary | ICD-10-CM

## 2019-08-29 NOTE — TELEPHONE ENCOUNTER
dme updated. Please fax and also fax chart notes from my telephone visit.       -Orestes Medley,PAC

## 2019-08-29 NOTE — TELEPHONE ENCOUNTER
Received a 1 page fax from [facility]. Info needed regarding walker. Fax to Orestes Medley. Ruth Behrens.

## 2019-08-30 DIAGNOSIS — E78.5 HYPERLIPIDEMIA LDL GOAL <100: ICD-10-CM

## 2019-08-30 RX ORDER — SIMVASTATIN 20 MG
TABLET ORAL
Qty: 90 TABLET | Refills: 0 | Status: SHIPPED | OUTPATIENT
Start: 2019-08-30 | End: 2019-11-27

## 2019-09-05 ENCOUNTER — MEDICAL CORRESPONDENCE (OUTPATIENT)
Dept: HEALTH INFORMATION MANAGEMENT | Facility: CLINIC | Age: 44
End: 2019-09-05

## 2019-09-05 NOTE — TELEPHONE ENCOUNTER
Received a 3 page fax from [facility], requesting additional info. Fax to Orestes Medley. Ruth Behrens.

## 2019-09-05 NOTE — TELEPHONE ENCOUNTER
Re-faxed additional info requested to [facility] [phone]. Ruth Behrens.

## 2019-09-10 ENCOUNTER — THERAPY VISIT (OUTPATIENT)
Dept: PHYSICAL THERAPY | Facility: CLINIC | Age: 44
End: 2019-09-10
Payer: MEDICARE

## 2019-09-10 DIAGNOSIS — G89.29 CHRONIC PAIN OF RIGHT KNEE: ICD-10-CM

## 2019-09-10 DIAGNOSIS — M25.561 CHRONIC PAIN OF RIGHT KNEE: ICD-10-CM

## 2019-09-10 DIAGNOSIS — M24.561 FLEXION CONTRACTURE OF RIGHT KNEE: ICD-10-CM

## 2019-09-10 PROCEDURE — 97110 THERAPEUTIC EXERCISES: CPT | Mod: GP | Performed by: PHYSICAL THERAPIST

## 2019-09-10 PROCEDURE — 97140 MANUAL THERAPY 1/> REGIONS: CPT | Mod: GP | Performed by: PHYSICAL THERAPIST

## 2019-09-10 NOTE — PROGRESS NOTES
SUBJECTIVE  Subjective changes as noted by pt:   Patient received verbal auth from ankle surgeon to exercise ankle. Working on his exercises. Still has not gotten new walker.    Current pain level: NA     Changes in function:  Yes (See Goal flowsheet attached for changes in current functional level)     Adverse reaction to treatment or activity:  None    OBJECTIVE  Changes in objective findings:  Yes,  R hip extension 5 deg in Shon position. Hamstrings allow SLR to 80(AA). Resting knee extension -20 deg. Active R DF 0 deg, passive 10 deg. Poor quad set. Fair patellar mobility. Able to ambulate 20 feet with CAM on R pushing WC.     ASSESSMENT  Andrea continues to require intervention to meet STG and LTG's: PT  Patient's symptoms are resolving.  Patient is ready to progress to more complex exercises.  Response to therapy has shown an improvement in  ROM , flexibility, strength, gait and function  Progress made towards STG/LTG?  Yes (See Goal flowsheet attached for updates on achievement of STG and LTG)    PLAN  Current treatment program is being advanced to more complex exercises.    PTA/ATC plan:  N/A    Please refer to the daily flowsheet for treatment today, total treatment time and time spent performing 1:1 timed codes.

## 2019-09-10 NOTE — TELEPHONE ENCOUNTER
Patient called states that [facility] never received the DME orders for 4-wheeled walker with a seat and hand brakes    Please refax order to them     Mitra Valiente/MCKAYLA

## 2019-09-11 NOTE — TELEPHONE ENCOUNTER
09/11/2019    Patient called stating that [facility] did receive the DME but they specifically need the chart notes to be filled out specifying why patient needs the Walker.Patient also stated he does return to work on 09/23/2019 and would like to get it soon before he returns. Patient is requesting we fax the forms fax:[phone] and would like to be contacted once papers have been faxed at ph: [phone] or ph: [phone]    Sherri Alfaro -Patient Representative

## 2019-09-20 ENCOUNTER — THERAPY VISIT (OUTPATIENT)
Dept: PHYSICAL THERAPY | Facility: CLINIC | Age: 44
End: 2019-09-20
Payer: MEDICARE

## 2019-09-20 DIAGNOSIS — M25.561 CHRONIC PAIN OF RIGHT KNEE: ICD-10-CM

## 2019-09-20 DIAGNOSIS — M24.561 FLEXION CONTRACTURE OF RIGHT KNEE: ICD-10-CM

## 2019-09-20 DIAGNOSIS — G89.29 CHRONIC PAIN OF RIGHT KNEE: ICD-10-CM

## 2019-09-20 PROCEDURE — 97110 THERAPEUTIC EXERCISES: CPT | Mod: GP | Performed by: PHYSICAL THERAPIST

## 2019-09-20 PROCEDURE — 97140 MANUAL THERAPY 1/> REGIONS: CPT | Mod: GP | Performed by: PHYSICAL THERAPIST

## 2019-09-20 NOTE — PROGRESS NOTES
SUBJECTIVE  Subjective changes as noted by pt:  Purchased 2, 4# weights and is using that to stretch knee extension prone at home. Still does not have new walker and did not bring in shoe and AFO.    Current pain level: NA     Changes in function:  Yes (See Goal flowsheet attached for changes in current functional level)     Adverse reaction to treatment or activity:  None    OBJECTIVE  Changes in objective findings:  Yes,  Resting R knee extension -15 in long sitting, -18 deg supine.  R hip extension 0 deg in Shon position. Hamstrings allow SLR to 80 deg active. Able to ambulate 60 feet with SPC in left hand and CAM on right with fair to poor balance and standby assist. Fair to good patellar mobility. Less R leg positioning into ER. Poor to fair quad set. No other changes.      ASSESSMENT  Andrea continues to require intervention to meet STG and LTG's: PT  Patient's symptoms are resolving.  Patient is ready to progress to more complex exercises.  Response to therapy has shown an improvement in  ROM , flexibility, muscle control, gait and function  Progress made towards STG/LTG?  Yes (See Goal flowsheet attached for updates on achievement of STG and LTG)    PLAN  Current treatment program is being advanced to more complex exercises.    PTA/ATC plan:  N/A    Please refer to the daily flowsheet for treatment today, total treatment time and time spent performing 1:1 timed codes.

## 2019-09-25 ENCOUNTER — THERAPY VISIT (OUTPATIENT)
Dept: PHYSICAL THERAPY | Facility: CLINIC | Age: 44
End: 2019-09-25
Payer: MEDICARE

## 2019-09-25 DIAGNOSIS — M25.561 CHRONIC PAIN OF RIGHT KNEE: ICD-10-CM

## 2019-09-25 DIAGNOSIS — G89.29 CHRONIC PAIN OF RIGHT KNEE: ICD-10-CM

## 2019-09-25 DIAGNOSIS — M24.561 FLEXION CONTRACTURE OF RIGHT KNEE: ICD-10-CM

## 2019-09-25 PROCEDURE — 97110 THERAPEUTIC EXERCISES: CPT | Mod: GP | Performed by: PHYSICAL THERAPIST

## 2019-09-25 PROCEDURE — 97140 MANUAL THERAPY 1/> REGIONS: CPT | Mod: GP | Performed by: PHYSICAL THERAPIST

## 2019-10-02 ENCOUNTER — THERAPY VISIT (OUTPATIENT)
Dept: PHYSICAL THERAPY | Facility: CLINIC | Age: 44
End: 2019-10-02
Payer: MEDICARE

## 2019-10-02 DIAGNOSIS — G89.29 CHRONIC PAIN OF RIGHT KNEE: ICD-10-CM

## 2019-10-02 DIAGNOSIS — M24.561 FLEXION CONTRACTURE OF RIGHT KNEE: ICD-10-CM

## 2019-10-02 DIAGNOSIS — M25.561 CHRONIC PAIN OF RIGHT KNEE: ICD-10-CM

## 2019-10-02 PROCEDURE — 97110 THERAPEUTIC EXERCISES: CPT | Mod: GP | Performed by: PHYSICAL THERAPIST

## 2019-10-02 PROCEDURE — 97140 MANUAL THERAPY 1/> REGIONS: CPT | Mod: GP | Performed by: PHYSICAL THERAPIST

## 2019-10-02 NOTE — TELEPHONE ENCOUNTER
Received a 4 page fax back from [facility], needing additional info. Fax to Alberto Medley. Ruth Behrens/   [facility]

## 2019-10-02 NOTE — PROGRESS NOTES
SUBJECTIVE  Subjective changes as noted by pt:  Has been walking much more with old R AFO and new rolling walker with a seat.    Current pain level:  2/10   Changes in function:  Yes (See Goal flowsheet attached for changes in current functional level)     Adverse reaction to treatment or activity:  None    OBJECTIVE  Changes in objective findings:  Yes,  R passive DF 16 deg, active remains 0. R knee extension supine 14 deg, long sit 10 deg. Active R knee extension -25 deg. Hip extension 0 in Shon position. Tolerating 90# on leg press with eccentric lowering on R.  Fair gait with R fixed AFO and rolling walker with R knee extension lag.      ASSESSMENT  Andrea continues to require intervention to meet STG and LTG's: PT  Patient's symptoms are resolving.  Patient is ready to progress to more complex exercises.  Response to therapy has shown an improvement in  pain level, ROM , strength, muscle control, gait and function  Progress made towards STG/LTG?  Yes (See Goal flowsheet attached for updates on achievement of STG and LTG)    PLAN  Current treatment program is being advanced to more complex exercises.    PTA/ATC plan:  N/A    Please refer to the daily flowsheet for treatment today, total treatment time and time spent performing 1:1 timed codes.

## 2019-10-02 NOTE — TELEPHONE ENCOUNTER
Unclear what else is needed. His previous addendum addressed all the following requirements per note    -discussed need of walker for 1 or more adls  -discussed ability to safely used walker in home.   -discussed cannot use cane or crutch.       All of these were mentioned in last addendum. Please call insurance to see what else is actually needed. If formal OT evaluation is needed, referral will be placed.     -Orestes Medley, PAC

## 2019-10-02 NOTE — TELEPHONE ENCOUNTER
Called [facility] to get more information. The Pt has already received his walker. Nothing is needed.     Didi Alarcon RN -- [facility]

## 2019-10-09 ENCOUNTER — THERAPY VISIT (OUTPATIENT)
Dept: PHYSICAL THERAPY | Facility: CLINIC | Age: 44
End: 2019-10-09
Payer: MEDICARE

## 2019-10-09 DIAGNOSIS — G89.29 CHRONIC PAIN OF RIGHT KNEE: ICD-10-CM

## 2019-10-09 DIAGNOSIS — M25.561 CHRONIC PAIN OF RIGHT KNEE: ICD-10-CM

## 2019-10-09 DIAGNOSIS — M24.561 FLEXION CONTRACTURE OF RIGHT KNEE: ICD-10-CM

## 2019-10-09 PROCEDURE — 97140 MANUAL THERAPY 1/> REGIONS: CPT | Mod: GP | Performed by: PHYSICAL THERAPIST

## 2019-10-09 PROCEDURE — 97110 THERAPEUTIC EXERCISES: CPT | Mod: GP | Performed by: PHYSICAL THERAPIST

## 2019-10-09 NOTE — PROGRESS NOTES
SUBJECTIVE  Subjective changes as noted by pt:   Uses wheelchair at work only. The rest of the time is walking with walker.    Current pain level: NA     Changes in function:  Yes (See Goal flowsheet attached for changes in current functional level)     Adverse reaction to treatment or activity:  None    OBJECTIVE  Changes in objective findings:  Yes,  R knee passive resting extension in supine -10, active -14 deg. Tolerating 100# of eccentric lowering on leg press. No other changes noted     ASSESSMENT  Andrea continues to require intervention to meet STG and LTG's: PT  Patient's symptoms are resolving.  Patient is ready to progress to more complex exercises.  Response to therapy has shown an improvement in  ROM , strength, muscle control, gait and function  Progress made towards STG/LTG?  Yes (See Goal flowsheet attached for updates on achievement of STG and LTG)    PLAN  Current treatment program is being advanced to more complex exercises.    PTA/ATC plan:  N/A    Please refer to the daily flowsheet for treatment today, total treatment time and time spent performing 1:1 timed codes.

## 2019-10-23 ENCOUNTER — THERAPY VISIT (OUTPATIENT)
Dept: PHYSICAL THERAPY | Facility: CLINIC | Age: 44
End: 2019-10-23
Payer: MEDICARE

## 2019-10-23 DIAGNOSIS — G89.29 CHRONIC PAIN OF RIGHT KNEE: ICD-10-CM

## 2019-10-23 DIAGNOSIS — M25.561 CHRONIC PAIN OF RIGHT KNEE: ICD-10-CM

## 2019-10-23 DIAGNOSIS — M24.561 FLEXION CONTRACTURE OF RIGHT KNEE: ICD-10-CM

## 2019-10-23 PROCEDURE — 97110 THERAPEUTIC EXERCISES: CPT | Mod: GP | Performed by: PHYSICAL THERAPIST

## 2019-10-23 PROCEDURE — 97140 MANUAL THERAPY 1/> REGIONS: CPT | Mod: GP | Performed by: PHYSICAL THERAPIST

## 2019-10-23 NOTE — PROGRESS NOTES
SUBJECTIVE  Subjective changes as noted by pt:   Doing some walking with cane at home. Saw achilles surgeon yesterday who is pleased with the progress. He got an order for a new AFO.  Has not tried walking at work yet. He is embarrassed by trying to walk.    Current pain level:  2/10   Changes in function:  Yes (See Goal flowsheet attached for changes in current functional level)     Adverse reaction to treatment or activity:  None    OBJECTIVE  Changes in objective findings:  Yes,  Passive R DF 18 deg. Tolerating 110# on leg press with eccentric lowering. Resting R knee extension -14, active knee extenion -20 with short arc, -16 with long arc. Good patellar mobility. Fair to poor VMO with quad set. R hip extension to 0 deg in Shon position.     ASSESSMENT  Andrea continues to require intervention to meet STG and LTG's: PT  Patient's symptoms are resolving.  Patient is ready to progress to more complex exercises.  Response to therapy has shown an improvement in  pain level, ROM , flexibility, strength, muscle control, gait and function  Progress made towards STG/LTG?  Yes (See Goal flowsheet attached for updates on achievement of STG and LTG)    PLAN  Current treatment program is being advanced to more complex exercises.    PTA/ATC plan:  N/A    Please refer to the daily flowsheet for treatment today, total treatment time and time spent performing 1:1 timed codes.

## 2019-10-30 ENCOUNTER — THERAPY VISIT (OUTPATIENT)
Dept: PHYSICAL THERAPY | Facility: CLINIC | Age: 44
End: 2019-10-30
Payer: MEDICARE

## 2019-10-30 DIAGNOSIS — M25.561 CHRONIC PAIN OF RIGHT KNEE: ICD-10-CM

## 2019-10-30 DIAGNOSIS — M24.561 FLEXION CONTRACTURE OF RIGHT KNEE: ICD-10-CM

## 2019-10-30 DIAGNOSIS — G89.29 CHRONIC PAIN OF RIGHT KNEE: ICD-10-CM

## 2019-10-30 PROCEDURE — 97140 MANUAL THERAPY 1/> REGIONS: CPT | Mod: GP | Performed by: PHYSICAL THERAPIST

## 2019-10-30 PROCEDURE — 97110 THERAPEUTIC EXERCISES: CPT | Mod: GP | Performed by: PHYSICAL THERAPIST

## 2019-11-27 DIAGNOSIS — E78.5 HYPERLIPIDEMIA LDL GOAL <100: ICD-10-CM

## 2019-11-27 DIAGNOSIS — G47.30 SLEEP APNEA, UNSPECIFIED TYPE: Primary | ICD-10-CM

## 2019-11-27 RX ORDER — SIMVASTATIN 20 MG
TABLET ORAL
Qty: 90 TABLET | Refills: 0 | Status: SHIPPED | OUTPATIENT
Start: 2019-11-27 | End: 2020-01-08

## 2019-11-27 NOTE — TELEPHONE ENCOUNTER
Patient has future apt scheduled.   Patient also requesting a DME for CPAP supplies.     If approved we are to mail to pts home address unless patient calls us back this morning with fax number to supply store.

## 2019-11-27 NOTE — TELEPHONE ENCOUNTER
Orestes - please see message below     Celia Salmon, Registered Nurse   [facility]

## 2019-11-27 NOTE — TELEPHONE ENCOUNTER
Unclear what patient's apnea-hypoxia index score is. If patient does not know this, will need to obtain supplies from sleep doctor. If new machine needed, will definitely need to get from sleep doctor.     -Nury, SUSANNA

## 2019-11-27 NOTE — TELEPHONE ENCOUNTER
Patient calling back, his apnea-hypoxia index score is 13. Patient doesn't need a new machine, states supplies are delivered every 6 months. Supplies he is looking for are as follows per patient    Ultra fine filter for respironics pr system 1 (quantity 6)    Hernandez fx/small dual wall pillow (quantity 5)    Hernandez fx frame with small dual wall pillows no head gear (quantity 1)    CPAP tubing 6 feet tubo6 (quantity 1)    [facility] doesn't have a fax # told patient we can call [phone]  Patient ID # [id number]        Delfina Huber-Patient Rep

## 2019-11-27 NOTE — TELEPHONE ENCOUNTER
Patient has not seen me in clinic for over 1 year. 1 refill given to allow for fasting annual visit. Please have schedule this.     -trae brewer, PAC

## 2019-11-29 NOTE — TELEPHONE ENCOUNTER
Fasting physical is scheduled with PCP 1/8/20. Left message with son at home # (pt is at work) for Andrea to call back. We'll let him know  DME order was faxed on Wednesday November 27.  See you in January for fasting physical.     Next 5 appointments (look out 90 days)    Jan 08, 2020  9:35 AM CST  (Arrive by 9:10 AM)  Adult Preventative Visit with Alberto Medley PA-C  [facility] ([facility]) [address] [place] [id number] [phone]      Marbin Danielle RN

## 2019-12-06 ENCOUNTER — TELEPHONE (OUTPATIENT)
Dept: FAMILY MEDICINE | Facility: CLINIC | Age: 44
End: 2019-12-06

## 2019-12-06 NOTE — TELEPHONE ENCOUNTER
Reason for Call:  Form, our goal is to have forms completed with 72 hours, however, some forms may require a visit or additional information.    Type of letter, form or note:  Ordrs    Who is the form from?: Insurance comp    Where did the form come from: form was faxed in    What clinic location was the form placed at?: [facility]     Where the form was placed: Alberto Medley Box/Folder    What number is listed as a contact on the form?: [phone]       Additional comments: Please review and intial any changes made. Fax back to [phone]    Call taken on 12/6/2019 at 6:56 AM by Mitra Valiente

## 2019-12-06 NOTE — TELEPHONE ENCOUNTER
Form in outbox. Will need to be sent to his sleep doctor. Requires sleep study results and notes  From sleep clinic. Patient was informed this may be possibility when DME was originally ordered.     -trae brewer, PAC

## 2019-12-13 ENCOUNTER — TELEPHONE (OUTPATIENT)
Dept: FAMILY MEDICINE | Facility: CLINIC | Age: 44
End: 2019-12-13

## 2019-12-13 NOTE — TELEPHONE ENCOUNTER
Patient calling and states had recent leg surgery.  Ankle and heel black and blue.  Having pain.  New AFO about a week ago.  States when saw [facility] they said his surgery was 100% healed.  States new AFO bends and previous AFO's did not.  Wondering if related.  Discussed and advised would be best to call [facility] since they see him for his AFO's and make sure they feel just from bending when had not been bending that area.  Patient agrees with plan to call [facility].  Bea Dutton RN

## 2019-12-18 ENCOUNTER — THERAPY VISIT (OUTPATIENT)
Dept: PHYSICAL THERAPY | Facility: CLINIC | Age: 44
End: 2019-12-18
Payer: MEDICARE

## 2019-12-18 DIAGNOSIS — G89.29 CHRONIC PAIN OF RIGHT KNEE: ICD-10-CM

## 2019-12-18 DIAGNOSIS — M24.561 FLEXION CONTRACTURE OF RIGHT KNEE: ICD-10-CM

## 2019-12-18 DIAGNOSIS — M25.561 CHRONIC PAIN OF RIGHT KNEE: ICD-10-CM

## 2019-12-18 PROCEDURE — 97110 THERAPEUTIC EXERCISES: CPT | Mod: GP | Performed by: PHYSICAL THERAPIST

## 2019-12-18 PROCEDURE — 97140 MANUAL THERAPY 1/> REGIONS: CPT | Mod: GP | Performed by: PHYSICAL THERAPIST

## 2019-12-18 ASSESSMENT — ACTIVITIES OF DAILY LIVING (ADL)
STIFFNESS: THE SYMPTOM PREVENTS ME FROM ALL DAILY ACTIVITIES
GO DOWN STAIRS: ACTIVITY IS NOT DIFFICULT
LIMPING: I HAVE THE SYMPTOM BUT IT DOES NOT AFFECT MY ACTIVITY
RISE FROM A CHAIR: ACTIVITY IS SOMEWHAT DIFFICULT
SWELLING: I DO NOT HAVE THE SYMPTOM
WEAKNESS: THE SYMPTOM AFFECTS MY ACTIVITY MODERATELY
STAND: ACTIVITY IS FAIRLY DIFFICULT
AS_A_RESULT_OF_YOUR_KNEE_INJURY,_HOW_WOULD_YOU_RATE_YOUR_CURRENT_LEVEL_OF_DAILY_ACTIVITY?: SEVERELY ABNORMAL
RAW_SCORE: 32
WALK: ACTIVITY IS FAIRLY DIFFICULT
KNEE_ACTIVITY_OF_DAILY_LIVING_SUM: 32
SIT WITH YOUR KNEE BENT: I AM UNABLE TO DO THE ACTIVITY
KNEE_ACTIVITY_OF_DAILY_LIVING_SCORE: 45.71
SQUAT: I AM UNABLE TO DO THE ACTIVITY
KNEEL ON THE FRONT OF YOUR KNEE: I AM UNABLE TO DO THE ACTIVITY
HOW_WOULD_YOU_RATE_THE_OVERALL_FUNCTION_OF_YOUR_KNEE_DURING_YOUR_USUAL_DAILY_ACTIVITIES?: SEVERELY ABNORMAL
GIVING WAY, BUCKLING OR SHIFTING OF KNEE: THE SYMPTOM AFFECTS MY ACTIVITY MODERATELY
PAIN: THE SYMPTOM AFFECTS MY ACTIVITY SLIGHTLY
GO UP STAIRS: ACTIVITY IS MINIMALLY DIFFICULT

## 2019-12-18 NOTE — LETTER
DEPARTMENT OF HEALTH AND HUMAN SERVICES  CENTERS FOR MEDICARE & MEDICAID SERVICES    PLAN/UPDATED PLAN OF PROGRESS FOR OUTPATIENT REHABILITATION    PATIENTS NAME:  Andrea Uribe   : 1975  PROVIDER NUMBER:    [id number]  HICN: [id number]   PROVIDER NAME: [facility] PH[facility]  MEDICAL RECORD NUMBER: [id number]   START OF CARE DATE:  19   TYPE:  PT  PRIMARY/TREATMENT DIAGNOSIS: Flexion contracture of right knee; Chronic pain of right knee  VISITS FROM START OF CARE:  Rxs Used: 9     PROGRESS  REPORT    Progress reporting period is from 19 to 19.       SUBJECTIVE  Subjective changes noted by patient:  Has not been in since 10/30/19.  Was seen at [facility] several times since then.  Surgeon happy with achilles lengthening. Had new jointed AFO made and has been wearing it for 1 1/2 weeks.  He is now walking in the house and started ambulating at work the past month. Has not been in the wheelchair for 2 1/2 weeks. He has used it only to shop in large stores. He feels uneven with walking due to his right knee not fully straightening cause the leg to be shorter than the left.     Current pain level is  8/10.     Previous pain level was   5/10.   Changes in function:  Yes (See Goal flowsheet attached for changes in current functional level)  Adverse reaction to treatment or activity: None    OBJECTIVE  Changes noted in objective findings:  Yes, : Passive R ankle DF 18 deg. Good R patellar mobility. Resting R knee extension after stretch -15 deg, short arc active knee extension -18, long arc -12. Passive knee extension -10 deg. R knee active flexion 105, passive 110. R hip extension in Shon -5 deg. Fair VMO with quad set. Tolerating 120# on leg press with eccentric lowering on the right. Gait with rolling walker and jointed R AFO is fair with fair balance and 10-15 deg extensor lag.  Adding 3/4 inch heel lift to the bottom of the right shoe improves this some.  "    ASSESSMENT/PLAN  Updated problem list and treatment plan: Diagnosis 1:  Right knee pain/flexion contracture     Pain -  hot/cold therapy  Decreased ROM/flexibility - manual therapy and therapeutic exercise  Decreased strength - therapeutic exercise and therapeutic activities  Impaired gait - gait training  Impaired muscle performance - neuro re-education  Decreased function - therapeutic activities  STG/LTGs have been met or progress has been made towards goals:  Yes (See Goal flow sheet completed today.)  Assessment of Progress: The patient's condition is improving.  Self Management Plans:  Patient has been instructed in a home treatment program.  I have re-evaluated this patient and find that the nature, scope, duration and intensity of the therapy is appropriate for the medical condition of the patient.  Andrea continues to require the following intervention to meet STG and LTG's:  PT    PATIENTS NAME:  Andrea Uribe   : 1975          Recommendations:  This patient would benefit from continued therapy.     Frequency:  1 X week, once daily  Duration:  for 7 weeks    Caregiver Signature/Credentials _____________________________ Date ________       Treating Provider:  Marline Shaikh, PT    I have reviewed and certified the need for these services and plan of treatment while under my care.        PHYSICIAN'S SIGNATURE:   _________________________________________  Date___________   Yao Grimaldo MD    Certification period:  Beginning of Cert date period: 19 to  End of Cert period date: 20     Functional Level Progress Report: Please see attached \"Goal Flow sheet for Functional level.\"    ____X____ Continue Services or       ________ DC Services                Service dates: From  SOC Date: 19 date to present                         "

## 2019-12-18 NOTE — PROGRESS NOTES
PROGRESS  REPORT    Progress reporting period is from 8/28/19 to 12/18/19.       SUBJECTIVE  Subjective changes noted by patient:   Has not been in since 10/30/19.  Was seen at [facility] several times since then.  Surgeon happy with achilles lengthening. Had new jointed AFO made and has been wearing it for 1 1/2 weeks.  He is now walking in the house and started ambulating at work the past month. Has not been in the wheelchair for 2 1/2 weeks. He has used it only to shop in large stores. He feels uneven with walking due to his right knee not fully straightening cause the leg to be shorter than the left.     Current pain level is  8/10.     Previous pain level was   5/10.   Changes in function:  Yes (See Goal flowsheet attached for changes in current functional level)  Adverse reaction to treatment or activity: None    OBJECTIVE  Changes noted in objective findings:  Yes, : Passive R ankle DF 18 deg. Good R patellar mobility. Resting R knee extension after stretch -15 deg, short arc active knee extension -18, long arc -12. Passive knee extension -10 deg. R knee active flexion 105, passive 110. R hip extension in Shon -5 deg. Fair VMO with quad set. Tolerating 120# on leg press with eccentric lowering on the right. Gait with rolling walker and jointed R AFO is fair with fair balance and 10-15 deg extensor lag.  Adding 3/4 inch heel lift to the bottom of the right shoe improves this some.     ASSESSMENT/PLAN  Updated problem list and treatment plan: Diagnosis 1:  Right knee pain/flexion contracture     Pain -  hot/cold therapy  Decreased ROM/flexibility - manual therapy and therapeutic exercise  Decreased strength - therapeutic exercise and therapeutic activities  Impaired gait - gait training  Impaired muscle performance - neuro re-education  Decreased function - therapeutic activities  STG/LTGs have been met or progress has been made towards goals:  Yes (See Goal flow sheet completed today.)  Assessment of Progress: The  patient's condition is improving.  Self Management Plans:  Patient has been instructed in a home treatment program.  I have re-evaluated this patient and find that the nature, scope, duration and intensity of the therapy is appropriate for the medical condition of the patient.  Andrea continues to require the following intervention to meet STG and LTG's:  PT    Recommendations:  This patient would benefit from continued therapy.     Frequency:  1 X week, once daily  Duration:  for 7 weeks        Please refer to the daily flowsheet for treatment today, total treatment time and time spent performing 1:1 timed codes.

## 2020-01-06 ENCOUNTER — ANCILLARY PROCEDURE (OUTPATIENT)
Dept: GENERAL RADIOLOGY | Facility: CLINIC | Age: 45
End: 2020-01-06
Attending: FAMILY MEDICINE
Payer: MEDICARE

## 2020-01-06 ENCOUNTER — OFFICE VISIT (OUTPATIENT)
Dept: FAMILY MEDICINE | Facility: CLINIC | Age: 45
End: 2020-01-06
Payer: MEDICARE

## 2020-01-06 VITALS
DIASTOLIC BLOOD PRESSURE: 90 MMHG | SYSTOLIC BLOOD PRESSURE: 156 MMHG | TEMPERATURE: 97.3 F | OXYGEN SATURATION: 96 % | HEART RATE: 65 BPM | BODY MASS INDEX: 34.31 KG/M2 | WEIGHT: 246 LBS

## 2020-01-06 DIAGNOSIS — J45.41 MODERATE PERSISTENT ASTHMATIC BRONCHITIS WITH ACUTE EXACERBATION: Primary | ICD-10-CM

## 2020-01-06 LAB
BASOPHILS # BLD AUTO: 0 10E9/L (ref 0–0.2)
BASOPHILS NFR BLD AUTO: 0.5 %
DIFFERENTIAL METHOD BLD: NORMAL
EOSINOPHIL # BLD AUTO: 0.3 10E9/L (ref 0–0.7)
EOSINOPHIL NFR BLD AUTO: 4.6 %
ERYTHROCYTE [DISTWIDTH] IN BLOOD BY AUTOMATED COUNT: 14.1 % (ref 10–15)
HCT VFR BLD AUTO: 42.6 % (ref 40–53)
HGB BLD-MCNC: 13.9 G/DL (ref 13.3–17.7)
LYMPHOCYTES # BLD AUTO: 2.1 10E9/L (ref 0.8–5.3)
LYMPHOCYTES NFR BLD AUTO: 32.9 %
MCH RBC QN AUTO: 30.5 PG (ref 26.5–33)
MCHC RBC AUTO-ENTMCNC: 32.6 G/DL (ref 31.5–36.5)
MCV RBC AUTO: 93 FL (ref 78–100)
MONOCYTES # BLD AUTO: 0.5 10E9/L (ref 0–1.3)
MONOCYTES NFR BLD AUTO: 8.2 %
NEUTROPHILS # BLD AUTO: 3.4 10E9/L (ref 1.6–8.3)
NEUTROPHILS NFR BLD AUTO: 53.8 %
PLATELET # BLD AUTO: 177 10E9/L (ref 150–450)
RBC # BLD AUTO: 4.56 10E12/L (ref 4.4–5.9)
WBC # BLD AUTO: 6.4 10E9/L (ref 4–11)

## 2020-01-06 PROCEDURE — 36415 COLL VENOUS BLD VENIPUNCTURE: CPT | Performed by: FAMILY MEDICINE

## 2020-01-06 PROCEDURE — 80048 BASIC METABOLIC PNL TOTAL CA: CPT | Performed by: FAMILY MEDICINE

## 2020-01-06 PROCEDURE — 71046 X-RAY EXAM CHEST 2 VIEWS: CPT

## 2020-01-06 PROCEDURE — 85025 COMPLETE CBC W/AUTO DIFF WBC: CPT | Performed by: FAMILY MEDICINE

## 2020-01-06 PROCEDURE — 99214 OFFICE O/P EST MOD 30 MIN: CPT | Performed by: FAMILY MEDICINE

## 2020-01-06 RX ORDER — PREDNISONE 20 MG/1
20 TABLET ORAL DAILY
Qty: 7 TABLET | Refills: 0 | Status: SHIPPED | OUTPATIENT
Start: 2020-01-06 | End: 2020-02-27

## 2020-01-06 RX ORDER — AZITHROMYCIN 250 MG/1
TABLET, FILM COATED ORAL
Qty: 6 TABLET | Refills: 0 | Status: SHIPPED | OUTPATIENT
Start: 2020-01-06 | End: 2020-02-27

## 2020-01-06 NOTE — LETTER
[facility]  [address] [place], [address]  [phone]        January 6, 2020    Andrea Uribe                                                                                                                                                       [address] [facility] [id number]      Off work until 1/13/2019 for acute bronchial asthma           Mychal Aburto MD
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
## 2020-01-07 LAB
ANION GAP SERPL CALCULATED.3IONS-SCNC: 2 MMOL/L (ref 3–14)
BUN SERPL-MCNC: 15 MG/DL (ref 7–30)
CALCIUM SERPL-MCNC: 8.6 MG/DL (ref 8.5–10.1)
CHLORIDE SERPL-SCNC: 106 MMOL/L (ref 94–109)
CO2 SERPL-SCNC: 31 MMOL/L (ref 20–32)
CREAT SERPL-MCNC: 0.98 MG/DL (ref 0.66–1.25)
GFR SERPL CREATININE-BSD FRML MDRD: >90 ML/MIN/{1.73_M2}
GLUCOSE SERPL-MCNC: 84 MG/DL (ref 70–99)
POTASSIUM SERPL-SCNC: 4 MMOL/L (ref 3.4–5.3)
SODIUM SERPL-SCNC: 139 MMOL/L (ref 133–144)

## 2020-01-07 NOTE — PROGRESS NOTES
"Pre-Visit Planning     Future Appointments   Date Time Provider Department Center   1/8/2020  9:35 AM Alberto Medley PA-C [facility]   1/15/2020 10:10 AM ShaikhCleve vegaa, PT [facility]   1/22/2020 10:10 AM Shaikh, Marline, PT [facility]   1/29/2020 10:10 AM Shaikh, Marline, PT [facility]   2/5/2020 10:10 AM Shaikh, Marline, PT [facility]   2/12/2020 10:10 AM Shaikh, Marline, PT [facility]     Arrival Time for this Appointment:  9:10 AM   Appointment Notes for this encounter:   physical-fasting lab-[facility]  Check for hemorrhoid x 2+ years, worsening.   Recheck bronchitis 1/6/20.  Pt wishes to keep this appointment. Fasting. Reminded to drink water during fast.   Requests fasting glucose because a provider told him blood glucose was low.     Please review meds. Pt and spouse report he is not taking lisinopril, simvastatin, venlafaxine,  B12, Vitamin D.    Is taking Dilantin and Keppra, antibiotic and prednisone.     Questionnaires Reviewed/Assigned  Additional questionnaires assigned        Patient preferred phone number: [phone]    Spoke to patient and with his permission spouse Gabriella via phone. Patient does have additional questions or concerns.  See above.      Visit is preventive. Reviewed purpose of preventive visit with patient.    Health Maintenance Due   Topic Date Due     HIV SCREENING  09/17/1989     MEDICARE ANNUAL WELLNESS VISIT  09/17/1992     MICROALBUMIN  08/04/2015     ASTHMA CONTROL TEST  11/23/2018     PHQ-9  08/06/2019     CMP  10/16/2019     LIPID  10/16/2019     ASTHMA ACTION PLAN  02/06/2020     MyChart  Patient is not active on MyChart.    Questionnaire Review   Advised patient to arrive early in order to complete questionnaires.    Call Summary  \"Thank you for your time today.  If anything comes up before your appointment, please feel free to contact us at [phone].\"  Marbin Danielle RN      "

## 2020-01-08 ENCOUNTER — OFFICE VISIT (OUTPATIENT)
Dept: FAMILY MEDICINE | Facility: CLINIC | Age: 45
End: 2020-01-08
Payer: MEDICARE

## 2020-01-08 ENCOUNTER — PATIENT OUTREACH (OUTPATIENT)
Dept: CARE COORDINATION | Facility: CLINIC | Age: 45
End: 2020-01-08

## 2020-01-08 VITALS
HEART RATE: 63 BPM | RESPIRATION RATE: 16 BRPM | SYSTOLIC BLOOD PRESSURE: 142 MMHG | WEIGHT: 246 LBS | DIASTOLIC BLOOD PRESSURE: 86 MMHG | BODY MASS INDEX: 34.44 KG/M2 | TEMPERATURE: 97 F | HEIGHT: 71 IN | OXYGEN SATURATION: 96 %

## 2020-01-08 DIAGNOSIS — K92.1 HEMATOCHEZIA: ICD-10-CM

## 2020-01-08 DIAGNOSIS — E53.8 VITAMIN B12 DEFICIENCY (NON ANEMIC): ICD-10-CM

## 2020-01-08 DIAGNOSIS — Z11.4 ENCOUNTER FOR SCREENING FOR HIV: ICD-10-CM

## 2020-01-08 DIAGNOSIS — K64.8 INTERNAL HEMORRHOID: ICD-10-CM

## 2020-01-08 DIAGNOSIS — Z00.00 VISIT FOR PREVENTIVE HEALTH EXAMINATION: Primary | ICD-10-CM

## 2020-01-08 DIAGNOSIS — N18.30 CKD (CHRONIC KIDNEY DISEASE) STAGE 3, GFR 30-59 ML/MIN (H): ICD-10-CM

## 2020-01-08 DIAGNOSIS — I10 ESSENTIAL HYPERTENSION: ICD-10-CM

## 2020-01-08 DIAGNOSIS — F33.1 MAJOR DEPRESSIVE DISORDER, RECURRENT EPISODE, MODERATE (H): ICD-10-CM

## 2020-01-08 DIAGNOSIS — F41.9 ANXIETY: ICD-10-CM

## 2020-01-08 DIAGNOSIS — E78.5 HYPERLIPIDEMIA LDL GOAL <100: ICD-10-CM

## 2020-01-08 DIAGNOSIS — R19.7 DIARRHEA, UNSPECIFIED TYPE: ICD-10-CM

## 2020-01-08 DIAGNOSIS — J45.20 INTERMITTENT ASTHMA, UNCOMPLICATED: ICD-10-CM

## 2020-01-08 DIAGNOSIS — E55.9 VITAMIN D DEFICIENCY: ICD-10-CM

## 2020-01-08 DIAGNOSIS — G40.909 SEIZURE DISORDER (H): ICD-10-CM

## 2020-01-08 LAB — VIT B12 SERPL-MCNC: 228 PG/ML (ref 193–986)

## 2020-01-08 PROCEDURE — 80061 LIPID PANEL: CPT | Performed by: PHYSICIAN ASSISTANT

## 2020-01-08 PROCEDURE — 87389 HIV-1 AG W/HIV-1&-2 AB AG IA: CPT | Performed by: PHYSICIAN ASSISTANT

## 2020-01-08 PROCEDURE — 99213 OFFICE O/P EST LOW 20 MIN: CPT | Mod: 25 | Performed by: PHYSICIAN ASSISTANT

## 2020-01-08 PROCEDURE — G0439 PPPS, SUBSEQ VISIT: HCPCS | Performed by: PHYSICIAN ASSISTANT

## 2020-01-08 PROCEDURE — 82043 UR ALBUMIN QUANTITATIVE: CPT | Performed by: PHYSICIAN ASSISTANT

## 2020-01-08 PROCEDURE — 80053 COMPREHEN METABOLIC PANEL: CPT | Performed by: PHYSICIAN ASSISTANT

## 2020-01-08 PROCEDURE — 36415 COLL VENOUS BLD VENIPUNCTURE: CPT | Performed by: PHYSICIAN ASSISTANT

## 2020-01-08 PROCEDURE — 82306 VITAMIN D 25 HYDROXY: CPT | Performed by: PHYSICIAN ASSISTANT

## 2020-01-08 PROCEDURE — 82607 VITAMIN B-12: CPT | Performed by: PHYSICIAN ASSISTANT

## 2020-01-08 RX ORDER — ALBUTEROL SULFATE 90 UG/1
2 AEROSOL, METERED RESPIRATORY (INHALATION) EVERY 6 HOURS PRN
Qty: 2 INHALER | Refills: 3 | Status: SHIPPED | OUTPATIENT
Start: 2020-01-08 | End: 2020-12-21

## 2020-01-08 RX ORDER — LISINOPRIL 5 MG/1
5 TABLET ORAL DAILY
Qty: 90 TABLET | Refills: 3 | Status: SHIPPED | OUTPATIENT
Start: 2020-01-08 | End: 2021-01-05

## 2020-01-08 RX ORDER — VENLAFAXINE HYDROCHLORIDE 75 MG/1
CAPSULE, EXTENDED RELEASE ORAL
Qty: 90 CAPSULE | Refills: 1 | Status: SHIPPED | OUTPATIENT
Start: 2020-01-08 | End: 2020-01-30 | Stop reason: DRUGHIGH

## 2020-01-08 RX ORDER — SIMVASTATIN 20 MG
TABLET ORAL
Qty: 90 TABLET | Refills: 1 | Status: SHIPPED | OUTPATIENT
Start: 2020-01-08 | End: 2020-09-09

## 2020-01-08 SDOH — SOCIAL STABILITY: SOCIAL NETWORK: ARE YOU MARRIED, WIDOWED, DIVORCED, SEPARATED, NEVER MARRIED, OR LIVING WITH A PARTNER?: MARRIED

## 2020-01-08 SDOH — ECONOMIC STABILITY: TRANSPORTATION INSECURITY
IN THE PAST 12 MONTHS, HAS LACK OF TRANSPORTATION KEPT YOU FROM MEETINGS, WORK, OR FROM GETTING THINGS NEEDED FOR DAILY LIVING?: NO

## 2020-01-08 SDOH — HEALTH STABILITY: MENTAL HEALTH: HOW OFTEN DO YOU HAVE A DRINK CONTAINING ALCOHOL?: NEVER

## 2020-01-08 SDOH — SOCIAL STABILITY: SOCIAL NETWORK
DO YOU BELONG TO ANY CLUBS OR ORGANIZATIONS SUCH AS CHURCH GROUPS UNIONS, FRATERNAL OR ATHLETIC GROUPS, OR SCHOOL GROUPS?: NO

## 2020-01-08 SDOH — HEALTH STABILITY: PHYSICAL HEALTH: ON AVERAGE, HOW MANY MINUTES DO YOU ENGAGE IN EXERCISE AT THIS LEVEL?: 0 MIN

## 2020-01-08 SDOH — HEALTH STABILITY: MENTAL HEALTH: HOW MANY STANDARD DRINKS CONTAINING ALCOHOL DO YOU HAVE ON A TYPICAL DAY?: 1 OR 2

## 2020-01-08 SDOH — HEALTH STABILITY: MENTAL HEALTH
STRESS IS WHEN SOMEONE FEELS TENSE, NERVOUS, ANXIOUS, OR CAN'T SLEEP AT NIGHT BECAUSE THEIR MIND IS TROUBLED. HOW STRESSED ARE YOU?: VERY MUCH

## 2020-01-08 SDOH — SOCIAL STABILITY: SOCIAL NETWORK: HOW OFTEN DO YOU GET TOGETHER WITH FRIENDS OR RELATIVES?: ONCE A WEEK

## 2020-01-08 SDOH — ECONOMIC STABILITY: FOOD INSECURITY: WITHIN THE PAST 12 MONTHS, YOU WORRIED THAT YOUR FOOD WOULD RUN OUT BEFORE YOU GOT MONEY TO BUY MORE.: NEVER TRUE

## 2020-01-08 SDOH — SOCIAL STABILITY: SOCIAL NETWORK: HOW OFTEN DO YOU ATTENT MEETINGS OF THE CLUB OR ORGANIZATION YOU BELONG TO?: NEVER

## 2020-01-08 SDOH — HEALTH STABILITY: MENTAL HEALTH: HOW OFTEN DO YOU HAVE 6 OR MORE DRINKS ON ONE OCCASION?: NEVER

## 2020-01-08 SDOH — SOCIAL STABILITY: SOCIAL NETWORK: HOW OFTEN DO YOU ATTEND CHURCH OR RELIGIOUS SERVICES?: 1 TO 4 TIMES PER YEAR

## 2020-01-08 SDOH — ECONOMIC STABILITY: TRANSPORTATION INSECURITY
IN THE PAST 12 MONTHS, HAS THE LACK OF TRANSPORTATION KEPT YOU FROM MEDICAL APPOINTMENTS OR FROM GETTING MEDICATIONS?: YES

## 2020-01-08 SDOH — HEALTH STABILITY: PHYSICAL HEALTH
ON AVERAGE, HOW MANY DAYS PER WEEK DO YOU ENGAGE IN MODERATE TO STRENUOUS EXERCISE (LIKE A BRISK WALK)?: PATIENT DECLINED

## 2020-01-08 SDOH — ECONOMIC STABILITY: FOOD INSECURITY: WITHIN THE PAST 12 MONTHS, THE FOOD YOU BOUGHT JUST DIDN'T LAST AND YOU DIDN'T HAVE MONEY TO GET MORE.: NEVER TRUE

## 2020-01-08 SDOH — SOCIAL STABILITY: SOCIAL NETWORK: IN A TYPICAL WEEK, HOW MANY TIMES DO YOU TALK ON THE PHONE WITH FAMILY, FRIENDS, OR NEIGHBORS?: ONCE A WEEK

## 2020-01-08 SDOH — ECONOMIC STABILITY: INCOME INSECURITY: HOW HARD IS IT FOR YOU TO PAY FOR THE VERY BASICS LIKE FOOD, HOUSING, MEDICAL CARE, AND HEATING?: NOT HARD AT ALL

## 2020-01-08 ASSESSMENT — ANXIETY QUESTIONNAIRES
GAD7 TOTAL SCORE: 15
2. NOT BEING ABLE TO STOP OR CONTROL WORRYING: MORE THAN HALF THE DAYS
4. TROUBLE RELAXING: NEARLY EVERY DAY
7. FEELING AFRAID AS IF SOMETHING AWFUL MIGHT HAPPEN: NOT AT ALL
1. FEELING NERVOUS, ANXIOUS, OR ON EDGE: NEARLY EVERY DAY
6. BECOMING EASILY ANNOYED OR IRRITABLE: NEARLY EVERY DAY
7. FEELING AFRAID AS IF SOMETHING AWFUL MIGHT HAPPEN: NOT AT ALL
3. WORRYING TOO MUCH ABOUT DIFFERENT THINGS: SEVERAL DAYS
GAD7 TOTAL SCORE: 15
GAD7 TOTAL SCORE: 15
5. BEING SO RESTLESS THAT IT IS HARD TO SIT STILL: NEARLY EVERY DAY

## 2020-01-08 ASSESSMENT — ENCOUNTER SYMPTOMS
PARESTHESIAS: 0
SORE THROAT: 1
HEADACHES: 1
NERVOUS/ANXIOUS: 1
ARTHRALGIAS: 1
DIARRHEA: 1
COUGH: 1
HEMATURIA: 0
NAUSEA: 0
DYSURIA: 0
HEARTBURN: 0
ABDOMINAL PAIN: 0
CHILLS: 1
HEMATOCHEZIA: 1
DIZZINESS: 1
CONSTIPATION: 0
JOINT SWELLING: 1
FEVER: 1
SHORTNESS OF BREATH: 0
EYE PAIN: 0
FREQUENCY: 0
PALPITATIONS: 0
WEAKNESS: 1
MYALGIAS: 1

## 2020-01-08 ASSESSMENT — ACTIVITIES OF DAILY LIVING (ADL)
CURRENT_FUNCTION: PREPARING MEALS REQUIRES ASSISTANCE
CURRENT_FUNCTION: HOUSEWORK REQUIRES ASSISTANCE
CURRENT_FUNCTION: LAUNDRY REQUIRES ASSISTANCE

## 2020-01-08 ASSESSMENT — ASTHMA QUESTIONNAIRES
ACT_TOTALSCORE: 18
QUESTION_4 LAST FOUR WEEKS HOW OFTEN HAVE YOU USED YOUR RESCUE INHALER OR NEBULIZER MEDICATION (SUCH AS ALBUTEROL): ONCE A WEEK OR LESS
QUESTION_1 LAST FOUR WEEKS HOW MUCH OF THE TIME DID YOUR ASTHMA KEEP YOU FROM GETTING AS MUCH DONE AT WORK, SCHOOL OR AT HOME: NONE OF THE TIME
QUESTION_3 LAST FOUR WEEKS HOW OFTEN DID YOUR ASTHMA SYMPTOMS (WHEEZING, COUGHING, SHORTNESS OF BREATH, CHEST TIGHTNESS OR PAIN) WAKE YOU UP AT NIGHT OR EARLIER THAN USUAL IN THE MORNING: FOUR OR MORE NIGHTS A WEEK
QUESTION_2 LAST FOUR WEEKS HOW OFTEN HAVE YOU HAD SHORTNESS OF BREATH: ONCE OR TWICE A WEEK
QUESTION_5 LAST FOUR WEEKS HOW WOULD YOU RATE YOUR ASTHMA CONTROL: WELL CONTROLLED

## 2020-01-08 ASSESSMENT — PATIENT HEALTH QUESTIONNAIRE - PHQ9
SUM OF ALL RESPONSES TO PHQ QUESTIONS 1-9: 12
SUM OF ALL RESPONSES TO PHQ QUESTIONS 1-9: 12
10. IF YOU CHECKED OFF ANY PROBLEMS, HOW DIFFICULT HAVE THESE PROBLEMS MADE IT FOR YOU TO DO YOUR WORK, TAKE CARE OF THINGS AT HOME, OR GET ALONG WITH OTHER PEOPLE: EXTREMELY DIFFICULT

## 2020-01-08 ASSESSMENT — MIFFLIN-ST. JEOR: SCORE: 2022.98
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
## 2020-01-08 NOTE — PATIENT INSTRUCTIONS
Patient Education   Personalized Prevention Plan  You are due for the preventive services outlined below.  Your care team is available to assist you in scheduling these services.  If you have already completed any of these items, please share that information with your care team to update in your medical record.  Health Maintenance Due   Topic Date Due     HIV Screening  09/17/1989     Annual Wellness Visit  09/17/1992     Kidney Microalbumin Urine Test  08/04/2015     Asthma Control Test  11/23/2018     Depression Assessment  08/06/2019     Comprehensive Metabolic Panel  10/16/2019     Cholesterol Lab  10/16/2019     Asthma Action Plan - yearly  02/06/2020     Your Health Risk Assessment indicates you feel you are not in good health    A healthy lifestyle helps keep the body fit and the mind alert. It helps protect you from disease, helps you fight disease, and helps prevent chronic disease (disease that doesn't go away) from getting worse. This is important as you get older and begin to notice twinges in muscles and joints and a decline in the strength and stamina you once took for granted. A healthy lifestyle includes good healthcare, good nutrition, weight control, recreation, and regular exercise. Avoid harmful substances and do what you can to keep safe. Another part of a healthy lifestyle is stay mentally active and socially involved.    Good healthcare     Have a wellness visit every year.     If you have new symptoms, let us know right away. Don't wait until the next checkup.     Take medicines exactly as prescribed and keep your medicines in a safe place. Tell us if your medicine causes problems.   Healthy diet and weight control     Eat 3 or 4 small, nutritious, low-fat, high-fiber meals a day. Include a variety of fruits, vegetables, and whole-grain foods.     Make sure you get enough calcium in your diet. Calcium, vitamin D, and exercise help prevent osteoporosis (bone thinning).     If you live  alone, try eating with others when you can. That way you get a good meal and have company while you eat it.     Try to keep a healthy weight. If you eat more calories than your body uses for energy, it will be stored as fat and you will gain weight.     Recreation   Recreation is not limited to sports and team events. It includes any activity that provides relaxation, interest, enjoyment, and exercise. Recreation provides an outlet for physical, mental, and social energy. It can give a sense of worth and achievement. It can help you stay healthy.    Mental Exercise and Social Involvement  Mental and emotional health is as important as physical health. Keep in touch with friends and family. Stay as active as possible. Continue to learn and challenge yourself.   Things you can do to stay mentally active are:    Learn something new, like a foreign language or musical instrument.     Play SCRABBLE or do crossword puzzles. If you cannot find people to play these games with you at home, you can play them with others on your computer through the Internet.     Join a games club--anything from card games to chess or checkers or lawn bowling.     Start a new hobby.     Go back to school.     Volunteer.     Read.   Keep up with world events.    Exercise for a Healthier Heart     Exercise with a friend. When activity is fun, you're more likely to stick with it.   You may wonder how you can improve the health of your heart. If you re thinking about exercise, you re on the right track. You don t need to become an athlete, but you do need a certain amount of brisk exercise to help strengthen your heart. If you have been diagnosed with a heart condition, your doctor may recommend exercise to help stabilize your condition. To help make exercise a habit, choose safe, fun activities.  Be sure to check with your healthcare provider before starting an exercise program.  Why exercise?  Exercising regularly offers many healthy rewards. It  can help you do all of the following:    Improve your blood cholesterol level to help prevent further heart trouble    Lower your blood pressure to help prevent a stroke or heart attack    Control diabetes, or reduce your risk of getting this disease    Improve your heart and lung function    Reach and maintain a healthy weight    Make your muscles stronger and more limber so you can stay active    Prevent falls and fractures by slowing the loss of bone mass (osteoporosis)    Manage stress better    Reduce your blood pressure    Improve your sense of self and your body image  Exercise tips  Ease into your routine. Set small goals. Then build on them.  Exercise on most days. Aim for a total of 150 or more minutes of moderate to  vigorous intensity activity each week. Consider 40 minutes, 3 to 4 times a week. For best results, activity should last for 40 minutes on average. It is OK to work up to the 40 minute period over time. Examples of moderate-intensity activity is walking 1 mile in 15 minutes or 30 to 45 minutes of yard work.  Step up your daily activity level. Along with your exercise program, try being more active throughout the day. Walk instead of drive. Do more household tasks or yard work.  Choose one or more activities you enjoy. Walking is one of the easiest things you can do. You can also try swimming, riding a bike, dancing, or taking an exercise class.  Stop exercising and call your doctor if you:    Have chest pain or feel dizzy or lightheaded    Feel burning, tightness, pressure, or heaviness in your chest, neck, shoulders, back, or arms    Have unusual shortness of breath    Have increased joint or muscle pain    Have palpitations or an irregular heartbeat  Date Last Reviewed: 5/1/2016 2000-2019 [employer]. [address] [place] [address]. All rights reserved. This information is not intended as a substitute for professional medical care. Always follow your healthcare  professional's instructions.          Understanding USDA MyPlate  The USDA (U.S. Department of Agriculture) has guidelines to help you make healthy food choices. These are called MyPlate. MyPlate shows the food groups that make up healthy meals using the image of a place setting. Before you eat, think about the healthiest choices for what to put onto your plate or into your cup or bowl. To learn more about building a healthy plate, visit www.choosemyplate.gov.    The food groups    Fruits. Any fruit or 100% fruit juice counts as part of the Fruit Group. Fruits may be fresh, canned, frozen, or dried, and may be whole, cut-up, or pureed. Make half your plate fruits and vegetables.    Vegetables. Any vegetable or 100% vegetable juice counts as a member of the Vegetable Group. Vegetables may be fresh, frozen, canned, or dried. They can be served raw or cooked and may be whole, cut-up, or mashed. Make half your plate fruits and vegetables.    Grains. All foods made from grains are part of the Grains Group. These include wheat, rice, oats, cornmeal, and barley such as bread, pasta, oatmeal, cereal, tortillas, and grits. Grains should be no more than a quarter of your plate. At least half of your grains should be whole grains.    Protein. This group includes meat, poultry, seafood, beans and peas, eggs, processed soy products (like tofu), nuts (including nut butters), and seeds. Make protein choices no more than a quarter of your plate. Meat and poultry choices should be lean or low fat.    Dairy. All fluid milk products and foods made from milk that contain calcium, like yogurt and cheese, are part of the Dairy Group. (Foods that have little calcium, such as cream, butter, and cream cheese, are not part of the group.) Most dairy choices should be low-fat or fat-free.    Oils. These are fats that are liquid at room temperature. They include canola, corn, olive, soybean, and sunflower oil. Foods that are mainly oil include  mayonnaise, certain salad dressings, and soft margarines. You should have only 5 to 7 teaspoons of oils a day. You probably already get this much from the food you eat.  Date Last Reviewed: 8/1/2017 2000-2019 [employer]. [address] [place] [address]. All rights reserved. This information is not intended as a substitute for professional medical care. Always follow your healthcare professional's instructions.        Activities of Daily Living    Your Health Risk Assessment indicates you have difficulties with activities of daily living such as housework, bathing, preparing meals, taking medication, etc. Please make a follow up appointment for us to address this issue in more detail.  Your Health Risk Assessment indicates you feel you are not in good emotional health.    Recreation   Recreation is not limited to sports and team events. It includes any activity that provides relaxation, interest, enjoyment, and exercise. Recreation provides an outlet for physical, mental, and social energy. It can give a sense of worth and achievement. It can help you stay healthy.    Mental Exercise and Social Involvement  Mental and emotional health is as important as physical health. Keep in touch with friends and family. Stay as active as possible. Continue to learn and challenge yourself.   Things you can do to stay mentally active are:    Learn something new, like a foreign language or musical instrument.     Play SCRABBLE or do crossword puzzles. If you cannot find people to play these games with you at home, you can play them with others on your computer through the Internet.     Join a games club--anything from card games to chess or checkers or lawn bowling.     Start a new hobby.     Go back to school.     Volunteer.     Read.   Keep up with world events.

## 2020-01-09 LAB
ALBUMIN SERPL-MCNC: 3.6 G/DL (ref 3.4–5)
ALP SERPL-CCNC: 139 U/L (ref 40–150)
ALT SERPL W P-5'-P-CCNC: 20 U/L (ref 0–70)
ANION GAP SERPL CALCULATED.3IONS-SCNC: 7 MMOL/L (ref 3–14)
AST SERPL W P-5'-P-CCNC: 9 U/L (ref 0–45)
BILIRUB SERPL-MCNC: 0.2 MG/DL (ref 0.2–1.3)
BUN SERPL-MCNC: 15 MG/DL (ref 7–30)
CALCIUM SERPL-MCNC: 9.1 MG/DL (ref 8.5–10.1)
CHLORIDE SERPL-SCNC: 106 MMOL/L (ref 94–109)
CHOLEST SERPL-MCNC: 294 MG/DL
CO2 SERPL-SCNC: 27 MMOL/L (ref 20–32)
CREAT SERPL-MCNC: 0.95 MG/DL (ref 0.66–1.25)
CREAT UR-MCNC: 241 MG/DL
DEPRECATED CALCIDIOL+CALCIFEROL SERPL-MC: 6 UG/L (ref 20–75)
GFR SERPL CREATININE-BSD FRML MDRD: >90 ML/MIN/{1.73_M2}
GLUCOSE SERPL-MCNC: 89 MG/DL (ref 70–99)
HDLC SERPL-MCNC: 65 MG/DL
HIV 1+2 AB+HIV1 P24 AG SERPL QL IA: NONREACTIVE
LDLC SERPL CALC-MCNC: 192 MG/DL
MICROALBUMIN UR-MCNC: 2840 MG/L
MICROALBUMIN/CREAT UR: 1178.42 MG/G CR (ref 0–17)
NONHDLC SERPL-MCNC: 229 MG/DL
POTASSIUM SERPL-SCNC: 4.1 MMOL/L (ref 3.4–5.3)
PROT SERPL-MCNC: 8.1 G/DL (ref 6.8–8.8)
SODIUM SERPL-SCNC: 140 MMOL/L (ref 133–144)
TRIGL SERPL-MCNC: 185 MG/DL

## 2020-01-09 ASSESSMENT — ASTHMA QUESTIONNAIRES: ACT_TOTALSCORE: 18

## 2020-01-09 ASSESSMENT — ANXIETY QUESTIONNAIRES: GAD7 TOTAL SCORE: 15

## 2020-01-09 ASSESSMENT — ACTIVITIES OF DAILY LIVING (ADL): DEPENDENT_IADLS:: INDEPENDENT

## 2020-01-09 ASSESSMENT — PATIENT HEALTH QUESTIONNAIRE - PHQ9: SUM OF ALL RESPONSES TO PHQ QUESTIONS 1-9: 12

## 2020-01-09 NOTE — PROGRESS NOTES
Clinic Care Coordination Contact    Clinic Care Coordination Contact  OUTREACH    Referral Information:  Referral Source: PCP    Primary Diagnosis: CKD    Chief Complaint   Patient presents with     Clinic Care Coordination - Initial     Medication Management        Universal Utilization: Appropriate utilization.  Clinic Utilization  Difficulty keeping appointments:: No  Compliance Concerns: No  No-Show Concerns: No  No PCP office visit in Past Year: No  Utilization    Last refreshed: 1/9/2020 12:48 PM:  Hospital Admissions 0           Last refreshed: 1/9/2020 12:48 PM:  ED Visits 1           Last refreshed: 1/9/2020 12:48 PM:  No Show Count (past year) 0              Current as of: 1/9/2020 12:48 PM              Clinical Concerns:  Current Medical Concerns:   Patient Active Problem List   Diagnosis     Seizure disorder (H)     TBI (traumatic brain injury) (H)     Moderate major depression (H)     Chronic pain of right knee     Obesity     Anxiety     Sleep apnea     GERD (gastroesophageal reflux disease)     Intermittent asthma     CKD (chronic kidney disease) stage 2, GFR 60-89 ml/min     MRSA cellulitis     Anemia     Femur fracture, right (H)     Physical deconditioning     S/P total knee replacement     Health Care Home     Hyperlipidemia LDL goal <130     Incisional hernia, without obstruction or gangrene     Ataxic gait     S/P total knee arthroplasty     Acute post-operative pain     Short heel cord, right     Flexion contracture of right knee     Diarrhea, unspecified type     Essential hypertension     Vitamin B12 deficiency (non anemic)     Vitamin D deficiency         Current Behavioral Concerns:  PHQ 5/23/2018 2/6/2019 1/8/2020   PHQ-9 Total Score 4 5 12   Q9: Thoughts of better off dead/self-harm past 2 weeks Not at all Not at all Not at all      DAYAMI-7 SCORE 5/23/2018 2/6/2019 1/8/2020   Total Score - - -   Total Score - 9 (mild anxiety) 15 (severe anxiety)   Total Score 12 9 15      Last PHQ-9  1/8/2020   1.  Little interest or pleasure in doing things 3   2.  Feeling down, depressed, or hopeless 3   3.  Trouble falling or staying asleep, or sleeping too much 2   4.  Feeling tired or having little energy 3   5.  Poor appetite or overeating 0   6.  Feeling bad about yourself 1   7.  Trouble concentrating 0   8.  Moving slowly or restless 0   Q9: Thoughts of better off dead/self-harm past 2 weeks 0   PHQ-9 Total Score 12   Difficulty at work, home, or with people -      DAYAMI-7  1/8/2020   1. Feeling nervous, anxious, or on edge 3   2. Not being able to stop or control worrying 2   3. Worrying too much about different things 1   4. Trouble relaxing 3   5. Being so restless that it is hard to sit still 3   6. Becoming easily annoyed or irritable 3   7. Feeling afraid, as if something awful might happen 0   DAYAMI-7 Total Score 15   If you checked any problems, how difficult have they made it for you to do your work, take care of things at home, or get along with other people? -          Was stable on effexor. No longer taking due to stress per patient. Recommending restarting and initiating therapy     SW MARKY met with Patient in the clinic.  He reports that he has a Vascular Closure Waiver but not sure of the Vascular Closure Workers name.  He has had an [employer] worker in the past but didn't like it.  Through the Vascular Closure waiver he was able to get his walker.  His wife and his sister are his main support people.  He states that he is not interested in therapy at this time.  He has worked at [employer] for 12 years. He used to work as a  and loves working on cars.  He is currently working on his Donta.He has been feeling stressed and irritable lately and made plan with PCP to resume taking his mental health medication.         Education Provided to patient: Care Coordination   Pain  Pain (GOAL):: No  Health Maintenance Reviewed:    Clinical Pathway: Depression    Medication Management:  Spouse help hm manage his medication.      Functional Status:  Dependent ADLs:: Ambulation-walker  Dependent IADLs:: Independent  Bed or wheelchair confined:: No  Mobility Status: Independent w/Device  Fallen 2 or more times in the past year?: No  Any fall with injury in the past year?: No    Living Situation:  Current living arrangement:: I live in a private home with family  Type of residence:: Private home - stairs    Diet/Exercise/Sleep:  Inadequate nutrition (GOAL):: No  Food Insecurity: No  Tube Feeding: No  Significant changes in sleep pattern (GOAL): No    Transportation:  Transportation concerns (GOAL):: No  Transportation means:: Accessible car     Psychosocial:  Sikh or spiritual beliefs that impact treatment:: No  Mental health DX:: Yes  Mental health DX how managed:: Medication  Mental health management concern (GOAL):: Yes  Informal Support system:: Family     Financial/Insurance:   Financial/Insurance concerns (GOAL):: No  No concerns identified during this encounter.     Resources and Interventions:  Current Resources: Cadi Waiver   ;   Community Resources: [facility]  Supplies used at home:: None  Equipment Currently Used at Home: cane, straight, transfer board, walker, rolling, wheelchair, manual    Advance Care Plan/Directive  Advanced Care Plans/Directives on file:: No  Advanced Care Plan/Directive Status: Declined Further Information    Referrals Placed: Mental Health     Goals:   Goals        General    Mental Health Management (pt-stated)     Notes - Note edited  1/9/2020  4:52 PM by Milagros Chatman LSW    Goal Statement: Take my mental health medication as prescribed.   Date Goal set: 1/8/20  Date to Achieve By: 3/30/20  Patient expressed understanding of goal: Yes  Action steps to achieve this goal:    1.)  I will resume taking EFFEXOR-XR   2.)  I will continue to work with my PCP on medication management. I will let PCP know if I have concerns about my medication.  3.)  My wife will remind me to take my medication.    I can explore with my Cadi worker about getting a pill box timer.                  Barriers: Worries about side effects of medication.  Strengths: Has a supportive family.    Patient/Caregiver understanding:Yes    Outreach Frequency: monthly  Future Appointments              In 6 days Marline Shaikh PT [facility]    In 1 week CR MA/LPN [facility],     In 1 week Marline Shaikh PT [facility]    In 2 weeks Marline Shaikh PT [facility]    In 3 weeks Marline Shaikh PT [facility] [place] [facility]    In 1 month Marline Shaikh PT [facility]          Plan: Andrea will continue with his PHYSICAL THERAPY.  He plans to resume taking his Effexor.  He is agreeable to enrolling in care coordination and will discuss additional goals at next outreach.   [facility] will outreach again in 2-3 weeks.    GEENA Whitten Care Coordination Team  [phone]

## 2020-01-13 ENCOUNTER — TRANSFERRED RECORDS (OUTPATIENT)
Dept: HEALTH INFORMATION MANAGEMENT | Facility: CLINIC | Age: 45
End: 2020-01-13

## 2020-01-15 ENCOUNTER — THERAPY VISIT (OUTPATIENT)
Dept: PHYSICAL THERAPY | Facility: CLINIC | Age: 45
End: 2020-01-15
Payer: MEDICARE

## 2020-01-15 DIAGNOSIS — G89.29 CHRONIC PAIN OF RIGHT KNEE: ICD-10-CM

## 2020-01-15 DIAGNOSIS — M24.561 FLEXION CONTRACTURE OF RIGHT KNEE: ICD-10-CM

## 2020-01-15 DIAGNOSIS — M25.561 CHRONIC PAIN OF RIGHT KNEE: ICD-10-CM

## 2020-01-15 PROCEDURE — 97110 THERAPEUTIC EXERCISES: CPT | Mod: GP | Performed by: PHYSICAL THERAPIST

## 2020-01-15 PROCEDURE — 97140 MANUAL THERAPY 1/> REGIONS: CPT | Mod: GP | Performed by: PHYSICAL THERAPIST

## 2020-01-15 NOTE — PROGRESS NOTES
SUBJECTIVE  Subjective changes as noted by pt:  Has not used wheelchair since last in. Felt the 1 inch heel lift was helpful.    Current pain level: 6/10     Changes in function:  Yes (See Goal flowsheet attached for changes in current functional level)     Adverse reaction to treatment or activity:  None    OBJECTIVE  Changes in objective findings:  Yes, Passive R ankle DF 18 deg. Resting R knee extension after stretch 12 deg, short arc active knee extension -15, long arc -12. Passive knee extension -10 deg. R hip extension in Shon 0 deg. Fair VMO with quad set. Tolerating 5# with SAQ, and 130# on leg press with eccentric lowering. Able to WB on R with right hand on rail and lift left leg up and off 2 inch step with fair to poor R knee stability.      ASSESSMENT  Andrea continues to require intervention to meet STG and LTG's: PT  Patient's symptoms are resolving.  Response to therapy has shown an improvement in  pain level, ROM , flexibility, strength, muscle control, gait and function  Progress made towards STG/LTG?  Yes (See Goal flowsheet attached for updates on achievement of STG and LTG)    PLAN  Current treatment program is being advanced to more complex exercises.    PTA/ATC plan:  N/A    Please refer to the daily flowsheet for treatment today, total treatment time and time spent performing 1:1 timed codes.

## 2020-01-22 ENCOUNTER — THERAPY VISIT (OUTPATIENT)
Dept: PHYSICAL THERAPY | Facility: CLINIC | Age: 45
End: 2020-01-22
Payer: MEDICARE

## 2020-01-22 DIAGNOSIS — G89.29 CHRONIC PAIN OF RIGHT KNEE: ICD-10-CM

## 2020-01-22 DIAGNOSIS — M25.561 CHRONIC PAIN OF RIGHT KNEE: ICD-10-CM

## 2020-01-22 DIAGNOSIS — M24.561 FLEXION CONTRACTURE OF RIGHT KNEE: ICD-10-CM

## 2020-01-22 PROCEDURE — 97112 NEUROMUSCULAR REEDUCATION: CPT | Mod: GP | Performed by: PHYSICAL THERAPIST

## 2020-01-22 PROCEDURE — 97110 THERAPEUTIC EXERCISES: CPT | Mod: GP | Performed by: PHYSICAL THERAPIST

## 2020-01-22 NOTE — LETTER
[facility]  [address]  [facility] [address]  [phone]    2020    Re: Andrea Uribe   :   1975  MRN:  [id number]   REFERRING PHYSICIAN:   Yao Grimaldo    [facility]  Date of Initial Evaluation:  19  Visits:  Rxs Used: 11  Reason for Referral: Flexion contracture of right knee; Chronic pain of right knee    DISCHARGE REPORT  Progress reporting period is from 19 to 20.       SUBJECTIVE  Subjective changes noted by patient:  This patient completed his 180 day Medicare certification on 20. In 6 months he completed only 11 visits and cancelled 10 appointments.  He was last seen on 20 and was instructed numerous times that we would need to finish his treatment by 20, as he was no longer showing the progress necessary to support additional treatment through Medicare.  He had appointments scheduled on 20 and 3/4/20 which were cancelled and a message was left with the patient's wife. When last seen on 20 the patient reported that he was going to  see Andrea IBARRA at [facility] to see about getting a lift on his AFO or shoe on R to correct functional leg length discrepency, as he felt this was helpful when we tried a heel lift in the clinic.  He reported that he has not used his wheelchair since 19.  Current pain level is 6/10  .     Previous pain level was  5/10.   Changes in function:  Yes (See Goal flowsheet attached for changes in current functional level)  Adverse reaction to treatment or activity: None    OBJECTIVE  Changes noted in objective findings:  Yes,  Gait with SPC with poor balance. Passive R ankle DF 18 deg. Resting R knee extension after stretch 12 deg, short arc active knee extension -15, long arc -12. Passive knee extension -10 deg. R hip extension in Shon 0 deg. Fair VMO with quad set. Tolerating 5# with SAQ, and 130#  on leg press with eccentric lowering. Able to WB on R with right hand on rail and lift left leg up and off 2 inch step with fair to poor R knee stability.    ASSESSMENT/PLAN  Updated problem list and treatment plan: Diagnosis 1:  Right knee pain/flexion contracture    Pain -  home program  Decreased ROM/flexibility - home program  Decreased strength - home program  Impaired gait - home program  Impaired muscle performance - home program  Decreased function - home program  STG/LTGs have been met or progress has been made towards goals:  Yes (See Goal flow sheet completed today.)  Assessment of Progress: The patient's progress has slowed.  The patient has not returned to therapy. Current status is unknown.  Self Management Plans:  Patient has been instructed in a home treatment program.        Re: Andrea Uribe   :   1975          Andrea continues to require the following intervention to meet STG and LTG's:  PT intervention is no longer required to meet STG/LTG.    Recommendations:  This patient is ready to be discharged from therapy and continue their home treatment program.    Thank you for your referral.    INQUIRIES  Therapist: Marline Shaikh, PT  [facility]  [address] [place] [id number]  Phone: [phone]  Fax: [phone]

## 2020-01-30 ENCOUNTER — TELEPHONE (OUTPATIENT)
Dept: FAMILY MEDICINE | Facility: CLINIC | Age: 45
End: 2020-01-30

## 2020-01-30 ENCOUNTER — PATIENT OUTREACH (OUTPATIENT)
Dept: CARE COORDINATION | Facility: CLINIC | Age: 45
End: 2020-01-30

## 2020-01-30 DIAGNOSIS — F32.1 MODERATE MAJOR DEPRESSION (H): Primary | ICD-10-CM

## 2020-01-30 DIAGNOSIS — F41.9 ANXIETY: ICD-10-CM

## 2020-01-30 RX ORDER — VENLAFAXINE HYDROCHLORIDE 150 MG/1
150 CAPSULE, EXTENDED RELEASE ORAL DAILY
Qty: 90 CAPSULE | Refills: 0 | Status: SHIPPED | OUTPATIENT
Start: 2020-01-30 | End: 2020-02-27

## 2020-01-30 ASSESSMENT — ACTIVITIES OF DAILY LIVING (ADL): DEPENDENT_IADLS:: INDEPENDENT

## 2020-01-30 NOTE — TELEPHONE ENCOUNTER
Let's increase effexor to 150 mg and recheck on a telephone visit in 1 month. Sent to pharmacy.     Letter in outbox.      -trae brewer,PAC

## 2020-01-30 NOTE — PROGRESS NOTES
Clinic Care Coordination Contact    Follow Up Progress Note      Assessment: Andrea  Reports that he has not seen a change in his level of anxiety or mood since resuming on Effexor.  He reports that he continues to be stressed and feeling irritable.  A big stressor is his job at [employer].  He previously worked a 7:30 - noon shift which worked out very well for him because he could schedule medical appointments in the afternoon and regularly  his boys from school. Employer has changes his schedule to 9:30 - 2 pm which is not working well for him.  He is requested a letter from PCP supporting that going back to his previous schedule would be beneficial to his health and well being.  Andrea has scheduled his GI appointment but has yet to schedule appointment with  Colorectal Surgeon.      Goals addressed this encounter:   Goals Addressed                 This Visit's Progress       Patient Stated      Mental Health Management (pt-stated)        Goal Statement: Take my mental health medication as prescribed.   Date Goal set: 1/8/20  Date to Achieve By: 3/30/20  Patient expressed understanding of goal: Yes  Action steps to achieve this goal:    1.)  I will resume taking EFFEXOR-XR   2.)  I will continue to work with my PCP on medication management. I will let PCP know if I have concerns about my medication.  3.)  My wife will remind me to take my medication.   I can explore with my Cadi worker about getting a pill box timer.      As of today's date 1/30/2020 goal is met at 26 - 50%.   Goal Status:  Active  Andrea has been taking his medication as prescribed,  He doesn't think that it has made any change to his anxiety or mood..               Intervention/Education provided during outreach: Huddled with Dirk Zazueta about the following to address with PCP 1. Andrea reported no improvement in mood on venlafaxine 75. He declines talk therapy at this time.    2. Letter for employer.They moved him to 9:30-2 shift. He wants  to return to 7:30-12 noon shift so he may attend medical appointments and  his children from school in the afternoons. He is feeling stressed by his 9:30-2 shift and is worried too they are trying to get rid of him        Outreach Frequency: monthly    Plan:   See intervention provided above..   Care Coordinator will follow up in 3 - 4 weeks.    GEENA Whitten   Care Coordination Team  [phone]

## 2020-01-30 NOTE — LETTER
[place] CARE COORDINATION  [facility]   January 30, 2020    Andrea Uribe  [address] [facility] [address]      Dear Andrea,    I am a clinic care coordinator who works with Alberto Medley PA-C at [facility] . I wanted to thank you for spending the time to talk with me.       Please feel free to contact me at [phone], with any questions or concerns. We at [facility] are focused on providing you with the highest-quality healthcare experience possible and that all starts with you.     Sincerely,     GEENA Prajapati    Enclosed: I have enclosed a copy of the Complex Care Plan. This has helpful information and goals that we have talked about. Please keep this in an easy to access place to use as needed.

## 2020-01-30 NOTE — TELEPHONE ENCOUNTER
Per alyson with Milagros in Care Coordination   1. Andrea reported no improvement in mood on venlafaxine 75. He declines talk therapy at this time.    2. Letter for employer.They moved him to 9:30-2 shift. He wants to return to 7:30-12 noon shift so he may attend medical appointments and  his children from school in the afternoons. He is feeling stressed by his 9:30-2 shift and is worried too they are trying to get rid of him.   Marbin Danielle RN

## 2020-01-30 NOTE — TELEPHONE ENCOUNTER
Pt informed and agrees to plan.  PV 2/27/20.  Letter mailed to home address. Andrea will call PAL if questions about letter.   Marbin Danielle RN

## 2020-01-30 NOTE — LETTER
January 30, 2020      Andrea Uribe  [address] [facility] [address]        To Whom It May Concern,       I recommend Andrea work 7:30 AM  - 12:00 PM shift to allow time in the afternoons to attend medical appointments.      Sincerely,        Alberto Medley PA-C

## 2020-01-30 NOTE — LETTER
[facility]  [address] [place] [address]  Phone: [phone]    January 30, 2020        Andrea Uribe  [address] [facility] [address]          To whom it may concern:    RE: Andrea Uribe    Patient is under my care as a patient. He informs me that his shift for work has changed from 7:30 AM to 12:00 PM to 9:30 AM to 2:00 PM. Patient has a complex medical history requiring frequent Office visits and he would benefit from maintaining his original shift time of 7:30 AM to 12:00 PM to allow him to make all the needed appointment times.     Please contact me for questions or concerns.      Sincerely,        Alberto Medley PA-C

## 2020-01-30 NOTE — LETTER
[facility]  Complex Care Plan  About Me:    Patient Name:  Andrea Uribe    YOB: 1975  Age:         44 year old   [place] MRN:    [id number] Telephone Information:  Home Phone [phone]   Mobile [phone]       Address:  [address] [facility] [address] Email address:  [email or web address]      Emergency Contact(s)    Name Relationship Lgl Grd Work Phone Home Phone Mobile Phone   1. REGGIE URIBE Spouse No  [phone]   2. DECLINED, PER * Declined               Primary language:  English     needed? No   [place] [employer]:  [phone] op. 1  Other communication barriers: None  Preferred Method of Communication:  Mail  Current living arrangement: I live in a private home with family  Mobility Status/ Medical Equipment: Independent w/Device    Health Maintenance  Health Maintenance Reviewed: Due/Overdue Medicare Annual Wellness Visit, Asthma Action Plan    My Access Plan  Medical Emergency 911   Primary Clinic Line [facility] - [phone]   24 Hour Appointment Line [phone] or  [phone]) (toll-free)   24 Hour Nurse Line [phone] (toll-free)   Preferred Urgent Care     Preferred Hospital [facility]  [phone]   Preferred Pharmacy [id number] [place], [place] - [address] [facility]     Behavioral Health Crisis Line The National Suicide Prevention Lifeline at 1-745.230.1097 or 911             My Care Team Members  Patient Care Team       Relationship Specialty Notifications Start End    Alberto Medley PA-C PCP - General Physician Assistant  6/2/16     Phone: [phone] Fax: [phone]         [address] [facility] [address]    David Mckay MD MD Orthopaedic Surgery  1/29/14     Phone: [phone] Fax: [phone]         [facility] [address] [facility] [address]    Mychal Aburto,  MD Assigned PCP   10/20/19     Phone: [phone] Fax: [phone] [address] [facility] [address]    Milagros Chatman LSW Lead Care Coordinator Primary Care - CC  1/8/20     Phone: [phone]         Marbin Danielle, RN Personal Advocate & Liaison (PAL) Family Practice Admissions 1/9/20             My Care Plans  Self Management and Treatment Plan  Goals and (Comments)  Goals        General    Mental Health Management (pt-stated)     Notes - Note edited  1/30/2020  1:48 PM by Milagros Chatman LSW    Goal Statement: Take my mental health medication as prescribed.   Date Goal set: 1/8/20  Date to Achieve By: 3/30/20  Patient expressed understanding of goal: Yes  Action steps to achieve this goal:    1.)  I will resume taking EFFEXOR-XR   2.)  I will continue to work with my PCP on medication management. I will let PCP know if I have concerns about my medication.  3.)  My wife will remind me to take my medication.   I can explore with my Cadi worker about getting a pill box timer.      As of today's date 1/30/2020 goal is met at 26 - 50%.   Goal Status:  Active  Andrea has been taking his medication as prescribed,  He doesn't think that it has made any change to his anxiety or mood..               Action Plans on File:   Asthma                   Advance Care Plans/Directives Type:        My Medical and Care Information  Problem List   Patient Active Problem List   Diagnosis     Seizure disorder (H)     TBI (traumatic brain injury) (H)     Moderate major depression (H)     Chronic pain of right knee     Obesity     Anxiety     Sleep apnea     GERD (gastroesophageal reflux disease)     Intermittent asthma     CKD (chronic kidney disease) stage 2, GFR 60-89 ml/min     MRSA cellulitis     Anemia     Femur fracture, right (H)     Physical deconditioning     S/P total knee replacement     Health Care Home     Hyperlipidemia LDL goal <130     Incisional hernia, without obstruction or gangrene     Ataxic gait      S/P total knee arthroplasty     Acute post-operative pain     Short heel cord, right     Flexion contracture of right knee     Diarrhea, unspecified type     Essential hypertension     Vitamin B12 deficiency (non anemic)     Vitamin D deficiency      Current Medications and Allergies:  See printed Medication Report.    Care Coordination Start Date: 1/8/2020   Frequency of Care Coordination: monthly   Form Last Updated: 01/30/2020

## 2020-02-10 ENCOUNTER — TELEPHONE (OUTPATIENT)
Dept: FAMILY MEDICINE | Facility: CLINIC | Age: 45
End: 2020-02-10

## 2020-02-10 NOTE — TELEPHONE ENCOUNTER
Per ZB, needing to have ACT updated    ACT Total Scores 5/23/2018 1/8/2020 2/10/2020   ACT TOTAL SCORE - - -   ASTHMA ER VISITS - - -   ASTHMA HOSPITALIZATIONS - - -   ACT TOTAL SCORE (Goal Greater than or Equal to 20) 25 18 22   In the past 12 months, how many times did you visit the emergency room for your asthma without being admitted to the hospital? 0 0 0   In the past 12 months, how many times were you hospitalized overnight because of your asthma? 0 0 0          Rosalba King/[facility]---[facility]

## 2020-02-11 ASSESSMENT — ASTHMA QUESTIONNAIRES: ACT_TOTALSCORE: 22

## 2020-02-26 PROBLEM — M24.561 FLEXION CONTRACTURE OF RIGHT KNEE: Status: RESOLVED | Noted: 2019-08-28 | Resolved: 2020-02-26

## 2020-02-26 NOTE — PROGRESS NOTES
DISCHARGE REPORT    Progress reporting period is from 8/28/19 to 1/22/20.       SUBJECTIVE  Subjective changes noted by patient:  This patient completed his 180 day Medicare certification on 2/24/20. In 6 months he completed only 11 visits and cancelled 10 appointments.  He was last seen on 1/22/20 and was instructed numerous times that we would need to finish his treatment by 2/24/20, as he was no longer showing the progress necessary to support additional treatment through Medicare.  He had appointments scheduled on 2/26/20 and 3/4/20 which were cancelled and a message was left with the patient's wife.  When last seen on 1/22/20 the patient reported that he was going to  see Andrea IBARRA at [facility] [place] to see about getting a lift on his AFO or shoe on R to correct functional leg length discrepency, as he felt this was helpful when we tried a heel lift in the clinic.  He reported that he has not used his wheelchair since 12/18/19.  Current pain level is 6/10  .     Previous pain level was  5/10.   Changes in function:  Yes (See Goal flowsheet attached for changes in current functional level)  Adverse reaction to treatment or activity: None    OBJECTIVE  Changes noted in objective findings:  Yes,  Gait with SPC with poor balance. Passive R ankle DF 18 deg. Resting R knee extension after stretch 12 deg, short arc active knee extension -15, long arc -12. Passive knee extension -10 deg. R hip extension in Shon 0 deg. Fair VMO with quad set. Tolerating 5# with SAQ, and 130# on leg press with eccentric lowering. Able to WB on R with right hand on rail and lift left leg up and off 2 inch step with fair to poor R knee stability.    ASSESSMENT/PLAN  Updated problem list and treatment plan: Diagnosis 1:  Right knee pain/flexion contracture    Pain -  home program  Decreased ROM/flexibility - home program  Decreased strength - home program  Impaired gait - home program  Impaired muscle performance - home program  Decreased function -  home program  STG/LTGs have been met or progress has been made towards goals:  Yes (See Goal flow sheet completed today.)  Assessment of Progress: The patient's progress has slowed.  The patient has not returned to therapy. Current status is unknown.  Self Management Plans:  Patient has been instructed in a home treatment program.    Andrea continues to require the following intervention to meet STG and LTG's:  PT intervention is no longer required to meet STG/LTG.    Recommendations:  This patient is ready to be discharged from therapy and continue their home treatment program.    Please refer to the daily flowsheet for treatment today, total treatment time and time spent performing 1:1 timed codes.

## 2020-02-27 ENCOUNTER — PATIENT OUTREACH (OUTPATIENT)
Dept: CARE COORDINATION | Facility: CLINIC | Age: 45
End: 2020-02-27

## 2020-02-27 ENCOUNTER — VIRTUAL VISIT (OUTPATIENT)
Dept: FAMILY MEDICINE | Facility: CLINIC | Age: 45
End: 2020-02-27
Payer: MEDICARE

## 2020-02-27 DIAGNOSIS — M67.01 CONTRACTURE OF RIGHT ACHILLES TENDON: ICD-10-CM

## 2020-02-27 DIAGNOSIS — F32.1 MODERATE MAJOR DEPRESSION (H): Primary | ICD-10-CM

## 2020-02-27 DIAGNOSIS — F41.9 ANXIETY: ICD-10-CM

## 2020-02-27 PROCEDURE — 99442 ZZC PHYSICIAN TELEPHONE EVALUATION 11-20 MIN: CPT | Performed by: PHYSICIAN ASSISTANT

## 2020-02-27 RX ORDER — VENLAFAXINE HYDROCHLORIDE 150 MG/1
150 CAPSULE, EXTENDED RELEASE ORAL DAILY
Qty: 90 CAPSULE | Refills: 0 | Status: SHIPPED | OUTPATIENT
Start: 2020-02-27 | End: 2020-07-24

## 2020-02-27 ASSESSMENT — ANXIETY QUESTIONNAIRES
3. WORRYING TOO MUCH ABOUT DIFFERENT THINGS: SEVERAL DAYS
1. FEELING NERVOUS, ANXIOUS, OR ON EDGE: NOT AT ALL
GAD7 TOTAL SCORE: 1
6. BECOMING EASILY ANNOYED OR IRRITABLE: NOT AT ALL
7. FEELING AFRAID AS IF SOMETHING AWFUL MIGHT HAPPEN: NOT AT ALL
5. BEING SO RESTLESS THAT IT IS HARD TO SIT STILL: NOT AT ALL
2. NOT BEING ABLE TO STOP OR CONTROL WORRYING: NOT AT ALL

## 2020-02-27 ASSESSMENT — PATIENT HEALTH QUESTIONNAIRE - PHQ9
5. POOR APPETITE OR OVEREATING: NOT AT ALL
SUM OF ALL RESPONSES TO PHQ QUESTIONS 1-9: 1

## 2020-02-27 NOTE — Clinical Note
SHREYAS. Much improved depression and anxiety. In remission. Will continue on 150 mg effexor. Due for nurse only bp check. He is working on getting physical therapy re-established (will have care coordination assist in this) and informed him to have this checked at the pharmacy. If this isn't checked in the next month, please have come in for BP only nurse check. Planning on following up with patient in 6 months in office. -trae brewer, pac

## 2020-02-27 NOTE — Clinical Note
Kushal Linares, Patient informed me his progress after his surgery is starting to regress without formal physical therapy. This makes since due to his tbi and ability to complete his home therapy as instructed may be effected. He would likely benefit from further in office physical therapy, but I am not sure if this will be covered by his insurance or if he will need a new order from her surgeon to continue this. Can you help him with this? Thanks,trae brewer, pac

## 2020-02-27 NOTE — PROGRESS NOTES
"    Andrea Uribe is a 44 year old male who is being evaluated via a billable telephone visit.      The patient has been notified of following:     \"This telephone visit will be conducted via a call between you and your physician/provider. We have found that certain health care needs can be provided without the need for a physical exam.  This service lets us provide the care you need with a short phone conversation.  If a prescription is necessary we can send it directly to your pharmacy.  If lab work is needed we can place an order for that and you can then stop by our lab to have the test done at a later time.    If during the course of the call the physician/provider feels a telephone visit is not appropriate, you will not be charged for this service.\"     Consent has been obtained for this service by 1 care team member: yes. See the scanned image in the medical record.    Andrea Uribe complains of    Chief Complaint   Patient presents with     Depression     f/u meds, depression       I have reviewed and updated the patient's Past Medical History, Social History, Family History and Medication List.    ALLERGIES  Iodine; Asa [aspirin]; Ibuprofen sodium; Pollen extract; and Seasonal allergies    Rosalba King/[facility]  [place]---[facility]   (MA signature)    Additional provider notes:  Depression and Anxiety Follow-Up    How are you doing with your depression since your last visit? Improved     How are you doing with your anxiety since your last visit?  Improved     Are you having other symptoms that might be associated with depression or anxiety? No    Have you had a significant life event? Job Concerns     Do you have any concerns with your use of alcohol or other drugs? No    Social History     Tobacco Use     Smoking status: Never Smoker     Smokeless tobacco: Never Used   Substance Use Topics     Alcohol use: No     Alcohol/week: 0.0 standard drinks     Frequency: Never     Drinks per session: 1 or 2 "     Binge frequency: Never     Drug use: No     PHQ 2/6/2019 1/8/2020 2/27/2020   PHQ-9 Total Score 5 12 1   Q9: Thoughts of better off dead/self-harm past 2 weeks Not at all Not at all Not at all     DAYAMI-7 SCORE 2/6/2019 1/8/2020 2/27/2020   Total Score - - -   Total Score 9 (mild anxiety) 15 (severe anxiety) -   Total Score 9 15 1     Last PHQ-9 2/27/2020   1.  Little interest or pleasure in doing things 0   2.  Feeling down, depressed, or hopeless 0   3.  Trouble falling or staying asleep, or sleeping too much 0   4.  Feeling tired or having little energy 0   5.  Poor appetite or overeating 0   6.  Feeling bad about yourself 1   7.  Trouble concentrating 0   8.  Moving slowly or restless 0   Q9: Thoughts of better off dead/self-harm past 2 weeks 0   PHQ-9 Total Score 1   Difficulty at work, home, or with people -     DAYAMI-7  2/27/2020   1. Feeling nervous, anxious, or on edge 0   2. Not being able to stop or control worrying 0   3. Worrying too much about different things 1   4. Trouble relaxing 0   5. Being so restless that it is hard to sit still 0   6. Becoming easily annoyed or irritable 0   7. Feeling afraid, as if something awful might happen 0   DAYAMI-7 Total Score 1   If you checked any problems, how difficult have they made it for you to do your work, take care of things at home, or get along with other people? -         Suicide Assessment Five-step Evaluation and Treatment (SAFE-T)      Patient also states he feels his progress from his in office physical therapy after his contracture heel cord lengthening surgery on the right leg has continued to worsen. He states he was discharged with home therapy after his strengths improved over 11 sessions. However, he states due to lack of strengthening equipment, such as the leg press machine, at home he has noticed his strengths have worsened. He continues to require his walker both around the house and at work. Patient states he unfortunately missed 12 total  sessions due to a death in the family and feels further in office physical therapy would be beneficial for him.       Assessment/Plan:  (F32.1) Moderate major depression (H)  (primary encounter diagnosis)  Comment: much improved. phq 9 now in remission. Will maintain current dose. Recommending recheck in 6 months.   Plan: venlafaxine (EFFEXOR-XR) 150 MG 24 hr capsule        -Medication use and side effects discussed with the patient. Patient is in complete understanding and agreement with plan.       (F41.9) Anxiety  Comment: as above   Plan: venlafaxine (EFFEXOR-XR) 150 MG 24 hr capsule            (M67.01) Contracture of right Achilles tendon  Comment: ongoing. Followed by ortho at [facility]. Patient feels his symptoms are regressing due to lack of formal physical therapy. Given his history of TBI, it is understandable patient may not be completing his home physical therapy as instructed. He would benefit from further therapy pending insurance coverage. May need a requested order extension from his orthopedic surgeon. Will have care coordination assist in this.   Plan:     I have reviewed the note as documented above.  This accurately captures the substance of my conversation with the patient.  SUSANNA Mejia    Total time of call between patient and provider was 12 minutes     Alberto Medley PA-C

## 2020-02-28 ASSESSMENT — ANXIETY QUESTIONNAIRES: GAD7 TOTAL SCORE: 1

## 2020-02-28 ASSESSMENT — ACTIVITIES OF DAILY LIVING (ADL): DEPENDENT_IADLS:: INDEPENDENT

## 2020-02-28 NOTE — PROGRESS NOTES
Clinic Care Coordination Contact    Follow Up Progress Note      Assessment: Andrea reports that his mood has improved quite a bit and he feels much calmer.  He has not yet received the PCP letter supporting a schedule change at work.  ORA NEGRO will mal that to him today.      Goals addressed this encounter:   Goals Addressed                 This Visit's Progress       Patient Stated      Mental Health Management (pt-stated)   On track     Goal Statement: Take my mental health medication as prescribed.   Date Goal set: 1/8/20  Date to Achieve By: 3/30/20  Patient expressed understanding of goal: Yes  Action steps to achieve this goal:    1.)  I will resume taking EFFEXOR-XR   2.)  I will continue to work with my PCP on medication management. I will let PCP know if I have concerns about my medication.  3.)  My wife will remind me to take my medication.   I can explore with my Cadi worker about getting a pill box timer.      As of today's date 1/30/2020 goal is met at 26 - 50%.   Goal Status:  Active  Andrea has been taking his medication as prescribed,  He doesn't think that it has made any change to his anxiety or mood..  As of today's date 2/28/2020 goal is met at 76 - 100%.   Goal Status:  Active               Intervention/Education provided during outreach: Mailed him the PCP letter supporting schedule adjustment.     Outreach Frequency: monthly    Plan:   ORA NEGRO will Mailhim the PCP letter supporting schedule adjustment.    Care Coordinator will follow up in one month    GEENA Whitten Care Coordination Team  [phone]

## 2020-03-03 ENCOUNTER — PATIENT OUTREACH (OUTPATIENT)
Dept: FAMILY MEDICINE | Facility: CLINIC | Age: 45
End: 2020-03-03

## 2020-03-03 NOTE — TELEPHONE ENCOUNTER
Andrea is at work. Gabriella requests we call him back around 2 PM.   BP Readings from Last 1 Encounters:   01/08/20 (!) 142/86     Due for pharmacy (or NO) BP check by 3/27/20.  Does Andrea have preference or wants a reminder?    PT plan?  Current PT order expires 3/4/2020  Fasting labs due 4/9/20. Offer to schedule   Marbin Danielle RN /CARLITOS

## 2020-03-03 NOTE — TELEPHONE ENCOUNTER
Andrea reports BP at [facility] /?  He agrees to stop by our pharmacy in the next couple of weeks.       Andrea is motivated and eager (tearful)  to restart PT with Marline Palma in [place] in [place] if possible. Multiple no shows. We'll check with Marline on Thursday morning  (7:30-12) or Friday (9-5) and get back to Andrea if he is accepted.    Oneil prefers we call him closer to 4/9 lab date.I sent separate reminder for PAL RN to call him ~4/2/20.    Marbin Danielle, RN

## 2020-03-05 NOTE — TELEPHONE ENCOUNTER
CARLITOS RN will call Andrea after 2 PM today.   Per Molly PT, due to Medicare guidelines patient must make progress in 90 days. Patient had two 90-day period in which he did not progress.   Molly suggests Andrea wait until ~April 1 and if at that time he is committed to PT (possibly Andrea will need to sign a written agreement with PT) Molly could consider submitting another override from Medicare for 90 day recertification.     Marbin Danielle, RN

## 2020-03-05 NOTE — TELEPHONE ENCOUNTER
Andrea informed. He understands.   Informed when I call him back around 4/2/20 we'll review PT request.  He will continue to do his home exercises until that time.    Message postponed to 4/2/20 for PAL to review BP, fasting labs, PT.   Marbin Danielle RN

## 2020-03-27 DIAGNOSIS — E55.9 VITAMIN D DEFICIENCY: ICD-10-CM

## 2020-03-27 RX ORDER — METHOCARBAMOL 750 MG/1
TABLET ORAL
Qty: 12 CAPSULE | Refills: 0 | OUTPATIENT
Start: 2020-03-27

## 2020-03-27 NOTE — TELEPHONE ENCOUNTER
"Was to return for lab only. Given covid-19 concerns, recommending 5000 international unit(s) otc dose once daily and returning for fasting lab only in ~3 months.     -delphine romero    Requested Prescriptions   Pending Prescriptions Disp Refills     D3-50 1.25 MG (14388 UT) capsule [Pharmacy Med Name: VITAMIN D3-50 50,000 UNIT CAP] 12 capsule 0     Sig: TAKE 1 CAPSULE BY MOUTH ONE TIME PER WEEK       Vitamin Supplements (Adult) Protocol Failed - 3/27/2020  2:28 AM        Failed - High dose Vitamin D not ordered        Passed - Recent (12 mo) or future (30 days) visit within the authorizing provider's specialty     Patient has had an office visit with the authorizing provider or a provider within the authorizing providers department within the previous 12 mos or has a future within next 30 days. See \"Patient Info\" tab in inbasket, or \"Choose Columns\" in Meds & Orders section of the refill encounter.              Passed - Medication is active on med list             "

## 2020-03-27 NOTE — TELEPHONE ENCOUNTER
Pt informed and agrees to plan. Orestes, can we try sending Rx?  If insurance does not cover, Andrea will ask his pharmacist to help him choose OTC.   CARLITOS RN sent future message to call Andrea in mid June to schedule fasting lab only.   Marbin Danielle RN

## 2020-03-30 RX ORDER — CHOLECALCIFEROL (VITAMIN D3) 125 MCG
1 CAPSULE ORAL DAILY
Qty: 100 CAPSULE | Refills: 1 | Status: SHIPPED | OUTPATIENT
Start: 2020-03-30 | End: 2020-08-12

## 2020-04-01 ENCOUNTER — TELEPHONE (OUTPATIENT)
Dept: FAMILY MEDICINE | Facility: CLINIC | Age: 45
End: 2020-04-01

## 2020-04-01 NOTE — TELEPHONE ENCOUNTER
Forms to go to sleep doctor. Repeat fax. See 12/6 encounter. Needs face to face assessment date and cpap measurements. These obtained through sleep. Unable to fill out at primary. In TC box at [facility].    -delphine romero

## 2020-04-02 ENCOUNTER — PATIENT OUTREACH (OUTPATIENT)
Dept: FAMILY MEDICINE | Facility: CLINIC | Age: 45
End: 2020-04-02

## 2020-04-02 NOTE — TELEPHONE ENCOUNTER
Follow up 1/9/20 lab results.   Lipids and Vit D due ~4/9/20 .  Orestes are these labs vital at this time?  Or delay to x date?  We expect resuming PT is on hold due to COVID-19.  Marbin Danielle RN

## 2020-04-03 ENCOUNTER — PATIENT OUTREACH (OUTPATIENT)
Dept: CARE COORDINATION | Facility: CLINIC | Age: 45
End: 2020-04-03

## 2020-04-03 ASSESSMENT — ACTIVITIES OF DAILY LIVING (ADL): DEPENDENT_IADLS:: INDEPENDENT

## 2020-04-03 NOTE — TELEPHONE ENCOUNTER
Left message with wife Gabriella Mendez to call back.  I will take his call, please Skype/Ivan me. Thanks.   Marbin Danielle RN

## 2020-04-03 NOTE — PROGRESS NOTES
Clinic Care Coordination Contact    Follow Up Progress Note      Assessment: Andrea reports that he is doing well and has bee joann to continue working during the COVID 19 stay in place restrictions. He states that he has been able to go back to his previous work schedule which he is pleased about. He has been taking his medications as prescribed.    Goals addressed this encounter:   Goals Addressed                 This Visit's Progress       Patient Stated      Mental Health Management (pt-stated)        Goal Statement: Take my mental health medication as prescribed.   Date Goal set: 1/8/20  Date to Achieve By: 3/30/20  Patient expressed understanding of goal: Yes  Action steps to achieve this goal:    1.)  I will resume taking EFFEXOR-XR   2.)  I will continue to work with my PCP on medication management. I will let PCP know if I have concerns about my medication.  3.)  My wife will remind me to take my medication.   I can explore with my Cadi worker about getting a pill box timer.      As of today's date 1/30/2020 goal is met at 26 - 50%.   Goal Status:  Active  Andrea has been taking his medication as prescribed,  He doesn't think that it has made any change to his anxiety or mood..  As of today's date 2/28/2020 goal is met at 76 - 100%.   Goal Status:  Active  As of today's date 4/3/2020 goal is met at 76 - 100%.   Goal Status:  Complete                Intervention/Education provided during outreach: NA     Outreach Frequency: 2 months    Plan:   Has met his goal and is being placed on maintenance at this time. He will contact [facility] if he needs additional support.     Care Coordinator will follow up in two months.  GEENA Whitten   Care Coordination Team  [phone]

## 2020-04-17 ENCOUNTER — VIRTUAL VISIT (OUTPATIENT)
Dept: URGENT CARE | Facility: CLINIC | Age: 45
End: 2020-04-17
Payer: MEDICARE

## 2020-04-17 ENCOUNTER — NURSE TRIAGE (OUTPATIENT)
Dept: NURSING | Facility: CLINIC | Age: 45
End: 2020-04-17

## 2020-04-17 DIAGNOSIS — M79.675 GREAT TOE PAIN, LEFT: Primary | ICD-10-CM

## 2020-04-17 PROCEDURE — 99443: CPT | Performed by: STUDENT IN AN ORGANIZED HEALTH CARE EDUCATION/TRAINING PROGRAM

## 2020-04-17 NOTE — LETTER
April 17, 2020      [place]rea [place]ribe  [address] [address]        To Whom It May Concern:    Andrea Uribe was seen in our clinic on 04/17/20. Please excuse him from work on 04/17/20.  He may return to work without restrictions on 4/20/2020.      Sincerely,        Criss Edmond MD

## 2020-04-17 NOTE — LETTER
April 17, 2020      Andrea Uribe  [address] [facility] [address]              Dear Andrea,          Sincerely,      Criss Edmond {PROVIDER CREDENTIALS:[id number]}

## 2020-04-17 NOTE — PROGRESS NOTES
I was present during the key/critical portion of the telephone visit. The patient acknowledged that I participated in the telephone conversation. I discussed the case with the resident and agree with the note as documented by the resident.    I personally spent a total of 4 minutes speaking with Andrea Uribe during today s visit.     Michael Briggs MD  4/17/2020 at 8:41 AM-8:45 am

## 2020-04-17 NOTE — PROGRESS NOTES
"Andrea Uribe is a 44 year old male who is being evaluated via a billable telephone visit.      The patient has been notified of following:     \"This telephone visit will be conducted via a call between you and your physician/provider. We have found that certain health care needs can be provided without the need for a physical exam.  This service lets us provide the care you need with a short phone conversation.  If a prescription is necessary we can send it directly to your pharmacy.  If lab work is needed we can place an order for that and you can then stop by our lab to have the test done at a later time.    Telephone visits are billed at different rates depending on your insurance coverage. During this emergency period, for some insurers they may be billed the same as an in-person visit.  Please reach out to your insurance provider with any questions.    If during the course of the call the physician/provider feels a telephone visit is not appropriate, you will not be charged for this service.\"    Patient has given verbal consent for Telephone visit?  Yes    How would you like to obtain your AVS? Declined    Subjective     Andrea Uribe is a 44 year old male with a history of:    Patient Active Problem List   Diagnosis     Seizure disorder (H)     TBI (traumatic brain injury) (H)     Moderate major depression (H)     Obesity     Anxiety     Sleep apnea     GERD (gastroesophageal reflux disease)     Intermittent asthma     CKD (chronic kidney disease) stage 2, GFR 60-89 ml/min     MRSA cellulitis     Anemia     Femur fracture, right (H)     Physical deconditioning     S/P total knee replacement     Health Care Home     Hyperlipidemia LDL goal <130     Incisional hernia, without obstruction or gangrene     Ataxic gait     S/P total knee arthroplasty     Acute post-operative pain     Short heel cord, right     Diarrhea, unspecified type     Essential hypertension     Vitamin B12 deficiency (non anemic)     Vitamin " D deficiency     He rolled off of the bed overnight after getting tangled with his CPAP tubing.  His left great toe hit the floor and is now painful, bruised, and swollen.  He is not having pain anywhere else.  There is not any deformity or breakdown of the skin.  There is swelling, but he is able to fit into his normal shoes.  The nailbed is intact.  There is some bruising at the base of the nailbed, but no significant pooling of blood.  Ambulation is painful, but he is able to ambulate with a supportive shoe in place.  Due to a history of JASON when he had his TBI, he has been told not to use NSAIDs.    Reviewed and updated as needed this visit by Provider         Review of Systems   ROS COMP: Constitutional, HEENT, cardiovascular, pulmonary, gi and gu systems are negative, except as otherwise noted.       Objective   Reported vitals:  There were no vitals taken for this visit.   healthy, alert and no distress  PSYCH: Alert and oriented times 3; coherent speech, normal   rate and volume, able to articulate logical thoughts, able   to abstract reason, no tangential thoughts, no hallucinations   or delusions  His affect is normal  RESP: No cough, no audible wheezing, able to talk in full sentences  Remainder of exam unable to be completed due to telephone visits    Diagnostic Test Results:  none         Assessment/Plan:  Great toe pain, left  Since there is no evidence concerning for a displaced fracture, recommended ice, elevation, Tylenol as needed, and a rigid supportive shoe with ambulation.  Discussed that I expect the pain to slowly and gradually improve day by day.  Discussed that if he is not experiencing improvement or feels like things are getting worse, he would need to be seen at a in person urgent care at that time for x-rays.  Provided work note excusing him from work today.  He has not scheduled to work over the weekend and may return to work on Monday (in 3 days) without restrictions.    Return if  symptoms worsen or fail to improve.    Phone call duration:  25 minutes    Criss Edmond MD PGY2    I precepted with Dr. Briggs.

## 2020-04-17 NOTE — TELEPHONE ENCOUNTER
I connected him with scheduling for a telephone appointment today. He got his c pap hose stuck and tangled, he ended up rolling out of bed and hitting his toe, left foot. It is black and blue. He can move it. He doesn't want to bear weight.  Melanie Brown RN  [facility]      Reason for Disposition    [1] Toe injury AND [2] bad limp or can't wear shoes/sandals    Additional Information    Negative: [1] Major bleeding (e.g., spurting blood) AND [2] can't be stopped    Negative: Foot or ankle injury    Negative: Looks infected    Negative: Amputated toe    Negative: Skin is split open or gaping  (or length > 1/2 inch or 12 mm)    Negative: [1] Bleeding AND [2] won't stop after 10 minutes of direct pressure (using correct technique)    Negative: [1] Dirt in the wound AND [2] not removed with 15 minutes of scrubbing    Negative: Sounds like a serious injury to the triager    Negative: [1] SEVERE pain AND [2] not improved 2 hours after pain medicine/ice packs     Pain at 5 if left alone, bending it causes pain at 8    Negative: [1] MODERATE-SEVERE pain AND [2] blood present under the toenail    Negative: Looks like a broken bone (e.g., crooked or deformed)    Negative: Looks like a dislocated joint (e.g., crooked or deformed)    Negative: Toenail is completely torn off (toenail avulsion)    Negative: Base of toenail has popped out of the skin fold (nail base dislocation)    Protocols used: TOE INJURY-A-

## 2020-04-28 ENCOUNTER — TELEPHONE (OUTPATIENT)
Dept: FAMILY MEDICINE | Facility: CLINIC | Age: 45
End: 2020-04-28

## 2020-04-29 ENCOUNTER — NURSE TRIAGE (OUTPATIENT)
Dept: NURSING | Facility: CLINIC | Age: 45
End: 2020-04-29

## 2020-04-29 ENCOUNTER — VIRTUAL VISIT (OUTPATIENT)
Dept: INTERNAL MEDICINE | Facility: CLINIC | Age: 45
End: 2020-04-29
Payer: MEDICARE

## 2020-04-29 DIAGNOSIS — R10.9 ACUTE ABDOMINAL PAIN: Primary | ICD-10-CM

## 2020-04-29 PROCEDURE — 99442 ZZC PHYSICIAN TELEPHONE EVALUATION 11-20 MIN: CPT | Performed by: INTERNAL MEDICINE

## 2020-04-29 PROCEDURE — 83690 ASSAY OF LIPASE: CPT | Performed by: INTERNAL MEDICINE

## 2020-04-29 NOTE — PROGRESS NOTES
"Andrea Uribe is a 44 year old male who is being evaluated via a billable telephone visit.      The patient has been notified of following:     \"This telephone visit will be conducted via a call between you and your physician/provider. We have found that certain health care needs can be provided without the need for a physical exam.  This service lets us provide the care you need with a short phone conversation.  If a prescription is necessary we can send it directly to your pharmacy.  If lab work is needed we can place an order for that and you can then stop by our lab to have the test done at a later time.    Telephone visits are billed at different rates depending on your insurance coverage. During this emergency period, for some insurers they may be billed the same as an in-person visit.  Please reach out to your insurance provider with any questions.    If during the course of the call the physician/provider feels a telephone visit is not appropriate, you will not be charged for this service.\"    Patient has given verbal consent for Telephone visit?  Yes      Subjective     Andrea Uribe is a 44 year old male who presents to clinic today for the following health issues:    HPI  Abdominal pain and weight loss:    Presents with abdominal pain.   Symptoms are present for 2 weeks.   Has pain in the middle abdominal area, above the belly button. Mild to moderate. Sharp, on and off.   No nausea, vomiting, fever.   Has had regular BMs, no dysuria.   Has a VC filter for h/o clots, for 10 years, not removed.        PROBLEMS TO ADD ON...  Has h/o HTN. on medical treatment. BP has been controlled. No side effects from medications. No CP, HA, dizziness. good compliance with medications and low salt diet.  Has h/o seizures. Last seizure 9 years ago. On preventive treatment.     Patient Active Problem List   Diagnosis     Seizure disorder (H)     TBI (traumatic brain injury) (H)     Moderate major depression (H)     Obesity "     Anxiety     Sleep apnea     GERD (gastroesophageal reflux disease)     Intermittent asthma     CKD (chronic kidney disease) stage 2, GFR 60-89 ml/min     MRSA cellulitis     Anemia     Femur fracture, right (H)     Physical deconditioning     S/P total knee replacement     Health Care Home     Hyperlipidemia LDL goal <130     Incisional hernia, without obstruction or gangrene     Ataxic gait     S/P total knee arthroplasty     Acute post-operative pain     Short heel cord, right     Diarrhea, unspecified type     Essential hypertension     Vitamin B12 deficiency (non anemic)     Vitamin D deficiency     Past Surgical History:   Procedure Laterality Date     APPENDECTOMY OPEN  8/11/2012    Procedure: APPENDECTOMY OPEN;  Exploratory Laparotomy, Appendectomy, Remove part IVC filter from duodenum with repair;  Surgeon: Michael Jimenez MD;  Location: [facility] OR     ARTHROPLASTY KNEE Right 8/27/2014    Procedure: ARTHROPLASTY KNEE;  Surgeon: David Mckay MD;  Location:  OR     ARTHROPLASTY REVISION KNEE Right 12/13/2016    Procedure: ARTHROPLASTY REVISION KNEE;  Surgeon: David Mckay MD;  Location:  OR     ENT SURGERY      tracheostomy     ESOPHAGOSCOPY, GASTROSCOPY, DUODENOSCOPY (EGD), COMBINED  8/11/2012    Procedure: COMBINED ESOPHAGOSCOPY, GASTROSCOPY, DUODENOSCOPY (EGD);   ESOPHAGOSCOPY, GASTROSCOPY, DUODENOSCOPY (EGD);  Surgeon: Holland Lawson MD;  Location:  GI     HERNIORRHAPHY INCISIONAL (LOCATION) N/A 5/26/2016    Procedure: HERNIORRHAPHY INCISIONAL (LOCATION);  Surgeon: John Mcfarlane MD;  Location:  OR     IMPLANT STIMULATOR VAGUS NERVE  1/16/2012    Procedure:IMPLANT STIMULATOR VAGUS NERVE; VAGUS NERVE STIMULATOR IMPLANT; Surgeon:LEILANI CORTÉS; Location:[facility]     LAPAROSCOPIC CHOLECYSTECTOMY N/A 5/4/2017    Procedure: LAPAROSCOPIC CHOLECYSTECTOMY;  LAPAROSCOPIC CHOLECYSTECTOMY ;  Surgeon: John Mcfarlane MD;  Location: [facility] OR     LAPAROTOMY EXPLORATORY   8/11/2012    Procedure: LAPAROTOMY EXPLORATORY;;  Surgeon: Michael Jimenez MD;  Location: [facility] OR     OPEN REDUCTION INTERNAL FIXATION FEMUR DISTAL  1/22/2014    Procedure: OPEN REDUCTION INTERNAL FIXATION FEMUR DISTAL;  right distal femur Open reduction internal fixation        ORTHOPEDIC SURGERY      removal of femur bone growth,hand surgery, knee surgery     ORTHOPEDIC SURGERY Right 10/30/2017    enlonging muscle     REMOVE HARDWARE KNEE Right 8/27/2014    Procedure: REMOVE HARDWARE KNEE;  Surgeon: David Mckay MD;  Location: [facility] OR     REMOVE HARDWARE LOWER EXTREMITY Right 10/14/2016    Procedure: REMOVE HARDWARE LOWER EXTREMITY;  Surgeon: David Mckay MD;  Location: [facility] OR     spleen surgery      repaired     SURGICAL HISTORY OF -  Left 2009    selam in left femur     SURGICAL HISTORY OF -       screws in right knee     SURGICAL HISTORY OF -       .IVC filter, parts removed       Social History     Tobacco Use     Smoking status: Never Smoker     Smokeless tobacco: Never Used   Substance Use Topics     Alcohol use: No     Alcohol/week: 0.0 standard drinks     Frequency: Never     Drinks per session: 1 or 2     Binge frequency: Never     Family History   Problem Relation Age of Onset     Cardiovascular Mother      Cerebrovascular Disease Mother      Gastrointestinal Disease Father         Colitis     Diabetes Sister      Crohn's Disease Son      Other Cancer Other          Current Outpatient Medications   Medication Sig Dispense Refill     albuterol (PROAIR HFA/PROVENTIL HFA/VENTOLIN HFA) 108 (90 Base) MCG/ACT inhaler Inhale 2 puffs into the lungs every 6 hours as needed for shortness of breath / dyspnea 2 Inhaler 3     Cholecalciferol (VITAMIN D) 125 MCG (5000 UT) CAPS Take 1 capsule (5,000 Units) by mouth daily 100 capsule 1     KEPPRA 1000 MG TABS Take 2,000 mg by mouth 2 times daily At 0900 and 2100       lisinopril (PRINIVIL/ZESTRIL) 5 MG tablet Take 1 tablet (5 mg) by mouth daily 90  tablet 3     phenytoin (DILANTIN) 100 MG CR capsule Take 300 mg by mouth 2 times daily 300mg in the am and 200mg at pm       simvastatin (ZOCOR) 20 MG tablet TAKE 1 TABLET BY MOUTH EVERYDAY AT BEDTIME 90 tablet 1     venlafaxine (EFFEXOR-XR) 150 MG 24 hr capsule Take 1 capsule (150 mg) by mouth daily 90 capsule 0     cyanocobalamin (VITAMIN B12) 1000 MCG/ML injection Inject 1 mL (1,000 mcg) into the muscle every 30 days (Patient not taking: Reported on 4/29/2020) 1 mL 11     order for DME Equipment being ordered: Wheelchair 1 Device 0       Reviewed and updated as needed this visit by Provider         Review of Systems   ROS COMP: Constitutional, HEENT, cardiovascular, pulmonary, gi and gu systems are negative, except as otherwise noted.       Objective   Normal speech   Remainder of exam unable to be completed due to telephone visits    Diagnostic Test Results:  Labs reviewed in Epic        Assessment/Plan:  1. Acute abdominal pain    - CBC with platelets  - Comprehensive metabolic panel  - *UA reflex to Microscopic  - Lipase  - Amylase  - CT Abdomen Pelvis w Contrast; Future    Assess lab work and abdominal CT for possible infection, SBO, masses      Phone call duration:  12 minutes    Ronal Ireland MD
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
## 2020-04-29 NOTE — LETTER
[place] [address]  [facility] [address]  [phone]          April 29, 2020    RE:  Andrea Uribe                                                                                                                                                       [address] [facility] [address]            To whom it may concern:    Andrea Uribe is under my professional care for Acute abdominal pain   The patient is unable to work , because of medical condition from 4/29/2020 till 5/5/2020.    Sincerely,        Ronal Ireland MD

## 2020-04-30 DIAGNOSIS — R10.9 ACUTE ABDOMINAL PAIN: ICD-10-CM

## 2020-04-30 LAB
ALBUMIN SERPL-MCNC: 3.2 G/DL (ref 3.4–5)
ALBUMIN UR-MCNC: 100 MG/DL
ALP SERPL-CCNC: 152 U/L (ref 40–150)
ALT SERPL W P-5'-P-CCNC: 23 U/L (ref 0–70)
AMYLASE SERPL-CCNC: 72 U/L (ref 30–110)
ANION GAP SERPL CALCULATED.3IONS-SCNC: 8 MMOL/L (ref 3–14)
APPEARANCE UR: CLEAR
AST SERPL W P-5'-P-CCNC: 13 U/L (ref 0–45)
BACTERIA #/AREA URNS HPF: ABNORMAL /HPF
BILIRUB SERPL-MCNC: 0.2 MG/DL (ref 0.2–1.3)
BILIRUB UR QL STRIP: NEGATIVE
BUN SERPL-MCNC: 16 MG/DL (ref 7–30)
CALCIUM SERPL-MCNC: 8.4 MG/DL (ref 8.5–10.1)
CHLORIDE SERPL-SCNC: 105 MMOL/L (ref 94–109)
CO2 SERPL-SCNC: 27 MMOL/L (ref 20–32)
COLOR UR AUTO: YELLOW
CREAT SERPL-MCNC: 0.83 MG/DL (ref 0.66–1.25)
ERYTHROCYTE [DISTWIDTH] IN BLOOD BY AUTOMATED COUNT: 13.7 % (ref 10–15)
GFR SERPL CREATININE-BSD FRML MDRD: >90 ML/MIN/{1.73_M2}
GLUCOSE SERPL-MCNC: 93 MG/DL (ref 70–99)
GLUCOSE UR STRIP-MCNC: NEGATIVE MG/DL
HCT VFR BLD AUTO: 36.3 % (ref 40–53)
HGB BLD-MCNC: 11.7 G/DL (ref 13.3–17.7)
HGB UR QL STRIP: ABNORMAL
KETONES UR STRIP-MCNC: NEGATIVE MG/DL
LEUKOCYTE ESTERASE UR QL STRIP: NEGATIVE
LIPASE SERPL-CCNC: 68 U/L (ref 73–393)
MCH RBC QN AUTO: 30.3 PG (ref 26.5–33)
MCHC RBC AUTO-ENTMCNC: 32.2 G/DL (ref 31.5–36.5)
MCV RBC AUTO: 94 FL (ref 78–100)
NITRATE UR QL: NEGATIVE
PH UR STRIP: 5.5 PH (ref 5–7)
PLATELET # BLD AUTO: 206 10E9/L (ref 150–450)
POTASSIUM SERPL-SCNC: 3.9 MMOL/L (ref 3.4–5.3)
PROT SERPL-MCNC: 7.7 G/DL (ref 6.8–8.8)
RBC # BLD AUTO: 3.86 10E12/L (ref 4.4–5.9)
RBC #/AREA URNS AUTO: ABNORMAL /HPF
SODIUM SERPL-SCNC: 140 MMOL/L (ref 133–144)
SOURCE: ABNORMAL
SP GR UR STRIP: 1.02 (ref 1–1.03)
UROBILINOGEN UR STRIP-ACNC: 0.2 EU/DL (ref 0.2–1)
WBC # BLD AUTO: 8.8 10E9/L (ref 4–11)
WBC #/AREA URNS AUTO: ABNORMAL /HPF

## 2020-04-30 PROCEDURE — 82150 ASSAY OF AMYLASE: CPT | Performed by: INTERNAL MEDICINE

## 2020-04-30 PROCEDURE — 81001 URINALYSIS AUTO W/SCOPE: CPT | Performed by: INTERNAL MEDICINE

## 2020-04-30 PROCEDURE — 80053 COMPREHEN METABOLIC PANEL: CPT | Performed by: INTERNAL MEDICINE

## 2020-04-30 PROCEDURE — 85027 COMPLETE CBC AUTOMATED: CPT | Performed by: INTERNAL MEDICINE

## 2020-04-30 PROCEDURE — 83690 ASSAY OF LIPASE: CPT | Performed by: INTERNAL MEDICINE

## 2020-04-30 PROCEDURE — 36415 COLL VENOUS BLD VENIPUNCTURE: CPT | Performed by: INTERNAL MEDICINE

## 2020-05-01 ENCOUNTER — APPOINTMENT (OUTPATIENT)
Dept: CT IMAGING | Facility: CLINIC | Age: 45
End: 2020-05-01
Attending: EMERGENCY MEDICINE
Payer: MEDICARE

## 2020-05-01 ENCOUNTER — HOSPITAL ENCOUNTER (EMERGENCY)
Facility: CLINIC | Age: 45
Discharge: HOME | End: 2020-05-01
Attending: EMERGENCY MEDICINE | Admitting: EMERGENCY MEDICINE
Payer: MEDICARE

## 2020-05-01 VITALS
HEART RATE: 74 BPM | TEMPERATURE: 98.3 F | OXYGEN SATURATION: 99 % | DIASTOLIC BLOOD PRESSURE: 75 MMHG | SYSTOLIC BLOOD PRESSURE: 115 MMHG | RESPIRATION RATE: 18 BRPM

## 2020-05-01 DIAGNOSIS — R10.84 ABDOMINAL PAIN, GENERALIZED: ICD-10-CM

## 2020-05-01 DIAGNOSIS — Z95.828 PRESENCE OF IVC FILTER: ICD-10-CM

## 2020-05-01 LAB
ALBUMIN SERPL-MCNC: 3.6 G/DL (ref 3.4–5)
ALP SERPL-CCNC: 166 U/L (ref 40–150)
ALT SERPL W P-5'-P-CCNC: 23 U/L (ref 0–70)
ANION GAP SERPL CALCULATED.3IONS-SCNC: 1 MMOL/L (ref 3–14)
AST SERPL W P-5'-P-CCNC: 14 U/L (ref 0–45)
BASOPHILS # BLD AUTO: 0 10E9/L (ref 0–0.2)
BASOPHILS NFR BLD AUTO: 0.5 %
BILIRUB SERPL-MCNC: 0.2 MG/DL (ref 0.2–1.3)
BUN SERPL-MCNC: 18 MG/DL (ref 7–30)
CALCIUM SERPL-MCNC: 9 MG/DL (ref 8.5–10.1)
CHLORIDE SERPL-SCNC: 106 MMOL/L (ref 94–109)
CO2 SERPL-SCNC: 32 MMOL/L (ref 20–32)
CREAT SERPL-MCNC: 0.86 MG/DL (ref 0.66–1.25)
DIFFERENTIAL METHOD BLD: ABNORMAL
EOSINOPHIL # BLD AUTO: 0.2 10E9/L (ref 0–0.7)
EOSINOPHIL NFR BLD AUTO: 2.5 %
ERYTHROCYTE [DISTWIDTH] IN BLOOD BY AUTOMATED COUNT: 13.6 % (ref 10–15)
GFR SERPL CREATININE-BSD FRML MDRD: >90 ML/MIN/{1.73_M2}
GLUCOSE SERPL-MCNC: 123 MG/DL (ref 70–99)
HCT VFR BLD AUTO: 40 % (ref 40–53)
HGB BLD-MCNC: 12.8 G/DL (ref 13.3–17.7)
IMM GRANULOCYTES # BLD: 0 10E9/L (ref 0–0.4)
IMM GRANULOCYTES NFR BLD: 0.3 %
LYMPHOCYTES # BLD AUTO: 2.1 10E9/L (ref 0.8–5.3)
LYMPHOCYTES NFR BLD AUTO: 26.1 %
MCH RBC QN AUTO: 30.8 PG (ref 26.5–33)
MCHC RBC AUTO-ENTMCNC: 32 G/DL (ref 31.5–36.5)
MCV RBC AUTO: 96 FL (ref 78–100)
MONOCYTES # BLD AUTO: 0.6 10E9/L (ref 0–1.3)
MONOCYTES NFR BLD AUTO: 7.4 %
NEUTROPHILS # BLD AUTO: 5 10E9/L (ref 1.6–8.3)
NEUTROPHILS NFR BLD AUTO: 63.2 %
NRBC # BLD AUTO: 0 10*3/UL
NRBC BLD AUTO-RTO: 0 /100
PLATELET # BLD AUTO: 211 10E9/L (ref 150–450)
POTASSIUM SERPL-SCNC: 3.7 MMOL/L (ref 3.4–5.3)
PROT SERPL-MCNC: 8.1 G/DL (ref 6.8–8.8)
RBC # BLD AUTO: 4.16 10E12/L (ref 4.4–5.9)
SODIUM SERPL-SCNC: 139 MMOL/L (ref 133–144)
TSH SERPL DL<=0.005 MIU/L-ACNC: 1.98 MU/L (ref 0.4–4)
WBC # BLD AUTO: 7.9 10E9/L (ref 4–11)

## 2020-05-01 PROCEDURE — 74177 CT ABD & PELVIS W/CONTRAST: CPT

## 2020-05-01 PROCEDURE — 85025 COMPLETE CBC W/AUTO DIFF WBC: CPT | Performed by: EMERGENCY MEDICINE

## 2020-05-01 PROCEDURE — 84443 ASSAY THYROID STIM HORMONE: CPT | Performed by: EMERGENCY MEDICINE

## 2020-05-01 PROCEDURE — 80053 COMPREHEN METABOLIC PANEL: CPT | Performed by: EMERGENCY MEDICINE

## 2020-05-01 PROCEDURE — 99285 EMERGENCY DEPT VISIT HI MDM: CPT | Mod: 25

## 2020-05-01 PROCEDURE — 25000128 H RX IP 250 OP 636: Performed by: EMERGENCY MEDICINE

## 2020-05-01 RX ORDER — IOPAMIDOL 755 MG/ML
100 INJECTION, SOLUTION INTRAVASCULAR ONCE
Status: COMPLETED | OUTPATIENT
Start: 2020-05-01 | End: 2020-05-01

## 2020-05-01 RX ADMIN — IOPAMIDOL 100 ML: 755 INJECTION, SOLUTION INTRAVENOUS at 18:43

## 2020-05-01 NOTE — ED TRIAGE NOTES
Pt with abdominal pain the past few weeks, worse today. Denies n/v/d. Had labs drawn yesterday at clinic. ABC's intact, alert and oriented X3.

## 2020-05-01 NOTE — ED AVS SNAPSHOT
[facility] Emergency Department  [address]  [facility] [address]  Phone:  [phone]  Fax:  [phone]                                    Andrea Uribe   MRN: [id number]    Department:  [facility] Emergency Department   Date of Visit:  5/1/2020           After Visit Summary Signature Page    I have received my discharge instructions, and my questions have been answered. I have discussed any challenges I see with this plan with the nurse or doctor.    ..........................................................................................................................................  Patient/Patient Representative Signature      ..........................................................................................................................................  Patient Representative Print Name and Relationship to Patient    ..................................................               ................................................  Date                                   Time    ..........................................................................................................................................  Reviewed by Signature/Title    ...................................................              ..............................................  Date                                               Time          22EPIC Rev 08/18

## 2020-05-01 NOTE — LETTER
[facility] EMERGENCY DEPARTMENT  [address]  [facility] [address]  [phone]      May 1, 2020    Andrea Uribe  [address] [facility] [address]  [phone] (home) [phone] (work)    : 1975      To Whom it may concern:    Andrea Uribe was seen in our Emergency Department today, May 1, 2020.  Return to work is based on symptom improvement expected within 3 days.    Sincerely,      Lorrie Dave MD

## 2020-05-01 NOTE — ED PROVIDER NOTES
History   Chief Complaint:  Abdominal pain     HPI   Andrea Uribe is a 44 year old male who presents for evaluation of mid-right abdominal pain.  Has been present for a few days, gets severe, but not associated with eating.  Had blood work and urine done through clinic yesterday which was reviewed in the chart.  No fevers, vomiting, diarrhea, dysuria, hematuria.  Worries that something happened to his IVC filter that was placed in 2009.  60-70 lb weight loss over several months after stopping drinking pop.    Allergies:  Iodine  Asa [Aspirin]  Ibuprofen Sodium    Medications:   Albuterol   Keppra  Prinivil  Dilantin  Zocor   Effexor     Past Medical History:    Chronic kidney disease, stage 3   SANGITA  Hypertension  History of blood transfusion  MRSA  Obesity   Osteoarthritis   Seizure disorder  TBI  Thyroid disease   Asthma   Depression  GERD  Hyperlipidemia      Past Surgical History:    Appendectomy   Arthroplasty knee, right   Arthroplasty revision knee, right  Tracheostomy   EGD   Herniorrhaphy incisional  Implant stimulator vagus nerve  Cholecystectomy   Laparotomy exploratory   ORIF femur distal x 2  Orthopedic surgery, right  Spleen surgery      Family History:    Mother - Cardiovascular disease, Cerebrovascular disease  Father - Colitis  Sister - Diabetes  Son - Crohn's disease     Social History:  The patient was unaccompanied to the ED.  Smoking Status: Never Smoker  Smokeless Tobacco: Never Used  Alcohol Use: No  Drug Use: No  PCP: Alberto Medley     Review of Systems  Ten system ROS reviewed and is negative except as above    Physical Exam     Patient Vitals for the past 24 hrs:   BP Temp Temp src Pulse Heart Rate Resp SpO2   05/01/20 1753 126/76 98.3  F (36.8  C) Oral 74 74 18 99 %     Physical Exam  Eyes:  Sclera white; Pupils are equal and round  ENT:    External ears and nares normal  CV:  Rate as above with regular rhythm   Resp:  Breath sounds clear and equal bilaterally    Non-labored, no  retractions or accessory muscle use  GI:  Abdomen is soft, upper, mid R, and suprapubic tenderness    No rebound tenderness or peritoneal features  :  No CVA tenderness  MS:  Moves all extremities  Skin:  Warm and dry  Neuro:  Speech is normal and fluent. No apparent deficit.      Emergency Department Course   Imaging:  Radiology findings were communicated with the patient who voiced understanding of the findings.    CT Abdomen Pelvis w Contrast  IMPRESSION:   1.  No acute abnormality in the abdomen or pelvis.   Reading per radiology.      Laboratory:  Laboratory findings were communicated with the patient who voiced understanding of the findings.    CBC: HGB 12.8 (L) o/w WNL (WBC 7.9, )   CMP: Anion gap 1 (L), Glucose 123 (H), Alkphos 166 (H) o/w WNL (Creatinine 0.86)   TSH with free T4 reflex: 1.98       Emergency Department Course:  Past medical records, nursing notes, and vitals reviewed.    0555: I performed an exam of the patient as documented above.      IV was inserted and blood was drawn for laboratory testing, results above.   The patient was sent for imaging studies while in the emergency department, results above.      1955: Patient re-evaluated.    Findings and plan explained to the Patient. Patient discharged home with instructions regarding supportive care, medications, and reasons to return. The importance of close follow-up was reviewed. The patient was prescribed Prilosec. I personally reviewed the laboratory and imaging results with the Patient and answered all related questions prior to discharge.     Impression & Plan   Medical Decision Making:  Labs w/o evidence of hepatitis, pancreatitis, sepsis, hyperthyroid, electrolyte abnormalities.  Imaging w/o appendicitis, cholecystitis, obstruction, surgical pathology, renal stone, or IVC migration.  No indication for consultation, admission, or antibiotics.  Etiology of pain unclear at this time but appears appropriate for outpatient  management.  Will try omeprazole for possible gastritis vs ulcer.  OTC analgesics discussed.  F/u clinic for persistent symptoms.    Diagnosis:    ICD-10-CM    1. Abdominal pain, generalized  R10.84    2. Presence of IVC filter  Z95.828        Disposition:  Discharged to home.    Discharge Medications:  Discharge Medication List as of 5/1/2020  7:57 PM      START taking these medications    Details   omeprazole (PRILOSEC) 20 MG DR capsule Take 1 capsule (20 mg) by mouth daily for 14 days, Disp-14 capsule,R-0, E-Prescribe             Scribe Disclosure:  I, Marvin Devi, am serving as a scribe at 5:57 PM on 5/1/2020 to document services personally performed by Lorrie Dave MD based on my observations and the provider's statements to me.    May 1, 2020   [facility] EMERGENCY DEPARTMENT        Lorrie Dave MD  05/01/20 3993

## 2020-05-02 NOTE — DISCHARGE INSTRUCTIONS
Prescription strength dosing instructions:  - 650mg acetaminophen (tylenol) every 4 hours or 1000mg every 6 hours (maximum of 4000mg in 24 hours).  Acetaminophen is often in combination over the counter and prescription pain medications.  Be sure to include this in daily total.    Discharge Instructions  Abdominal Pain    Abdominal pain (belly pain) can be caused by many things. Your evaluation today does not show the exact cause for your pain. Your provider today has decided that it is unlikely your pain is due to a life threatening problem, or a problem requiring surgery or hospital admission. Sometimes those problems cannot be found right away, so it is very important that you follow up as directed.  Sometimes only the changes which occur over time allow the cause of your pain to be found.    Generally, every Emergency Department visit should have a follow-up clinic visit with either a primary or a specialty clinic/provider. Please follow-up as instructed by your emergency provider today. With abdominal pain, we often recommend very close follow-up, such as the following day.    ADULTS:  Return to the Emergency Department right away if:    You get an oral temperature above 102oF or as directed by your provider.  You have blood in your stools. This may be bright red or appear as black, tarry stools.    You keep vomiting (throwing up) or cannot drink liquids.  You see blood when you vomit.   You cannot have a bowel movement or you cannot pass gas.  Your stomach gets bloated or bigger.  Your skin or the whites of your eyes look yellow.  You faint.  You have bloody, frequent or painful urination (peeing).  You have new symptoms or anything that worries you.    CHILDREN:  Return to the Emergency Department right away if your child has any of the above-listed symptoms or the following:    Pushes your hand away or screams/cries when his/her belly is touched.  You notice your child is very fussy or weak.  Your child is  very tired and is too tired to eat or drink.  Your child is dehydrated.  Signs of dehydration can be:  Significant change in the amount of wet diapers/urine.  Your infant or child starts to have dry mouth and lips, or no saliva (spit) or tears.    PREGNANT WOMEN:  Return to the Emergency Department right away if you have any of the above-listed symptoms or the following:    You have bleeding, leaking fluid or passing tissue from the vagina.  You have worse pain or cramping, or pain in your shoulder or back.  You have vomiting that will not stop.  You have a temperature of 100oF or more.  Your baby is not moving as much as usual.  You faint.  You get a bad headache with or without eye problems and abdominal pain.  You have a seizure.  You have unusual discharge from your vagina and abdominal pain.    Abdominal pain is pretty common during pregnancy.  Your pain may or may not be related to your pregnancy. You should follow-up closely with your OB provider so they can evaluate you and your baby.  Until you follow-up with your regular provider, do the following:     Avoid sex and do not put anything in your vagina.  Drink clear fluids.  Only take medications approved by your provider.    MORE INFORMATION:    Appendicitis:  A possible cause of abdominal pain in any person who still has their appendix is acute appendicitis. Appendicitis is often hard to diagnose.  Testing does not always rule out early appendicitis or other causes of abdominal pain. Close follow-up with your provider and re-evaluations may be needed to figure out the reason for your abdominal pain.    Follow-up:  It is very important that you make an appointment with your clinic and go to the appointment.  If you do not follow-up with your primary provider, it may result in missing an important development which could result in permanent injury or disability and/or lasting pain.  If there is any problem keeping your appointment, call your provider or  "return to the Emergency Department.    Medications:  Take your medications as directed by your provider today.  Before using over-the-counter medications, ask your provider and make sure to take the medications as directed.  If you have any questions about medications, ask your provider.    Diet:  Resume your normal diet as much as possible, but do not eat fried, fatty or spicy foods while you have pain.  Do not drink alcohol or have caffeine.  Do not smoke tobacco.    Probiotics: If you have been given an antibiotic, you may want to also take a probiotic pill or eat yogurt with live cultures. Probiotics have \"good bacteria\" to help your intestines stay healthy. Studies have shown that probiotics help prevent diarrhea (loose stools) and other intestine problems (including C. diff infection) when you take antibiotics. You can buy these without a prescription in the pharmacy section of the store.     If you were given a prescription for medicine here today, be sure to read all of the information (including the package insert) that comes with your prescription.  This will include important information about the medicine, its side effects, and any warnings that you need to know about.  The pharmacist who fills the prescription can provide more information and answer questions you may have about the medicine.  If you have questions or concerns that the pharmacist cannot address, please call or return to the Emergency Department.       Remember that you can always come back to the Emergency Department if you are not able to see your regular provider in the amount of time listed above, if you get any new symptoms, or if there is anything that worries you.    "

## 2020-05-04 ENCOUNTER — PATIENT OUTREACH (OUTPATIENT)
Dept: FAMILY MEDICINE | Facility: CLINIC | Age: 45
End: 2020-05-04

## 2020-05-04 DIAGNOSIS — R10.84 ABDOMINAL PAIN, GENERALIZED: ICD-10-CM

## 2020-05-04 DIAGNOSIS — D64.9 ANEMIA, UNSPECIFIED TYPE: Primary | ICD-10-CM

## 2020-05-04 NOTE — TELEPHONE ENCOUNTER
5/1/2020 ED visit note was forwarded to CARLITOS RN. Chart reviewed. Appears Andrea's concern R abd pain IVC migration.  No acute findings.   Plan:  Will try omeprazole for possible gastritis vs ulcer.  OTC analgesics discussed.  F/u clinic for persistent symptoms.  PAL RN will check in with Andrea later this week.  Marbin Danielle, RN

## 2020-05-06 ENCOUNTER — NURSE TRIAGE (OUTPATIENT)
Dept: NURSING | Facility: CLINIC | Age: 45
End: 2020-05-06

## 2020-05-06 NOTE — TELEPHONE ENCOUNTER
When in the ER a couple of days ago, MD called the house. They didn't tell him if he found anything. He wasn't there to answer the call so wonders what he called about. I checked for notes attached to the scan and read him the normal results. I found a note from May 1st which I read to him and advised he should expect a call back from the PAL RN this week. He has no further questions at this time.  Melanie Brown RN  [facility]    Additional Information    Negative: Nursing judgment    Negative: Nursing judgment    Negative: Nursing judgment    Negative: Nursing judgment    Information only question and nurse able to answer    Protocols used: NO PROTOCOL AVAILABLE - INFORMATION ONLY-A-[place]

## 2020-05-07 NOTE — TELEPHONE ENCOUNTER
Orestes, before I check in with Andrea by phone today, will you review May 1 ED notes and results and let me know if you have comments or questions for him?  Marbin Danielle RN

## 2020-05-07 NOTE — TELEPHONE ENCOUNTER
Labs looked good. CBC showed improved hemoglobin. Will send additional prilosec to take for a total of 8 weeks in case there was a small ulcer in his stomach. Should avoid acidic foods until this point and no nsaids or alcohol. Follow up if no improved symptoms in 1 week for clinic visit. Recheck CBC in 2 months. Future ordered.     -trae brewer, pac

## 2020-05-07 NOTE — TELEPHONE ENCOUNTER
Could be better. Slight improvement since starting omeprazole. Will take 8 weeks, informed #60 sent to pharmacy today. He will call in one week if fails to improve, sooner if worsens.   PAL RN will reach out in 7 weeks, ~6/25/20,  to schedule CBC ~7/20/2020.   Marbin Danielle RN

## 2020-06-10 ENCOUNTER — PATIENT OUTREACH (OUTPATIENT)
Dept: CARE COORDINATION | Facility: CLINIC | Age: 45
End: 2020-06-10

## 2020-06-10 ASSESSMENT — ACTIVITIES OF DAILY LIVING (ADL): DEPENDENT_IADLS:: INDEPENDENT

## 2020-06-10 NOTE — PROGRESS NOTES
Clinic Care Coordination Contact  [facility]/Voicemail    Referral Source: PCP  Clinical Data: Care Coordinator Outreach  Outreach attempted x 1.  Left message on patient's voicemail with call back information and requested return call.  Plan: Care Coordinator will send unable to contact letter with care coordinator contact information via mail. Care Coordinator will try to reach patient again in 10 business days.      GEENA Whitten   Care Coordination Team  [phone]

## 2020-06-29 ENCOUNTER — PATIENT OUTREACH (OUTPATIENT)
Dept: FAMILY MEDICINE | Facility: CLINIC | Age: 45
End: 2020-06-29

## 2020-06-29 NOTE — TELEPHONE ENCOUNTER
"Orestes, we called Andrea about missed lab appointment on Saturday. He stated he had to work. We offered to reschedule Vit D, fasting lipids, albumin.  Patient declined. He has \"too much going on.\"   CARLITOS RN will call mid-July for lab only - will be due for CBC too 7/20/20.   Marbin Danielle, RN    "

## 2020-06-29 NOTE — TELEPHONE ENCOUNTER
Late entry.   RN recalls Fasting labs due around June 24.     On 6/11/20 PAL RN called Andrea to remind and schedule LO. Andrea was unable to come to the phone but agreed we will call again tomorrow after work.      On June 12, we scheduled lab only Saturday 6/27/20 in . Pt's work schedule did not allow him to come in M-F.     6/29/20 chart reviewed. Andrea no-showed Saturday lab appointment.     PAL RN will call to reschedule.  Marbin Danielle, RN

## 2020-06-30 ENCOUNTER — PATIENT OUTREACH (OUTPATIENT)
Dept: CARE COORDINATION | Facility: CLINIC | Age: 45
End: 2020-06-30

## 2020-06-30 ASSESSMENT — ACTIVITIES OF DAILY LIVING (ADL): DEPENDENT_IADLS:: INDEPENDENT

## 2020-06-30 NOTE — PROGRESS NOTES
Clinic Care Coordination Contact  [facility]/Voicemail    Referral Source: PCP  Clinical Data: Care Coordinator Outreach  Outreach attempted x 2.  Left message on patient's voicemail with call back information and requested return call.  Plan: Care Coordinator will send unable to contact letter with care coordinator contact information via mail. Care Coordinator will try to reach patient again in 10 business days.    GEENA Whitten   Care Coordination Team  [phone]

## 2020-07-05 DIAGNOSIS — R10.84 ABDOMINAL PAIN, GENERALIZED: ICD-10-CM

## 2020-07-06 NOTE — TELEPHONE ENCOUNTER
Andrea said he did not request a refill and does not need one.He will let us know if symptoms return. Left message pharmacy voicemail refill denied.   Marbin Danielle RN

## 2020-07-06 NOTE — TELEPHONE ENCOUNTER
Routing refill request to provider for review/approval because:  Prescription .  Not listed for continued use

## 2020-07-08 ENCOUNTER — HOSPITAL ENCOUNTER (EMERGENCY)
Facility: CLINIC | Age: 45
Discharge: HOME | End: 2020-07-08
Attending: EMERGENCY MEDICINE | Admitting: EMERGENCY MEDICINE
Payer: MEDICARE

## 2020-07-08 ENCOUNTER — APPOINTMENT (OUTPATIENT)
Dept: GENERAL RADIOLOGY | Facility: CLINIC | Age: 45
End: 2020-07-08
Attending: EMERGENCY MEDICINE
Payer: MEDICARE

## 2020-07-08 ENCOUNTER — APPOINTMENT (OUTPATIENT)
Dept: CT IMAGING | Facility: CLINIC | Age: 45
End: 2020-07-08
Attending: EMERGENCY MEDICINE
Payer: MEDICARE

## 2020-07-08 VITALS
SYSTOLIC BLOOD PRESSURE: 115 MMHG | WEIGHT: 234 LBS | BODY MASS INDEX: 32.76 KG/M2 | TEMPERATURE: 98.4 F | HEART RATE: 67 BPM | HEIGHT: 71 IN | RESPIRATION RATE: 16 BRPM | DIASTOLIC BLOOD PRESSURE: 74 MMHG | OXYGEN SATURATION: 98 %

## 2020-07-08 DIAGNOSIS — R10.84 ABDOMINAL PAIN, GENERALIZED: ICD-10-CM

## 2020-07-08 LAB
ALBUMIN SERPL-MCNC: 3.5 G/DL (ref 3.4–5)
ALBUMIN UR-MCNC: 70 MG/DL
ALP SERPL-CCNC: 150 U/L (ref 40–150)
ALT SERPL W P-5'-P-CCNC: 28 U/L (ref 0–70)
ANION GAP SERPL CALCULATED.3IONS-SCNC: 5 MMOL/L (ref 3–14)
APPEARANCE UR: CLEAR
AST SERPL W P-5'-P-CCNC: 17 U/L (ref 0–45)
BASOPHILS # BLD AUTO: 0 10E9/L (ref 0–0.2)
BASOPHILS NFR BLD AUTO: 0.4 %
BILIRUB SERPL-MCNC: 0.2 MG/DL (ref 0.2–1.3)
BILIRUB UR QL STRIP: NEGATIVE
BUN SERPL-MCNC: 18 MG/DL (ref 7–30)
CALCIUM SERPL-MCNC: 8.4 MG/DL (ref 8.5–10.1)
CHLORIDE SERPL-SCNC: 105 MMOL/L (ref 94–109)
CO2 SERPL-SCNC: 29 MMOL/L (ref 20–32)
COLOR UR AUTO: ABNORMAL
CREAT SERPL-MCNC: 0.9 MG/DL (ref 0.66–1.25)
DIFFERENTIAL METHOD BLD: ABNORMAL
EOSINOPHIL # BLD AUTO: 0.2 10E9/L (ref 0–0.7)
EOSINOPHIL NFR BLD AUTO: 1.9 %
ERYTHROCYTE [DISTWIDTH] IN BLOOD BY AUTOMATED COUNT: 13.2 % (ref 10–15)
GFR SERPL CREATININE-BSD FRML MDRD: >90 ML/MIN/{1.73_M2}
GLUCOSE SERPL-MCNC: 85 MG/DL (ref 70–99)
GLUCOSE UR STRIP-MCNC: NEGATIVE MG/DL
HCT VFR BLD AUTO: 38.9 % (ref 40–53)
HGB BLD-MCNC: 12.2 G/DL (ref 13.3–17.7)
HGB UR QL STRIP: NEGATIVE
HYALINE CASTS #/AREA URNS LPF: 1 /LPF (ref 0–2)
IMM GRANULOCYTES # BLD: 0 10E9/L (ref 0–0.4)
IMM GRANULOCYTES NFR BLD: 0.4 %
INTERPRETATION ECG - MUSE: NORMAL
KETONES UR STRIP-MCNC: NEGATIVE MG/DL
LEUKOCYTE ESTERASE UR QL STRIP: NEGATIVE
LIPASE SERPL-CCNC: 74 U/L (ref 73–393)
LYMPHOCYTES # BLD AUTO: 1.6 10E9/L (ref 0.8–5.3)
LYMPHOCYTES NFR BLD AUTO: 20 %
MCH RBC QN AUTO: 30.3 PG (ref 26.5–33)
MCHC RBC AUTO-ENTMCNC: 31.4 G/DL (ref 31.5–36.5)
MCV RBC AUTO: 97 FL (ref 78–100)
MONOCYTES # BLD AUTO: 0.6 10E9/L (ref 0–1.3)
MONOCYTES NFR BLD AUTO: 7.1 %
MUCOUS THREADS #/AREA URNS LPF: PRESENT /LPF
NEUTROPHILS # BLD AUTO: 5.5 10E9/L (ref 1.6–8.3)
NEUTROPHILS NFR BLD AUTO: 70.2 %
NITRATE UR QL: NEGATIVE
NRBC # BLD AUTO: 0 10*3/UL
NRBC BLD AUTO-RTO: 0 /100
PH UR STRIP: 5.5 PH (ref 5–7)
PLATELET # BLD AUTO: 204 10E9/L (ref 150–450)
POTASSIUM SERPL-SCNC: 3.9 MMOL/L (ref 3.4–5.3)
PROT SERPL-MCNC: 7.9 G/DL (ref 6.8–8.8)
RBC # BLD AUTO: 4.03 10E12/L (ref 4.4–5.9)
RBC #/AREA URNS AUTO: 1 /HPF (ref 0–2)
SODIUM SERPL-SCNC: 139 MMOL/L (ref 133–144)
SOURCE: ABNORMAL
SP GR UR STRIP: 1.02 (ref 1–1.03)
SQUAMOUS #/AREA URNS AUTO: <1 /HPF (ref 0–1)
UROBILINOGEN UR STRIP-MCNC: NORMAL MG/DL (ref 0–2)
WBC # BLD AUTO: 7.8 10E9/L (ref 4–11)
WBC #/AREA URNS AUTO: <1 /HPF (ref 0–5)

## 2020-07-08 PROCEDURE — 99285 EMERGENCY DEPT VISIT HI MDM: CPT | Mod: 25

## 2020-07-08 PROCEDURE — 74019 RADEX ABDOMEN 2 VIEWS: CPT

## 2020-07-08 PROCEDURE — 25000132 ZZH RX MED GY IP 250 OP 250 PS 637: Mod: GY | Performed by: EMERGENCY MEDICINE

## 2020-07-08 PROCEDURE — 93005 ELECTROCARDIOGRAM TRACING: CPT

## 2020-07-08 PROCEDURE — 81001 URINALYSIS AUTO W/SCOPE: CPT | Performed by: EMERGENCY MEDICINE

## 2020-07-08 PROCEDURE — 80053 COMPREHEN METABOLIC PANEL: CPT | Performed by: EMERGENCY MEDICINE

## 2020-07-08 PROCEDURE — 83690 ASSAY OF LIPASE: CPT | Performed by: EMERGENCY MEDICINE

## 2020-07-08 PROCEDURE — 96374 THER/PROPH/DIAG INJ IV PUSH: CPT | Mod: 59

## 2020-07-08 PROCEDURE — 25000128 H RX IP 250 OP 636: Performed by: EMERGENCY MEDICINE

## 2020-07-08 PROCEDURE — 96361 HYDRATE IV INFUSION ADD-ON: CPT

## 2020-07-08 PROCEDURE — 85025 COMPLETE CBC W/AUTO DIFF WBC: CPT | Performed by: EMERGENCY MEDICINE

## 2020-07-08 PROCEDURE — 74177 CT ABD & PELVIS W/CONTRAST: CPT

## 2020-07-08 PROCEDURE — 25800030 ZZH RX IP 258 OP 636: Performed by: EMERGENCY MEDICINE

## 2020-07-08 RX ORDER — LIDOCAINE 4 G/G
1 PATCH TOPICAL ONCE
Status: DISCONTINUED | OUTPATIENT
Start: 2020-07-08 | End: 2020-07-08 | Stop reason: HOSPADM

## 2020-07-08 RX ORDER — HYDROMORPHONE HYDROCHLORIDE 1 MG/ML
0.5 INJECTION, SOLUTION INTRAMUSCULAR; INTRAVENOUS; SUBCUTANEOUS
Status: COMPLETED | OUTPATIENT
Start: 2020-07-08 | End: 2020-07-08

## 2020-07-08 RX ORDER — IOPAMIDOL 755 MG/ML
500 INJECTION, SOLUTION INTRAVASCULAR ONCE
Status: COMPLETED | OUTPATIENT
Start: 2020-07-08 | End: 2020-07-08

## 2020-07-08 RX ADMIN — SODIUM CHLORIDE 65 ML: 9 INJECTION, SOLUTION INTRAVENOUS at 12:54

## 2020-07-08 RX ADMIN — LIDOCAINE 1 PATCH: 560 PATCH PERCUTANEOUS; TOPICAL; TRANSDERMAL at 14:20

## 2020-07-08 RX ADMIN — IOPAMIDOL 100 ML: 755 INJECTION, SOLUTION INTRAVENOUS at 12:54

## 2020-07-08 RX ADMIN — HYDROMORPHONE HYDROCHLORIDE 0.5 MG: 1 INJECTION, SOLUTION INTRAMUSCULAR; INTRAVENOUS; SUBCUTANEOUS at 11:11

## 2020-07-08 ASSESSMENT — ENCOUNTER SYMPTOMS
ABDOMINAL PAIN: 1
SHORTNESS OF BREATH: 1
VOMITING: 0
BLOOD IN STOOL: 0
DYSURIA: 0

## 2020-07-08 ASSESSMENT — MIFFLIN-ST. JEOR: SCORE: 1968.55

## 2020-07-08 NOTE — DISCHARGE INSTRUCTIONS
Discharge Instructions  Abdominal Pain    Abdominal pain (belly pain) can be caused by many things. Your evaluation today does not show the exact cause for your pain. Your provider today has decided that it is unlikely your pain is due to a life threatening problem, or a problem requiring surgery or hospital admission. Sometimes those problems cannot be found right away, so it is very important that you follow up as directed.  Sometimes only the changes which occur over time allow the cause of your pain to be found.    Generally, every Emergency Department visit should have a follow-up clinic visit with either a primary or a specialty clinic/provider. Please follow-up as instructed by your emergency provider today. With abdominal pain, we often recommend very close follow-up, such as the following day.    ADULTS:  Return to the Emergency Department right away if:    You get an oral temperature above 102oF or as directed by your provider.  You have blood in your stools. This may be bright red or appear as black, tarry stools.    You keep vomiting (throwing up) or cannot drink liquids.  You see blood when you vomit.   You cannot have a bowel movement or you cannot pass gas.  Your stomach gets bloated or bigger.  Your skin or the whites of your eyes look yellow.  You faint.  You have bloody, frequent or painful urination (peeing).  You have new symptoms or anything that worries you.    CHILDREN:  Return to the Emergency Department right away if your child has any of the above-listed symptoms or the following:    Pushes your hand away or screams/cries when his/her belly is touched.  You notice your child is very fussy or weak.  Your child is very tired and is too tired to eat or drink.  Your child is dehydrated.  Signs of dehydration can be:  Significant change in the amount of wet diapers/urine.  Your infant or child starts to have dry mouth and lips, or no saliva (spit) or tears.    PREGNANT WOMEN:  Return to the  Emergency Department right away if you have any of the above-listed symptoms or the following:    You have bleeding, leaking fluid or passing tissue from the vagina.  You have worse pain or cramping, or pain in your shoulder or back.  You have vomiting that will not stop.  You have a temperature of 100oF or more.  Your baby is not moving as much as usual.  You faint.  You get a bad headache with or without eye problems and abdominal pain.  You have a seizure.  You have unusual discharge from your vagina and abdominal pain.    Abdominal pain is pretty common during pregnancy.  Your pain may or may not be related to your pregnancy. You should follow-up closely with your OB provider so they can evaluate you and your baby.  Until you follow-up with your regular provider, do the following:     Avoid sex and do not put anything in your vagina.  Drink clear fluids.  Only take medications approved by your provider.    MORE INFORMATION:    Appendicitis:  A possible cause of abdominal pain in any person who still has their appendix is acute appendicitis. Appendicitis is often hard to diagnose.  Testing does not always rule out early appendicitis or other causes of abdominal pain. Close follow-up with your provider and re-evaluations may be needed to figure out the reason for your abdominal pain.    Follow-up:  It is very important that you make an appointment with your clinic and go to the appointment.  If you do not follow-up with your primary provider, it may result in missing an important development which could result in permanent injury or disability and/or lasting pain.  If there is any problem keeping your appointment, call your provider or return to the Emergency Department.    Medications:  Take your medications as directed by your provider today.  Before using over-the-counter medications, ask your provider and make sure to take the medications as directed.  If you have any questions about medications, ask your  "provider.    Diet:  Resume your normal diet as much as possible, but do not eat fried, fatty or spicy foods while you have pain.  Do not drink alcohol or have caffeine.  Do not smoke tobacco.    Probiotics: If you have been given an antibiotic, you may want to also take a probiotic pill or eat yogurt with live cultures. Probiotics have \"good bacteria\" to help your intestines stay healthy. Studies have shown that probiotics help prevent diarrhea (loose stools) and other intestine problems (including C. diff infection) when you take antibiotics. You can buy these without a prescription in the pharmacy section of the store.     If you were given a prescription for medicine here today, be sure to read all of the information (including the package insert) that comes with your prescription.  This will include important information about the medicine, its side effects, and any warnings that you need to know about.  The pharmacist who fills the prescription can provide more information and answer questions you may have about the medicine.  If you have questions or concerns that the pharmacist cannot address, please call or return to the Emergency Department.       Remember that you can always come back to the Emergency Department if you are not able to see your regular provider in the amount of time listed above, if you get any new symptoms, or if there is anything that worries you.    Ice, tylenol and lidoderm patch to abdomen. Rest abdominal muscles.   "

## 2020-07-08 NOTE — LETTER
July 8, 2020      To Whom It May Concern:      Andrea Uribe was seen in our Emergency Department today, 07/08/20.  I expect his condition to improve over the next 3 days.  He may return to work/school when improved.    Sincerely,        Linh Huber RN BSN PATRICIA

## 2020-07-08 NOTE — ED PROVIDER NOTES
History     Chief Complaint:  Abdominal Pain       HPI  Andrea Uribe is a 44 year old year old male with a history of chronic kidney disease, hypertension, hyperlipidemia, and hyperthyroidism who presents for evaluation of abdominal pain. He complains of upper abdominal pain that feels similar to past issues he has had. He says that he feels like his IVC filter he has in his abdomen has snapped and can feel it every time he breaths. He has calcium buildup in his abdomen near the site of pain from previous wounds. The patient states that it hurts when attempting to breath and move. He was eating and drinking just fine. He said this pain is different from that of his diagnosed stomach ulcer about a month ago because that pain improved but this has not. He was lifting something heavy when this most recent pain came on on Monday. He knew something was wrong but did not present earlier because he did not want to miss work. The patient states his bowel movement has been fine. He has not seen any doctor yet for this current pain and has not taken anything to relieve it. He denies vomiting, blood in stool, or urinary symptoms. He has had normal bowel movements.       Allergies:  Iodine  Aspirin  Ibuprofen sodium      Medications:   Albuterol inhaler  Vitamin D  Vitamin B12  Keppra  Prinivil  Dilantin  Zocor  Effexor      Medical History:   Chronic kidney disease, stage 3  Complication of anesthesia  CPAP dependence  History of blood transfusion  Hypertension  MRSA, elbow  Obesity  Osteoarthritis, right knee  Ptosis, right eyelid  Renal insufficiency  Seizure disorder  Sleep apnea  Traumatic brain injury  Thyroid disease, hyperthyroid  Uncomplicated asthma  Short heel cord, right  Ataxic gait  Hyperlipidemia  Anemia  GERD  Sleep apnea  Anxiety  Obesity  Depression    Surgical History   Appendectomy open  Arthroplasty knee  Tracheostomy  EGD  Herniorrhaphy incisional  Implant stimulator vagus nerve  Laparoscopic  "cholecystectomy  Laparotomy exploratory  Open reduction internal fixation femur distal  Orthopedic surgery  IVC filter, parts removed    Family History:   Cardiovascular  Cerebrovascular disease  Gastrointestinal disease  Diabetes  Crohn's disease      Social History:  Patient is accompanied to the ED by his wife.  Smoking Status: Negative   Smokeless Tobacco: Negative   Alcohol Use: Negative   Drug Use: Negative   Primary Physician: Alberto Medley       Review of Systems   Respiratory: Positive for shortness of breath.    Gastrointestinal: Positive for abdominal pain. Negative for blood in stool and vomiting.   Genitourinary: Negative for dysuria.   All other systems reviewed and are negative.      Physical Exam     Patient Vitals for the past 24 hrs:   BP Temp Temp src Pulse Heart Rate Resp SpO2 Height Weight   07/08/20 1120 -- -- -- -- -- -- 95 % -- --   07/08/20 1115 115/74 -- -- 67 -- -- 98 % -- --   07/08/20 1100 128/74 -- -- 70 -- -- -- -- --   07/08/20 1043 122/84 98.4  F (36.9  C) Oral -- 77 16 -- 1.803 m (5' 11\") 106.1 kg (234 lb)          Physical Exam    Nursing note and vitals reviewed.    Constitutional: Pleasant and well groomed. Elevated BMI.         HENT:    Mouth/Throat: Oropharynx is without swelling or erythema. Oral mucosa moist.    Eyes: Conjunctivae are normal. No scleral icterus.    Neck: Neck supple.   Cardiovascular: Normal rate, regular rhythm and intact distal pulses.    Pulmonary/Chest: Effort normal and breath sounds normal.   Abdominal: Soft.  No distension. Midline vertical wound with palpable, hard, mobile subcutaneous mass. Significant tenderness to this region. NO overlying erythema/warmth/fluctuance. Otherwise mildly tender.   Musculoskeletal:  No edema, No calf tenderness  Neurological:Alert and  Answering questions appropriately. Coordination normal.   Skin: Skin is warm and dry.   Psychiatric: Normal mood and affect.   Firm mobile subcutaneous mass in his " epigastrum      Emergency Department Course     ECG:  ECG taken at 1113, ECG read at 1128  Normal sinus rhythm  Normal ECG  Rate 70 bpm. NH interval 176 ms. QRS duration 114 ms. QT/QTc 388/419 ms. P-R-T axes 63 34 39.    Imaging:  Radiology results were communicated with the patient who voiced understanding of the findings.    CT Abdomen Pelvis w Contrast  1. No change in position or appearance of an inferior vena cava filter  and no evidence for complication.  2. Midline anterior abdominal wall chronic ossification and scarring,  likely postoperative changes.  3. No acute-appearing abnormality.    XR Abdomen 2 Views  Inferior vena cava filter without change in position.  Prior cholecystectomy clips are noted. Metallic vascular coils are  seen in the left upper quadrant from prior vascular procedure. There  is a large amount of stool throughout the colon. No significant small  bowel gas. No free intraperineal air. ORIF femoral fracture with  intramedullary selam and heterotopic bone projecting superior to the  left greater trochanter again noted.    Reading per radiology     Laboratory:  Laboratory findings were communicated with the patient who voiced understanding of the findings.    UA with micro: Protein albumin 70 (A), Mucous urine present (A) o/w negative    Lipase: 74    CBC: WBC 7.8, HGB 12.2 (L),   CMP: Calcium 8.4 (L) (Creatinine 0.90) o/w WNL       Interventions:   1111 Dilaudid 0.5 mg IV  1420 Lidocaine 1 patch transdermal      Emergency Department Course:    1046 Nursing notes and vitals reviewed.    1056 I performed an exam of the patient as documented above.     1105 IV was inserted and blood was drawn for laboratory testing, results above.    1113 EKG obtained as noted above.    1131 The patient was sent for XR while in the emergency department, results above.     1220 I recheck the patient. He states he does not feel much better, and upon exam still feels really tender diffusely.    1229 A  urine sample was obtained for laboratory testing as documented above.    1253 The patient was sent for CT while in the emergency department, results above.     1408 Findings and plan explained to the Patient. Patient discharged home with instructions regarding supportive care, medications, and reasons to return. The importance of close follow-up was reviewed. The patient was prescribed as below.    Impression & Plan     Medical Decision Making:  This patient presents with abdominal pain in the setting of having had a vena caval shunt with concern of migration. Differential diagnosis included but was not limited to IVC filter complication, pancreatitis, hepatitis, musculoskeletal abdominal wall pain, complication of the ossification of his wound, diverticulitis, constipation. ED evaluation is as noted above and is generally unremarkable. He does have some mild anemia this is unchanged from previous. Plain xray was reassuring however the patient remained uncomfortable and therefore a CT scan was obtained which was unremarkable. It does seem that the tenderness is surrounding the ossification of the scar and I wonder if he has some musculoskeletal pain related to the lifting and  pressure against his abdomen during the exertion. I recommended tylenol, rest, and follow up in two days for repeat examination but to return to the emergency department with any new or worsening symptoms.      Diagnosis:      ICD-10-CM    1. Abdominal pain, generalized  R10.84         Disposition:  Discharged to home.    Discharge Medications:  New Prescriptions    No medications on file       Scribe Disclosure:  Taisha SWEENEY, am serving as a scribe at 10:51 AM on 7/8/2020 to document services personally performed by Lorrie Javier MD based on my observations and the provider's statements to me.      Lorrie Javier MD  07/08/20 0392

## 2020-07-08 NOTE — ED AVS SNAPSHOT
[facility]  [address]  [facility] [address]  Phone:  [phone]  Fax:  [phone]                                    Andrea Uribe   MRN: [id number]    Department:  [facility]   Date of Visit:  7/8/2020           After Visit Summary Signature Page    I have received my discharge instructions, and my questions have been answered. I have discussed any challenges I see with this plan with the nurse or doctor.    ..........................................................................................................................................  Patient/Patient Representative Signature      ..........................................................................................................................................  Patient Representative Print Name and Relationship to Patient    ..................................................               ................................................  Date                                   Time    ..........................................................................................................................................  Reviewed by Signature/Title    ...................................................              ..............................................  Date                                               Time          22EPIC Rev 08/18

## 2020-07-08 NOTE — ED TRIAGE NOTES
"Severe pain in mid-upper abdomen for the past 3 days.  Patient has a \"CV filter-to filter blood clots\" and when the pain began he felt something \"snap\".  Patient alert and oriented x3.  Airway, breathing and circulation intact.  "

## 2020-07-13 ENCOUNTER — NURSE TRIAGE (OUTPATIENT)
Dept: NURSING | Facility: CLINIC | Age: 45
End: 2020-07-13

## 2020-07-13 ENCOUNTER — VIRTUAL VISIT (OUTPATIENT)
Dept: URGENT CARE | Facility: CLINIC | Age: 45
End: 2020-07-13
Payer: MEDICARE

## 2020-07-13 ENCOUNTER — HOSPITAL ENCOUNTER (EMERGENCY)
Facility: CLINIC | Age: 45
Discharge: HOME | End: 2020-07-13
Attending: EMERGENCY MEDICINE | Admitting: EMERGENCY MEDICINE
Payer: MEDICARE

## 2020-07-13 VITALS
HEIGHT: 72 IN | DIASTOLIC BLOOD PRESSURE: 66 MMHG | HEART RATE: 65 BPM | RESPIRATION RATE: 20 BRPM | TEMPERATURE: 97.9 F | BODY MASS INDEX: 29.93 KG/M2 | SYSTOLIC BLOOD PRESSURE: 114 MMHG | OXYGEN SATURATION: 97 % | WEIGHT: 221 LBS

## 2020-07-13 DIAGNOSIS — M89.8X9 HETEROTOPIC OSSIFICATION: ICD-10-CM

## 2020-07-13 DIAGNOSIS — R10.84 ABDOMINAL PAIN, GENERALIZED: Primary | ICD-10-CM

## 2020-07-13 PROCEDURE — 99441 ZZC PHYSICIAN TELEPHONE EVALUATION 5-10 MIN: CPT

## 2020-07-13 PROCEDURE — 99283 EMERGENCY DEPT VISIT LOW MDM: CPT

## 2020-07-13 RX ORDER — HYDROCODONE BITARTRATE AND ACETAMINOPHEN 5; 325 MG/1; MG/1
1 TABLET ORAL EVERY 6 HOURS PRN
Qty: 10 TABLET | Refills: 0 | Status: SHIPPED | OUTPATIENT
Start: 2020-07-13 | End: 2020-08-10

## 2020-07-13 ASSESSMENT — MIFFLIN-ST. JEOR: SCORE: 1917.51

## 2020-07-13 ASSESSMENT — ENCOUNTER SYMPTOMS
FEVER: 0
ABDOMINAL PAIN: 1
NAUSEA: 0
VOMITING: 0

## 2020-07-13 NOTE — TELEPHONE ENCOUNTER
Left message to call me back. If away from my desk, any RN may assist in relaying Orestes message and schedule lab only for   today's orders AND 1/9/20 orders lipids, albumin and vit D.   aMrbin Danielle RN

## 2020-07-13 NOTE — TELEPHONE ENCOUNTER
"Pt had ED eval 7/8/20 for abdominal pain.  NO new findings.  No discharge plan of action to relieve ongoing pain.    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    Pt agrees to clinical eval for ED visit ...  Warm transferred to a  for an appointment now.  No in-clinic appointment slots found in timelymanner.  Pt agrees to virtual urgent care provider telephone visit today.    [facility] Nurse Advisor   __________________________________    No suspicion of covid symptoms.  Nevertheless conveyed precautionary measures per current protocols:  COVID 19 Nurse Triage Plan/Patient Instructions    Please be aware that novel coronavirus (COVID-19) may be circulating in the community. If you develop symptoms such as fever, cough, or SOB or if you have concerns about the presence of another infection including coronavirus (COVID-19), please contact your health care provider or visit [email or web address].     Disposition/Instructions    Additional COVID19 information to add for patients.   How can I protect others?  If you have symptoms (fever, cough, body aches or trouble breathing): Stay home and away from others (self-isolate) until:    At least 10 days have passed since your symptoms started. And     You ve had no " fever--and no medicine that reduces fever--for 3 full days (72 hours). And      Your other symptoms have resolved (gotten better).     If you don t have symptoms, but a test showed that you have COVID-19 (you tested positive):    Stay home and away from others (self-isolate) until at least 10 days have passed since the date of your first positive COVID-19 test.    During this time:    Stay in your own room, even for meals. Use your own bathroom if you can.     Stay away from others in your home. No hugging, kissing or shaking hands. No visitors.    Don t go to work, school or anywhere else.     Clean  high touch  surfaces often (doorknobs, counters, handles, etc.). Use a household cleaning spray or wipes. You ll find a full list on the EPA website:  www.epa.gov/pesticide-registration/list-n-disinfectants-use-against-sars-cov-2.    Cover your mouth and nose with a mask, tissue or washcloth to avoid spreading germs.    Wash your hands and face often. Use soap and water.    Caregivers in these groups are at risk for severe illness due to COVID-19:  o People 65 years and older  o People who live in a nursing home or long-term care facility  o People with chronic disease (lung, heart, cancer, diabetes, kidney, liver, immunologic)  o People who have a weakened immune system, including those who:  - Are in cancer treatment  - Take medicine that weakens the immune system, such as corticosteroids  - Had a bone marrow or organ transplant  - Have an immune deficiency  - Have poorly controlled HIV or AIDS  - Are obese (body mass index of 40 or higher)  - Smoke regularly    Caregivers should wear gloves while washing dishes, handling laundry and cleaning bedrooms and bathrooms.    Use caution when washing and drying laundry: Don t shake dirty laundry, and use the warmest water setting that you can.    For more tips, go to www.cdc.gov/coronavirus/2019-ncov/downloads/10Things.pdf.    How can I take care of myself?  1. Get lots of  rest. Drink extra fluids (unless a doctor has told you not to).     2. Take Tylenol (acetaminophen) for fever or pain. If you have liver or kidney problems, ask your family doctor if it s okay to take Tylenol.     Adults can take either:     650 mg (two 325 mg pills) every 4 to 6 hours, or     1,000 mg (two 500 mg pills) every 8 hours as needed.     Note: Don t take more than 3,000 mg in one day.   Acetaminophen is found in many medicines (both prescribed and over-the-counter medicines). Read all labels to be sure you don t take too much.     For children, check the Tylenol bottle for the right dose. The dose is based on the child s age or weight.    3. If you have other health problems (like cancer, heart failure, an organ transplant or severe kidney disease): Call your specialty clinic if you don t feel better in the next 2 days.    4. Know when to call 911: Emergency warning signs include:    Trouble breathing or shortness of breath    Pain or pressure in the chest that doesn t go away    Feeling confused like you haven t felt before, or not being able to wake up    Bluish-colored lips or face    What are the symptoms of COVID-19?     The most common symptoms are cough, fever and trouble breathing.     Less common symptoms include body aches, chills, diarrhea (loose, watery poops), fatigue (feeling very tired), headache, runny nose, sore throat and loss of smell.    COVID-19 can cause severe coughing (bronchitis) and lung infection (pneumonia).    How does it spread?     The virus may spread when a person coughs or sneezes into the air. The virus can travel about 6 feet this way, and it can live on surfaces.      Common  (household disinfectants) will kill the virus.    Who is at risk?  Anyone can catch COVID-19 if they re around someone who has the virus.    How can others protect themselves?     Stay away from people who have COVID-19 (or symptoms of COVID-19).    Wash hands often with soap and water.  Or, use hand  with at least 60% alcohol.    Avoid touching the eyes, nose or mouth.     Wear a face mask when you go out in public, when sick or when caring for a sick person.    Where can I get more information?    [facility]: About COVID-19: [email or web address]    CDC: What to Do If You re Sick: www.cdc.gov/coronavirus/2019-ncov/about/steps-when-sick.html    CDC: Ending Home Isolation: www.cdc.gov/coronavirus/2019-ncov/hcp/disposition-in-home-patients.html     CDC: Caring for Someone: www.cdc.gov/coronavirus/2019-ncov/if-you-are-sick/care-for-someone.html     EB: Interim Guidance for Hospital Discharge to Home: www.health.[facility].[place]./diseases/coronavirus/hcp/hospdischarge.pdf    [facility] clinical trials (COVID-19 research studies): clinicalaffairs.[facility]-clinical-trials     Below are the COVID-19 hotlines at the [place] Department of Health ([facility]). Interpreters are available.   o For health questions: Call 274-407-4381 or 1-512.871.4117 (7 a.m. to 7 p.m.)  o For questions about schools and childcare: Call 838-831-5417 or 1-643.115.1459 (7 a.m. to 7 p.m.)          Thank you for taking steps to prevent the spread of this virus.  o Limit your contact with others.  o Wear a simple mask to cover your cough.  o Wash your hands well and often.    Resources    [facility]: About COVID-19: [email or web address]    CDC: What to Do If You're Sick: www.cdc.gov/coronavirus/2019-ncov/about/steps-when-sick.html    CDC: Ending Home Isolation: www.cdc.gov/coronavirus/2019-ncov/hcp/disposition-in-home-patients.html     CDC: Caring for Someone: www.cdc.gov/coronavirus/2019-ncov/if-you-are-sick/care-for-someone.html     EB: Interim Guidance for Hospital Discharge to Home: www.health.[facility].[place]/diseases/coronavirus/hcp/hospdischarge.pdf    [facility] clinical trials (COVID-19 research studies): clinicalaffairs.[facility]-clinical-trials     Below are  the COVID-19 hotlines at the [place] Department of [facility] ([facility]). Interpreters are available.   o For health questions: Call 890-952-0816 or 1-278.127.1978 (7 a.m. to 7 p.m.)  o For questions about schools and childcare: Call 790-249-4585 or 1-754.774.8606 (7 a.m. to 7 p.m.)                       Additional Information    Negative: Major injury from dangerous force or speed (e.g., MVA, fall > 10 feet or 3 meters)    Negative: Bullet wound, knife wound or other penetrating object    Negative: Puncture wound that sounds life-threatening to the triager    Negative: Severe difficulty breathing (e.g., struggling for each breath, speaks in single words)    Negative: Major bleeding (actively dripping or spurting) that can't be stopped    Negative: Open wound of the chest with sound of moving air (sucking wound) or visible air bubbles    Negative: Shock suspected (e.g., cold/pale/clammy skin, too weak to stand, low BP, rapid pulse)    Negative: Coughing or spitting up blood    Negative: Bluish (or gray) lips or face    Negative: Unconscious or was unconscious    Negative: Sounds like a life-threatening emergency to the triager    Negative: Chest pain not from an injury    Negative: Wound looks infected    Negative: SEVERE chest pain    Negative: Difficulty breathing and not severe    Negative: Skin is split open or gaping (or length > 1/2 inch or 12 mm)    Negative: Bleeding won't stop after 10 minutes of direct pressure (using correct technique)    Negative: Sounds like a serious injury to the triager    Negative: Can't take a deep breath but no respiratory distress (e.g., hurts to take a deep breath)    Negative: Shallow puncture wound    Negative: Collarbone is painful and difficulty raising arm    Injury and pain has not improved after 3 days    Patient wants to be seen    Negative: Patient is confused or is an unreliable provider of information (e.g., dementia, profound mental retardation, alcohol intoxication)     Negative: No prior tetanus shots (or is not fully vaccinated) and any wound (e.g., cut or scrape)    Negative: Last tetanus shot > 5 years ago and DIRTY cut or scrape    Negative: Last tetanus shot >10 years ago and CLEAN cut or scrape    Negative: High-risk adult (e.g., age > 60, osteoporosis, chronic steroid use)    Negative: Suspicious history for the injury    Negative: Large swelling or bruise and size > palm of person's hand    Protocols used: CHEST INJURY-A-[place]

## 2020-07-13 NOTE — TELEPHONE ENCOUNTER
Recommending cbc repeat as well as iron and vitamin b12 levels to assess cause of ongoing mild anemia. Lab only within the next couple of weeks is fine.     -trae brewer,pac

## 2020-07-13 NOTE — ED AVS SNAPSHOT
[facility]  [address]  [facility] [address]  Phone:  [phone]  Fax:  [phone]                                    Andrea Uribe   MRN: [id number]    Department:  [facility]   Date of Visit:  7/13/2020           After Visit Summary Signature Page    I have received my discharge instructions, and my questions have been answered. I have discussed any challenges I see with this plan with the nurse or doctor.    ..........................................................................................................................................  Patient/Patient Representative Signature      ..........................................................................................................................................  Patient Representative Print Name and Relationship to Patient    ..................................................               ................................................  Date                                   Time    ..........................................................................................................................................  Reviewed by Signature/Title    ...................................................              ..............................................  Date                                               Time          22EPIC Rev 08/18

## 2020-07-13 NOTE — ED TRIAGE NOTES
Pt presents for evaluation of mid to upper abdominal pain. Worse with bending over. Has hx of IVC filter and calcium build up, which is what pt thinks is causing him his pain. Pt seen on 7/8 for same issue. Pt here for pain control.

## 2020-07-13 NOTE — ED PROVIDER NOTES
History     Chief Complaint:  Abdominal Pain       HPI  Andrea Uribe is a 44 year old year old male with a history of chronic kidney disease stage three, hypertension, hyperlipidemia, and anemia who presents for evaluation of abdominal pain. He states he has had a similar pain before when he recently lifted something heavy and felt something snap inside of him. He complains of lower abdominal pain and followed up with his primary care who told him he should not be going to work if he is in pain. He states he has calcium buildup in his abdominal wall and follows with Dr. Mcfarlane. He cannot take ibuprofen and has not taken anything for pain. He states he is worried about his work at [employer] because of his pain and his balance issues, as he does not want to fall and injure himself. He says he can probably do light duty lifting for about 3 hours or so until he gets better. He denies fever, nausea, vomiting.      Allergies:  Iodine  Aspirin  Ibuprofen sodium        Medications:   Albuterol inhaler  Cholecalciferol  Cyanocobalamin  Keppra  Lisinopril  Dilantin  Zocor  Effexor      Medical History:   Chronic infection  Chronic kidney disease stage 3  Complication of anesthesia  CPAP dependence  History of blood transfusion  Hypertension  MRSA, elbow  Obesity  Osteoarthritis  Ptosis, right eyelid  Renal insufficiency  Seizure disorder  Sleep apnea  Traumatic brain injury  Hyperthyroid  Asthma  Vitamin B12 deficiency  Vitamin D deficiency  Ataxic gait  Incisional hernia, without obstruction or gangrene  Hyperlipidemia  Anemia  GERD  Anxiety  Obesity  Moderate major depression    Surgical History   Appendectomy open  Arthroplasty knee  Tracheostomy  EGD  Herniorrhaphy incisional  Implant stimulator vagus nerve  Laparoscopic cholecystectomy  Laparotomy exploratory  Open reduction internal fixation femur distal  Orthopedic surgery,  elonging muscle  Remove hardware knee  Remove hardware lower extremity  Spleen surgery,  "repaired  IVC filter, parts removed    Family History:   Cardiovascular  Cerebrovascular disease  Gastrointestinal disease, colitis  Diabetes  Crohn's disease  Other cancer      Social History:  Smoking Status: Negative   Smokeless Tobacco: Negative   Alcohol Use: Negative   Drug Use: Negative   Primary Physician: Alberto Medley         Review of Systems   Constitutional: Negative for fever.   Gastrointestinal: Positive for abdominal pain. Negative for nausea and vomiting.   All other systems reviewed and are negative.    Physical Exam     Patient Vitals for the past 24 hrs:   BP Temp Temp src Pulse Heart Rate Resp SpO2 Height Weight   07/13/20 1730 114/66 -- -- 65 -- -- 97 % -- --   07/13/20 1617 115/77 97.9  F (36.6  C) Oral -- 67 20 98 % 1.816 m (5' 11.5\") 100.2 kg (221 lb)          Physical Exam    General: Patient is alert and interactive when I enter the room  Head:  The scalp, face, and head appear normal  Eyes:  Conjunctivae are normal  ENT:    The nose is normal    Pinnae are normal    External acoustic canals are normal  Neck:  Trachea midline  CV:  Pulses are normal    Resp:  No respiratory distress   Abdomen:      Soft, large midline scar with tenderness along scar, no guarding, non-distended  Musc:  Normal muscular tone    No major joint effusions    No asymmetric leg swelling  Skin:  No rash or lesions noted  Neuro:  Speech is normal and fluent. Face is symmetric.     Moving all extremities well.   Psych: Awake. Alert.  Normal affect.  Appropriate interactions.      Emergency Department Course     Emergency Department Course:    1748 Nursing notes and vitals reviewed.    1753 I performed an exam of the patient as documented above.     1804 Findings and plan explained to the Patient. Patient discharged home with instructions regarding supportive care, medications, and reasons to return. The importance of close follow-up was reviewed. The patient was prescribed as below.    Impression & Plan "     Medical Decision Making:  Andrea Uribe is a 45 yo m with a history of CKD, HTN and TBI who presents with pain.  Patient had a thorough work-up a few days ago for similar pain.  He had a CT abdomen and pelvis done which was unremarkable except for an area of ossification of his abdominal wall.  Reviewing his chart he had surgery in the past and the surgeon discussed with him about removing it however patient declined.  Patient is fixated that he would like this removed.  Reviewing urgent care virtual notes he was sent over here for pain control and there was no pain plan in place.  I told him in terms of his ossification he needs to see his surgeon again to discuss operative management of this if that is indeed possible.  Otherwise he needs to follow-up with his primary to discuss further pain control.  He looks quite well and his abdomen is benign.  I will give him a short course of pain medication and he can do light duty at work.  Patient was very comfortable this plan.  Ultimately he also needs to follow-up with his primary for pain control.  Patient discharged.        Diagnosis:     ICD-10-CM    1. Heterotopic ossification  M89.8X9         Disposition:  Discharged to home.    Discharge Medications:  New Prescriptions    HYDROCODONE-ACETAMINOPHEN (NORCO) 5-325 MG TABLET    Take 1 tablet by mouth every 6 hours as needed for severe pain       Scribe Disclosure:  I, Taisha Franks, am serving as a scribe at 5:53 PM on 7/13/2020 to document services personally performed by Rhona Kay MD based on my observations and the provider's statements to me.      Rhona Kay MD  07/14/20 0436

## 2020-07-13 NOTE — LETTER
July 13, 2020      To Whom It May Concern:      Andrea Uribe was seen in our Emergency Department today, 07/13/20.  I expect his condition to improve over the next few days.  He may return to work/school when improved.  He returns to work he should only be doing light duty for a few days.      Sincerely,        Rhona Kay MD

## 2020-07-13 NOTE — DISCHARGE INSTRUCTIONS
These follow-up with your surgeon to discuss removal of his ossification center if this is felt to be indicated.  Otherwise you need to follow-up with your primary.  Fortunately her CT a few days ago was normal except for this ossification center.

## 2020-07-13 NOTE — TELEPHONE ENCOUNTER
Orestes, see 7/8/20 CBC results. Please advise.   We recall Andrea URBINA RN offers to reschedule missed lab appointment 6/27/20 for labs ordered 1/9/20.    Marbin Danielle RN

## 2020-07-13 NOTE — PROGRESS NOTES
"Andrea Uribe is a 44 year old male who is being evaluated via a billable telephone visit.      The patient has been notified of following:     \"This telephone visit will be conducted via a call between you and your physician/provider. We have found that certain health care needs can be provided without the need for a physical exam.  This service lets us provide the care you need with a short phone conversation.  If a prescription is necessary we can send it directly to your pharmacy.  If lab work is needed we can place an order for that and you can then stop by our lab to have the test done at a later time.    Telephone visits are billed at different rates depending on your insurance coverage. During this emergency period, for some insurers they may be billed the same as an in-person visit.  Please reach out to your insurance provider with any questions.    If during the course of the call the physician/provider feels a telephone visit is not appropriate, you will not be charged for this service.\"    Patient has given verbal consent for Telephone visit?  Yes  What phone number would you like to be contacted at? [phone]        Subjective     Andrea Uribe is a 44 year old male who presents via phone visit today for the following health issues:    HPI  Pt tried going to work today and has extreme continued pain in his belly. He had trouble trying to lift his walker into his vehicle. States it feels like a bone from his back is pushing into his abdomen. He states he used to weight 300 pounds and lost a lot of weight as well as quit drinking pop. He thinks this weight loss is causing an issue with his current problem. 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 Nurse line this morning advised him to go to the ER but he has not done so. He is apologetic stating that his TBI causes him to not understand directions.   I reassured him he was doing well with " "communicating.      Maria Del Carmen Serrano, RN           7/13/20 7:18 AM   Note      Pt had ED eval 7/8/20 for abdominal pain.  NO new findings.  No discharge plan of action to relieve ongoing pain.     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     Pt agrees to clinical eval for ED visit ...  Warm transferred to a  for an appointment now.  No in-clinic appointment slots found in [facility].  Pt agrees to virtual urgent care provider telephone visit today.     Paula Serrano   Health Nurse Advisor           Review of Systems Back and abdominal pain. No recent fever, sore throat, cough, chest pain, sob, GI or  complaints.         Objective   Reported vitals:  There were no vitals taken for this visit.     PSYCH: Alert and oriented times 3; coherent speech, normal   rate and volume, able to articulate logical thoughts, able   to abstract reason, no tangential thoughts, no hallucinations   or delusions    RESP: No cough, no audible wheezing, able to talk in full sentences  Remainder of exam unable to be completed due to telephone visits            Assessment/Plan: Patient is to return to the ER for further evaluation.     Diagnosis Comments   1. Abdominal pain, generalized             Phone call duration: 9 " minutes    Lizbeth Durán, MEREDITHP

## 2020-07-13 NOTE — LETTER
July 13, 2020      To Whom It May Concern:      Andrea Uribe was seen in our Emergency Department today, 07/13/20.  I expect his condition to improve over the next few days.  He may return to work/school when improved.  He returns to work he should only be doing light duty for a few days.  No heavy lifting until cleared by primary physician.    Sincerely,        Rhona Kay MD

## 2020-07-20 ENCOUNTER — PREP FOR PROCEDURE (OUTPATIENT)
Dept: SURGERY | Facility: CLINIC | Age: 45
End: 2020-07-20

## 2020-07-20 ENCOUNTER — TELEPHONE (OUTPATIENT)
Dept: SURGERY | Facility: CLINIC | Age: 45
End: 2020-07-20

## 2020-07-20 ENCOUNTER — OFFICE VISIT (OUTPATIENT)
Dept: SURGERY | Facility: CLINIC | Age: 45
End: 2020-07-20
Payer: MEDICARE

## 2020-07-20 VITALS
DIASTOLIC BLOOD PRESSURE: 80 MMHG | BODY MASS INDEX: 31.42 KG/M2 | WEIGHT: 232 LBS | HEART RATE: 72 BPM | SYSTOLIC BLOOD PRESSURE: 122 MMHG | OXYGEN SATURATION: 97 % | HEIGHT: 72 IN | RESPIRATION RATE: 18 BRPM

## 2020-07-20 DIAGNOSIS — M89.8X9 HETEROTOPIC OSSIFICATION: Primary | ICD-10-CM

## 2020-07-20 DIAGNOSIS — Z11.59 ENCOUNTER FOR SCREENING FOR OTHER VIRAL DISEASES: Primary | ICD-10-CM

## 2020-07-20 PROCEDURE — 99203 OFFICE O/P NEW LOW 30 MIN: CPT | Performed by: SURGERY

## 2020-07-20 ASSESSMENT — MIFFLIN-ST. JEOR: SCORE: 1967.41

## 2020-07-20 NOTE — LETTER
July 20, 2020        Alberto Medley PA-C       RE:   Andrea Uribe 1975      Dear Colleague,    Thank you for referring your patient, Andrea Uribe, to [facility] at [facility]. Please see a copy of my visit note below.    This is a patient known to me from a couple of previous issues. In 2016, I repaired a hernia on the patient. At that time, he had known heterotopic ossification in the upper abdominal wall. He chose at that time not to have that excised and we performed the repair of his hernias, with the mesh going at least partially anterior to the heterotopic ossification. A year later, I performed a laparoscopic cholecystectomy on the patient. About 2 weeks ago, the patient was doing some heavy lifting at home when he felt a snap and now has felt that there is increased motion in the area of his heterotopic ossification. He has had significant pain in the region and he feels that it is making it difficult for him to work. He is interested in having this area of ossification excised.     Physical exam:   The patient is in no obvious distress.   Breathing is nonlabored.   The patient walks with the assistance of a walker.   The abdomen is generally nontender. There is some mild tenderness along the area of heterotopic ossification. There does appear to be a break within this just above the area of the old hernia repair. There is no significant ecchymosis or swelling.     Recent CT scan shows the known heterotopic ossification. This does not appear significantly changed.   Assessment and plan: This is a patient with pain in the area of his heterotopic ossification. It sounds like this probably actually broke while he was doing some heavy work at home. At this point, the patient is having enough symptoms that he would like something definitively done with this. I did explain that there are some very real risks to trying to excise this area. Notably, some of this was densely  adherent to underlying bowel previously and that area is now also under a piece of mesh. For that reason, it seems unlikely that the entire area of heterotopic ossification could be excised. By removing the more superior aspect, we would then have to primarily close the entire region, which would raise the possibility of developing a hernia in the future. There is certainly some possibility of a bowel injury, given the known dense adhesions from previously. This could conceivably put him at risk for infection of his existing mesh. I explained that the lowest risk scenario would be to simply leave the area alone. Unfortunately, if the patient is having disabling pain, we may need to consider excision. After discussion with the patient, I have agreed to schedule him for surgery about a month from now. I would like him to come back and see me a week prior to that. If he is starting to have some improvement, I would lean towards canceling the surgery. If he is continuing to have a significant amount of pain, then we could plan on going forward with the surgery. We did discuss the surgery, along with its risks and complications, in detail. I would expect a several day hospital stay, as this will essentially be a midline laparotomy incision.      Again, thank you for allowing me to participate in the care of your patient.      Sincerely,      John Mcfarlane MD

## 2020-07-20 NOTE — TELEPHONE ENCOUNTER
Type of surgery: EXCISION OF ABDOMINAL WALL OSSIFICATION   Location of surgery: [facility]  Date and time of surgery: 8-20-20, 7:30 AM   Surgeon: DR. TIERNEY   Pre-Op Appt Date: PATIENT TO SCHEDULE   Post-Op Appt Date: PATIENT TO SCHEDULE   Packet sent out: GIVEN TO PATIENT   Pre-cert/Authorization completed:  Not Applicable  Date: 7-20-20  Changed to outpatient overnight - per utilization review 8/13/2020 nms     EXCISION OF ABDOMINAL WALL OSSIFICATION   GENERAL   PT INST TO HAVE H&P WITH DR. MARADIAGA  90 MINS REQ  PA ASSIST DFB   ALW
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
## 2020-07-22 ENCOUNTER — PATIENT OUTREACH (OUTPATIENT)
Dept: CARE COORDINATION | Facility: CLINIC | Age: 45
End: 2020-07-22

## 2020-07-22 NOTE — LETTER
[place] CARE COORDINATION  [facility]   July 23, 2020    Andrea Uribe  [address] [id number]      Dear Andrea,    I have been unsuccessful in reaching you since our last contact. At this time the Care Coordination team will make no further attempts to reach you, however this does not change your ability to continue receiving care from your providers at your primary care clinic. If you need additional support from a care coordinator in the future please contact me at [phone].    All of us at [facility] are invested in your health and are here to assist you in meeting your goals.     Sincerely,    GEEAN Whitten   Care Coordination Team  [phone]

## 2020-07-23 ASSESSMENT — ACTIVITIES OF DAILY LIVING (ADL): DEPENDENT_IADLS:: INDEPENDENT

## 2020-07-23 NOTE — PROGRESS NOTES
Clinic Care Coordination Contact  [facility]/Voicemail    Referral Source: PCP  Clinical Data: Care Coordinator Outreach  Outreach attempted x 3.  Left message on patient's voicemail with call back information and requested return call.  Plan: Care Coordinator will send disenrollment letter with care coordinator contact information via mail. Care Coordinator will do no further outreaches at this time.    GEENA Whitten   Care Coordination Team  [phone]

## 2020-07-28 ENCOUNTER — PATIENT OUTREACH (OUTPATIENT)
Dept: FAMILY MEDICINE | Facility: CLINIC | Age: 45
End: 2020-07-28

## 2020-07-28 NOTE — TELEPHONE ENCOUNTER
We called Andrea, not at home. We will try again later for reminder to schedule lab appointment . 5/7 and 7/13 lab orders from PCP.    Marbin Danielle RN

## 2020-07-30 ENCOUNTER — VIRTUAL VISIT (OUTPATIENT)
Dept: FAMILY MEDICINE | Facility: CLINIC | Age: 45
End: 2020-07-30
Payer: MEDICARE

## 2020-07-30 DIAGNOSIS — J06.9 UPPER RESPIRATORY TRACT INFECTION, UNSPECIFIED TYPE: Primary | ICD-10-CM

## 2020-07-30 PROCEDURE — 99442 ZZC PHYSICIAN TELEPHONE EVALUATION 11-20 MIN: CPT | Performed by: PHYSICIAN ASSISTANT

## 2020-07-30 RX ORDER — FLUTICASONE PROPIONATE 50 MCG
1-2 SPRAY, SUSPENSION (ML) NASAL DAILY
Qty: 16 G | Refills: 3 | Status: SHIPPED | OUTPATIENT
Start: 2020-07-30 | End: 2021-10-12

## 2020-07-30 ASSESSMENT — PATIENT HEALTH QUESTIONNAIRE - PHQ9
5. POOR APPETITE OR OVEREATING: NOT AT ALL
SUM OF ALL RESPONSES TO PHQ QUESTIONS 1-9: 1

## 2020-07-30 ASSESSMENT — ANXIETY QUESTIONNAIRES
3. WORRYING TOO MUCH ABOUT DIFFERENT THINGS: NOT AT ALL
GAD7 TOTAL SCORE: 1
6. BECOMING EASILY ANNOYED OR IRRITABLE: SEVERAL DAYS
1. FEELING NERVOUS, ANXIOUS, OR ON EDGE: NOT AT ALL
5. BEING SO RESTLESS THAT IT IS HARD TO SIT STILL: NOT AT ALL
IF YOU CHECKED OFF ANY PROBLEMS ON THIS QUESTIONNAIRE, HOW DIFFICULT HAVE THESE PROBLEMS MADE IT FOR YOU TO DO YOUR WORK, TAKE CARE OF THINGS AT HOME, OR GET ALONG WITH OTHER PEOPLE: NOT DIFFICULT AT ALL
7. FEELING AFRAID AS IF SOMETHING AWFUL MIGHT HAPPEN: NOT AT ALL
2. NOT BEING ABLE TO STOP OR CONTROL WORRYING: NOT AT ALL

## 2020-07-30 NOTE — PROGRESS NOTES
"Andrea Uribe is a 44 year old male who is being evaluated via a billable telephone visit.      The patient has been notified of following:     \"This telephone visit will be conducted via a call between you and your physician/provider. We have found that certain health care needs can be provided without the need for a physical exam.  This service lets us provide the care you need with a short phone conversation.  If a prescription is necessary we can send it directly to your pharmacy.  If lab work is needed we can place an order for that and you can then stop by our lab to have the test done at a later time.    Telephone visits are billed at different rates depending on your insurance coverage. During this emergency period, for some insurers they may be billed the same as an in-person visit.  Please reach out to your insurance provider with any questions.    If during the course of the call the physician/provider feels a telephone visit is not appropriate, you will not be charged for this service.\"    Patient has given verbal consent for Telephone visit?  Yes    What phone number would you like to be contacted at? [phone]    How would you like to obtain your AVS? Mail a copy    Subjective     Andrea rUibe is a 44 year old male who presents via phone visit today for the following health issues:    HPI    Acute Illness   Acute illness concerns: sneezing and nasal congestion  Onset: x2 days    Fever: no    Chills/Sweats: no    Headache (location?): no    Sinus Pressure:no    Conjunctivitis:  no    Ear Pain: YES: left--clogged    Rhinorrhea: YES    Congestion: YES    Sore Throat: no     Cough: no    Wheeze: no    Decreased Appetite: no    Nausea: no    Vomiting: no    Diarrhea:  no    Dysuria/Freq.: no    Fatigue/Achiness: no    Sick/Strep Exposure: no     Therapies Tried and outcome: none    Patient Active Problem List   Diagnosis     Seizure disorder (H)     TBI (traumatic brain injury) (H)     Moderate major " depression (H)     Obesity     Anxiety     Sleep apnea     GERD (gastroesophageal reflux disease)     Intermittent asthma     CKD (chronic kidney disease) stage 2, GFR 60-89 ml/min     MRSA cellulitis     Anemia     Femur fracture, right (H)     Physical deconditioning     S/P total knee replacement     Health Care Home     Hyperlipidemia LDL goal <130     Incisional hernia, without obstruction or gangrene     Ataxic gait     S/P total knee arthroplasty     Acute post-operative pain     Short heel cord, right     Diarrhea, unspecified type     Essential hypertension     Vitamin B12 deficiency (non anemic)     Vitamin D deficiency     Heterotopic ossification     Past Surgical History:   Procedure Laterality Date     APPENDECTOMY OPEN  8/11/2012    Procedure: APPENDECTOMY OPEN;  Exploratory Laparotomy, Appendectomy, Remove part IVC filter from duodenum with repair;  Surgeon: Michael Jimenez MD;  Location:  OR     ARTHROPLASTY KNEE Right 8/27/2014    Procedure: ARTHROPLASTY KNEE;  Surgeon: David Mckay MD;  Location:  OR     ARTHROPLASTY REVISION KNEE Right 12/13/2016    Procedure: ARTHROPLASTY REVISION KNEE;  Surgeon: David Mckay MD;  Location:  OR     ENT SURGERY      tracheostomy     ESOPHAGOSCOPY, GASTROSCOPY, DUODENOSCOPY (EGD), COMBINED  8/11/2012    Procedure: COMBINED ESOPHAGOSCOPY, GASTROSCOPY, DUODENOSCOPY (EGD);   ESOPHAGOSCOPY, GASTROSCOPY, DUODENOSCOPY (EGD);  Surgeon: Holland Lawson MD;  Location:  GI     HERNIORRHAPHY INCISIONAL (LOCATION) N/A 5/26/2016    Procedure: HERNIORRHAPHY INCISIONAL (LOCATION);  Surgeon: John Mcfarlane MD;  Location:  OR     IMPLANT STIMULATOR VAGUS NERVE  1/16/2012    Procedure:IMPLANT STIMULATOR VAGUS NERVE; VAGUS NERVE STIMULATOR IMPLANT; Surgeon:LEILANI CORTÉS; Location:[facility]     LAPAROSCOPIC CHOLECYSTECTOMY N/A 5/4/2017    Procedure: LAPAROSCOPIC CHOLECYSTECTOMY;  LAPAROSCOPIC CHOLECYSTECTOMY ;  Surgeon: John Mcfarlane  MD;  Location: [facility] OR     LAPAROTOMY EXPLORATORY  8/11/2012    Procedure: LAPAROTOMY EXPLORATORY;;  Surgeon: Michael Jimenez MD;  Location: [facility] OR     OPEN REDUCTION INTERNAL FIXATION FEMUR DISTAL  1/22/2014    Procedure: OPEN REDUCTION INTERNAL FIXATION FEMUR DISTAL;  right distal femur Open reduction internal fixation        ORTHOPEDIC SURGERY      removal of femur bone growth,hand surgery, knee surgery     ORTHOPEDIC SURGERY Right 10/30/2017    enlonging muscle     REMOVE HARDWARE KNEE Right 8/27/2014    Procedure: REMOVE HARDWARE KNEE;  Surgeon: David Mckay MD;  Location: [facility] OR     REMOVE HARDWARE LOWER EXTREMITY Right 10/14/2016    Procedure: REMOVE HARDWARE LOWER EXTREMITY;  Surgeon: David Mckay MD;  Location: [facility] OR     spleen surgery      repaired     SURGICAL HISTORY OF -  Left 2009    selam in left femur     SURGICAL HISTORY OF -       screws in right knee     SURGICAL HISTORY OF -       .IVC filter, parts removed       Social History     Tobacco Use     Smoking status: Never Smoker     Smokeless tobacco: Never Used   Substance Use Topics     Alcohol use: No     Alcohol/week: 0.0 standard drinks     Frequency: Never     Drinks per session: 1 or 2     Binge frequency: Never     Family History   Problem Relation Age of Onset     Cardiovascular Mother      Cerebrovascular Disease Mother      Gastrointestinal Disease Father         Colitis     Diabetes Sister      Crohn's Disease Son      Other Cancer Other          Current Outpatient Medications   Medication Sig Dispense Refill     fluticasone (FLONASE) 50 MCG/ACT nasal spray Spray 1-2 sprays into both nostrils daily 16 g 3     albuterol (PROAIR HFA/PROVENTIL HFA/VENTOLIN HFA) 108 (90 Base) MCG/ACT inhaler Inhale 2 puffs into the lungs every 6 hours as needed for shortness of breath / dyspnea 2 Inhaler 3     Cholecalciferol (VITAMIN D) 125 MCG (5000 UT) CAPS Take 1 capsule (5,000 Units) by mouth daily (Patient not taking: Reported on  7/20/2020) 100 capsule 1     cyanocobalamin (VITAMIN B12) 1000 MCG/ML injection Inject 1 mL (1,000 mcg) into the muscle every 30 days 1 mL 11     KEPPRA 1000 MG TABS Take 2,000 mg by mouth 2 times daily At 0900 and 2100       lisinopril (PRINIVIL/ZESTRIL) 5 MG tablet Take 1 tablet (5 mg) by mouth daily 90 tablet 3     order for DME Equipment being ordered: Wheelchair 1 Device 0     phenytoin (DILANTIN) 100 MG CR capsule Take 300 mg by mouth 2 times daily 300mg in the am and 200mg at pm       simvastatin (ZOCOR) 20 MG tablet TAKE 1 TABLET BY MOUTH EVERYDAY AT BEDTIME 90 tablet 1     venlafaxine (EFFEXOR-XR) 150 MG 24 hr capsule TAKE 1 CAPSULE BY MOUTH EVERY DAY 90 capsule 0     Allergies   Allergen Reactions     Iodine      Kidney disease per patient (high doses)     Asa [Aspirin]      Ibuprofen Sodium Other (See Comments)     Kidney problems (with high doses)     Pollen Extract Other (See Comments)     Seasonal Allergies      Recent Labs   Lab Test 07/08/20  1105 05/01/20  1809 04/30/20  0917 01/08/20  1034  10/16/18  1420 10/19/17  1038  08/04/14  1100  01/24/14  0120  08/12/12  0435   A1C  --   --   --   --   --   --   --   --   --   --  5.9  --  5.4   LDL  --   --   --  192*  --  91 127*   < >  --   --   --    < >  --    HDL  --   --   --  65  --  62 63   < >  --   --   --    < >  --    TRIG  --   --   --  185*  --  230* 113   < >  --   --   --    < >  --    ALT 28 23 23 20  --  20  --    < >  --   --   --    < >  --    CR 0.90 0.86 0.83 0.95   < > 1.11 0.99   < > 1.05   < >  --    < > 1.32*   GFRESTIMATED >90 >90 >90 >90   < > 72 83   < > 78   < >  --    < > 61   GFRESTBLACK >90 >90 >90 >90   < > 87 >90   < > >90  African American GFR Calc     < >  --    < > 74   POTASSIUM 3.9 3.7 3.9 4.1   < > 4.3 4.2   < > 4.3   < >  --    < > 5.1   TSH  --  1.98  --   --   --   --   --   --  2.12  --   --   --   --     < > = values in this interval not displayed.      BP Readings from Last 3 Encounters:   07/20/20 122/80    07/13/20 114/66   07/08/20 115/74    Wt Readings from Last 3 Encounters:   07/20/20 105.2 kg (232 lb)   07/13/20 100.2 kg (221 lb)   07/08/20 106.1 kg (234 lb)                    Reviewed and updated as needed this visit by Provider  Tobacco  Allergies  Meds  Problems  Med Hx  Surg Hx  Fam Hx         Review of Systems   Constitutional, HEENT, cardiovascular, pulmonary, GI, , musculoskeletal, neuro, skin, endocrine and psych systems are negative, except as otherwise noted.       Objective   Reported vitals:  There were no vitals taken for this visit.   healthy, alert and no distress  PSYCH: Alert and oriented times 3; coherent speech, normal   rate and volume, able to articulate logical thoughts, able   to abstract reason, no tangential thoughts, no hallucinations   or delusions  His affect is normal  RESP: No cough, no audible wheezing, able to talk in full sentences  Remainder of exam unable to be completed due to telephone visits    Diagnostic Test Results:  none         Assessment/Plan:    1. Upper respiratory tract infection, unspecified type  Present on history. Appears well and in NAD  On the phone.  No cough or shortness of breath. No fever  Or  Headaches.  However, mild covid-19 is possible and given surgery scheduled in 3 weeks, recommending testing. Self isolation discussed until negative results return. If possible, full isolation discussed. See patient instructions. However given mild symptoms and time of year, allergic rhinitis is possible. Will add flonase to regimen  As well.   - Symptomatic COVID-19 Virus (Coronavirus) by PCR; Future  - fluticasone (FLONASE) 50 MCG/ACT nasal spray; Spray 1-2 sprays into both nostrils daily  Dispense: 16 g; Refill: 3  -Medication use and side effects discussed with the patient. Patient is in complete understanding and agreement with plan.       Return in about 2 weeks (around 8/13/2020) for pre op.      Phone call duration:  17 minutes    Alberto Menendez  SEEMA Medley

## 2020-07-30 NOTE — PATIENT INSTRUCTIONS
Instructions for Patients  Your symptoms show that you may have coronavirus (COVID-19). This illness can cause fever, cough and trouble breathing. Many people get a mild case and get better on their own. Some people can get very sick.     Not all patients are tested for COVID-19. If you need to be tested, your care team will let you know.    How can I protect others?    Without a test, we can t know for sure that you have COVID-19. For safety, it s very important to follow these rules.    Stay home and away from others (self-isolate) until:    You ve had no fever--and no medicine that reduces fever--for 3 full days (72 hours). And      Your other symptoms have resolved (gotten better). For example, your cough or breathing has improved. And     At least 10 days have passed since your symptoms started.    During this time:    Stay in your own room (and use your own bathroom), if you can.    Stay away from others in your home. No hugging, kissing or shaking hands.    No visitors.    Don t go to work, school or anywhere else.     Clean  high touch  surfaces often (doorknobs, counters, handles, etc.). Use a household cleaning spray or wipes.    Cover your mouth and nose with a mask, tissue or washcloth to avoid spreading germs.    Wash your hands and face often. Use soap and water.    For more tips, go to https://www.cdc.gov/coronavirus/2019-ncov/downloads/10Things.pdf.    How can I take care of myself?    1. Get lots of rest. Drink extra fluids (unless a doctor has told you not to).     2. Take Tylenol (acetaminophen) for fever or pain. If you have liver or kidney problems, ask your family doctor if it's okay to take Tylenol.     Adults can take either:     650 mg (two 325 mg pills) every 4 to 6 hours, or     1,000 mg (two 500 mg pills) every 8 hours as needed.     Note: Don't take more than 3,000 mg in one day.   Acetaminophen is found in many medicines (both prescribed and over-the-counter medicines). Read all labels  to be sure you don't take too much.   For children, check the Tylenol bottle for the right dose. The dose is based on  the child's age or weight.    3. If you have other health problems (like cancer, heart failure, an organ transplant or severe kidney disease): Call your specialty clinic if you don't feel better in the next 2 days.    4. Know when to call 911: If your breathing is so bad that it keeps you from doing normal activities, call 911 or go to the emergency room. Tell them that you've been staying home and may have COVID-19.    Please follow these steps:    1. We will call to schedule your test.  2. A member of our care team will ask you some questions. Then, they will use a swab to collect samples from your nose and throat.     Our testing team will send you your test results.    How can I protect others?    Stay home and away from others (self-isolate) until:    You ve had no fever--and no medicine that reduces fever--for 3 full days (72 hours). And      Your other symptoms have resolved (gotten better). For example, your cough or breathing has improved. And     At least 10 days have passed since your symptoms started.    Stay at least 6 feet away from others. (If someone will drive you to your test, stay in the backseat, as far away from the  as you can.)     Don t go to work, school or anywhere else. When it s time for your test, go straight to the testing site. Don t make any stops on the way there or back.     Wash your hands and face often. Use soap and water.     Cover your mouth and nose with a mask, tissue or washcloth.     Don t touch anyone. No hugging, kissing or handshakes.    How can I take care of myself?    5. Get lots of rest. Drink extra fluids (unless a doctor has told you not to).     6. Take Tylenol (acetaminophen) for fever or pain. If you have liver or kidney problems, ask your family doctor if it's okay to take Tylenol.     Adults can take either:     650 mg (two 325 mg pills)  every 4 to 6 hours, or     1,000 mg (two 500 mg pills) every 8 hours as needed.     Note: Don't take more than 3,000 mg in one day.   Acetaminophen is found in many medicines (both prescribed and over-the-counter medicines). Read all labels to be sure you don't take too much.   For children, check the Tylenol bottle for the right dose. The dose is based on  the child's age or weight.    7. If you have other health problems (like cancer, heart failure, an organ transplant or severe kidney disease): Call your specialty clinic if you don't feel better in the next 2 days.    8. Know when to call 911: If your breathing is so bad that it keeps you from doing normal activities, call 911 or go to the emergency room. Tell them that you've been staying home and may have COVID-19.      Thank you for taking steps to prevent the spread of this virus.  o Limit your contact with others.  o Wear a simple mask to cover your cough.  o Wash your hands well and often.  o If you need medical care, go to [email or web address] or contact your health care provider.     For more about COVID-19 and caring for yourself at home, visit the CDC website at https://www.cdc.gov/coronavirus/2019-ncov/about/steps-when-sick.html.     To learn about care at [facility], please go to [email or web address].     [facility] clinical trials (COVID-19 research studies): [email or web address]-clinical-trials.    Below are the COVID-19 hotlines at the [facility] ([facility]). Interpreters are available.     For health questions: Call 592-642-3446 or 1-292.498.8572 (7 a.m. to 7 p.m.)    For questions about schools and childcare: Call 596-310-7332 or 1-187.187.9715 (7 a.m. to 7 p.m.)

## 2020-07-31 ASSESSMENT — ASTHMA QUESTIONNAIRES: ACT_TOTALSCORE: 20

## 2020-07-31 ASSESSMENT — ANXIETY QUESTIONNAIRES: GAD7 TOTAL SCORE: 1

## 2020-08-04 DIAGNOSIS — J06.9 UPPER RESPIRATORY TRACT INFECTION, UNSPECIFIED TYPE: ICD-10-CM

## 2020-08-04 PROCEDURE — U0003 INFECTIOUS AGENT DETECTION BY NUCLEIC ACID (DNA OR RNA); SEVERE ACUTE RESPIRATORY SYNDROME CORONAVIRUS 2 (SARS-COV-2) (CORONAVIRUS DISEASE [COVID-19]), AMPLIFIED PROBE TECHNIQUE, MAKING USE OF HIGH THROUGHPUT TECHNOLOGIES AS DESCRIBED BY CMS-2020-01-R: HCPCS | Performed by: PHYSICIAN ASSISTANT

## 2020-08-06 LAB
SARS-COV-2 RNA SPEC QL NAA+PROBE: NOT DETECTED
SPECIMEN SOURCE: NORMAL

## 2020-08-10 ENCOUNTER — OFFICE VISIT (OUTPATIENT)
Dept: SURGERY | Facility: CLINIC | Age: 45
End: 2020-08-10
Payer: MEDICARE

## 2020-08-10 VITALS
RESPIRATION RATE: 16 BRPM | HEIGHT: 71 IN | OXYGEN SATURATION: 96 % | HEART RATE: 74 BPM | BODY MASS INDEX: 30.94 KG/M2 | SYSTOLIC BLOOD PRESSURE: 116 MMHG | WEIGHT: 221 LBS | DIASTOLIC BLOOD PRESSURE: 68 MMHG

## 2020-08-10 DIAGNOSIS — M89.8X9 HETEROTOPIC OSSIFICATION: Primary | ICD-10-CM

## 2020-08-10 PROCEDURE — 99213 OFFICE O/P EST LOW 20 MIN: CPT | Performed by: SURGERY

## 2020-08-10 ASSESSMENT — MIFFLIN-ST. JEOR: SCORE: 1909.58

## 2020-08-10 NOTE — LETTER
August 10, 2020    RE: Andrea Uribe, : 1975      The patient returns today to check in preoperatively regarding his abdominal wall heterotopic ossification.  He continues to be quite symptomatic.  He feels this is quite different than it was prior to his recent injury to the area.     Palpation of the region reveals some tenderness along the area of upper abdominal heterotopic ossification.     I think it still reasonable to proceed with surgery as planned.  I did explain that he has a very real chance of forming a hernia or having wound healing issues.     John Mcfarlane MD  [facility]

## 2020-08-10 NOTE — PROGRESS NOTES
The patient returns today to check in preoperatively regarding his abdominal wall heterotopic ossification.  He continues to be quite symptomatic.  He feels this is quite different than it was prior to his recent injury to the area.    Palpation of the region reveals some tenderness along the area of upper abdominal heterotopic ossification.    I think it still reasonable to proceed with surgery as planned.  I did explain that he has a very real chance of forming a hernia or having wound healing issues.    John Mcfarlane MD  [facility]    Please route or send letter to:  Primary Care Provider (PCP)

## 2020-08-14 NOTE — PROGRESS NOTES
Pre-Visit Planning     Future Appointments   Date Time Provider Department Center   8/17/2020  1:00 PM 1, Bl Curbside [facility]id [facility]   8/19/2020  7:10 AM Juan J Joiner DO [facility]   8/20/2020  7:30 AM John Mcfarlane MD [facility]     Arrival Time for this Appointment:  6:50 AM   Appointment Notes for this encounter:   will arrive fasting for bloodwork  08/13/20 ok'd by RN Marbin (COVID PCR in [facility] at 1PM, may be late-jf)    Questionnaires Reviewed/Assigned  No additional questionnaires are needed    Patient preferred phone number: [phone]      Visit Summaries:   07/30/20 Virtual visit w/ [email or web address]. for URI - prescribed fluticasone and ordered testing for COVID   COVID test negative - resulted 08/06/20  08/10/20 OV w/ John Mcfarlane MD surgeon for pre-op: The patient returns today to check in preoperatively regarding his abdominal wall heterotopic ossification.  He continues to be quite symptomatic.  He feels this is quite different than it was prior to his recent injury to the area. Palpation of the region reveals some tenderness along the area of upper abdominal heterotopic ossification. I think it still reasonable to proceed with surgery as planned.  I did explain that he has a very real chance of forming a hernia or having wound healing issues.  08/20/20 heterotopic ossification surgery scheduled     PVP:   Spoke to patient via phone. Are there any additional questions or concerns you'd like to review with your provider during your visit? No -- is nervous for surgery    Patient does not have additional questions or concerns.        Visit is not preventive.    Meds  Is there anything on your medication list that needs to be updated? Yes: No longer having B12 injections-- will be taking OTC    Current Outpatient Medications   Medication     albuterol (PROAIR HFA/PROVENTIL HFA/VENTOLIN HFA) 108 (90 Base) MCG/ACT inhaler     cyanocobalamin (VITAMIN B12) 1000 MCG/ML injection     fluticasone (FLONASE) 50  "MCG/ACT nasal spray     KEPPRA 1000 MG TABS     lisinopril (PRINIVIL/ZESTRIL) 5 MG tablet     order for DME     phenytoin (DILANTIN) 100 MG CR capsule     simvastatin (ZOCOR) 20 MG tablet     venlafaxine (EFFEXOR-XR) 150 MG 24 hr capsule     No current facility-administered medications for this visit.      Which pharmacy do you prefer to use for medications during this visit if needed? [facility] in [place]    Do you need refills on any of your medications? Yes - lisinopril and keppra    Health Maintenance Due   Topic Date Due     MEDICARE ANNUAL WELLNESS VISIT  09/17/1992     ASTHMA ACTION PLAN  02/06/2020     Patient is due for:  Medicare annual wellness    MyChart  Patient is not active on [facility]. Patient declined due to head injury - does not know how to do it      Call Summary  \"Thank you for your time today.  If anything comes up before your appointment, please feel free to contact us at [phone].\"      Wolf AGUIRRE RN    "

## 2020-08-17 ENCOUNTER — TELEPHONE (OUTPATIENT)
Dept: FAMILY MEDICINE | Facility: CLINIC | Age: 45
End: 2020-08-17

## 2020-08-17 DIAGNOSIS — Z11.59 ENCOUNTER FOR SCREENING FOR OTHER VIRAL DISEASES: ICD-10-CM

## 2020-08-17 DIAGNOSIS — E53.8 VITAMIN B12 DEFICIENCY (NON ANEMIC): Primary | ICD-10-CM

## 2020-08-17 DIAGNOSIS — D64.9 ANEMIA, UNSPECIFIED TYPE: ICD-10-CM

## 2020-08-17 LAB
ERYTHROCYTE [DISTWIDTH] IN BLOOD BY AUTOMATED COUNT: 13.4 % (ref 10–15)
FERRITIN SERPL-MCNC: 122 NG/ML (ref 26–388)
HCT VFR BLD AUTO: 40.2 % (ref 40–53)
HGB BLD-MCNC: 13.1 G/DL (ref 13.3–17.7)
IRON SATN MFR SERPL: 32 % (ref 15–46)
IRON SERPL-MCNC: 81 UG/DL (ref 35–180)
MCH RBC QN AUTO: 31 PG (ref 26.5–33)
MCHC RBC AUTO-ENTMCNC: 32.6 G/DL (ref 31.5–36.5)
MCV RBC AUTO: 95 FL (ref 78–100)
PLATELET # BLD AUTO: 182 10E9/L (ref 150–450)
RBC # BLD AUTO: 4.23 10E12/L (ref 4.4–5.9)
TIBC SERPL-MCNC: 256 UG/DL (ref 240–430)
VIT B12 SERPL-MCNC: 166 PG/ML (ref 193–986)
WBC # BLD AUTO: 7.5 10E9/L (ref 4–11)

## 2020-08-17 PROCEDURE — 83550 IRON BINDING TEST: CPT | Performed by: PHYSICIAN ASSISTANT

## 2020-08-17 PROCEDURE — 36415 COLL VENOUS BLD VENIPUNCTURE: CPT | Performed by: PHYSICIAN ASSISTANT

## 2020-08-17 PROCEDURE — U0003 INFECTIOUS AGENT DETECTION BY NUCLEIC ACID (DNA OR RNA); SEVERE ACUTE RESPIRATORY SYNDROME CORONAVIRUS 2 (SARS-COV-2) (CORONAVIRUS DISEASE [COVID-19]), AMPLIFIED PROBE TECHNIQUE, MAKING USE OF HIGH THROUGHPUT TECHNOLOGIES AS DESCRIBED BY CMS-2020-01-R: HCPCS | Performed by: SURGERY

## 2020-08-17 PROCEDURE — 85027 COMPLETE CBC AUTOMATED: CPT | Performed by: PHYSICIAN ASSISTANT

## 2020-08-17 PROCEDURE — 83540 ASSAY OF IRON: CPT | Performed by: PHYSICIAN ASSISTANT

## 2020-08-17 PROCEDURE — 82607 VITAMIN B-12: CPT | Performed by: PHYSICIAN ASSISTANT

## 2020-08-17 PROCEDURE — 82728 ASSAY OF FERRITIN: CPT | Performed by: PHYSICIAN ASSISTANT

## 2020-08-17 RX ORDER — LANOLIN ALCOHOL/MO/W.PET/CERES
1000 CREAM (GRAM) TOPICAL DAILY
Qty: 90 TABLET | Refills: 1 | Status: SHIPPED | OUTPATIENT
Start: 2020-08-17 | End: 2021-10-12

## 2020-08-17 NOTE — TELEPHONE ENCOUNTER
Reason for call:  Same Day Appointment   Requested Provider: Juan J Joiner or other face to face provider    PCP: [unfilled]    Reason for visit: Pre-op Physical    Duration of symptoms:     Have you been treated for this in the past? No    Additional comments: Patient has an appointment Wednesday 8/19/20, but thought he had it today 8/17/20. He has fasted and is wondering if he could be worked into the schedule of a provider at [facility] today. He is waiting at the clinic location currently for a call back. Please advise.      Phone number to reach patient:  Home number on file [phone]    Best Time:  Asap today    Can we leave a detailed message on this number?  YES    Travel screening: Not Applicable

## 2020-08-17 NOTE — TELEPHONE ENCOUNTER
Andrea is in clinic now for scheduled lab only appointment at 8:00 AM. He has since been told he cannot leave his home after his covid test at 1:00 PM today.   Patient is aware no open appointments in our clinic. He is on his way home and expect call from us.     Called surgeon's office to inquire if they would do preop just prior to surgery. Office not open until 8 AM.   Marbin Danielle RN

## 2020-08-17 NOTE — TELEPHONE ENCOUNTER
Per surgeon's officem Pt encouraged to self isolate and mask but preop visit after the test is necessary and OK'd.  Andrea informed we will see him Wednesday AM 6:50 check in as previously scheduled.   Marbin Danielle RN

## 2020-08-18 LAB
SARS-COV-2 RNA SPEC QL NAA+PROBE: NOT DETECTED
SPECIMEN SOURCE: NORMAL

## 2020-08-19 ENCOUNTER — OFFICE VISIT (OUTPATIENT)
Dept: FAMILY MEDICINE | Facility: CLINIC | Age: 45
End: 2020-08-19
Payer: MEDICARE

## 2020-08-19 VITALS
SYSTOLIC BLOOD PRESSURE: 124 MMHG | OXYGEN SATURATION: 96 % | HEART RATE: 83 BPM | DIASTOLIC BLOOD PRESSURE: 70 MMHG | TEMPERATURE: 98.9 F | WEIGHT: 233 LBS | BODY MASS INDEX: 32.5 KG/M2 | RESPIRATION RATE: 16 BRPM

## 2020-08-19 DIAGNOSIS — M89.8X9 HETEROTOPIC OSSIFICATION: ICD-10-CM

## 2020-08-19 DIAGNOSIS — Z01.818 PREOP GENERAL PHYSICAL EXAM: Primary | ICD-10-CM

## 2020-08-19 PROCEDURE — 99215 OFFICE O/P EST HI 40 MIN: CPT | Performed by: FAMILY MEDICINE

## 2020-08-19 NOTE — PATIENT INSTRUCTIONS
"Do not take lisinopril on morning of surgery. Take other regular morning medications with a small amount of water on day of surgery. Do not take ibuprofen, naproxen, or aspirin for one week before surgery.    Preparing for Your Surgery  Getting started  A surgery nurse will call you to review your health history and instructions. They will give you an arrival time based on your scheduled surgery time.  Please be ready to share the following:    Your doctor's clinic name and phone number    Your medical, surgical and anesthesia history    A list of allergies and sensitivities    A list of medicines, including herbal treatments and over-the-counter drugs    Whether the patient has a legal guardian (ask how to send us the papers in advance)  If your child is having surgery, please ask for a copy of Preparing for Your Child's Surgery.    Preparing for surgery    Within 30 days of surgery: Have an exam at your family clinic (primary care clinic), or go to a pre-operative clinic. This exam is called a \"History and Physical,\" or H&P.    At your H&P exam, talk to your care team about all medicines you take. If you need to stop any medicines before surgery, ask when to start taking them again.  ? We do this for your safety. Many medicines can make you bleed too much during surgery. Some change how well surgery (anesthesia) drugs work.    Call your insurance company to see what it will and won't pay for. Ask if they need to pre-approve the surgery. (If no insurance, call [phone].)    Call your surgeon's clinic if there's any change in your health. This includes signs of a cold or flu (sore throat, runny nose, cough, rash, fever). It also includes a scrape or scratch near the surgery site.    If you have questions on the day of surgery, call your surgery center.  Eating and drinking guidelines  For your safety: Unless your surgeon tells you otherwise, follow the guidelines below.    Eat and drink as usual until 8 hours " before surgery. After that, no food or milk.    Drink clear liquids until 2 hours before surgery. These are liquids you can see through, like water, Gatorade and Propel Water. You may also have black coffee and tea (no cream or milk).    Nothing by mouth within 2 hours of surgery. This includes gum, candy and breath mints.    Stop alcohol the midnight before surgery.    If your family clinic tells you to take medicine on the morning of surgery, it's okay to take it with a sip of water.  Preventing infection    Shower or bathe the night before and morning of your surgery. Follow the instructions your clinic gave you. (If no instructions, use regular soap.)    Don't shave or clip hair near your surgery site. This can lead to skin infection.    Don't smoke the morning of surgery. Smoking increases the risk of infection. You may chew nicotine gum up to 2 hours before surgery. A nicotine patch is okay.  ? Note: Some surgeries require you to completely quit smoking and nicotine. Check with your surgeon.    Your care team will make every effort to keep you safe from infection. We will:  ? Clean our hands often with soap and water (or an alcohol-based hand rub).  ? Clean the skin at your surgery site with a special soap that kills germs. We'll also remove hair from the site as needed.  ? Wear special hair covers, masks, gowns and gloves during surgery.  ? Give antibiotic medicine, if prescribed. Not all surgeries need antibiotics.  What to bring on the day of surgery    Photo ID and insurance card    Copy of your health care directive, if you have one    Glasses and hearing aides (bring cases)  ? You can't wear contacts during surgery    Inhaler and eye drops, if you use them (tell us about these when you arrive)    CPAP machine or breathing device, if you use them    A few personal items, if spending the night    If you have . . .  ? A pacemaker or ICD (cardiac defibrillator): Bring the ID card.  ? An implanted stimulator:  Bring the remote control.  ? A legal guardian: Bring a copy of the certified (court-stamped) guardianship papers.  Please remove any jewelry, including body piercings. Leave jewelry and other valuables at home.  If you're going home the day of surgery  Important: If you don't follow the rules below, we must cancel your surgery.     Arrange for someone to drive you home after surgery. You may not drive, take a taxi or take public transportation by yourself (unless you'll have local anesthesia only).    Arrange for a responsible adult to stay with you overnight. If you don't, we may keep you in the hospital overnight, and you may need to pay the costs yourself.  Questions?   If you have any questions for your care team, list them here: _________________________________________________________________________________________________________________________________________________________________________________________________________________________________________________________________________________________________________________________  For informational purposes only. Not to replace the advice of your health care provider. Copyright   8515-3805 [facility]. All rights reserved. Clinically reviewed by Gloria Su MD. [employer] [id number] - REV 07/19.
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
## 2020-08-20 ENCOUNTER — ANESTHESIA (OUTPATIENT)
Dept: SURGERY | Facility: CLINIC | Age: 45
End: 2020-08-20
Payer: MEDICARE

## 2020-08-20 ENCOUNTER — APPOINTMENT (OUTPATIENT)
Dept: SURGERY | Facility: PHYSICIAN GROUP | Age: 45
End: 2020-08-20
Payer: MEDICARE

## 2020-08-20 ENCOUNTER — HOSPITAL ENCOUNTER (OUTPATIENT)
Facility: CLINIC | Age: 45
Discharge: HOME | End: 2020-08-22
Attending: SURGERY | Admitting: SURGERY
Payer: MEDICARE

## 2020-08-20 ENCOUNTER — ANESTHESIA EVENT (OUTPATIENT)
Dept: SURGERY | Facility: CLINIC | Age: 45
End: 2020-08-20
Payer: MEDICARE

## 2020-08-20 DIAGNOSIS — M89.8X9 HETEROTOPIC OSSIFICATION: ICD-10-CM

## 2020-08-20 DIAGNOSIS — K59.03 DRUG-INDUCED CONSTIPATION: ICD-10-CM

## 2020-08-20 DIAGNOSIS — G89.18 POSTOPERATIVE PAIN: Primary | ICD-10-CM

## 2020-08-20 LAB
CREAT SERPL-MCNC: 1.04 MG/DL (ref 0.66–1.25)
GFR SERPL CREATININE-BSD FRML MDRD: 86 ML/MIN/{1.73_M2}
POTASSIUM SERPL-SCNC: 4 MMOL/L (ref 3.4–5.3)

## 2020-08-20 PROCEDURE — 88304 TISSUE EXAM BY PATHOLOGIST: CPT | Performed by: SURGERY

## 2020-08-20 PROCEDURE — 37000009 ZZH ANESTHESIA TECHNICAL FEE, EACH ADDTL 15 MIN: Performed by: SURGERY

## 2020-08-20 PROCEDURE — 27210794 ZZH OR GENERAL SUPPLY STERILE: Performed by: SURGERY

## 2020-08-20 PROCEDURE — 37000008 ZZH ANESTHESIA TECHNICAL FEE, 1ST 30 MIN: Performed by: SURGERY

## 2020-08-20 PROCEDURE — 36415 COLL VENOUS BLD VENIPUNCTURE: CPT | Performed by: ANESTHESIOLOGY

## 2020-08-20 PROCEDURE — 84132 ASSAY OF SERUM POTASSIUM: CPT | Performed by: ANESTHESIOLOGY

## 2020-08-20 PROCEDURE — 36000050 ZZH SURGERY LEVEL 2 1ST 30 MIN: Performed by: SURGERY

## 2020-08-20 PROCEDURE — 25000132 ZZH RX MED GY IP 250 OP 250 PS 637: Mod: GY | Performed by: SURGERY

## 2020-08-20 PROCEDURE — 82565 ASSAY OF CREATININE: CPT | Performed by: ANESTHESIOLOGY

## 2020-08-20 PROCEDURE — 36000052 ZZH SURGERY LEVEL 2 EA 15 ADDTL MIN: Performed by: SURGERY

## 2020-08-20 PROCEDURE — 88311 DECALCIFY TISSUE: CPT | Mod: 26 | Performed by: SURGERY

## 2020-08-20 PROCEDURE — 25800030 ZZH RX IP 258 OP 636: Performed by: ANESTHESIOLOGY

## 2020-08-20 PROCEDURE — 25000128 H RX IP 250 OP 636: Performed by: ANESTHESIOLOGY

## 2020-08-20 PROCEDURE — 40000306 ZZH STATISTIC PRE PROC ASSESS II: Performed by: SURGERY

## 2020-08-20 PROCEDURE — 88311 DECALCIFY TISSUE: CPT | Performed by: SURGERY

## 2020-08-20 PROCEDURE — 88304 TISSUE EXAM BY PATHOLOGIST: CPT | Mod: 26 | Performed by: SURGERY

## 2020-08-20 PROCEDURE — 25000128 H RX IP 250 OP 636: Performed by: SURGERY

## 2020-08-20 PROCEDURE — 71000013 ZZH RECOVERY PHASE 1 LEVEL 1 EA ADDTL HR: Performed by: SURGERY

## 2020-08-20 PROCEDURE — 25000125 ZZHC RX 250: Performed by: PHYSICIAN ASSISTANT

## 2020-08-20 PROCEDURE — 25000125 ZZHC RX 250: Performed by: SURGERY

## 2020-08-20 PROCEDURE — 25000128 H RX IP 250 OP 636: Performed by: NURSE ANESTHETIST, CERTIFIED REGISTERED

## 2020-08-20 PROCEDURE — 25000125 ZZHC RX 250: Performed by: NURSE ANESTHETIST, CERTIFIED REGISTERED

## 2020-08-20 PROCEDURE — 71000012 ZZH RECOVERY PHASE 1 LEVEL 1 FIRST HR: Performed by: SURGERY

## 2020-08-20 RX ORDER — ONDANSETRON 2 MG/ML
4 INJECTION INTRAMUSCULAR; INTRAVENOUS EVERY 30 MIN PRN
Status: DISCONTINUED | OUTPATIENT
Start: 2020-08-20 | End: 2020-08-20 | Stop reason: HOSPADM

## 2020-08-20 RX ORDER — OXYCODONE HYDROCHLORIDE 5 MG/1
5-10 TABLET ORAL
Status: DISCONTINUED | OUTPATIENT
Start: 2020-08-20 | End: 2020-08-22 | Stop reason: HOSPADM

## 2020-08-20 RX ORDER — LIDOCAINE 40 MG/G
CREAM TOPICAL
Status: DISCONTINUED | OUTPATIENT
Start: 2020-08-20 | End: 2020-08-20 | Stop reason: HOSPADM

## 2020-08-20 RX ORDER — LIDOCAINE 40 MG/G
CREAM TOPICAL
Status: DISCONTINUED | OUTPATIENT
Start: 2020-08-20 | End: 2020-08-22 | Stop reason: HOSPADM

## 2020-08-20 RX ORDER — ONDANSETRON 4 MG/1
4 TABLET, ORALLY DISINTEGRATING ORAL EVERY 30 MIN PRN
Status: DISCONTINUED | OUTPATIENT
Start: 2020-08-20 | End: 2020-08-20 | Stop reason: HOSPADM

## 2020-08-20 RX ORDER — ONDANSETRON 2 MG/ML
4 INJECTION INTRAMUSCULAR; INTRAVENOUS EVERY 6 HOURS PRN
Status: DISCONTINUED | OUTPATIENT
Start: 2020-08-20 | End: 2020-08-22 | Stop reason: HOSPADM

## 2020-08-20 RX ORDER — PHENYTOIN SODIUM 100 MG/1
300 CAPSULE, EXTENDED RELEASE ORAL EVERY EVENING
COMMUNITY

## 2020-08-20 RX ORDER — VENLAFAXINE HYDROCHLORIDE 150 MG/1
150 CAPSULE, EXTENDED RELEASE ORAL
Status: DISCONTINUED | OUTPATIENT
Start: 2020-08-21 | End: 2020-08-22 | Stop reason: HOSPADM

## 2020-08-20 RX ORDER — PHENYTOIN SODIUM 100 MG/1
300 CAPSULE, EXTENDED RELEASE ORAL EVERY MORNING
Status: DISCONTINUED | OUTPATIENT
Start: 2020-08-21 | End: 2020-08-22 | Stop reason: HOSPADM

## 2020-08-20 RX ORDER — OXYCODONE HYDROCHLORIDE 5 MG/1
5 TABLET ORAL EVERY 4 HOURS PRN
Status: DISCONTINUED | OUTPATIENT
Start: 2020-08-20 | End: 2020-08-20 | Stop reason: ALTCHOICE

## 2020-08-20 RX ORDER — LEVETIRACETAM 500 MG/1
2000 TABLET ORAL 2 TIMES DAILY
Status: DISCONTINUED | OUTPATIENT
Start: 2020-08-20 | End: 2020-08-22 | Stop reason: HOSPADM

## 2020-08-20 RX ORDER — SODIUM CHLORIDE, SODIUM LACTATE, POTASSIUM CHLORIDE, CALCIUM CHLORIDE 600; 310; 30; 20 MG/100ML; MG/100ML; MG/100ML; MG/100ML
INJECTION, SOLUTION INTRAVENOUS CONTINUOUS
Status: DISCONTINUED | OUTPATIENT
Start: 2020-08-20 | End: 2020-08-20 | Stop reason: HOSPADM

## 2020-08-20 RX ORDER — BUPIVACAINE HYDROCHLORIDE 5 MG/ML
INJECTION, SOLUTION EPIDURAL; INTRACAUDAL PRN
Status: DISCONTINUED | OUTPATIENT
Start: 2020-08-20 | End: 2020-08-20 | Stop reason: HOSPADM

## 2020-08-20 RX ORDER — DEXAMETHASONE SODIUM PHOSPHATE 4 MG/ML
INJECTION, SOLUTION INTRA-ARTICULAR; INTRALESIONAL; INTRAMUSCULAR; INTRAVENOUS; SOFT TISSUE PRN
Status: DISCONTINUED | OUTPATIENT
Start: 2020-08-20 | End: 2020-08-20

## 2020-08-20 RX ORDER — ONDANSETRON 2 MG/ML
INJECTION INTRAMUSCULAR; INTRAVENOUS PRN
Status: DISCONTINUED | OUTPATIENT
Start: 2020-08-20 | End: 2020-08-20

## 2020-08-20 RX ORDER — GLYCOPYRROLATE 0.2 MG/ML
INJECTION, SOLUTION INTRAMUSCULAR; INTRAVENOUS PRN
Status: DISCONTINUED | OUTPATIENT
Start: 2020-08-20 | End: 2020-08-20

## 2020-08-20 RX ORDER — SODIUM CHLORIDE 9 MG/ML
INJECTION, SOLUTION INTRAVENOUS CONTINUOUS
Status: DISCONTINUED | OUTPATIENT
Start: 2020-08-20 | End: 2020-08-22 | Stop reason: HOSPADM

## 2020-08-20 RX ORDER — PHENYTOIN SODIUM 100 MG/1
200 CAPSULE, EXTENDED RELEASE ORAL EVERY MORNING
COMMUNITY

## 2020-08-20 RX ORDER — PHENYTOIN SODIUM 100 MG/1
200 CAPSULE, EXTENDED RELEASE ORAL EVERY EVENING
Status: DISCONTINUED | OUTPATIENT
Start: 2020-08-20 | End: 2020-08-22 | Stop reason: HOSPADM

## 2020-08-20 RX ORDER — CEFOTETAN DISODIUM 2 G/20ML
2 INJECTION, POWDER, FOR SOLUTION INTRAMUSCULAR; INTRAVENOUS
Status: COMPLETED | OUTPATIENT
Start: 2020-08-20 | End: 2020-08-20

## 2020-08-20 RX ORDER — FENTANYL CITRATE 50 UG/ML
25-50 INJECTION, SOLUTION INTRAMUSCULAR; INTRAVENOUS EVERY 5 MIN PRN
Status: DISCONTINUED | OUTPATIENT
Start: 2020-08-20 | End: 2020-08-20 | Stop reason: HOSPADM

## 2020-08-20 RX ORDER — FENTANYL CITRATE 50 UG/ML
25-50 INJECTION, SOLUTION INTRAMUSCULAR; INTRAVENOUS
Status: DISCONTINUED | OUTPATIENT
Start: 2020-08-20 | End: 2020-08-20 | Stop reason: HOSPADM

## 2020-08-20 RX ORDER — NALOXONE HYDROCHLORIDE 0.4 MG/ML
.1-.4 INJECTION, SOLUTION INTRAMUSCULAR; INTRAVENOUS; SUBCUTANEOUS
Status: DISCONTINUED | OUTPATIENT
Start: 2020-08-20 | End: 2020-08-20 | Stop reason: HOSPADM

## 2020-08-20 RX ORDER — HYDROMORPHONE HYDROCHLORIDE 1 MG/ML
.3-.5 INJECTION, SOLUTION INTRAMUSCULAR; INTRAVENOUS; SUBCUTANEOUS
Status: DISCONTINUED | OUTPATIENT
Start: 2020-08-20 | End: 2020-08-22 | Stop reason: HOSPADM

## 2020-08-20 RX ORDER — ALBUTEROL SULFATE 90 UG/1
2 AEROSOL, METERED RESPIRATORY (INHALATION) EVERY 6 HOURS PRN
Status: DISCONTINUED | OUTPATIENT
Start: 2020-08-20 | End: 2020-08-22 | Stop reason: HOSPADM

## 2020-08-20 RX ORDER — ALBUTEROL SULFATE 0.83 MG/ML
2.5 SOLUTION RESPIRATORY (INHALATION) EVERY 4 HOURS PRN
Status: DISCONTINUED | OUTPATIENT
Start: 2020-08-20 | End: 2020-08-20 | Stop reason: HOSPADM

## 2020-08-20 RX ORDER — HYDROMORPHONE HYDROCHLORIDE 1 MG/ML
.3-.5 INJECTION, SOLUTION INTRAMUSCULAR; INTRAVENOUS; SUBCUTANEOUS EVERY 10 MIN PRN
Status: DISCONTINUED | OUTPATIENT
Start: 2020-08-20 | End: 2020-08-20 | Stop reason: HOSPADM

## 2020-08-20 RX ORDER — FLUTICASONE PROPIONATE 50 MCG
1-2 SPRAY, SUSPENSION (ML) NASAL DAILY
Status: DISCONTINUED | OUTPATIENT
Start: 2020-08-20 | End: 2020-08-22 | Stop reason: HOSPADM

## 2020-08-20 RX ORDER — NEOSTIGMINE METHYLSULFATE 1 MG/ML
VIAL (ML) INJECTION PRN
Status: DISCONTINUED | OUTPATIENT
Start: 2020-08-20 | End: 2020-08-20

## 2020-08-20 RX ORDER — PROPOFOL 10 MG/ML
INJECTION, EMULSION INTRAVENOUS PRN
Status: DISCONTINUED | OUTPATIENT
Start: 2020-08-20 | End: 2020-08-20

## 2020-08-20 RX ORDER — LIDOCAINE HYDROCHLORIDE 10 MG/ML
INJECTION, SOLUTION INFILTRATION; PERINEURAL PRN
Status: DISCONTINUED | OUTPATIENT
Start: 2020-08-20 | End: 2020-08-20

## 2020-08-20 RX ORDER — METOPROLOL TARTRATE 1 MG/ML
1-2 INJECTION, SOLUTION INTRAVENOUS EVERY 5 MIN PRN
Status: DISCONTINUED | OUTPATIENT
Start: 2020-08-20 | End: 2020-08-20 | Stop reason: HOSPADM

## 2020-08-20 RX ORDER — FENTANYL CITRATE 50 UG/ML
INJECTION, SOLUTION INTRAMUSCULAR; INTRAVENOUS PRN
Status: DISCONTINUED | OUTPATIENT
Start: 2020-08-20 | End: 2020-08-20

## 2020-08-20 RX ORDER — MEPERIDINE HYDROCHLORIDE 25 MG/ML
12.5 INJECTION INTRAMUSCULAR; INTRAVENOUS; SUBCUTANEOUS
Status: DISCONTINUED | OUTPATIENT
Start: 2020-08-20 | End: 2020-08-20 | Stop reason: HOSPADM

## 2020-08-20 RX ORDER — NALOXONE HYDROCHLORIDE 0.4 MG/ML
.1-.4 INJECTION, SOLUTION INTRAMUSCULAR; INTRAVENOUS; SUBCUTANEOUS
Status: DISCONTINUED | OUTPATIENT
Start: 2020-08-20 | End: 2020-08-22 | Stop reason: HOSPADM

## 2020-08-20 RX ORDER — OXYCODONE HYDROCHLORIDE 5 MG/1
5-10 TABLET ORAL
Qty: 20 TABLET | Refills: 0 | Status: SHIPPED | OUTPATIENT
Start: 2020-08-20 | End: 2021-05-30

## 2020-08-20 RX ORDER — ONDANSETRON 4 MG/1
4 TABLET, ORALLY DISINTEGRATING ORAL EVERY 6 HOURS PRN
Status: DISCONTINUED | OUTPATIENT
Start: 2020-08-20 | End: 2020-08-22 | Stop reason: HOSPADM

## 2020-08-20 RX ORDER — HYDRALAZINE HYDROCHLORIDE 20 MG/ML
2.5-5 INJECTION INTRAMUSCULAR; INTRAVENOUS EVERY 10 MIN PRN
Status: DISCONTINUED | OUTPATIENT
Start: 2020-08-20 | End: 2020-08-20 | Stop reason: HOSPADM

## 2020-08-20 RX ADMIN — FENTANYL CITRATE 50 MCG: 50 INJECTION, SOLUTION INTRAMUSCULAR; INTRAVENOUS at 08:08

## 2020-08-20 RX ADMIN — Medication 3 MG: at 08:37

## 2020-08-20 RX ADMIN — ROCURONIUM BROMIDE 10 MG: 10 INJECTION INTRAVENOUS at 08:19

## 2020-08-20 RX ADMIN — CEFOTETAN DISODIUM 2 G: 2 INJECTION, POWDER, FOR SOLUTION INTRAMUSCULAR; INTRAVENOUS at 07:55

## 2020-08-20 RX ADMIN — FENTANYL CITRATE 50 MCG: 50 INJECTION, SOLUTION INTRAMUSCULAR; INTRAVENOUS at 08:40

## 2020-08-20 RX ADMIN — LEVETIRACETAM 2000 MG: 500 TABLET ORAL at 18:54

## 2020-08-20 RX ADMIN — FENTANYL CITRATE 100 MCG: 50 INJECTION, SOLUTION INTRAMUSCULAR; INTRAVENOUS at 07:48

## 2020-08-20 RX ADMIN — LIDOCAINE HYDROCHLORIDE 50 MG: 10 INJECTION, SOLUTION INFILTRATION; PERINEURAL at 07:48

## 2020-08-20 RX ADMIN — GLYCOPYRROLATE 0.4 MG: 0.2 INJECTION, SOLUTION INTRAMUSCULAR; INTRAVENOUS at 08:37

## 2020-08-20 RX ADMIN — MIDAZOLAM 2 MG: 1 INJECTION INTRAMUSCULAR; INTRAVENOUS at 07:41

## 2020-08-20 RX ADMIN — HYDROMORPHONE HYDROCHLORIDE 0.5 MG: 1 INJECTION, SOLUTION INTRAMUSCULAR; INTRAVENOUS; SUBCUTANEOUS at 10:45

## 2020-08-20 RX ADMIN — SODIUM CHLORIDE, POTASSIUM CHLORIDE, SODIUM LACTATE AND CALCIUM CHLORIDE: 600; 310; 30; 20 INJECTION, SOLUTION INTRAVENOUS at 08:52

## 2020-08-20 RX ADMIN — FENTANYL CITRATE 50 MCG: 50 INJECTION, SOLUTION INTRAMUSCULAR; INTRAVENOUS at 09:45

## 2020-08-20 RX ADMIN — HYDROMORPHONE HYDROCHLORIDE 0.5 MG: 1 INJECTION, SOLUTION INTRAMUSCULAR; INTRAVENOUS; SUBCUTANEOUS at 10:30

## 2020-08-20 RX ADMIN — ONDANSETRON HYDROCHLORIDE 4 MG: 2 INJECTION, SOLUTION INTRAVENOUS at 08:30

## 2020-08-20 RX ADMIN — ROCURONIUM BROMIDE 10 MG: 10 INJECTION INTRAVENOUS at 08:26

## 2020-08-20 RX ADMIN — OXYCODONE HYDROCHLORIDE 10 MG: 5 TABLET ORAL at 18:55

## 2020-08-20 RX ADMIN — DEXAMETHASONE SODIUM PHOSPHATE 4 MG: 4 INJECTION, SOLUTION INTRA-ARTICULAR; INTRALESIONAL; INTRAMUSCULAR; INTRAVENOUS; SOFT TISSUE at 07:48

## 2020-08-20 RX ADMIN — PROPOFOL 40 MG: 10 INJECTION, EMULSION INTRAVENOUS at 08:08

## 2020-08-20 RX ADMIN — FENTANYL CITRATE 50 MCG: 50 INJECTION, SOLUTION INTRAMUSCULAR; INTRAVENOUS at 10:15

## 2020-08-20 RX ADMIN — SODIUM CHLORIDE, POTASSIUM CHLORIDE, SODIUM LACTATE AND CALCIUM CHLORIDE: 600; 310; 30; 20 INJECTION, SOLUTION INTRAVENOUS at 07:39

## 2020-08-20 RX ADMIN — PROPOFOL 200 MG: 10 INJECTION, EMULSION INTRAVENOUS at 07:48

## 2020-08-20 RX ADMIN — HYDROMORPHONE HYDROCHLORIDE 0.5 MG: 1 INJECTION, SOLUTION INTRAMUSCULAR; INTRAVENOUS; SUBCUTANEOUS at 09:20

## 2020-08-20 RX ADMIN — ROCURONIUM BROMIDE 50 MG: 10 INJECTION INTRAVENOUS at 07:48

## 2020-08-20 RX ADMIN — HYDROMORPHONE HYDROCHLORIDE 0.5 MG: 1 INJECTION, SOLUTION INTRAMUSCULAR; INTRAVENOUS; SUBCUTANEOUS at 23:49

## 2020-08-20 RX ADMIN — ROCURONIUM BROMIDE 20 MG: 10 INJECTION INTRAVENOUS at 08:08

## 2020-08-20 RX ADMIN — PHENYTOIN SODIUM 200 MG: 100 CAPSULE ORAL at 18:53

## 2020-08-20 RX ADMIN — PROPOFOL 30 MG: 10 INJECTION, EMULSION INTRAVENOUS at 08:37

## 2020-08-20 RX ADMIN — OXYCODONE HYDROCHLORIDE 10 MG: 5 TABLET ORAL at 15:08

## 2020-08-20 ASSESSMENT — ENCOUNTER SYMPTOMS: SEIZURES: 1

## 2020-08-20 ASSESSMENT — MIFFLIN-ST. JEOR: SCORE: 1951.31

## 2020-08-20 NOTE — PHARMACY-ADMISSION MEDICATION HISTORY
Medication reconciliation interview completed by pre-admitting nurse, reviewed by pharmacy. Called patient and clarified phenytoin, re-entered as AM and PM orders; clarified Keppra dose. No further clarifications needed.     Reviewed by Stephanie Mason RN (Registered Nurse) on 08/12/20 at 1606      Prior to Admission medications    Medication Sig Last Dose Taking? Auth Provider   albuterol (PROAIR HFA/PROVENTIL HFA/VENTOLIN HFA) 108 (90 Base) MCG/ACT inhaler Inhale 2 puffs into the lungs every 6 hours as needed for shortness of breath / dyspnea Past Month at Unknown time Yes Alberto Medley PA-C   fluticasone (FLONASE) 50 MCG/ACT nasal spray Spray 1-2 sprays into both nostrils daily 8/19/2020 at Unknown time Yes Alberto Medley PA-C   KEPPRA 1000 MG TABS Take 2,000 mg by mouth 2 times daily At 0730 and 1930 8/20/2020 at Unknown time Yes Reported, Patient   lisinopril (PRINIVIL/ZESTRIL) 5 MG tablet Take 1 tablet (5 mg) by mouth daily 8/19/2020 at Unknown time Yes Alberto Medley PA-C          phenytoin (DILANTIN) 100 MG capsule Take 300 mg by mouth every morning 8/20/2020 Yes Reported, Patient   phenytoin (DILANTIN) 100 MG capsule Take 200 mg by mouth every evening 8/19/2020 Yes Reported, Patient   simvastatin (ZOCOR) 20 MG tablet TAKE 1 TABLET BY MOUTH EVERYDAY AT BEDTIME 8/19/2020 at Unknown time Yes Alberto Medley PA-C   venlafaxine (EFFEXOR-XR) 150 MG 24 hr capsule TAKE 1 CAPSULE BY MOUTH EVERY DAY 8/20/2020 at Unknown time Yes Alberto Medley PA-C   cyanocobalamin (VITAMIN B-12) 1000 MCG tablet Take 1 tablet (1,000 mcg) by mouth daily   Alberto Medley PA-C   cyanocobalamin (VITAMIN B12) 1000 MCG/ML injection Inject 1 mL (1,000 mcg) into the muscle every 30 days More than a month at Unknown time  Alberto Medley PA-C   order for DME Equipment being ordered: Wheelchair   Alberto Medley PA-C
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
## 2020-08-20 NOTE — ANESTHESIA CARE TRANSFER NOTE
Patient: Andrea Uribe    Procedure(s):  EXCISION OF ABDOMINAL WALL OSSIFICATION    Diagnosis: Heterotopic ossification [M89.8X9]  Diagnosis Additional Information: No value filed.    Anesthesia Type:   General     Note:  Airway :Face Mask  Patient transferred to:PACU  Comments: PT transferred to PACU; vSS; Reportto RNHandoff Report: Identifed the Patient, Identified the Reponsible Provider, Reviewed the pertinent medical history, Discussed the surgical course, Reviewed Intra-OP anesthesia mangement and issues during anesthesia, Set expectations for post-procedure period and Allowed opportunity for questions and acknowledgement of understanding      Vitals: (Last set prior to Anesthesia Care Transfer)    CRNA VITALS  8/20/2020 0832 - 8/20/2020 0907      8/20/2020             Pulse:  82    SpO2:  97 %    Resp Rate (observed):  13                Electronically Signed By: JANNA Montoya CRNA  August 20, 2020  9:07 AM

## 2020-08-20 NOTE — ANESTHESIA POSTPROCEDURE EVALUATION
Patient: Andrea Uribe    Procedure(s):  EXCISION OF ABDOMINAL WALL OSSIFICATION    Diagnosis:Heterotopic ossification [M89.8X9]  Diagnosis Additional Information: No value filed.    Anesthesia Type:  General    Note:  Anesthesia Post Evaluation    Patient location during evaluation: PACU  Patient participation: Able to fully participate in evaluation  Level of consciousness: awake  Pain management: adequate  Airway patency: patent  Cardiovascular status: acceptable  Respiratory status: acceptable  Hydration status: acceptable  PONV: controlled     Anesthetic complications: None          Last vitals:  Vitals:    08/20/20 1045 08/20/20 1100 08/20/20 1132   BP: 119/73 128/68 117/49   Pulse: 81 55 64   Resp: 20 22 14   Temp: 98  F (36.7  C) 97.9  F (36.6  C) 96.1  F (35.6  C)   SpO2: 99% 96% 92%         Electronically Signed By: Angelito Bai MD  August 20, 2020  11:57 AM

## 2020-08-20 NOTE — PLAN OF CARE
PRIMARY DIAGNOSIS: Heterotopic Ossification of the abdominal wall   OUTPATIENT/OBSERVATION GOALS TO BE MET BEFORE DISCHARGE:  1. Stable vital signs Yes  2. Tolerating diet:Yes (tolrated full)  3. Pain controlled with oral pain medications:  hasn't received any pain meds yet  4. Positive bowel sounds:  Yes  5. Voiding without difficulty:  Yes  6. Able to ambulate:  No  7. Provider specific discharge goals met:  Yes    Discharge Planner Nurse   Safe discharge environment identified: Yes  Barriers to discharge: Yes       Entered by: TAYO ROBISON 08/20/2020   Vital signs:  Temp: 96.1  F (35.6  C) Temp src: Oral BP: 126/59 Pulse: 62   Resp: 16 SpO2: 95 % O2 Device: BiPAP/CPAP Oxygen Delivery: 2 LPM Alert and oriented x4. Appeared drowsy but easily arouses for voice. Reported pain 4/10 and tolerable at this time. Abdominal dressing clean and intact. Advanced to regular diet. Pt ordered food and waiting for delivery from the kitchen. Using home CPAP with 2L oxygen. Unable to put CAPNO due to pt is using their CPAP for comfort. Continous pulse oximeter placed in the left index finger. Assist of x1 with own walker. Will continue to monitor.       Please review provider order for any additional goals.   Nurse to notify provider when observation goals have been met and patient is ready for discharge.

## 2020-08-20 NOTE — PLAN OF CARE
ROOM # 205    Living Situation (if not independent, order SW consult): home with wife  Facility name:  : Gabriella Uribe (spouse)    Activity level at baseline: independent   Activity level on admit: stand by assist       Patient registered to observation; given Patient Bill of Rights; given the opportunity to ask questions about observation status and their plan of care.  Patient has been oriented to the observation room, bathroom and call light is in place.    Discussed discharge goals and expectations with patient/family.

## 2020-08-20 NOTE — OR NURSING
"Pt is vitally stable and afebrile. Pain is now 7 from 10 scale,pt said ,\"It is tolerable,I just want to go on a comfortable bed and get some sleep\" Pt is taking Ice chips and water without nausea. Abdominal dressing is clean and dry. Pt has HX of SANGITA,  home C-PAP is placed on him. Oxygen saturation in the mid and high 90's.  "
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
## 2020-08-21 LAB — GLUCOSE BLDC GLUCOMTR-MCNC: 108 MG/DL (ref 70–99)

## 2020-08-21 PROCEDURE — 25000132 ZZH RX MED GY IP 250 OP 250 PS 637: Mod: GY | Performed by: PHYSICIAN ASSISTANT

## 2020-08-21 PROCEDURE — 25000132 ZZH RX MED GY IP 250 OP 250 PS 637: Mod: GY | Performed by: SURGERY

## 2020-08-21 PROCEDURE — 82962 GLUCOSE BLOOD TEST: CPT

## 2020-08-21 PROCEDURE — 25000128 H RX IP 250 OP 636: Performed by: SURGERY

## 2020-08-21 RX ORDER — DOCUSATE SODIUM 100 MG/1
100 CAPSULE, LIQUID FILLED ORAL 2 TIMES DAILY
Status: DISCONTINUED | OUTPATIENT
Start: 2020-08-21 | End: 2020-08-22 | Stop reason: HOSPADM

## 2020-08-21 RX ORDER — HYDROXYZINE HYDROCHLORIDE 50 MG/1
50 TABLET, FILM COATED ORAL EVERY 6 HOURS PRN
Status: DISCONTINUED | OUTPATIENT
Start: 2020-08-21 | End: 2020-08-22 | Stop reason: HOSPADM

## 2020-08-21 RX ORDER — HYDROXYZINE HYDROCHLORIDE 25 MG/1
25 TABLET, FILM COATED ORAL EVERY 6 HOURS PRN
Status: DISCONTINUED | OUTPATIENT
Start: 2020-08-21 | End: 2020-08-22 | Stop reason: HOSPADM

## 2020-08-21 RX ORDER — DOCUSATE SODIUM 100 MG/1
100 CAPSULE, LIQUID FILLED ORAL 2 TIMES DAILY
Qty: 30 CAPSULE | Refills: 0 | Status: SHIPPED | OUTPATIENT
Start: 2020-08-21 | End: 2021-10-12

## 2020-08-21 RX ORDER — POLYETHYLENE GLYCOL 3350 17 G/17G
17 POWDER, FOR SOLUTION ORAL ONCE
Status: COMPLETED | OUTPATIENT
Start: 2020-08-21 | End: 2020-08-21

## 2020-08-21 RX ORDER — ACETAMINOPHEN 500 MG
1000 TABLET ORAL EVERY 6 HOURS
Status: DISCONTINUED | OUTPATIENT
Start: 2020-08-21 | End: 2020-08-22 | Stop reason: HOSPADM

## 2020-08-21 RX ORDER — HYDROXYZINE HYDROCHLORIDE 25 MG/1
25-50 TABLET, FILM COATED ORAL EVERY 6 HOURS PRN
Qty: 50 TABLET | Refills: 1 | Status: SHIPPED | OUTPATIENT
Start: 2020-08-21 | End: 2023-02-15

## 2020-08-21 RX ADMIN — HYDROXYZINE HYDROCHLORIDE 25 MG: 25 TABLET, FILM COATED ORAL at 18:29

## 2020-08-21 RX ADMIN — ACETAMINOPHEN 1000 MG: 500 TABLET, FILM COATED ORAL at 09:46

## 2020-08-21 RX ADMIN — ACETAMINOPHEN 1000 MG: 500 TABLET, FILM COATED ORAL at 14:27

## 2020-08-21 RX ADMIN — VENLAFAXINE HYDROCHLORIDE 150 MG: 150 CAPSULE, EXTENDED RELEASE ORAL at 09:46

## 2020-08-21 RX ADMIN — HYDROMORPHONE HYDROCHLORIDE 0.5 MG: 1 INJECTION, SOLUTION INTRAMUSCULAR; INTRAVENOUS; SUBCUTANEOUS at 07:33

## 2020-08-21 RX ADMIN — DOCUSATE SODIUM 100 MG: 100 CAPSULE, LIQUID FILLED ORAL at 20:05

## 2020-08-21 RX ADMIN — PHENYTOIN SODIUM 300 MG: 100 CAPSULE ORAL at 06:55

## 2020-08-21 RX ADMIN — POLYETHYLENE GLYCOL 3350 17 G: 17 POWDER, FOR SOLUTION ORAL at 09:46

## 2020-08-21 RX ADMIN — ACETAMINOPHEN 1000 MG: 500 TABLET, FILM COATED ORAL at 20:06

## 2020-08-21 RX ADMIN — HYDROXYZINE HYDROCHLORIDE 25 MG: 25 TABLET, FILM COATED ORAL at 09:46

## 2020-08-21 RX ADMIN — LEVETIRACETAM 2000 MG: 500 TABLET ORAL at 20:05

## 2020-08-21 RX ADMIN — DOCUSATE SODIUM 100 MG: 100 CAPSULE, LIQUID FILLED ORAL at 09:46

## 2020-08-21 RX ADMIN — HYDROMORPHONE HYDROCHLORIDE 0.5 MG: 1 INJECTION, SOLUTION INTRAMUSCULAR; INTRAVENOUS; SUBCUTANEOUS at 04:22

## 2020-08-21 RX ADMIN — OXYCODONE HYDROCHLORIDE 10 MG: 5 TABLET ORAL at 13:28

## 2020-08-21 RX ADMIN — OXYCODONE HYDROCHLORIDE 10 MG: 5 TABLET ORAL at 06:53

## 2020-08-21 RX ADMIN — LEVETIRACETAM 2000 MG: 500 TABLET ORAL at 06:52

## 2020-08-21 RX ADMIN — PHENYTOIN SODIUM 200 MG: 100 CAPSULE ORAL at 20:05

## 2020-08-21 NOTE — DISCHARGE INSTRUCTIONS
HOME CARE FOLLOWING ABDOMINAL SURGERY  CARLA Corral, TI Guerrero R. O Donnell, J. Shaheen    INCISIONAL CARE:  Replace the bandage over your incision (or incisions) until all drainage stops, or if more comfortable to have in place.  If present, leave the steri-strips (white paper tapes) in place for 14 days after surgery.  If you have staples in your incision at the time of discharge, they will be removed at your follow-up appointment.  If Dermabond (a type of skin glue) is present, leave in place until it wears/flakes off.     BATHING:  Avoid baths for 1 week after surgery.  Showers are okay.  You may wash your hair at any time.  Gently pat your incision dry after bathing.    ACTIVITY:  Light Activity -- you may immediately be up and about as tolerated.  Driving -- you may drive when comfortable and off narcotic pain medications.  Light Work -- resume when comfortable off pain medications.  (If you can drive, you probably can work.)  Strenuous Work/Activity -- limit lifting to 20 pounds for 4 weeks.  Then, progressively increase with time.  Active Sports (running, biking, etc.) -- cautiously resume after 6 weeks.    DISCOMFORT:  Use pain medications as prescribed by your surgeon.  Take the pain medication with some food, when possible, to minimize side effects.  Expect gradual improvement.    DIET:  Return to diet you were on before surgery, unless you are given specific diet instructions.  Drink plenty of fluids.  While taking pain medications, increase dietary fiber or add a fiber supplementation like Metamucil or Citrucel to help prevent constipation - a possible side effect of pain medications.    NAUSEA:  If nauseated from the anesthetic/pain meds; rest in bed, get up cautiously with assistance, and drink clear liquids (juice, tea, broth).    RETURN APPOINTMENT:  Schedule a follow-up visit 2-3 weeks after discharge from the hospital.  Office Phone:  [phone]     CONTACT US IF THE  FOLLOWING DEVELOPS:   1. A fever that is above 101     2. If there is a large amount of drainage, bleeding, or swelling.   3. Severe pain that is not relieved by your prescription.   4. Drainage that is thick, cloudy, yellow, green or white.   5. Any other questions not answered by  Frequently Asked Questions  sheet.      FREQUENTLY ASKED QUESTIONS:    Q:  How should my incision look?    A:  Normally your incision will appear slightly swollen with light redness directly along the incision itself as it heals.  It may feel like a bump or ridge as the healing/scarring happens, and over time (3-4 months) this bump or ridge feeling should slowly go away.  In general, clear or pink watery drainage can be normal at first as your incision heals, but should decrease over time.    Q:  How do I know if my incision is infected?  A:  Look at your incision for signs of infection, like redness around the incision spreading to surrounding skin, or drainage of cloudy or foul-smelling drainage.  If you feel warm, check your temperature to see if you are running a fever.    **If any of these things occur, please notify the nurse at our office.  We may need you to come into the office for an incision check.      Q:  How do I take care of my incision?  A:  If you have a dressing in place - Starting the day after surgery, replace the dressing 1-2 times a day until there is no further drainage from the incision.  At that time, a dressing is no longer needed.  Try to minimize tape on the skin if irritation is occurring at the tape sites.  If you have significant irritation from tape on the skin, please call the office to discuss other method of dressing your incision.    Small pieces of tape called  steri-strips  may be present directly overlying your incision; these may be removed 10 days after surgery unless otherwise specified by your surgeon.  If these tapes start to loosen at the ends, you may trim them back until they fall off or are  removed.    A:  If you had  Dermabond  tissue glue used as a dressing (this causes your incision to look shiny with a clear covering over it) - This type of dressing wears off with time and does not require more dressings over the top unless it is draining around the glue as it wears off.  Do not apply ointments or lotions over the incisions until the glue has completely worn off.    Q:  There is a piece of tape or a sticky  lead  still on my skin.  Can I remove this?  A:  Sometimes the sticky  leads  used for monitoring during surgery or for evaluation in the emergency department are not all removed while you are in the hospital.  These sometimes have a tab or metal dot on them.  You can easily remove these on your own, like taking off a band-aid.  If there is a gel substance under the  lead , simply wipe/clean it off with a washcloth or paper towel.      Q:  What can I do to minimize constipation (very hard stools, or lack of stools)?  A:  Stay well hydrated.  Increase your dietary fiber intake or take a fiber supplement -with plenty of water.  Walk around frequently.  You may consider an over-the-counter stool-softener.  Your Pharmacist can assist you with choosing one that is stocked at your pharmacy.  Constipation is also one of the most common side effects of pain medication.  If you are using pain medication, be pro-active and try to PREVENT problems with constipation by taking the steps above BEFORE constipation becomes a problem.    Q:  What do I do if I need more pain medications?  A:  Call the office to receive refills.  Be aware that certain pain meds cannot be called into a pharmacy and actually require a paper prescription.  A change may be made in your pain med as you progress thru your recovery period or if you have side effects to certain meds.    --Pain meds are NOT refilled after 5pm on weekdays, and NOT AT ALL on the weekends, so please look ahead to prevent problems.      Q:  Why am I having a  hard time sleeping now that I am at home?  A:  Many medications you receive while you are in the hospital can impact your sleep for a number of days after your surgery/hospitalization.  Decreased level of activity and naps during the day may also make sleeping at night difficult.  Try to minimize day-time naps, and get up frequently during the day to walk around your home during your recovery time.  Sleep aides may be of some help, but are not recommended for long-term use.      Q:  I am having some back discomfort.  What should I do?  A:  This may be related to certain positioning that was required for your surgery, extended periods of time in bed, or other changes in your overall activity level.  You may try ice, heat, acetaminophen, or ibuprofen to treat this temporarily.  Note that many pain medications have acetaminophen in them and would state this on the prescription bottle.  Be sure not to exceed the maximum of 4000mg per day of acetaminophen.     **If the pain you are having does not resolve, is severe, or is a flare of back pain you have had on other occasions prior to surgery, please contact your primary physician for further recommendations or for an appointment to be examined at their office.    Q:  Why am I having headaches?  A:  Headaches can be caused by many things:  caffeine withdrawal, use of pain meds, dehydration, high blood pressure, lack of sleep, over-activity/exhaustion, flare-up of usual migraine headaches.  If you feel this is related to muscle tension (a band-like feeling around the head, or a pressure at the low-back of the head) you may try ice or heat to this area.  You may need to drink more fluids (try electrolyte drink like Gatorade), rest, or take your usual migraine medications.   **If your headaches do not resolve, worsen, are accompanied by other symptoms, or if your blood pressure is high, please call your primary physician for recommendation and/or examination.    Q:  I am  unable to urinate.  What do I do?  A:  A small percentage of people can have difficulty urinating initially after surgery.  This includes being able to urinate only a very small amount at a time and feeling discomfort or pressure in the very low abdomen.  This is called  urinary retention , and is actually an urgent situation.  Proceed to your nearest Emergency department for evaluation (not an Urgent Care Center).  Sometimes the bladder does not work correctly after certain medications you receive during surgery, or related to certain procedures.  You may need to have a catheter placed until your bladder recovers.  When planning to go to an Emergency department, it may help to call the ER to let them know you are coming in for this problem after a surgery.  This may help you get in quicker to be evaluated.  **If you have symptoms of a urinary tract infection, please contact your primary physician for the proper evaluation and treatment.          If you have other questions, please call the office Monday thru Friday between 8am and 5pm to discuss with the nurse or physician assistant.  #[phone]    There is a surgeon ON CALL on weekday evenings and over the weekend in case of urgent need only, and may be contacted at the same number.    If you are having an emergency, call 911 or proceed to your nearest emergency department.

## 2020-08-21 NOTE — PROGRESS NOTES
"[facility]   General Surgery Progress Note          Assessment and Plan:   Assessment:   POD#1 s/p Procedure(s):  EXCISION OF ABDOMINAL WALL OSSIFICATION  Postoperative pain - not controlled yet      Plan:   -Pain Mgmt: oxycodone, atarax, tylenol. Avoid IV pain meds. Add binder for support.  -Increase activity as tolerated  -Bowel meds: Miralax, colace.  -Diet as tolerated  -Disposition: Plan to discharge later today when pain controlled with oral medications.   -Rx: oxycodone, atarax, colace         Interval History:   Resting in bed. States he has quite a bit of surgical pain. Has needed IV dilaudid and oxycodone to control his pain. Has been up to restroom only. Voiding independently. Passing flatus. Has not had much to eat. Has not walked the halls yet.         Physical Exam:   Blood pressure 138/75, pulse 59, temperature 97  F (36.1  C), temperature source Axillary, resp. rate 16, height 1.803 m (5' 11\"), weight 104.4 kg (230 lb 3.2 oz), SpO2 95 %.    I/O last 3 completed shifts:  In: 1980 [P.O.:680; I.V.:1300]  Out: 2755 [Urine:2750; Blood:5]    Abdomen: soft, ND, +tenderness at surgical area, +BS  Inc(s) - bandages clean, dry, intact.          Data:     Recent Labs   Lab Test 08/17/20  0717 07/08/20  1105 05/01/20  1809   HGB 13.1* 12.2* 12.8*   WBC 7.5 7.8 7.9          Flo Alvarenga PA-C    "

## 2020-08-21 NOTE — PLAN OF CARE
PRIMARY DIAGNOSIS: Excision of abdominal wall ossification  OUTPATIENT/OBSERVATION GOALS TO BE MET BEFORE DISCHARGE:  1. Stable vital signs Yes  2. Tolerating diet:Yes  3. Pain controlled with oral pain medications:  No  4. Positive bowel sounds:  Yes  5. Voiding without difficulty:  Yes  6. Able to ambulate:  Has not been OOB since surgery  7. Provider specific discharge goals met:  No    Discharge Planner Nurse   Safe discharge environment identified: Yes  Barriers to discharge: Yes       Entered by: Valerio Tariq 08/21/2020 1:42 AM    Vitals are Temp: 96.8  F (36  C) Temp src: Oral BP: 127/69 Pulse: 73   Resp: 16 SpO2: 94 %.  Patient is AxOx4. VSS. CPAP in use on 2 L of oxygen.  On continuous pulse ox. Have not been OOB since surgery per report. Ind at baseline. On a regular diet. Rated abdominal pain 10/10. Stated oxycodone did not work for him. Prn IV Dilaudid given. Declined ice pack. PIV SL. Denies dizziness, SOB, and nausea. Midline abdominal dressing CDI. CMS intact. Will continue to monitor.        Please review provider order for any additional goals.   Nurse to notify provider when observation goals have been met and patient is ready for discharge.

## 2020-08-21 NOTE — PLAN OF CARE
PRIMARY DIAGNOSIS: Heterotopic Ossification   OUTPATIENT/OBSERVATION GOALS TO BE MET BEFORE DISCHARGE:  1. Stable vital signs Yes  2. Tolerating diet:Yes  3. Pain controlled with oral pain medications:  No  4. Positive bowel sounds:  Yes  5. Voiding without difficulty:  Yes  6. Able to ambulate:  No  7. Provider specific discharge goals met:  No    Discharge Planner Nurse   Safe discharge environment identified: Yes  Barriers to discharge: Yes       Entered by: TAYO ROBISON 08/21/2020   Vital signs:  Temp: 97  F (36.1  C) Temp src: Axillary BP: 138/75 Pulse: 59   Resp: 16 SpO2: 95 % O2 Device: BiPAP/CPAP Oxygen Delivery: 2 LPM Alert and oriented x4. Rated pain 7/10 after receiving Dilaudid. Seen by surgeon this morning. Pt pain is not controlled yet. Plan is if patient's pain controlled with oral medications and if they ambulating, they will be discharging later tonight. Per Surgeon, if patient couldn't me post-op criteria today, they can stay overnight and be discharge tomorrow morning. Will continue to monitor.     Please review provider order for any additional goals.   Nurse to notify provider when observation goals have been met and patient is ready for discharge.

## 2020-08-21 NOTE — PLAN OF CARE
Vitals continue stable and using cpap with 2l oxygen, continues with po oxy with good relief and good urine output. Abd inc clean, dry and intact.

## 2020-08-21 NOTE — PLAN OF CARE
Vitals stable, sleeping with cpap at 2l oxygen between cares. Good urine output, ovi reg diet. Pain eased with po oxy. Wife called and anticipate discharge tomorrow. Abd inc clean, dry and intact. Calls appropriately.

## 2020-08-21 NOTE — PLAN OF CARE
PRIMARY DIAGNOSIS: Heterotopic Ossification   OUTPATIENT/OBSERVATION GOALS TO BE MET BEFORE DISCHARGE:  1. Stable vital signs Yes  2. Tolerating diet:Yes  3. Pain controlled with oral pain medications:  No  4. Positive bowel sounds:  Yes  5. Voiding without difficulty:  Yes  6. Able to ambulate:  No  7. Provider specific discharge goals met:  No    Discharge Planner Nurse   Safe discharge environment identified: Yes  Barriers to discharge: Yes       Entered by: TAYO ROBISON 08/21/2020   Vital signs:  Temp: 98.5  F (36.9  C) Temp src: Oral BP: 124/58 Pulse: 70   Resp: 16 SpO2: 93 % O2 Device: BiPAP/CPAP Oxygen Delivery: 1/2 LPM Alert and oriented x4. Tylenol, Oxycodone, Atarax available for pain. Dressing in the abdomin clean and intact. Plan is controlling pain with oral pain medications and ambulate patient. There is active discharge order for the patient and they can go home if they feel safe tonight or tomorrow morning. Will continue to monitor.       Please review provider order for any additional goals.   Nurse to notify provider when observation goals have been met and patient is ready for discharge.

## 2020-08-21 NOTE — PLAN OF CARE
PRIMARY DIAGNOSIS: Excision of abdominal wall ossification  OUTPATIENT/OBSERVATION GOALS TO BE MET BEFORE DISCHARGE:  1. Stable vital signs Yes  2. Tolerating diet:Yes  3. Pain controlled with oral pain medications:  No  4. Positive bowel sounds:  Yes  5. Voiding without difficulty:  Yes  6. Able to ambulate:  Has not been OOB since surgery  7. Provider specific discharge goals met:  No    Discharge Planner Nurse   Safe discharge environment identified: Yes  Barriers to discharge: Yes       Entered by: Valerio Tariq 08/21/2020 4:28 AM    Vitals are Temp: 98.3  F (36.8  C) Temp src: Oral BP: 132/67 Pulse: 68   Resp: 16 SpO2: 97 %.  Patient is AxOx4. VSS. CPAP in use on 2 L of oxygen.  On continuous pulse ox. Have not been OOB since surgery per report. Ind at baseline. On a regular diet. Rated abdominal pain 10/10. Stated oxycodone did not work for him. Prn IV Dilaudid given x2 overnight, decreasing pain down to 4/10. Declined ice pack. PIV SL, patent, and flushed well. Denies dizziness, SOB, and nausea. Midline abdominal dressing CDI. CMS intact. Gen. Surgery following. Stable and resting in bed. Will continue to monitor and provide supportive cares.        Please review provider order for any additional goals.   Nurse to notify provider when observation goals have been met and patient is ready for discharge.

## 2020-08-22 VITALS
HEIGHT: 71 IN | RESPIRATION RATE: 18 BRPM | HEART RATE: 67 BPM | TEMPERATURE: 97.2 F | DIASTOLIC BLOOD PRESSURE: 75 MMHG | WEIGHT: 230.2 LBS | OXYGEN SATURATION: 97 % | BODY MASS INDEX: 32.23 KG/M2 | SYSTOLIC BLOOD PRESSURE: 130 MMHG

## 2020-08-22 PROCEDURE — 25000132 ZZH RX MED GY IP 250 OP 250 PS 637: Mod: GY | Performed by: PHYSICIAN ASSISTANT

## 2020-08-22 PROCEDURE — 25000132 ZZH RX MED GY IP 250 OP 250 PS 637: Mod: GY | Performed by: SURGERY

## 2020-08-22 RX ADMIN — ACETAMINOPHEN 1000 MG: 500 TABLET, FILM COATED ORAL at 09:43

## 2020-08-22 RX ADMIN — LEVETIRACETAM 2000 MG: 500 TABLET ORAL at 09:43

## 2020-08-22 RX ADMIN — OXYCODONE HYDROCHLORIDE 10 MG: 5 TABLET ORAL at 11:24

## 2020-08-22 RX ADMIN — PHENYTOIN SODIUM 300 MG: 100 CAPSULE ORAL at 09:44

## 2020-08-22 RX ADMIN — ACETAMINOPHEN 1000 MG: 500 TABLET, FILM COATED ORAL at 04:14

## 2020-08-22 RX ADMIN — DOCUSATE SODIUM 100 MG: 100 CAPSULE, LIQUID FILLED ORAL at 09:46

## 2020-08-22 RX ADMIN — VENLAFAXINE HYDROCHLORIDE 150 MG: 150 CAPSULE, EXTENDED RELEASE ORAL at 09:46

## 2020-08-22 NOTE — PLAN OF CARE
PRIMARY DIAGNOSIS: Heterotopic Ossification   OUTPATIENT/OBSERVATION GOALS TO BE MET BEFORE DISCHARGE:  1. ADLs back to baseline: Yes     2. Activity and level of assistance: Up with standby assistance.     3. Pain status: Improved-controlled with oral pain medications.     4. Return to near baseline physical activity: No          Discharge Planner Nurse   Safe discharge environment identified: Yes  Barriers to discharge: Yes       Entered by: Arminda Oshea 08/21/2020 5:43AM  Please review provider order for any additional goals.   Nurse to notify provider when observation goals have been met and patient is ready for discharge.     VSS. A and Ox4. PIV SL. A1 with walker and gait belt. CPAP on in room,  Pt. sleeping in bed. Pain managed with tylenol, hopes to discharge in AM. Utilizing abd binder. Active BS, voiding well. Dressing CDI.  Will continue to monitor.

## 2020-08-22 NOTE — PLAN OF CARE
PRIMARY DIAGNOSIS: Heterotopic Ossification   OUTPATIENT/OBSERVATION GOALS TO BE MET BEFORE DISCHARGE:  1. ADLs back to baseline: Yes     2. Activity and level of assistance: Up with standby assistance.     3. Pain status: Improved-controlled with oral pain medications.     4. Return to near baseline physical activity: No          Discharge Planner Nurse   Safe discharge environment identified: Yes  Barriers to discharge: Yes       Entered by: Arminda Oshea 08/21/2020 1:16AM  Please review provider order for any additional goals.   Nurse to notify provider when observation goals have been met and patient is ready for discharge.     VSS. A and Ox4. PIV SL. A1 with walker and gait belt. CPAP in room.  Pt. sleeping in bed. Plan is to manage pain and to discharge in AM. Will continue to monitor.

## 2020-08-22 NOTE — PLAN OF CARE
PRIMARY DIAGNOSIS: Heterotopic Ossification   OUTPATIENT/OBSERVATION GOALS TO BE MET BEFORE DISCHARGE:  1. ADLs back to baseline: Yes    2. Activity and level of assistance: Up with standby assistance.    3. Pain status: Improved-controlled with oral pain medications.    4. Return to near baseline physical activity: No     Discharge Planner Nurse   Safe discharge environment identified: Yes  Barriers to discharge: Yes       Entered by: Arminda Oshea 08/21/2020 7:20 PM     Please review provider order for any additional goals.   Nurse to notify provider when observation goals have been met and patient is ready for discharge.    VSS. A and Ox4. PIV SL. A1 with walker and gait belt. CPAP in room. Prn atarax for headache. Pt. Resting in bed, wife in room. Pt. Would like to stay to ensure pain management. Will continue to monitor.

## 2020-08-22 NOTE — PLAN OF CARE
PRIMARY DIAGNOSIS: Heterotopic Ossification  OUTPATIENT/OBSERVATION GOALS TO BE MET BEFORE DISCHARGE:  1. Stable vital signs Yes  2. Tolerating diet:Yes  3. Pain controlled with oral pain medications:  Yes  4. Positive bowel sounds:  Yes  5. Voiding without difficulty:  Yes  6. Able to ambulate:  Yes  7. Provider specific discharge goals met:  Yes    Discharge Planner Nurse   Safe discharge environment identified: Yes  Barriers to discharge: No       Entered by: TAYO ROBISON 08/22/2020 2:11 PM     Please review provider order for any additional goals.   Nurse to notify provider when observation goals have been met and patient is ready for discharge.

## 2020-08-22 NOTE — PROGRESS NOTES
"[facility]   General Surgery Progress Note          Assessment and Plan:   Assessment:   POD#1 s/p Procedure(s):  EXCISION OF ABDOMINAL WALL OSSIFICATION  Postoperative pain - controlled        Plan:   -Pain Mgmt: oxycodone, atarax, tylenol. Add binder for support.  -Increase activity as tolerated  -Bowel meds: Miralax, colace.  -Diet as tolerated  -Disposition: home today         Interval History:   Sleeping in bed. Discussed with RN.  Doing well, pain mainly controlled with Tylenol.  Wife coming to pick pt up.          Physical Exam:   Blood pressure 130/75, pulse 67, temperature 97.2  F (36.2  C), temperature source Axillary, resp. rate 18, height 1.803 m (5' 11\"), weight 104.4 kg (230 lb 3.2 oz), SpO2 97 %.    I/O last 3 completed shifts:  In: -   Out: 500 [Urine:500]    Abdomen: soft, ND, +tenderness at surgical area, +BS  Inc(s) - bandages clean, dry, intact.          Data:     Recent Labs   Lab Test 08/17/20  0717 07/08/20  1105 05/01/20  1809   HGB 13.1* 12.2* 12.8*   WBC 7.5 7.8 7.9          Maya Wood PA-C    "

## 2020-08-22 NOTE — PLAN OF CARE
"PRIMARY DIAGNOSIS: Heterotopic Ossification    OUTPATIENT/OBSERVATION GOALS TO BE MET BEFORE DISCHARGE:  1. Stable vital signs Yes  2. Tolerating diet:Yes  3. Pain controlled with oral pain medications:  Yes  4. Positive bowel sounds:  Yes  5. Voiding without difficulty:  Yes  6. Able to ambulate:  ambulated to the bathroom SBA  7. Provider specific discharge goals met:  Yes    Discharge Planner Nurse   Safe discharge environment identified: Yes  Barriers to discharge: No       Entered by: TAYO ROBISON 08/22/2020   Vital signs:  Vital signs:  Temp: 97.2  F (36.2  C) Temp src: Axillary BP: 130/75 Pulse: 67   Resp: 18 SpO2: 97 % O2 Device: BiPAP/CPAP Oxygen Delivery: Others (comment)(none ) Height: 180.3 cm (5' 11\") Weight: 104.4 kg (230 lb 3.2 oz)  Alert and oriented x4. Reported pain 3/10 and tolerable. Dressing in the abdomen dry, clean and intact. On regular diet. Saline locked. Pt to be discharged after having breakfast and walk in the reed. Wife is coming and will ride pt to home. Will continue to monitor.         Please review provider order for any additional goals.   Nurse to notify provider when observation goals have been met and patient is ready for discharge.  "

## 2020-08-22 NOTE — PLAN OF CARE
Patient's After Visit Summary was reviewed with patient and/or wife.   Patient verbalized understanding of After Visit Summary, recommended follow up and was given an opportunity to ask questions.   Discharge medications sent home with patient/family: YES   Discharged with spouse  VSS. Discharge medications (Oxycodone, Atarax, Colace) sent with patient. Wheelchair ride provided.

## 2020-08-24 LAB — COPATH REPORT: NORMAL

## 2020-09-08 ENCOUNTER — TELEPHONE (OUTPATIENT)
Dept: SURGERY | Facility: CLINIC | Age: 45
End: 2020-09-08

## 2020-09-08 NOTE — TELEPHONE ENCOUNTER
[facility] Postoperative Call Note:     Andrea Uribe was called for an update regarding his recovery. He underwent excision of an abdominal wall heterotopic ossification by Dr. Mcfarlane on 8/20/20. Today he tells me he is doing well. He has some mild discomfort with lifting and bending. He also tells me he has a firm ridge at the surgical site and I explained that that is normal and will last a few months. He is eating a normal diet and his bowels are regular. He has some symptoms of reflux in the middle of the night. He states his wound is healing well.    The pathology revealed benign non-inflamed bone consistent with heterotopic ossification. This was discussed with the patient.     Lifting restrictions reviewed. Patient was instructed to slowly and gradually resume all normal activities.The patient states all of his questions were answered. He understands our discussion. He agrees to follow up as needed or to call our office with any concerns.    Flo Alvarenga PA-C      Please route or send letter to:  Primary Care Provider (PCP)

## 2020-09-09 DIAGNOSIS — E78.5 HYPERLIPIDEMIA LDL GOAL <100: ICD-10-CM

## 2020-09-09 RX ORDER — SIMVASTATIN 20 MG
TABLET ORAL
Qty: 90 TABLET | Refills: 1 | Status: SHIPPED | OUTPATIENT
Start: 2020-09-09 | End: 2021-03-15

## 2020-09-21 DIAGNOSIS — F32.1 MODERATE MAJOR DEPRESSION (H): ICD-10-CM

## 2020-09-21 DIAGNOSIS — J06.9 UPPER RESPIRATORY TRACT INFECTION, UNSPECIFIED TYPE: ICD-10-CM

## 2020-09-21 DIAGNOSIS — F41.9 ANXIETY: ICD-10-CM

## 2020-09-21 RX ORDER — FLUTICASONE PROPIONATE 50 MCG
1-2 SPRAY, SUSPENSION (ML) NASAL DAILY
Qty: 48 ML | Refills: 1 | OUTPATIENT
Start: 2020-09-21

## 2020-09-21 RX ORDER — VENLAFAXINE HYDROCHLORIDE 150 MG/1
CAPSULE, EXTENDED RELEASE ORAL
Qty: 90 CAPSULE | Refills: 0 | OUTPATIENT
Start: 2020-09-21

## 2020-09-23 ENCOUNTER — OFFICE VISIT (OUTPATIENT)
Dept: FAMILY MEDICINE | Facility: CLINIC | Age: 45
End: 2020-09-23
Payer: MEDICARE

## 2020-09-23 VITALS
WEIGHT: 230 LBS | RESPIRATION RATE: 16 BRPM | BODY MASS INDEX: 32.08 KG/M2 | HEART RATE: 70 BPM | TEMPERATURE: 98.4 F | DIASTOLIC BLOOD PRESSURE: 60 MMHG | OXYGEN SATURATION: 100 % | SYSTOLIC BLOOD PRESSURE: 120 MMHG

## 2020-09-23 DIAGNOSIS — T20.22XA PARTIAL THICKNESS BURN OF LIP, INITIAL ENCOUNTER: Primary | ICD-10-CM

## 2020-09-23 DIAGNOSIS — Z23 NEED FOR PROPHYLACTIC VACCINATION AND INOCULATION AGAINST INFLUENZA: ICD-10-CM

## 2020-09-23 PROCEDURE — G0008 ADMIN INFLUENZA VIRUS VAC: HCPCS | Performed by: PHYSICIAN ASSISTANT

## 2020-09-23 PROCEDURE — 90686 IIV4 VACC NO PRSV 0.5 ML IM: CPT | Performed by: PHYSICIAN ASSISTANT

## 2020-09-23 PROCEDURE — 99213 OFFICE O/P EST LOW 20 MIN: CPT | Mod: 25 | Performed by: PHYSICIAN ASSISTANT

## 2020-09-23 RX ORDER — BACITRACIN ZINC 500 [USP'U]/G
OINTMENT TOPICAL 2 TIMES DAILY
Qty: 28.4 G | Refills: 0 | Status: SHIPPED | OUTPATIENT
Start: 2020-09-23 | End: 2021-10-12

## 2020-09-23 RX ORDER — SILVER SULFADIAZINE 10 MG/G
CREAM TOPICAL 2 TIMES DAILY
Qty: 50 G | Refills: 0 | Status: SHIPPED | OUTPATIENT
Start: 2020-09-23 | End: 2021-10-12

## 2020-09-23 NOTE — PATIENT INSTRUCTIONS
Patient Education     Second-Degree Burn  A burn occurs when skin is exposed to too much heat, sun, or harsh chemicals. A second-degree burn (partial-thickness burn) is deeper than a first-degree burn (superficial burn). It usually causes a blister to form. The blister may remain intact and gradually go away on its own. Or it may break open. The goal of treatment is to relieve pain and stop infection while the burn heals.  Home care  Use pain medicine as directed. If no pain medicine was prescribed, you may use over-the-counter medicine to control pain. If you have chronic liver or kidney disease, talk with your healthcare provider before using acetaminophen or ibuprofen. Also talk with your provider if you've had a stomach ulcer or GI bleeding.  General care    On the first day, you may put a cool compress on the wound to ease pain. A cool compress is a small towel soaked in cool water.    If you were sent home with the blister intact, don't break the blister. The risk for infection is greater if the blister breaks. If a bandage was applied, change it once a day, unless told otherwise. If the bandage becomes wet or soiled, change it as soon as you can.    Sometimes an infection may occur even with proper treatment. Check the burn daily for the signs of infection listed below.    Eat more calories and protein until your wound is healed.    Wear a hat, sunscreen, and long sleeves while in the sun to protect the skin.    Don't pick or scratch at the wound. Use over-the-counter medicines like diphenhydramine for itching.    Avoid tight-fitting clothes.  To change a bandage:    Wash your hands.    Take off the old bandage. If the bandage sticks, soak it off under warm running water.    Once the bandage is off, gently wash the burn area with mild soap and warm water to remove any cream, ointment, ooze, or scab. You may do this in a sink, under a tub faucet, or in the shower. Rinse off the soap and gently pat dry with a  clean towel.    Check for signs of infection listed below.    Put any prescribed antibiotic cream or ointment on the wound.    Cover the burn with nonstick gauze. Then wrap it with the bandage material.  Follow-up care  Follow up with your healthcare provider, or as advised.  When to seek medical advice  Call your healthcare provider right away if you have any of these signs of infection:    Fever of 100.4 F (38 C) or higher, or as directed by your healthcare provider    Pain that gets worse    Redness or swelling that gets worse    Pus comes from the burn    Red streaks in your skin coming from the burn    Wound doesn't appear to be healing    Nausea or vomiting   Date Last Reviewed: 1/1/2017 2000-2019 [employer]. [address] [place] [address], [place] [address]. All rights reserved. This information is not intended as a substitute for professional medical care. Always follow your healthcare professional's instructions.

## 2020-09-23 NOTE — LETTER
[facility]  [address] [place] [id number]  Phone: [phone]    September 23, 2020        Andrea Uribe  [address] [facility] [address]          To whom it may concern:    RE: Andrea Uribe    Patient was seen and treated today at our clinic and missed work due to non work related injury. Please excuse him from today through 9/27/2020 with return to work with baseline restrictions planned for 9/28/2020.     Please contact me for questions or concerns.      Sincerely,        Alberto Medley PA-C

## 2020-10-14 ENCOUNTER — TELEPHONE (OUTPATIENT)
Dept: FAMILY MEDICINE | Facility: CLINIC | Age: 45
End: 2020-10-14

## 2020-10-14 NOTE — TELEPHONE ENCOUNTER
Patient states he has had migraine for past couple days, when he uses CPAP his jaw hurts. Was given abx and Tylenol 3  by dentist due to tooth cracked. Patient states he has been taking 3000 mg of Tylenol at one time for his headache. Advised this does not increase pain relief any more than 1000 mg and can cause liver damage. Advised that he should take into account amount of Tylenol 3 he is taking when totaling amount of acetaminophen in a 24 hour period. Advised he should take abx with food to avoid upset stomach. Advised resting in a dark, quiet room to help with migraines. Patient verbalizes understanding. Patient states he has had migraines in the past, used to take medication for them, but he can't remember the name of the med. He says he has trouble with recall d/t head injury. Patient scheduled for video visit, declines mychart activation, says TBI has made it hard for him to navigate technology.   Lorrie Lindsey, CAROLEEN, RN, PHN

## 2020-10-15 ENCOUNTER — HOSPITAL ENCOUNTER (EMERGENCY)
Facility: CLINIC | Age: 45
Discharge: HOME | End: 2020-10-15
Attending: EMERGENCY MEDICINE | Admitting: EMERGENCY MEDICINE
Payer: MEDICARE

## 2020-10-15 ENCOUNTER — VIRTUAL VISIT (OUTPATIENT)
Dept: FAMILY MEDICINE | Facility: CLINIC | Age: 45
End: 2020-10-15
Payer: MEDICARE

## 2020-10-15 ENCOUNTER — APPOINTMENT (OUTPATIENT)
Dept: GENERAL RADIOLOGY | Facility: CLINIC | Age: 45
End: 2020-10-15
Attending: EMERGENCY MEDICINE
Payer: MEDICARE

## 2020-10-15 VITALS
WEIGHT: 206 LBS | DIASTOLIC BLOOD PRESSURE: 73 MMHG | HEIGHT: 71 IN | TEMPERATURE: 98.7 F | OXYGEN SATURATION: 93 % | RESPIRATION RATE: 18 BRPM | BODY MASS INDEX: 28.84 KG/M2 | SYSTOLIC BLOOD PRESSURE: 107 MMHG | HEART RATE: 72 BPM

## 2020-10-15 DIAGNOSIS — R06.02 SOB (SHORTNESS OF BREATH): ICD-10-CM

## 2020-10-15 DIAGNOSIS — R51.9 ACUTE NONINTRACTABLE HEADACHE, UNSPECIFIED HEADACHE TYPE: Primary | ICD-10-CM

## 2020-10-15 DIAGNOSIS — J06.9 UPPER RESPIRATORY TRACT INFECTION, UNSPECIFIED TYPE: ICD-10-CM

## 2020-10-15 DIAGNOSIS — G44.209 TENSION HEADACHE: ICD-10-CM

## 2020-10-15 LAB
ANION GAP SERPL CALCULATED.3IONS-SCNC: 6 MMOL/L (ref 3–14)
BASOPHILS # BLD AUTO: 0 10E9/L (ref 0–0.2)
BASOPHILS NFR BLD AUTO: 0.4 %
BUN SERPL-MCNC: 13 MG/DL (ref 7–30)
CALCIUM SERPL-MCNC: 8.8 MG/DL (ref 8.5–10.1)
CHLORIDE SERPL-SCNC: 104 MMOL/L (ref 94–109)
CO2 SERPL-SCNC: 30 MMOL/L (ref 20–32)
CREAT SERPL-MCNC: 0.95 MG/DL (ref 0.66–1.25)
DIFFERENTIAL METHOD BLD: ABNORMAL
EOSINOPHIL # BLD AUTO: 0.2 10E9/L (ref 0–0.7)
EOSINOPHIL NFR BLD AUTO: 2.1 %
ERYTHROCYTE [DISTWIDTH] IN BLOOD BY AUTOMATED COUNT: 13.4 % (ref 10–15)
GFR SERPL CREATININE-BSD FRML MDRD: >90 ML/MIN/{1.73_M2}
GLUCOSE SERPL-MCNC: 87 MG/DL (ref 70–99)
HCT VFR BLD AUTO: 38.2 % (ref 40–53)
HGB BLD-MCNC: 12 G/DL (ref 13.3–17.7)
IMM GRANULOCYTES # BLD: 0 10E9/L (ref 0–0.4)
IMM GRANULOCYTES NFR BLD: 0.4 %
LYMPHOCYTES # BLD AUTO: 1.6 10E9/L (ref 0.8–5.3)
LYMPHOCYTES NFR BLD AUTO: 21.9 %
MCH RBC QN AUTO: 29.9 PG (ref 26.5–33)
MCHC RBC AUTO-ENTMCNC: 31.4 G/DL (ref 31.5–36.5)
MCV RBC AUTO: 95 FL (ref 78–100)
MONOCYTES # BLD AUTO: 0.5 10E9/L (ref 0–1.3)
MONOCYTES NFR BLD AUTO: 7.4 %
NEUTROPHILS # BLD AUTO: 4.9 10E9/L (ref 1.6–8.3)
NEUTROPHILS NFR BLD AUTO: 67.8 %
NRBC # BLD AUTO: 0 10*3/UL
NRBC BLD AUTO-RTO: 0 /100
PLATELET # BLD AUTO: 191 10E9/L (ref 150–450)
POTASSIUM SERPL-SCNC: 4.1 MMOL/L (ref 3.4–5.3)
RBC # BLD AUTO: 4.01 10E12/L (ref 4.4–5.9)
SODIUM SERPL-SCNC: 140 MMOL/L (ref 133–144)
WBC # BLD AUTO: 7.3 10E9/L (ref 4–11)

## 2020-10-15 PROCEDURE — 71046 X-RAY EXAM CHEST 2 VIEWS: CPT

## 2020-10-15 PROCEDURE — 96375 TX/PRO/DX INJ NEW DRUG ADDON: CPT

## 2020-10-15 PROCEDURE — 80048 BASIC METABOLIC PNL TOTAL CA: CPT | Performed by: EMERGENCY MEDICINE

## 2020-10-15 PROCEDURE — 96361 HYDRATE IV INFUSION ADD-ON: CPT

## 2020-10-15 PROCEDURE — A9270 NON-COVERED ITEM OR SERVICE: HCPCS | Performed by: EMERGENCY MEDICINE

## 2020-10-15 PROCEDURE — 99284 EMERGENCY DEPT VISIT MOD MDM: CPT | Mod: 25

## 2020-10-15 PROCEDURE — 250N000013 HC RX MED GY IP 250 OP 250 PS 637: Performed by: EMERGENCY MEDICINE

## 2020-10-15 PROCEDURE — 94640 AIRWAY INHALATION TREATMENT: CPT

## 2020-10-15 PROCEDURE — 99214 OFFICE O/P EST MOD 30 MIN: CPT | Mod: 95 | Performed by: PHYSICIAN ASSISTANT

## 2020-10-15 PROCEDURE — 258N000003 HC RX IP 258 OP 636: Performed by: EMERGENCY MEDICINE

## 2020-10-15 PROCEDURE — 85025 COMPLETE CBC W/AUTO DIFF WBC: CPT | Performed by: EMERGENCY MEDICINE

## 2020-10-15 PROCEDURE — 250N000011 HC RX IP 250 OP 636: Performed by: EMERGENCY MEDICINE

## 2020-10-15 PROCEDURE — 96374 THER/PROPH/DIAG INJ IV PUSH: CPT

## 2020-10-15 RX ORDER — ALBUTEROL SULFATE 90 UG/1
6 AEROSOL, METERED RESPIRATORY (INHALATION) ONCE
Status: COMPLETED | OUTPATIENT
Start: 2020-10-15 | End: 2020-10-15

## 2020-10-15 RX ORDER — DIPHENHYDRAMINE HYDROCHLORIDE 50 MG/ML
25 INJECTION INTRAMUSCULAR; INTRAVENOUS ONCE
Status: COMPLETED | OUTPATIENT
Start: 2020-10-15 | End: 2020-10-15

## 2020-10-15 RX ORDER — METOCLOPRAMIDE HYDROCHLORIDE 5 MG/ML
10 INJECTION INTRAMUSCULAR; INTRAVENOUS ONCE
Status: COMPLETED | OUTPATIENT
Start: 2020-10-15 | End: 2020-10-15

## 2020-10-15 RX ADMIN — SODIUM CHLORIDE, POTASSIUM CHLORIDE, SODIUM LACTATE AND CALCIUM CHLORIDE 1000 ML: 600; 310; 30; 20 INJECTION, SOLUTION INTRAVENOUS at 16:47

## 2020-10-15 RX ADMIN — METOCLOPRAMIDE HYDROCHLORIDE 10 MG: 5 INJECTION INTRAMUSCULAR; INTRAVENOUS at 16:48

## 2020-10-15 RX ADMIN — ALBUTEROL SULFATE 6 PUFF: 90 AEROSOL, METERED RESPIRATORY (INHALATION) at 16:48

## 2020-10-15 RX ADMIN — DIPHENHYDRAMINE HYDROCHLORIDE 25 MG: 50 INJECTION, SOLUTION INTRAMUSCULAR; INTRAVENOUS at 16:48

## 2020-10-15 ASSESSMENT — ENCOUNTER SYMPTOMS
COUGH: 1
FEVER: 1
DYSURIA: 0
DIARRHEA: 0
SORE THROAT: 0
APPETITE CHANGE: 1
CHEST TIGHTNESS: 1
HEADACHES: 1

## 2020-10-15 ASSESSMENT — MIFFLIN-ST. JEOR: SCORE: 1836.54

## 2020-10-15 NOTE — ED PROVIDER NOTES
History     Chief Complaint:  Headache and Cough       HPI   Andrea Uribe is a 45 year old male who presents with headache and cough. The patient developed a headache 4 days ago. He states that he also developed a tooth ache 2 days ago and went to his dentist. He was prescribed antibiotics which he stats has been helping with the tooth ache. However, the headache is still persistent. He also developed chest tightness, decreased appetite, cough, and ear pain a couple of days ago. The patient has been taking tylenol at home which reduced the fever. He has been taking hot showers at home which he states helps with his headache. He states that he is prone to getting pneumonia and his symptoms feels similar to previous infections. He denies any sore throat, diarrhea, or dysuria. He has had no known covid contacts.     Allergies:  Iodine  Asa [Aspirin]  Ibuprofen Sodium  Pollen Extract  Seasonal Allergies     Medications:    Atarax   Dilantin   zocor  effexor     Past Medical History:    Chronic infection   CKD   Hypertension    MRSA (methicillin resistant Staphylococcus aureus)   Obesity   Osteoarthritis   Seizures   Sleep apnea   TBI (traumatic brain injury)    Thyroid disease   Uncomplicated asthma     Past Surgical History:    Appendectomy   Arthroplasty knee  Tracheostomy   Herniorrhaphy incisional   Implant stimulator vagus nerve   Lap cholecystectomy   laparotomy exploratory   Orthopedic surgery   Remove hardware  Spleen surgery   Lonnie in left femur  Screws in right knee     Family History:    Cardiovascular   Cerebrovascular disease  Colitis   Diabetes   Crohn's disease      Social History:  Smoking Status: Never Smoker  Smokeless Tobacco: Never Used  Alcohol Use: No  Drug Use: No  PCP: Alberto Medley     Review of Systems   Constitutional: Positive for appetite change (decreased) and fever.   HENT: Positive for ear pain. Negative for sore throat.    Respiratory: Positive for cough and chest tightness.   "  Gastrointestinal: Negative for diarrhea.   Genitourinary: Negative for dysuria.   Neurological: Positive for headaches.   All other systems reviewed and are negative.        Physical Exam     Patient Vitals for the past 24 hrs:   BP Temp Temp src Pulse Resp SpO2 Height Weight   10/15/20 1700 107/73 -- -- 72 -- -- -- --   10/15/20 1651 120/78 -- -- 70 -- 93 % -- --   10/15/20 1447 (!) 131/92 98.7  F (37.1  C) Oral 73 18 96 % 1.803 m (5' 11\") 93.4 kg (206 lb)        Physical Exam  Gen: well appearing, in no acute distress  HEENT:  mmm, no rhinorrhea, TMs clear bilaterally  Neck: supple, no abnormal swelling  Lungs:  CTAB,  no resp distress  CV: rrr, no m/r/g, ppi  Abd: soft, nontender, nondistended, no rebound/masses/guarding/hsm  Ext: no peripheral edema  Skin: warm, dry, well perfused, no rashes/bruising/lesions on exposed skin  Neuro: alert, MAEE, no gross motor or sensory deficits, gait stable  Psych: Normal mood, normal affect      Emergency Department Course     Imaging:  Radiology findings were communicated with the patient who voiced understanding of the findings.    Chest XR,  PA & LAT  Preliminary Result  IMPRESSION: No acute cardiopulmonary disease.  Reading per radiology     Laboratory:  Laboratory findings were communicated with the patient who voiced understanding of the findings.    CBC:  WBC 7.3, HGB 12.0 (L), , o/w WNL     BMP: AWNL (Creatinine 0.95)     Interventions:  1647 lactated ringers BOLUS 1,000 mL IV   1648 Albuterol 6 puff inhalation   1648 reglan 10 mg IV  1648 Benadryl 25 mg IV    Emergency Department Course:  Past medical records, nursing notes, and vitals reviewed.    1623 I performed an exam of the patient as documented above.    IV was inserted and blood was drawn for laboratory testing, results above.     The patient was sent for imaging while in the emergency department, results above.       1730 Patient rechecked and updated.       Findings and plan explained to the " Patient. Patient discharged home with instructions regarding supportive care, medications, and reasons to return. The importance of close follow-up was reviewed.     Impression & Plan       Medical Decision Makin-year-old male here with atraumatic headache no associated neurologic abnormalities this is likely migraine versus tension headache he also has a associated upper respiratory tract infection.  Chest x-ray showed no evidence of pneumonia here basic blood test unremarkable feeling better after the above therapies.  I think he safe and stable for discharge home he is comfortable agree with our plan to return with new or worsening symptoms.    Discharge Diagnosis:    ICD-10-CM    1. Tension headache  G44.209 Basic metabolic panel   2. Upper respiratory tract infection, unspecified type  J06.9        Disposition:  Discharged to home.    Scribe Disclosure:  I, Casey Hyman, am serving as a scribe at 4:23 PM on 10/15/2020 to document services personally performed by Ricki Rodriguez MD based on my observations and the provider's statements to me.      10/15/2020   Ricki Rodriguez MD Walker, Jerome Richard, MD  10/15/20 4484

## 2020-10-15 NOTE — ED AVS SNAPSHOT
[facility]  [address]  [facility] [address]  Phone: [phone]  Fax: [phone]                                    Andrea Uribe   MRN: [id number]    Department: [facility]   Date of Visit: 10/15/2020           After Visit Summary Signature Page    I have received my discharge instructions, and my questions have been answered. I have discussed any challenges I see with this plan with the nurse or doctor.    ..........................................................................................................................................  Patient/Patient Representative Signature      ..........................................................................................................................................  Patient Representative Print Name and Relationship to Patient    ..................................................               ................................................  Date                                   Time    ..........................................................................................................................................  Reviewed by Signature/Title    ...................................................              ..............................................  Date                                               Time          22EPIC Rev 08/18

## 2020-10-15 NOTE — PROGRESS NOTES
"Andrea Uribe is a 45 year old male who is being evaluated via a billable video visit.      The patient has been notified of following:     \"This video visit will be conducted via a call between you and your physician/provider. We have found that certain health care needs can be provided without the need for an in-person physical exam.  This service lets us provide the care you need with a video conversation.  If a prescription is necessary we can send it directly to your pharmacy.  If lab work is needed we can place an order for that and you can then stop by our lab to have the test done at a later time.    Video visits are billed at different rates depending on your insurance coverage.  Please reach out to your insurance provider with any questions.    If during the course of the call the physician/provider feels a video visit is not appropriate, you will not be charged for this service.\"    Patient has given verbal consent for Video visit? Yes  How would you like to obtain your AVS? Mail a copy  If you are dropped from the video visit, the video invite should be resent to: Text to cell phone: [phone]  Will anyone else be joining your video visit? No    Subjective     Andrea Uribe is a 45 year old male who presents today via video visit for the following health issues:    HPI     Headache  Onset: 3    Description:   Location: bilateral in the frontal area   Character: throbbing pain, sharp pain, squeezing pain  Frequency: daily  Duration:  persistent    Intensity: severe    Progression of Symptoms:  same    Accompanying Signs & Symptoms:  Stiff neck: YES  Neck or upper back pain: no  Fever: no-but chills and sweating.   Sinus pressure: no  Nausea or vomiting: YES  Dizziness: YES  Numbness: no  Weakness: YES  Visual changes: no    History:   Head trauma: no  Family history of migraines: no  Previous tests for headaches: no  Neurologist evaluations: no  Able to do daily activities: no  Wake with a headaches: " "YES  Do headaches wake you up: no  Daily pain medication use: YES- attempted tylenol and norco through dentist after cracked tooth, mild improvement.   Work/school stressors/changes: no    Precipitating factors:   Does light make it worse: no  Does sound make it worse: no    Alleviating factors:  Does sleep help: YES    Therapies Tried and outcome: Tylenol and narcotics ( hydrocodone )       Patient also states today feels short of breath. Wearing cpap just to aid in this. Having intermittent centralized chest pains, sharp and occurring at night and in the afternoon during this time.     Cough present, but mild and only in the AM and resolves within hours.     No known covid exposure.       Video Start Time: 11:11 AM      Review of Systems   Constitutional, HEENT, cardiovascular, pulmonary, GI, , musculoskeletal, neuro, skin, endocrine and psych systems are negative, except as otherwise noted.      Objective    Vitals - Patient Reported  Weight (Patient Reported): 93.4 kg (206 lb)  Height (Patient Reported): 180.3 cm (5' 11\")  BMI (Based on Pt Reported Ht/Wt): 28.73      Vitals:  No vitals were obtained today due to virtual visit.    Physical Exam     GENERAL: alert, mild distress and fatigued  EYES: Eyes grossly normal to inspection.  No discharge or erythema, or obvious scleral/conjunctival abnormalities.  RESP: visually labored using accessory muscles. Able to speak in full sentences as long as cpap on.   SKIN: Visible skin clear. No significant rash, abnormal pigmentation or lesions.  NEURO: Cranial nerves grossly intact.  Mentation and speech appropriate for age.  PSYCH: Mentation appears normal, affect normal/bright, judgement and insight intact, normal speech and appearance well-groomed.          Assessment & Plan     Acute nonintractable headache, unspecified headache type  Virtual visit limitation, however, no obvious visual findings suggesting CVA. Reassured given course length as well. However, patient " is in obvious discomfort and also given shortness of breath and chest pains and inability to tolerate room air without cpap, covid vs ACS, vs pneumonia are all possible. Recommending ER visit urgently. Patient states wife will drive him within the hour. Will call and inform triage of this.     SOB (shortness of breath)             Return in about 3 months (around 1/15/2021) for medicare check.    Alberto Medley PA-C  [facility] [employer]      Video-Visit Details    Type of service:  Video Visit    Video End Time:11:24 AM    Originating Location (pt. Location): Home    Distant Location (provider location):  [facility] [place]     Platform used for Video Visit: LuWell

## 2020-10-15 NOTE — LETTER
October 15, 2020      To Whom It May Concern:      Andrea Uribe was seen in our Emergency Department today, 10/15/20.  I expect his condition to improve over the next 10/16/20 days.  He may return to work/school when improved.    Sincerely,        Ricki Rodriguez MD

## 2020-10-15 NOTE — ED TRIAGE NOTES
Headache, left ear pain and concern for pneumonia. Symptoms for the past three days. Also with jaw pain and intermittent cough. States he just got over a cold 'a couple weeks ago'.

## 2020-10-15 NOTE — LETTER
October 15, 2020      To Whom It May Concern:      Andrea Uribe was seen in our Emergency Department today, 10/15/20.  I expect his condition to improve over the next 2 days.  He may return to work when improved.    Sincerely,        Zulma BALLARD RN

## 2020-10-22 ENCOUNTER — ANCILLARY PROCEDURE (OUTPATIENT)
Dept: GENERAL RADIOLOGY | Facility: CLINIC | Age: 45
End: 2020-10-22
Attending: FAMILY MEDICINE
Payer: MEDICARE

## 2020-10-22 ENCOUNTER — OFFICE VISIT (OUTPATIENT)
Dept: URGENT CARE | Facility: URGENT CARE | Age: 45
End: 2020-10-22
Payer: MEDICARE

## 2020-10-22 VITALS — OXYGEN SATURATION: 95 % | HEART RATE: 86 BPM | TEMPERATURE: 100.1 F

## 2020-10-22 DIAGNOSIS — R50.9 FEVER IN ADULT: Primary | ICD-10-CM

## 2020-10-22 DIAGNOSIS — J98.01 BRONCHOSPASM: ICD-10-CM

## 2020-10-22 DIAGNOSIS — J01.00 ACUTE NON-RECURRENT MAXILLARY SINUSITIS: ICD-10-CM

## 2020-10-22 LAB
ALBUMIN UR-MCNC: 100 MG/DL
APPEARANCE UR: CLEAR
BACTERIA #/AREA URNS HPF: ABNORMAL /HPF
BASOPHILS # BLD AUTO: 0 10E9/L (ref 0–0.2)
BASOPHILS NFR BLD AUTO: 0.3 %
BILIRUB UR QL STRIP: NEGATIVE
COLOR UR AUTO: YELLOW
DEPRECATED S PYO AG THROAT QL EIA: NEGATIVE
DIFFERENTIAL METHOD BLD: ABNORMAL
EOSINOPHIL # BLD AUTO: 0.1 10E9/L (ref 0–0.7)
EOSINOPHIL NFR BLD AUTO: 1 %
ERYTHROCYTE [DISTWIDTH] IN BLOOD BY AUTOMATED COUNT: 14.6 % (ref 10–15)
GLUCOSE UR STRIP-MCNC: NEGATIVE MG/DL
HCT VFR BLD AUTO: 38.5 % (ref 40–53)
HETEROPH AB SER QL: NEGATIVE
HGB BLD-MCNC: 12.1 G/DL (ref 13.3–17.7)
HGB UR QL STRIP: ABNORMAL
KETONES UR STRIP-MCNC: NEGATIVE MG/DL
LEUKOCYTE ESTERASE UR QL STRIP: NEGATIVE
LYMPHOCYTES # BLD AUTO: 1.5 10E9/L (ref 0.8–5.3)
LYMPHOCYTES NFR BLD AUTO: 24.9 %
MCH RBC QN AUTO: 30.3 PG (ref 26.5–33)
MCHC RBC AUTO-ENTMCNC: 31.4 G/DL (ref 31.5–36.5)
MCV RBC AUTO: 97 FL (ref 78–100)
MONOCYTES # BLD AUTO: 1 10E9/L (ref 0–1.3)
MONOCYTES NFR BLD AUTO: 15.7 %
NEUTROPHILS # BLD AUTO: 3.5 10E9/L (ref 1.6–8.3)
NEUTROPHILS NFR BLD AUTO: 58.1 %
NITRATE UR QL: NEGATIVE
PH UR STRIP: 5.5 PH (ref 5–7)
PLATELET # BLD AUTO: 191 10E9/L (ref 150–450)
RBC # BLD AUTO: 3.99 10E12/L (ref 4.4–5.9)
RBC #/AREA URNS AUTO: ABNORMAL /HPF
SOURCE: ABNORMAL
SP GR UR STRIP: 1.02 (ref 1–1.03)
SPECIMEN SOURCE: NORMAL
UROBILINOGEN UR STRIP-ACNC: 0.2 EU/DL (ref 0.2–1)
WBC # BLD AUTO: 6.1 10E9/L (ref 4–11)
WBC #/AREA URNS AUTO: ABNORMAL /HPF

## 2020-10-22 PROCEDURE — 36415 COLL VENOUS BLD VENIPUNCTURE: CPT | Performed by: FAMILY MEDICINE

## 2020-10-22 PROCEDURE — 85025 COMPLETE CBC W/AUTO DIFF WBC: CPT | Performed by: FAMILY MEDICINE

## 2020-10-22 PROCEDURE — U0003 INFECTIOUS AGENT DETECTION BY NUCLEIC ACID (DNA OR RNA); SEVERE ACUTE RESPIRATORY SYNDROME CORONAVIRUS 2 (SARS-COV-2) (CORONAVIRUS DISEASE [COVID-19]), AMPLIFIED PROBE TECHNIQUE, MAKING USE OF HIGH THROUGHPUT TECHNOLOGIES AS DESCRIBED BY CMS-2020-01-R: HCPCS | Performed by: FAMILY MEDICINE

## 2020-10-22 PROCEDURE — 71046 X-RAY EXAM CHEST 2 VIEWS: CPT | Performed by: RADIOLOGY

## 2020-10-22 PROCEDURE — 86308 HETEROPHILE ANTIBODY SCREEN: CPT | Performed by: FAMILY MEDICINE

## 2020-10-22 PROCEDURE — 81001 URINALYSIS AUTO W/SCOPE: CPT | Performed by: FAMILY MEDICINE

## 2020-10-22 PROCEDURE — 99214 OFFICE O/P EST MOD 30 MIN: CPT | Performed by: FAMILY MEDICINE

## 2020-10-22 PROCEDURE — 87651 STREP A DNA AMP PROBE: CPT | Performed by: FAMILY MEDICINE

## 2020-10-22 RX ORDER — FLUTICASONE PROPIONATE 50 MCG
1-2 SPRAY, SUSPENSION (ML) NASAL DAILY
Qty: 16 G | Refills: 0 | Status: SHIPPED | OUTPATIENT
Start: 2020-10-22 | End: 2021-10-12

## 2020-10-22 NOTE — LETTER
[facility]  [address] [facility] [id number] [phone]      October 22, 2020    RE:  Andrea Uribe                                                                                                                                                       [address] [address] [address]            To whom it may concern:    Andrea Uribe is under my professional care for    Fever in adult  Acute non-recurrent maxillary sinusitis.   He  may return to work with the following:   Covid testing has been ordered.  If covid positive will need to quarantine at least 10 days and may return to work when symptoms resolve.            Sincerely,        Vanessa Bullard MD    [facility]

## 2020-10-23 LAB
SARS-COV-2 RNA SPEC QL NAA+PROBE: ABNORMAL
SPECIMEN SOURCE: ABNORMAL
SPECIMEN SOURCE: NORMAL
STREP GROUP A PCR: NOT DETECTED
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
## 2020-10-24 ENCOUNTER — NURSE TRIAGE (OUTPATIENT)
Dept: NURSING | Facility: CLINIC | Age: 45
End: 2020-10-24

## 2020-10-24 NOTE — TELEPHONE ENCOUNTER
"Coronavirus (COVID-19) Notification    Caller Name (Patient, parent, daughter/son, grandparent, etc)  Patient: Andrea    Reason for call  Notify of Positive Coronavirus (COVID-19) lab results, assess symptoms,  review  [facility] recommendations    Lab Result    Lab test:  2019-nCoV rRt-PCR or SARS-CoV-2 PCR    Oropharyngeal AND/OR nasopharyngeal swabs is POSITIVE for 2019-nCoV RNA/SARS-COV-2 PCR (COVID-19 virus)    RN Recommendations/Instructions per [facility] Coronavirus COVID-19 recommendations    Brief introduction script  Introduce self then review script:  \"I am calling on behalf of [employer].  We were notified that your Coronavirus test (COVID-19) for was POSITIVE for the virus.  I have some information to relay to you but first I wanted to mention that the [place] Dept of Health will be contacting you shortly [it's possible MD already called Patient] to talk to you more about how you are feeling and other people you have had contact with who might now also have the virus.  Also,  [facility] is Partnering with the [facility] for Covid-19 research, you may be contacted directly by research staff.\"    Assessment (Inquire about Patient's current symptoms)   Assessment   Current Symptoms at time of phone call: (if no symptoms, document No symptoms] Nausea, excess gas   Symptoms onset (if applicable)      If at time of call, Patients symptoms hare worsened, the Patient should contact 911 or have someone drive them to Emergency Dept promptly:      If Patient calling 911, inform 911 personal that you have tested positive for the Coronavirus (COVID-19).  Place mask on and await 911 to arrive.    If Emergency Dept, If possible, please have another adult drive you to the Emergency Dept but you need to wear mask when in contact with other people.      Review information with Patient    Your result was positive. This means you have COVID-19 (coronavirus).  We have sent you a letter that " reviews the information that I'll be reviewing with you now.    How can I protect others?    If you have symptoms: stay home and away from others (self-isolate) until:    You've had no fever--and no medicine that reduces fever--for 1 full day (24 hours). And       Your other symptoms have gotten better. For example, your cough or breathing has improved. And     At least 10 days have passed since your symptoms started. (If you've been told by a doctor that you have a weak immune system, wait 20 days.)     If you don't have symptoms: Stay home and away from others (self-isolate) until at least 10 days have passed since your first positive COVID-19 test. (Date test collected)    During this time:    Stay in your own room, including for meals. Use your own bathroom if you can.    Stay away from others in your home. No hugging, kissing or shaking hands. No visitors.     Don't go to work, school or anywhere else.     Clean  high touch  surfaces often (doorknobs, counters, handles, etc.). Use a household cleaning spray or wipes. You'll find a full list on the EPA website at www.epa.gov/pesticide-registration/list-n-disinfectants-use-against-sars-cov-2.     Cover your mouth and nose with a mask, tissue or other face covering to avoid spreading germs.    Wash your hands and face often with soap and water.    Caregivers in these groups are at risk for severe illness due to COVID-19:  o People 65 years and older  o People who live in a nursing home or long-term care facility  o People with chronic disease (lung, heart, cancer, diabetes, kidney, liver, immunologic)  o People who have a weakened immune system, including those who:  - Are in cancer treatment  - Take medicine that weakens the immune system, such as corticosteroids  - Had a bone marrow or organ transplant  - Have an immune deficiency  - Have poorly controlled HIV or AIDS  - Are obese (body mass index of 40 or higher)  - Smoke regularly    Caregivers should wear  gloves while washing dishes, handling laundry and cleaning bedrooms and bathrooms.    Wash and dry laundry with special caution. Don't shake dirty laundry, and use the warmest water setting you can.    If you have a weakened immune system, ask your doctor about other actions you should take.    For more tips, go to www.cdc.gov/coronavirus/2019-ncov/downloads/10Things.pdf.    You should not go back to work until you meet the guidelines above for ending your home isolation. You don't need to be retested for COVID-19 before going back to work--studies show that you won't spread the virus if it's been at least 10 days since your symptoms started (or 20 days, if you have a weak immune system).    Employers: This document serves as formal notice of your employee's medical guidelines for going back to work. They must meet the above guidelines before going back to work in person.    How can I take care of myself?    1. Get lots of rest. Drink extra fluids (unless a doctor has told you not to).    2. Take Tylenol (acetaminophen) for fever or pain. If you have liver or kidney problems, ask your family doctor if it's okay to take Tylenol.     Take either:     650 mg (two 325 mg pills) every 4 to 6 hours, or     1,000 mg (two 500 mg pills) every 8 hours as needed.     Note: Don't take more than 3,000 mg in one day. Acetaminophen is found in many medicines (both prescribed and over-the-counter medicines). Read all labels to be sure you don't take too much.    For children, check the Tylenol bottle for the right dose (based on their age or weight).    3. If you have other health problems (like cancer, heart failure, an organ transplant or severe kidney disease): Call your specialty clinic if you don't feel better in the next 2 days.    4. Know when to call 911: Emergency warning signs include:    Trouble breathing or shortness of breath    Pain or pressure in the chest that doesn't go away    Feeling confused like you haven't  felt before, or not being able to wake up    Bluish-colored lips or face    5. Sign up for [employer]. We know it's scary to hear that you have COVID-19. We want to track your symptoms to make sure you're okay over the next 2 weeks. Please look for an email from [employer]--this is a free, online program that we'll use to keep in touch. To sign up, follow the link in the email. Learn more at [email or web address].    Where can I get more information?    [facility]: [email or web address]    Coronavirus Basics: www.health.[facility].[place]/diseases/coronavirus/basics.html    What to Do If You're Sick: www.cdc.gov/coronavirus/2019-ncov/about/steps-when-sick.html    Ending Home Isolation: www.cdc.gov/coronavirus/2019-ncov/hcp/disposition-in-home-patients.html     Caring for Someone with COVID-19: www.cdc.gov/coronavirus/2019-ncov/if-you-are-sick/care-for-someone.html     [facility] clinical trials (COVID-19 research studies): [email or web address].[facility]-clinical-trials     A Positive COVID-19 letter will be sent via [employer] or the mail. (Exception, no letters sent to Presurgerical/Preprocedure Patients)      Reason for Disposition    Caller requesting lab results    Protocols used: PCP CALL - NO TRIAGE-A-    Millicent Modi RN on 10/24/2020 at 12:56 AM

## 2020-11-07 ENCOUNTER — HOSPITAL ENCOUNTER (EMERGENCY)
Facility: CLINIC | Age: 45
Discharge: HOME | End: 2020-11-07
Attending: EMERGENCY MEDICINE | Admitting: EMERGENCY MEDICINE
Payer: MEDICARE

## 2020-11-07 VITALS
BODY MASS INDEX: 32.29 KG/M2 | SYSTOLIC BLOOD PRESSURE: 114 MMHG | DIASTOLIC BLOOD PRESSURE: 83 MMHG | HEART RATE: 65 BPM | WEIGHT: 231.48 LBS | OXYGEN SATURATION: 99 % | RESPIRATION RATE: 18 BRPM | TEMPERATURE: 99 F

## 2020-11-07 DIAGNOSIS — R05.9 COUGH: ICD-10-CM

## 2020-11-07 DIAGNOSIS — Z86.16 HISTORY OF 2019 NOVEL CORONAVIRUS DISEASE (COVID-19): ICD-10-CM

## 2020-11-07 PROCEDURE — 99282 EMERGENCY DEPT VISIT SF MDM: CPT

## 2020-11-07 ASSESSMENT — ENCOUNTER SYMPTOMS
DIARRHEA: 1
FEVER: 0
NAUSEA: 0
SHORTNESS OF BREATH: 0
COUGH: 1

## 2020-11-07 NOTE — ED AVS SNAPSHOT
[facility] Emergency Dept  [address]  [facility] [address]  Phone: [phone]  Fax: [phone]                                    Andrea Uribe   MRN: [id number]    Department: [facility] Emergency Dept   Date of Visit: 11/7/2020           After Visit Summary Signature Page    I have received my discharge instructions, and my questions have been answered. I have discussed any challenges I see with this plan with the nurse or doctor.    ..........................................................................................................................................  Patient/Patient Representative Signature      ..........................................................................................................................................  Patient Representative Print Name and Relationship to Patient    ..................................................               ................................................  Date                                   Time    ..........................................................................................................................................  Reviewed by Signature/Title    ...................................................              ..............................................  Date                                               Time          22EPIC Rev 08/18

## 2020-11-07 NOTE — ED PROVIDER NOTES
History     Chief Complaint:  Cough    HPI   Andrea Uribe is a 45 year old male with a known history of CKD, hypertension and asthma who presents with a cough. The patient reports that he tested positive for COVID-19 two weeks ago and is still feeling the symptoms of persistent cough. He states that he feels a tickle in the back of his throat with a mostly cough that isn't going away. Along with this he has been experiencing intermittent diarrhea roughly 1-2 episodes of nonbloody stool daily. He denies any fever, nausea, vomiting, abdominal pain, chest pain or shortness of breath. Patient states that he feels the symptoms are supposed to be over after 2 weeks so he presents with his son today for evaluation.  He underwent a recent CXR he reports, see below:    10/22/20 Chest xray 2 view  FINDINGS: Left-sided stimulator device noted. Heart size and pulmonary  vascularity are within normal limits. The lungs are clear. No  pneumothorax or pleural effusion.                                                                    IMPRESSION: No radiographic evidence of acute chest abnormality.      GREG HEMPHILL MD    Allergies:  Iodine  Asa   Ibuprofen Sodium  Pollen Extract  Seasonal Allergies    Medications:    Albuterol  Vitamin B12  Colace  Flonase  Atarax  Keppra  Lisinopril  Oxycodone  Dilantin  Silvadene  Zocor  Effexor    Past Medical History:    CKD  CPAP  Hypertension  MRSA cellulitis  Obesity  Osteoarthritis  Ptosis, right eyelid  Renal insufficiency  Seizure disorder  TBI  Thyroid disease  Asthma  Major depression  Anxiety  Sleep apnea  GERD  Anemia  Physical deconditioning  Hyperlipidemia  Incisional hernia  Ataxic gait  Vitamin D deficiency  Vitamin B12 deficiency  Heterotopic ossification    Past Surgical History:    Appendectomy  Arthroplasty knee  Arthroplasty revision knee  Tracheostomy  EGD, combined  Excise mass trunk  Herniorrhaphy incisional  Implant stimulator vagus nerve  Laparoscopic  cholecystectomy  Laparotomy exploratory  Open reduction internal fixation femur distal  Removal of femur bone growth, hand surgery, knee surgery  Enlonging muscle  Remove hardware knee  Spleen surgery  Lonnie in left femur  Screws in right knee  IVC filter, parts removed    Family History:    Mother: Cardiovascular disease  Father: Colitis  Sister: Diabetes  Son: Chron's disease    Social History:  The patient was accompanied to the ED by his son.  Smoking Status: Never Smoker  Smokeless Tobacco: Never Used  Alcohol Use: Negative  Drug Use: Negative  PCP: Alberto Medley  Marital Status:      Review of Systems   Constitutional: Negative for fever.   Respiratory: Positive for cough. Negative for shortness of breath.    Cardiovascular: Negative for chest pain.   Gastrointestinal: Positive for diarrhea. Negative for nausea.   All other systems reviewed and are negative.      Physical Exam     Patient Vitals for the past 24 hrs:   BP Temp Temp src Pulse Resp SpO2 Weight   11/07/20 1418 114/83 99  F (37.2  C) Temporal 65 18 99 % 105 kg (231 lb 7.7 oz)       Physical Exam  Nursing note and vitals reviewed.  Constitutional: Well nourished. Resting comfortably.   Eyes: Conjunctiva normal.  Pupils are equal, round, and reactive to light.   ENT: Nose normal. Mucous membranes pink and moist.    Neck: Normal range of motion.  CVS: Normal rate, regular rhythm.  Normal heart sounds.  No murmur.  Pulmonary: Lungs clear to auscultation bilaterally. No wheezes/rales/rhonchi.  GI: Abdomen soft. Nontender, nondistended. No rigidity or guarding.    MSK: No calf tenderness or swelling.  Neuro: Alert. Follows simple commands.  Skin: Skin is warm and dry. No rash noted.   Psychiatric: Normal affect.       Emergency Department Course     Emergency Department Course:    Past medical records, nursing notes, and vitals reviewed.  1529: I performed an exam of the patient and obtained history, as documented above.     Findings and  plan explained to the Patient. Patient discharged home with instructions regarding supportive care, medications, and reasons to return. The importance of close follow-up was reviewed.     Impression & Plan    Covid-19  Andrea Uribe was evaluated during a global COVID-19 pandemic, which necessitated consideration that the patient might be at risk for infection with the SARS-CoV-2 virus that causes COVID-19.   Applicable protocols for evaluation were followed during the patient's care.   COVID-19 was considered as part of the patient's evaluation. The plan for testing is:  a test was obtained at a previous visit and reviewed & considered today.    Medical Decision Making:  Andrea Uribe is a 45 year old male who presents to the emergency department today for evaluation of complaint of persistent cough in setting of history of COVID-19.  Patient nontoxic on arrival, in no significant respiratory distress.  He denies any active chest pain, fevers, dyspnea or other concerning symptoms.  Patient with clear lungs on exam.  He is not septic appearing and ambulates without hypoxia.  He is well hydrated appearing.I offered patient formal blood work and repeat CXR though he is declining at this time.  I discussed the course of COVID19 and unfortunately patient's can report persistent symptoms beyond the 2 week point.    I discussed with patient he is ok to defer labs and imaging though recommended close outpatient f/u should his symptoms persist.  He is also welcome to represent to the ED at any point for reevaluation.   Diagnosis:    ICD-10-CM    1. Cough  R05    2. History of 2019 novel coronavirus disease (COVID-19)  Z86.19        Disposition:   The patient is discharged to home.    Scribe Disclosure:  PATEL, Mihai Becker, am serving as a scribe at 3:29 PM on 11/7/2020 to document services personally performed by Adriana Hankins DO based on my observations and the provider's statements to me.  [facility]  EMERGENCY DEPT         Adriana Hankins, DO  11/07/20 1557

## 2020-11-07 NOTE — LETTER
November 7, 2020      To Whom It May Concern:      Andrea Uribe was seen in our Emergency Department today, 11/07/20.  I expect his condition to improve over the next 1-2 days.  He may return to work/school when improved.    Sincerely,        HUBER Mcnair

## 2020-11-09 DIAGNOSIS — Z71.89 OTHER SPECIFIED COUNSELING: ICD-10-CM

## 2020-11-18 ENCOUNTER — OFFICE VISIT (OUTPATIENT)
Dept: URGENT CARE | Facility: URGENT CARE | Age: 45
End: 2020-11-18
Payer: MEDICARE

## 2020-11-18 VITALS
OXYGEN SATURATION: 98 % | RESPIRATION RATE: 20 BRPM | SYSTOLIC BLOOD PRESSURE: 110 MMHG | HEART RATE: 82 BPM | DIASTOLIC BLOOD PRESSURE: 70 MMHG | TEMPERATURE: 98.2 F

## 2020-11-18 DIAGNOSIS — M25.561 ACUTE PAIN OF RIGHT KNEE: Primary | ICD-10-CM

## 2020-11-18 PROCEDURE — 99213 OFFICE O/P EST LOW 20 MIN: CPT | Performed by: PHYSICIAN ASSISTANT

## 2020-11-18 ASSESSMENT — ENCOUNTER SYMPTOMS
ENDOCRINE NEGATIVE: 1
EYES NEGATIVE: 1
RESPIRATORY NEGATIVE: 1
CONSTITUTIONAL NEGATIVE: 1
CARDIOVASCULAR NEGATIVE: 1
BACK PAIN: 0
PSYCHIATRIC NEGATIVE: 1
GASTROINTESTINAL NEGATIVE: 1

## 2020-11-18 NOTE — LETTER
November 18, 2020      RE: Andrea Uribe  [address] [facility] [address]        To whom it may concern:    Andrea Uribe is under my professional care for Acute pain of right knee He  may return to work with the following: The employee is ABLE to return to work today.    When the patient returns to work, the following restrictions apply until: Indefinitely   A) Bend: Not at all (0 hours)  B) Squat: Not at all (0 hours)  C) Walk/Stand: Minimal walking (1 hour max intermittently)  D) Reach Above Shoulders: Frequently (4-8 hours)  E) Lift, carry, push, and pull no more than:  0-10 lbs.Light duty-unable to bend, stand, stoop or twist. Please give Andrea a sitting job.       Sincerely,      Ta Guzman PA-C
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
## 2020-11-19 NOTE — PATIENT INSTRUCTIONS
Patient Education     Knee Pain  Knee pain is very common. It s especially common in active people who put a lot of pressure on their knees, like runners. It affects women more often than men.  Your kneecap (patella) is a thick, round bone. It covers and protects the front portion of your knee joint. It moves along a groove in your thighbone (femur) as part of the patellofemoral joint. A layer of cartilage surrounds the underside of your kneecap. This layer protects it from grinding against your femur.  When this cartilage softens and breaks down, it can cause knee pain. This is partly because of repetitive stress. The stress irritates the lining of the joint. This causes pain in the underlying bone.  What causes knee pain?  Many things can cause knee pain. You may have more than one cause. Some of these include:    Overuse of the knee joint    The kneecap doesn t line up with the tissue around it    Damage to small nerves in the area    Damage to the ligament-like structure that holds the kneecap in place (retinaculum)    Breakdown of the bone under the cartilage    Swelling in the soft tissues around the kneecap    Injury  You might be more likely to have knee pain if you:    Exercise a lot    Recently increased the intensity of your workouts    Have a body mass index (BMI) greater than 25    Have poor alignment of your kneecap    Walk with your feet turned overly outward or inward    Have weakness in surrounding muscle groups (inner quad or hip adductor muscles)    Have too much tightness in surrounding muscle groups (hamstrings or iliotibial band)    Have a recent history of injury to the area    Are female  Symptoms of knee pain  This type of knee pain is a dull, aching pain in the front of the knee in the area under and around the kneecap. This pain may start quickly or slowly. Your pain might be worse when you squat, run, or sit for a long time. Climbing stairs may be painful or hard to do. You might also  sometimes feel like your knee is giving out. You may have symptoms in one or both of your knees.  Diagnosing knee pain  Your healthcare provider will ask about your medical history and your symptoms. Be sure to describe any activities that make your knee pain worse. He or she will look at your knee. This will include tests of your range of motion, strength, and areas of pain of your knee. Your knee alignment will be checked.  Your healthcare provider will need to rule out other causes of your knee pain, such as arthritis. You may need an imaging test, such as an X-ray or MRI.  Treatment for knee pain  Treatments that can help ease your symptoms may include:    Avoiding activities for a while that make your pain worse, returning to activity over time    Icing the outside of your knee when it causes you pain    Taking over-the-counter pain medicine such as NSAIDs    Wearing a knee brace or taping your knee to support it    Compression to help prevent swelling    Wearing special shoe inserts to help keep your feet in the proper alignment    Elevating your knee    Doing special exercises to stretch and strengthen the muscles around your hip and your knee  These steps help most people manage knee pain. But some cases of knee pain need to be treated with surgery. You rarely need surgery right away. You may need it later if other treatments don t work. Your healthcare provider may refer you to an orthopedic surgeon. He or she will talk with you about your choices.  Preventing knee pain  Losing weight and correcting excess muscle tightness or muscle weakness may help lower your risk.  In some cases, you can prevent knee pain. To help prevent a flare-up of knee pain, do these things:    Regularly do all the exercises your doctor or physical therapist advises    Warm up fully before exercising    Support your knee as advised by your doctor or physical therapist    Increase training gradually, and ease up on training when  needed    Have an expert check your gait for running or other sporting activities    Stretch properly before and after exercise    Replace your running shoes regularly    Lose excess weight  When to call your healthcare provider  Call your healthcare provider right away if:    Your symptoms don t get better after a few weeks of treatment    You have any new symptoms  Vidhya last reviewed this educational content on 6/1/2019 2000-2020 [employer]. [address] [place] [address]. All rights reserved. This information is not intended as a substitute for professional medical care. Always follow your healthcare professional's instructions.

## 2020-12-28 ENCOUNTER — PATIENT OUTREACH (OUTPATIENT)
Dept: FAMILY MEDICINE | Facility: CLINIC | Age: 45
End: 2020-12-28

## 2020-12-28 NOTE — TELEPHONE ENCOUNTER
Pt not available by phone. Reminder letter mailed to schedule fasting physical with PCP late January, 2021.   Marbin Danielle RN - Patient Advocate and Liaison (PAL) [phone]

## 2020-12-28 NOTE — LETTER
December 28, 2020        Andrea Uribe  [address] [facility] [address]        Dear Andrea,     This is a reminder to schedule a fasting physical on or after January 25, 2021. Please call me direct to schedule or if you have questions about this reminder.     Sincerely,        Alberto Medley PA-C/Marbin RN - Patient Advocate and Liaison (PAL) [phone]

## 2021-01-05 DIAGNOSIS — N18.30 CKD (CHRONIC KIDNEY DISEASE) STAGE 3, GFR 30-59 ML/MIN (H): ICD-10-CM

## 2021-01-05 RX ORDER — LISINOPRIL 5 MG/1
TABLET ORAL
Qty: 90 TABLET | Refills: 0 | Status: SHIPPED | OUTPATIENT
Start: 2021-01-05 | End: 2021-02-02

## 2021-02-01 DIAGNOSIS — N18.30 CKD (CHRONIC KIDNEY DISEASE) STAGE 3, GFR 30-59 ML/MIN (H): ICD-10-CM

## 2021-02-02 ENCOUNTER — TELEPHONE (OUTPATIENT)
Dept: FAMILY MEDICINE | Facility: CLINIC | Age: 46
End: 2021-02-02

## 2021-02-02 RX ORDER — LISINOPRIL 5 MG/1
TABLET ORAL
Qty: 90 TABLET | Refills: 0 | Status: SHIPPED | OUTPATIENT
Start: 2021-02-02 | End: 2021-05-17

## 2021-02-02 NOTE — TELEPHONE ENCOUNTER
Prescription approved per [facility].    Иван Bethea RN

## 2021-02-02 NOTE — TELEPHONE ENCOUNTER
Spouse Gabriella calls for recent and upcoming appointments dates for Social Security form. Past visit dates reviewed. Informed he is due for physical now. Scheduled 2/11/21.   Marbin Danielle RN - Patient Advocate and Liaison (PAL) [phone]

## 2021-03-04 DIAGNOSIS — F32.1 MODERATE MAJOR DEPRESSION (H): ICD-10-CM

## 2021-03-04 DIAGNOSIS — F41.9 ANXIETY: ICD-10-CM

## 2021-03-04 NOTE — LETTER
March 12, 2021      Andrea Urbie  [address] [facility] [address]        Dear Orestes Mendez sent a one month refill of venlafaxine to your pharmacy on March 6. This is a reminder to schedule an office visit with Orestes, or any provider, as soon as possible and before   April 5, 2021.      Marbin Cloud RN - Patient Advocate and Liaison (PAL) [phone] for Orestes Medley PA-C

## 2021-03-05 NOTE — TELEPHONE ENCOUNTER
Routing refill request to provider for review/approval because:  Arabella given x1 and patient did not follow up, please advise  Outdated PHQ9    Karlene Bond RN on 3/5/2021 at 2:53 PM

## 2021-03-06 RX ORDER — VENLAFAXINE HYDROCHLORIDE 150 MG/1
CAPSULE, EXTENDED RELEASE ORAL
Qty: 30 CAPSULE | Refills: 0 | Status: SHIPPED | OUTPATIENT
Start: 2021-03-06 | End: 2021-03-30

## 2021-03-07 NOTE — TELEPHONE ENCOUNTER
Refilled to allow follow up. Most recent visit was cancelled. Please schedule with me or extender.     -trae brewer, pac

## 2021-03-10 NOTE — TELEPHONE ENCOUNTER
Left message to call PAL RN back.  Marbin Danielle RN - Patient Advocate and Liaison (PAL) [phone]

## 2021-03-19 ENCOUNTER — DOCUMENTATION ONLY (OUTPATIENT)
Dept: FAMILY MEDICINE | Facility: CLINIC | Age: 46
End: 2021-03-19

## 2021-03-29 DIAGNOSIS — F32.1 MODERATE MAJOR DEPRESSION (H): ICD-10-CM

## 2021-03-29 DIAGNOSIS — F41.9 ANXIETY: ICD-10-CM

## 2021-03-30 RX ORDER — VENLAFAXINE HYDROCHLORIDE 150 MG/1
CAPSULE, EXTENDED RELEASE ORAL
Qty: 30 CAPSULE | Refills: 0 | Status: SHIPPED | OUTPATIENT
Start: 2021-03-30 | End: 2021-05-19

## 2021-03-30 NOTE — TELEPHONE ENCOUNTER
Routing refill request to provider for review/approval because:  Arabella given x1 and patient did not follow up, please advise  Outdated PHQ9    Karlene Bond RN on 3/30/2021 at 10:14 AM

## 2021-04-05 ENCOUNTER — NURSE TRIAGE (OUTPATIENT)
Dept: NURSING | Facility: CLINIC | Age: 46
End: 2021-04-05

## 2021-04-05 NOTE — TELEPHONE ENCOUNTER
Triage call:   Vomited twice yesterday after [employer] dinner yesterday and twice this morning  States that he feels nauseated this morning    States that he is not able to drink water, has been drinking lemonade- states that this is his fluid of choice  States that he has some pain in his back- hurts when he bends over, but otherwise no pain  States he had some diarrhea yesterday and today- 1 diarrheal stool yesterday and one today  States that he has had no sick contacts that he knows of- states that he went to his fathers house for [employer] yesterday and started to feel sick yesterday  States that his mouth is always dry- states that he last urinated this morning around 4 am  States that he had an orange/yellow color to the vomit- had carrots at [employer]  States that he had a headache yesterday and the day before- similar to previous headaches  History of head injury as a teenager    Advised to continue home care at this time and reviewed additional care advice and when to call back. Patient verbalizes understanding and declines additional questions.     Rosalba Bethea RN BSN 4/5/2021 7:04 AM    COVID 19 Nurse Triage Plan/Patient Instructions    Please be aware that novel coronavirus (COVID-19) may be circulating in the community. If you develop symptoms such as fever, cough, or SOB or if you have concerns about the presence of another infection including coronavirus (COVID-19), please contact your health care provider or visit [email or web address].     Disposition/Instructions    Home care recommended. Follow home care protocol based instructions.    Thank you for taking steps to prevent the spread of this virus.  o Limit your contact with others.  o Wear a simple mask to cover your cough.  o Wash your hands well and often.    Resources    [facility]: About COVID-19: [email or web address]    CDC: What to Do If You're Sick: www.cdc.gov/coronavirus/2019-ncov/about/steps-when-sick.html    CDC:  "Ending Home Isolation: www.cdc.gov/coronavirus/2019-ncov/hcp/disposition-in-home-patients.html     CDC: Caring for Someone: www.cdc.gov/coronavirus/2019-ncov/if-you-are-sick/care-for-someone.html     [facility]: Interim Guidance for Hospital Discharge to Home: www.health.[facility].[place]/diseases/coronavirus/hcp/hospdischarge.pdf    [facility] clinical trials (COVID-19 research studies): [email or web address]     Below are the COVID-19 hotlines at the [place] Department of [facility] ([facility]). Interpreters are available.   o For health questions: Call 271-475-8183 or 1-388.197.9648 (7 a.m. to 7 p.m.)  o For questions about schools and childcare: Call 228-218-3260 or 1-447.274.8912 (7 a.m. to 7 p.m.)         Additional Information    Negative: Shock suspected (e.g., cold/pale/clammy skin, too weak to stand, low BP, rapid pulse)    Negative: Difficult to awaken or acting confused (e.g., disoriented, slurred speech)    Negative: Sounds like a life-threatening emergency to the triager    Negative: Vomiting occurs only while coughing    Negative: [1] Pregnant < 20 Weeks AND [2] nausea/vomiting began in early pregnancy (i.e., 4-8 weeks pregnant)    Negative: Chest pain    Negative: Headache is main symptom    Negative: Vomiting (or Nausea) in a cancer patient who is currently (or recently) receiving chemotherapy or radiation therapy, or cancer patient who has metastatic or end-stage cancer and is receiving palliative care    Negative: [1] Vomiting AND [2] contains red blood or black (\"coffee ground\") material  (Exception: few red streaks in vomit that only happened once)    Negative: Severe pain in one eye    Negative: Recent head injury (within last 3 days)    Negative: Recent abdominal injury (within last 3 days)    Negative: [1] Insulin-dependent diabetes (Type I) AND [2] glucose > 400 mg/dl (22 mmol/l)    Negative: [1] SEVERE vomiting (e.g., 6 or more times/day) AND [2] present > 8 hours    Negative: " [1] MODERATE vomiting (e.g., 3 - 5 times/day) AND [2] age > 60    Negative: Severe headache (e.g., excruciating) (Exception: similar to previous migraines)    Negative: High-risk adult (e.g., diabetes mellitus, brain tumor, V-P shunt, hernia)    Negative: [1] Drinking very little AND [2] dehydration suspected (e.g., no urine > 12 hours, very dry mouth, very lightheaded)    Negative: Patient sounds very sick or weak to the triager    Negative: [1] Vomiting AND [2] abdomen looks much more swollen than usual    Negative: [1] Vomiting AND [2] contains bile (green color)    Negative: [1] Constant abdominal pain AND [2] present > 2 hours    Negative: [1] Fever > 103 F (39.4 C) AND [2] not able to get the fever down using Fever Care Advice    Negative: [1] Fever > 101 F (38.3 C) AND [2] age > 60    Negative: [1] Fever > 100.0 F (37.8 C) AND [2] bedridden (e.g., nursing home patient, CVA, chronic illness, recovering from surgery)    Negative: [1] Fever > 100.0 F (37.8 C) AND [2] weak immune system (e.g., HIV positive, cancer chemo, splenectomy, organ transplant, chronic steroids)    Negative: Taking any of the following medications: digoxin (Lanoxin), lithium, theophylline, phenytoin (Dilantin)    Negative: [1] MILD or MODERATE vomiting AND [2] present > 48 hours (2 days) (Exception: mild vomiting with associated diarrhea)    Negative: Fever present > 3 days (72 hours)    Negative: Vomiting a prescription medication    Negative: [1] MILD vomiting with diarrhea AND [2] present > 5 days    Negative: Alcohol or drug abuse, known or suspected    Negative: Vomiting is a chronic symptom (recurrent or ongoing AND present > 4 weeks)    Negative: [1] SEVERE vomiting (e.g., 6 or more times/day, vomits everything) BUT [2] hydrated    Negative: MILD or MODERATE vomiting (e.g., 1 - 5 times / day)    MILD vomiting with diarrhea    Protocols used: VOMITING-A-

## 2021-04-07 ENCOUNTER — OFFICE VISIT (OUTPATIENT)
Dept: URGENT CARE | Facility: URGENT CARE | Age: 46
End: 2021-04-07
Payer: MEDICARE

## 2021-04-07 VITALS
DIASTOLIC BLOOD PRESSURE: 64 MMHG | HEART RATE: 66 BPM | BODY MASS INDEX: 30.94 KG/M2 | RESPIRATION RATE: 20 BRPM | SYSTOLIC BLOOD PRESSURE: 104 MMHG | OXYGEN SATURATION: 100 % | TEMPERATURE: 97.7 F | WEIGHT: 221 LBS | HEIGHT: 71 IN

## 2021-04-07 DIAGNOSIS — G44.209 TENSION HEADACHE: ICD-10-CM

## 2021-04-07 DIAGNOSIS — Z20.822 SUSPECTED COVID-19 VIRUS INFECTION: Primary | ICD-10-CM

## 2021-04-07 DIAGNOSIS — R50.9 FEVER AND CHILLS: ICD-10-CM

## 2021-04-07 PROCEDURE — 99214 OFFICE O/P EST MOD 30 MIN: CPT | Performed by: FAMILY MEDICINE

## 2021-04-07 PROCEDURE — U0003 INFECTIOUS AGENT DETECTION BY NUCLEIC ACID (DNA OR RNA); SEVERE ACUTE RESPIRATORY SYNDROME CORONAVIRUS 2 (SARS-COV-2) (CORONAVIRUS DISEASE [COVID-19]), AMPLIFIED PROBE TECHNIQUE, MAKING USE OF HIGH THROUGHPUT TECHNOLOGIES AS DESCRIBED BY CMS-2020-01-R: HCPCS | Performed by: FAMILY MEDICINE

## 2021-04-07 PROCEDURE — U0005 INFEC AGEN DETEC AMPLI PROBE: HCPCS | Performed by: FAMILY MEDICINE

## 2021-04-07 ASSESSMENT — MIFFLIN-ST. JEOR: SCORE: 1904.58

## 2021-04-07 NOTE — PATIENT INSTRUCTIONS
Given your exposure at work and your symptoms right now, it is likely you have COVID.  Your test may come back negative, but it would likely be a false negative.  You should stay isolated until Thursday April 15 even if your test comes back negative.    If your symptoms get really bad really fast, go to the ER.  If you can't drink enough fluid to keep hydrated, you should go to the ER.  If you have chest pain, trouble breathing, or a really bad headache that doesn't get better with Tylenol, you should go to the ER.    Otherwise, rest a lot and drink plenty of fluids.  Use over-the-counter pain medications if needed.

## 2021-04-07 NOTE — LETTER
[facility]  [address]  [facility] [address]  [phone]      April 7, 2021    RE:  Andrea Uribe                                                                                                                                                       To whom it may concern:    Andrea Uribe was seen in urgent care [facility] and has symptoms consistent with possible COVID infection.  He had a phone visit for these symptoms on Monday, April 5, 2021 as well.  Please excuse him from work until Thursday, April 15, 2021.  If he has not recovered from this illness by that date, his return to work may need to be delayed.      Sincerely,        Nga Boss MD    [facility]

## 2021-04-07 NOTE — LETTER
[facility]  [address]  [facility] [address]  [phone]      April 7, 2021    To whom it may concern:    Gabriella Uribe has been exposed to a person with possible COVID.  She needs to stay isolated until this person's test results are available, likely on Friday, April 9.  Please excuse her absence from work to avoid spreading this highly-contagious illness.    Sincerely,        Nga Boss MD    [facility]

## 2021-04-07 NOTE — PROGRESS NOTES
Assessment & Plan     ICD-10-CM    1. Suspected COVID-19 virus infection  Z20.822    2. Tension headache  G44.209    3. Fever and chills  R50.9 Symptomatic COVID-19 Virus (Coronavirus) by PCR     Given exposure at work last week and constellation of symptoms starting on Monday 4/5, I strongly suspect that this patient has COVID.  COVID swab pending here tonight, but given known exposure and consistent symptoms, I would not trust a negative test.    No evidence of pneumonia or other respiratory compromise on exam tonight.  Vitals stable.  No evidence of any dehydration due to diarrhea.  Continue to push fluids.    Patient Instructions   Given your exposure at work and your symptoms right now, it is likely you have COVID.  Your test may come back negative, but it would likely be a false negative.  You should stay isolated until Thursday April 15 even if your test comes back negative.    If your symptoms get really bad really fast, go to the ER.  If you can't drink enough fluid to keep hydrated, you should go to the ER.  If you have chest pain, trouble breathing, or a really bad headache that doesn't get better with Tylenol, you should go to the ER.    Otherwise, rest a lot and drink plenty of fluids.  Use over-the-counter pain medications if needed.      Return in about 1 week (around 4/14/2021) for If symptoms worsen or fail to improve.    Nga Boss MD  [facility] [place]    Alek Mendez is a 45 year old who presents for vomiting and a few loose stools for 3 days.  He has felt very fatigued as well.  Had low-grade fever on Monday that went down with Tylenol, none since.  Noticed new itchy rash on R arm on Monday.  Had LUQ abdominal pain yesterday, but this is much better today.  Decreased appetite but still taking fluids well.  No dysuria or hematuria.    Pt found out this week that he was exposed to COVID at work last week.    PMH significant for asthma, stage 2 kidney disease,  "obesity, TBI, and seizure disorder.        Objective    /64   Pulse 66   Temp 97.7  F (36.5  C) (Oral)   Resp 20   Ht 1.803 m (5' 11\")   Wt 100.2 kg (221 lb)   SpO2 100%   BMI 30.82 kg/m    Body mass index is 30.82 kg/m .  Physical Exam  Vitals signs and nursing note reviewed.   Constitutional:       General: He is not in acute distress.  HENT:      Right Ear: Tympanic membrane, ear canal and external ear normal.      Left Ear: Tympanic membrane, ear canal and external ear normal.      Mouth/Throat:      Mouth: Mucous membranes are moist.      Pharynx: No oropharyngeal exudate.      Comments: There is cobblestoning of posterior pharynx.    Neck:      Musculoskeletal: Neck supple.   Cardiovascular:      Rate and Rhythm: Normal rate and regular rhythm.      Heart sounds: No murmur.   Pulmonary:      Effort: Pulmonary effort is normal. No respiratory distress.      Breath sounds: Normal breath sounds.   Abdominal:      General: Bowel sounds are normal. There is no distension.      Palpations: Abdomen is soft.      Tenderness: There is no abdominal tenderness.   Lymphadenopathy:      Cervical: No cervical adenopathy.   Skin:     Findings: Rash present.      Comments: R arm with blotchy erythematous macules and patches scattered.  No vesicles.   Neurological:      Mental Status: He is alert.                "

## 2021-04-08 LAB
SARS-COV-2 RNA RESP QL NAA+PROBE: NORMAL
SPECIMEN SOURCE: NORMAL

## 2021-04-09 LAB
LABORATORY COMMENT REPORT: NORMAL
SARS-COV-2 RNA RESP QL NAA+PROBE: NEGATIVE
SPECIMEN SOURCE: NORMAL

## 2021-04-25 DIAGNOSIS — F41.9 ANXIETY: ICD-10-CM

## 2021-04-25 DIAGNOSIS — F32.1 MODERATE MAJOR DEPRESSION (H): ICD-10-CM

## 2021-04-25 NOTE — LETTER
[facility]  [address] [place], [address]  [phone]        May 10, 2021    Andrea Uribe                                                                                                                                                       [address] [facility] [address]            Dear Andrea,      We recently received a call from your pharmacy requesting a refill of effexor.     A review of your chart indicates that an appointment is required with your provider for wellness visit and medication check. Please call the clinic at [phone] to schedule your appointment.       Taking care of your health is important to us and ongoing visits with your provider are vital to your care. We look forward to seeing you in the near future.         Sincerely,     [facility]

## 2021-04-26 NOTE — TELEPHONE ENCOUNTER
Has been contacted multiple times in the past. Please schedule medicare wellness and med check. If cannot get a hold of him, will deny refill.     -trae brewer, pac

## 2021-04-26 NOTE — TELEPHONE ENCOUNTER
Has had multiple aarti refills    Routing refill request to provider for review/approval because:  Patient needs to be seen because:  Failing visit   Failing PHQ9    Bea Dutton RN

## 2021-05-10 ENCOUNTER — OFFICE VISIT (OUTPATIENT)
Dept: FAMILY MEDICINE | Facility: CLINIC | Age: 46
End: 2021-05-10
Payer: MEDICARE

## 2021-05-10 VITALS
DIASTOLIC BLOOD PRESSURE: 78 MMHG | RESPIRATION RATE: 14 BRPM | OXYGEN SATURATION: 97 % | HEART RATE: 68 BPM | HEIGHT: 71 IN | BODY MASS INDEX: 31.78 KG/M2 | SYSTOLIC BLOOD PRESSURE: 138 MMHG | TEMPERATURE: 98.4 F | WEIGHT: 227 LBS

## 2021-05-10 DIAGNOSIS — A05.9 FOOD POISONING: ICD-10-CM

## 2021-05-10 DIAGNOSIS — R11.2 INTRACTABLE VOMITING WITH NAUSEA, UNSPECIFIED VOMITING TYPE: ICD-10-CM

## 2021-05-10 DIAGNOSIS — R19.7 DIARRHEA OF PRESUMED INFECTIOUS ORIGIN: ICD-10-CM

## 2021-05-10 PROCEDURE — 99214 OFFICE O/P EST MOD 30 MIN: CPT | Performed by: FAMILY MEDICINE

## 2021-05-10 RX ORDER — ONDANSETRON 4 MG/1
4 TABLET, ORALLY DISINTEGRATING ORAL ONCE
Status: COMPLETED | OUTPATIENT
Start: 2021-05-10 | End: 2021-05-10

## 2021-05-10 RX ORDER — ONDANSETRON 4 MG/1
4 TABLET, ORALLY DISINTEGRATING ORAL EVERY 8 HOURS PRN
Qty: 6 TABLET | Refills: 0 | Status: SHIPPED | OUTPATIENT
Start: 2021-05-10 | End: 2021-05-12

## 2021-05-10 RX ORDER — VENLAFAXINE HYDROCHLORIDE 150 MG/1
CAPSULE, EXTENDED RELEASE ORAL
Qty: 30 CAPSULE | Refills: 0 | OUTPATIENT
Start: 2021-05-10

## 2021-05-10 RX ADMIN — ONDANSETRON 4 MG: 4 TABLET, ORALLY DISINTEGRATING ORAL at 15:25

## 2021-05-10 ASSESSMENT — PATIENT HEALTH QUESTIONNAIRE - PHQ9: SUM OF ALL RESPONSES TO PHQ QUESTIONS 1-9: 0

## 2021-05-10 ASSESSMENT — MIFFLIN-ST. JEOR: SCORE: 1931.8

## 2021-05-10 ASSESSMENT — ENCOUNTER SYMPTOMS: DIARRHEA: 1

## 2021-05-10 NOTE — PATIENT INSTRUCTIONS
I will call you regarding your lab results. Try to drink sport drinks, or Pedialyte which kids with diarrhea and vomiting drink. Try ginger for nausea relief, then use the ondansetron as needed.     Patient Education     Cancer Care: Controlling Nausea and Vomiting  Nausea and vomiting are common side effects of chemotherapy and radiation therapy. Side effects occur when treatment changes some normal cells as well as cancer cells. In this case, it affects the cells lining your stomach. And it affects the part of your brain that controls vomiting.   Nausea is feeling that you need to throw up. Vomiting is when you do throw up. This is when your body forces food that is in your stomach out through your mouth.   Nausea and vomiting are common. They can be caused by many things, such as:     Stomach virus    Food poisoning    Stomach pain    Blockages in the digestive system    Constipation    Infection    Anxiety and stress  Other common causes are:    Head injury    Infection in the brain or inside the ear    Migraines    Brain tumor    Brain bruise or injury    Motion sickness    Alcohol    Pain medicines such as morphine    Certain treatments, such as chemotherapy and radiation therapy    Poisonous things (toxins), such as plants or liquids that you swallow by accident    Advanced types of cancer    Movement problems    Extra pressure in the fluid that surrounds the brain and the spinal cord  Sometimes belly pain and cramps happen along with nausea and vomiting. The symptoms can be mild and go away on their own. Other times they can be severe. They may need to be treated.   Nausea and vomiting with cancer  Nausea and vomiting can happen before, during, or after cancer treatments. But it can be controlled. Don t think it is a normal part of cancer and cancer treatment. If not treated, it can become serious. It can change the fluid and chemical balance in your body. It could even keep you from getting cancer  treatment. Call your healthcare provider right away if any of these occur:     You have nausea or vomiting that lasts 24 hours or more.    You can t take your antiemetics or they are not working. These medicines help ease or stop nausea or vomiting.    You have trouble keeping fluids down.    You become dizzy, lightheaded, or confused.    You have very dark urine or you stop urinating.  Talk with your healthcare provider about your treatment and the best way to handle any nausea and vomiting. Be sure you know how and when to use antiemetics. Also know when to call your healthcare provider.   Medicines can help  Nausea or vomiting can often be treated with medicines called antiemetics. You may take these before or after your cancer treatment. You may have to try different kinds or combinations of these medicines to feel better. But in most cases, nausea and vomiting can be eased.      Taken before meals, medicines can help ease nausea.      Eating tips    If you have medicines to control nausea, take them before meals as directed.    Don't eat fatty or greasy foods if you have nausea.    Eat small meals throughout the day.    Ask someone to sit with you while you eat. This can help to keep you from thinking about nausea.    Eat foods at room temperature or colder to limit strong smells.    Eat dry foods, such as toast, crackers, or pretzels. Also eat cool, light foods, such as applesauce. [place] foods, such as oatmeal or skinned chicken, are good, too.    Try to keep taking in clear fluids in small sips, or as ice chips, gelatin, or ice pops.    Other ways to feel better    Get a little fresh air. Take a short walk.    Talk to a friend, listen to music, or watch TV.    Take a few deep, slow breaths.    Eat by candlelight or in surroundings that you find relaxing.    Use a method to help you relax, such as guided imagery. Imagine yourself in a beautiful, restful scene. Or daydream about the place you d most like to  osmani Kee last reviewed this educational content on 5/1/2019 2000-2021 [employer]. All rights reserved. This information is not intended as a substitute for professional medical care. Always follow your healthcare professional's instructions.           Patient Education     Diarrhea with Uncertain Cause (Adult)    Diarrhea is when stools are loose and watery. This can be caused by:    Viral infections    Bacterial infections    Food poisoning    Parasites    Irritable bowel syndrome (IBS)    Inflammatory bowel diseases such as ulcerative colitis, Crohn's disease, and celiac disease    Food intolerance, such as to lactose, the sugar found in milk and milk products    Reaction to medicines like antibiotics, laxatives, cancer drugs, and antacids  Along with diarrhea, you may also have:    Abdominal pain and cramping    Nausea and vomiting    Loss of bowel control    Fever and chills    Bloody stools  In some cases, antibiotics may help to treat diarrhea. You may have a stool sample test. This is done to see what is causing your diarrhea, and if antibiotics will help treat it. The results of a stool sample test may take up to 2 days. The healthcare provider may not give you antibiotics until he or she has the stool test results.  Diarrhea can cause dehydration. This is the loss of too much water and other fluids from the body. When this occurs, body fluid must be replaced. This can be done with oral rehydration solutions. Oral rehydration solutions are available at drugstores and grocery stores without a prescription. Sports drinks are not the best choice if you are very dehydrated. They have too much sugar and not enough electrolytes.  Home care  Follow all instructions given by your healthcare provider. Rest at home for the next 24 hours, or until you feel better. Avoid caffeine, tobacco, and alcohol. These can make diarrhea, cramping, and pain worse.  If taking medicines:    Over-the-counter  nausea and diarrhea medicines are generally OK unless you experience fever or blood stool. Check with your doctor first in those circumstances.    You may use acetaminophen or NSAID medicines like ibuprofen or naproxen to reduce pain and fever. Don t use these if you have chronic liver or kidney disease, or ever had a stomach ulcer or gastrointestinal bleeding. Don't use NSAID medicines if you are already taking one for another condition (like arthritis) or are on daily aspirin therapy (such as for heart disease or after a stroke). Talk with your healthcare provider first.    If antibiotics were prescribed, be sure you take them until they are finished. Don t stop taking them even when you feel better. Antibiotics must be taken as a full course.  To prevent the spread of illness:    Remember that washing with soap and water and using alcohol-based  is the best way to prevent the spread of infection. Dry your hands with a single use towel (like a paper towel).    Clean the toilet after each use.    Wash your hands before eating.    Wash your hands before and after preparing food. Keep in mind that people with diarrhea or vomiting should not prepare food for others.    Wash your hands after using cutting boards, countertops, and knives that have been in contact with raw foods.    Wash and then peel fruits and vegetables.    Keep uncooked meats away from cooked and ready-to-eat foods.    Use a food thermometer when cooking. Cook poultry to at least 165 F (74 C). Cook ground meat (beef, veal, pork, lamb) to at least 160 F (71 C). Cook fresh beef, veal, lamb, and pork to at least 145 F (63 C).    Don t eat raw or undercooked eggs (poached or allen side up), poultry, meat, or unpasteurized milk and juices.  Food and drinks  The main goal while treating vomiting or diarrhea is to prevent dehydration. This is done by taking small amounts of liquids often.    Keep in mind that liquids are more important than food  right now.    Drink only small amounts of liquids at a time.    Don t force yourself to eat, especially if you are having cramping, vomiting, or diarrhea. Don t eat large amounts at a time, even if you are hungry.    If you eat, avoid fatty, greasy, spicy, or fried foods.    Don t eat dairy foods or drink milk if you have diarrhea. These can make diarrhea worse.  During the first 24 hours you can try:    Oral rehydration solutions.  Sports drinks may be used if you are not too dehydrated and are otherwise healthy.    Soft drinks without caffeine    Ginger ale    Water (plain or flavored)    Decaf tea or coffee    Clear broth, consommé, or bouillon    Gelatin, popsicles, or frozen fruit juice bars  The second 24 hours, if you are feeling better, you can add:    Hot cereal, plain toast, bread, rolls, or crackers    Plain noodles, rice, mashed potatoes, chicken noodle soup, or rice soup    Unsweetened canned fruit (no pineapple)    Bananas  As you recover:    Limit fat intake to less than 15 grams per day. Don t eat margarine, butter, oils, mayonnaise, sauces, gravies, fried foods, peanut butter, meat, poultry, or fish.    Limit fiber. Don t eat raw or cooked vegetables, fresh fruits except bananas, or bran cereals.    Limit caffeine and chocolate.    Limit dairy.    Don t use spices or seasonings except salt.    Go back to your normal diet over time, as you feel better and your symptoms improve.    If the symptoms come back, go back to a simple diet or clear liquids.  Follow-up care  Follow up with your healthcare provider, or as advised. If a stool sample was taken or cultures were done, call the healthcare provider for the results as instructed.  Call 911  Call 911 if you have any of these symptoms:    Trouble breathing    Confusion    Extreme drowsiness or trouble walking    Loss of consciousness    Rapid heart rate    Chest pain    Stiff neck    Seizure  When to seek medical advice  Call your healthcare provider  right away if any of these occur:    Abdominal pain that gets worse    Constant lower right abdominal pain    Continued vomiting and inability to keep liquids down    Diarrhea more than 5 times a day    Blood in vomit or stool    Dark urine or no urine for 8 hours, dry mouth and tongue, tiredness, weakness, or dizziness    Drowsiness    New rash    You don t get better in 2 to 3 days    Fever of 100.4 F (38 C) or higher, or as directed by your healthcare provider  Vidhya last reviewed this educational content on 6/1/2018 2000-2021 [employer]. All rights reserved. This information is not intended as a substitute for professional medical care. Always follow your healthcare professional's instructions.           Patient Education     Food Poisoning or Viral Gastroenteritis (Adult)  You have a stomach illness that is likely either food poisoning or viral gastroenteritis.  Food poisoning is illness that is passed along in food and affects the stomach and intestinal tract. It usually occurs from 1 to 24 hours after eating food that has spoiled. When it happens within a few hours of eating, it is often caused by toxins from bacteria in food that has not been cooked or refrigerated properly.  Viral gastroenteritis is an illness from a virus that also affects the stomach and intestinal tract. Many people call it the  stomach flu,  but it has nothing to do with influenza. In fact, it can happen from food poisoning, but it can also happen when germs are passed from person-to-person or contaminated surface (toothbrush, cutting board, toilet) to a person.  Either illness can cause these symptoms:    Abdominal pain and cramping    Nausea    Vomiting    Diarrhea    Fever and chills    Loss of bowel control  The symptoms of food poisoning usually last 1 to 2 days. The symptoms of viral gastroenteritis can sometimes last up to 7 days but usually end sooner.  Antibiotics are not effective for either  illness.  Other causes of gastroenteritis include bacteria and parasites which are not discussed here.  Home care  Follow all instructions given by your healthcare provider. Rest at home for the next 24 hours, or until you feel better. Avoid caffeine, tobacco, and alcohol. These can make diarrhea, cramping, and pain worse.  If taking medicines:    Over-the-counter diarrhea or nausea medicines are generally OK unless you have bleeding, fever, or severe abdominal pain.    You may use acetaminophen or NSAID medicines like ibuprofen or naproxen to reduce pain and fever. Don t use these if you have chronic liver or kidney disease, or ever had a stomach ulcer or gastrointestinal bleeding. Talk with your healthcare provider first. Don't use NSAID medicines if you are already taking one for another condition (like arthritis) or are on daily aspirin therapy (such as for heart disease or after a stroke).  To prevent the spread of illness:    Remember that washing with soap and water is the best way to prevent the spread of infection. Wash your hands before and after caring for a sick person. Dry your hands with a single use towel.    Clean the toilet after each use.    Wash your hands or use alcohol-based hand  before eating.    Wash your hands or use alcohol-based hand  before and after preparing food. Keep in mind that people with diarrhea or vomiting should not prepare food for others.    Wash your hands or use alcohol-based hand  after using cutting boards, counter-tops, and knives (and other utensils) that have been in contact with raw foods.    Wash and then peel fruits and vegetables.    Keep uncooked meats away from cooked and ready-to-eat foods.    Use a food thermometer when cooking. Cook poultry to at least 165 F (74 C). Cook ground meat (beef, veal, pork, lamb) to at least 160 F (71 C). Cook fresh beef, veal, lamb, and pork to at least 145 F (63 C).    Don t eat raw or undercooked eggs  (poached or allen side up), poultry, meat or unpasteurized milk and juices.  Food and drinks  The main goal while treating vomiting or diarrhea is to prevent dehydration. This is done by taking small amounts of liquids often.    Keep in mind that liquids are more important than food right now.    Drink only small amounts of liquids at a time.    Don t force yourself to eat, especially if you are having cramping, vomiting, or diarrhea. Don t eat large amounts at a time, even if you are hungry.    If you eat, avoid fatty, greasy, spicy, or fried foods.    Don t eat dairy foods or drink milk if you have diarrhea. These can make diarrhea worse.  The first 24 hours you can try:    Oral rehydration solutions, available at grocery stores and pharmacies. Sports drinks are not a good choice if you are very dehydrated. They have too much sugar and not enough electrolytes.    Soft drinks without caffeine    Ginger ale    Water (plain or flavored)    Decaf tea or coffee    Clear broth, consommé, or bouillon    Gelatin, ice pops, or frozen fruit juice bars   The second 24 hours, if you are feeling better, you can add:    Hot cereal, plain toast, bread, rolls, or crackers    Plain noodles, rice, mashed potatoes, chicken noodle soup, or rice soup    Unsweetened canned fruit (no pineapple)    Bananas  As you recover:    Limit fat intake to less than 15 grams per day. Don t eat margarine, butter, oils, mayonnaise, sauces, gravies, fried foods, peanut butter, meat, poultry, or fish.    Limit fiber. Don t eat raw or cooked vegetables, fresh fruits except bananas, and bran cereals.    Limit caffeine and chocolate.    Don t use spices or seasonings except salt.    Resume a normal diet over time, as you feel better and your symptoms improve.    If the symptoms come back, go back to a simple diet or clear liquids.  Follow-up care  Follow up with your healthcare provider, or as advised. If a stool sample was taken or cultures were done,  call the healthcare provider for the results as instructed.  Call 911  Call 911 if you have any of these symptoms:    Trouble breathing    Confusion    Extreme drowsiness or trouble walking    Loss of consciousness    Rapid heart rate    Chest pain    Stiff neck    Seizure  When to seek medical advice  Call your healthcare provider right away if any of these occur:    Abdominal pain that gets worse    Constant lower right abdominal pain    Continued vomiting and inability to keep liquids down    Diarrhea more than 5 times a day    Blood in vomit or stool    Dark urine or no urine for 8 hours, dry mouth and tongue, tiredness, weakness, or dizziness    New rash    You don t get better in 2 to 3 days    Fever of 100.4 F (38 C) or higher, or as directed by your healthcare provider    You have new symptoms of arthritis  Vidhya last reviewed this educational content on 6/1/2018 2000-2021 [employer]. All rights reserved. This information is not intended as a substitute for professional medical care. Always follow your healthcare professional's instructions.

## 2021-05-10 NOTE — PROGRESS NOTES
"    Assessment & Plan     Food poisoning  Presumed source of infection is restaurant food also eaten by spouse of patient at party attended by patient and his spouse.    Diarrhea of presumed infectious origin    - Enteric Bacteria and Virus Panel by [facility]  - Ova and Parasite Exam Routine  - Clostridium difficile Toxin B PCR  - CBC with platelets and differential  - Comprehensive metabolic panel (BMP + Alb, Alk Phos, ALT, AST, Total. Bili, TP)    Intractable vomiting with nausea, unspecified vomiting type    - ondansetron (ZOFRAN-ODT) ODT tab 4 mg  - ondansetron (ZOFRAN-ODT) 4 MG ODT tab  Dispense: 6 tablet; Refill: 0  - CBC with platelets and differential  - Comprehensive metabolic panel (BMP + Alb, Alk Phos, ALT, AST, Total. Bili, TP)        30 minutes spent on the date of the encounter doing chart review, history and exam, documentation and further activities per the note       BMI:   Estimated body mass index is 31.66 kg/m  as calculated from the following:    Height as of this encounter: 1.803 m (5' 11\").    Weight as of this encounter: 103 kg (227 lb).           Return in about 3 days (around 5/13/2021), or if symptoms worsen or fail to improve.    Juan J Joiner DO  [facility] is a 45 year old who presents for the following health issues     Diarrhea    History of Present Illness           Diarrhea  Onset/Duration: 2 days  Description:       Consistency of stool: watery, runny and loose       Blood in stool: no       Number of loose stools past 24 hours: 22  Progression of Symptoms: improving  Accompanying signs and symptoms:       Fever: no       Nausea/Vomiting: YES       Abdominal pain: YES       Weight loss: no       Episodes of constipation: no  History   Ill contacts: no  Recent use of antibiotics: no  Recent travels: no  Recent medication-new or changes(Rx or OTC): no  Precipitating or alleviating factors: None  Therapies tried and outcome: " "nothing        Review of Systems   Gastrointestinal: Positive for diarrhea.      Patient is seen for chief concern of nausea and vomiting, and diarrhea.    Patient states that over the past 24 hours, he has had at least 20 watery BMs. He has vomited about 12 times soon after trying to eat or drink anything.     His wife is ill with the same symptoms.     Patient and wife both ate [employer] sandwiches at a high school graduation party they both attended shortly before they became ill.     He feels chills, no fevers.         Objective    /78 (Cuff Size: Adult Regular)   Pulse 68   Temp 98.4  F (36.9  C) (Oral)   Resp 14   Ht 1.803 m (5' 11\")   Wt 103 kg (227 lb)   SpO2 97%   BMI 31.66 kg/m    Body mass index is 31.66 kg/m .  Physical Exam   Vital signs reviewed.  Patient is in acute appearing distress due to nausea, and reported rectal discomfort from frequent diarrhea.  Breathing appears nonlabored.  Patient is alert and oriented ×3.  Patient is very pleasant, making good eye contact and responding with clear fluent speech.  He prefer not having to get up and down from exam room chair and table due to generalized abdominal discomfort, though he is able to ambulate using his wheeled walker.    At exam, I noted a small amount of liquid vomit in patient's emesis bag he had been given by support staff prior to my exam.    Eyes: No scleral icterus.    Heart: Heart rate is regular without murmur.  Lungs: Lungs are clear to auscultation with good airflow bilaterally.    Abdominal exam done with patient sitting in chair: Patient has generalized abdominal tenderness with no guarding or rigidity noted.    Skin: Warm and dry.    After patient was given ondansetron in clinic, he did not note any change in his symptoms of nausea and abdominal discomfort, but he also did not have any further vomiting over approximately 20 minutes.    I walked patient to lab to have studies done.  I later noted to patient had not had any " blood drawn.  I suspect he left the clinic prior to samples being drawn.

## 2021-05-10 NOTE — LETTER
May 10, 2021      Andrea Uribe  [address] [facility] [id number]        To Whom It May Concern:    Andrea Uribe  was seen on 05/10/2021.  Please excuse him through 05/14/2021 due to illness.        Sincerely,        Juan J Joiner, DO

## 2021-05-10 NOTE — PROGRESS NOTES
Clinic Administered Medication Documentation    Oral Medication Documentation    Patient was given Ondansetron (Zofran) . Prior to medication administration, verified patients identity using patient s name and date of birth. Please see MAR and medication order for additional information.     Was entire amount of medication used? Yes  Expiration Date: 09/2022      BROCK Caruso

## 2021-05-11 ENCOUNTER — TELEPHONE (OUTPATIENT)
Dept: FAMILY MEDICINE | Facility: CLINIC | Age: 46
End: 2021-05-11

## 2021-05-11 ASSESSMENT — ASTHMA QUESTIONNAIRES: ACT_TOTALSCORE: 25

## 2021-05-15 DIAGNOSIS — N18.30 CKD (CHRONIC KIDNEY DISEASE) STAGE 3, GFR 30-59 ML/MIN (H): ICD-10-CM

## 2021-05-17 RX ORDER — LISINOPRIL 5 MG/1
TABLET ORAL
Qty: 90 TABLET | Refills: 0 | Status: SHIPPED | OUTPATIENT
Start: 2021-05-17 | End: 2021-10-12

## 2021-05-17 NOTE — TELEPHONE ENCOUNTER
Prescription approved per [facility] Refill Protocol.    Karlene Bond RN on 5/17/2021 at 10:19 AM

## 2021-05-19 ENCOUNTER — TELEPHONE (OUTPATIENT)
Dept: FAMILY MEDICINE | Facility: CLINIC | Age: 46
End: 2021-05-19

## 2021-05-19 DIAGNOSIS — F32.1 MODERATE MAJOR DEPRESSION (H): ICD-10-CM

## 2021-05-19 DIAGNOSIS — F41.9 ANXIETY: ICD-10-CM

## 2021-05-19 RX ORDER — VENLAFAXINE HYDROCHLORIDE 150 MG/1
CAPSULE, EXTENDED RELEASE ORAL
Qty: 30 CAPSULE | Refills: 0 | Status: CANCELLED | OUTPATIENT
Start: 2021-05-19

## 2021-05-19 RX ORDER — VENLAFAXINE HYDROCHLORIDE 150 MG/1
CAPSULE, EXTENDED RELEASE ORAL
Qty: 30 CAPSULE | Refills: 0 | Status: SHIPPED | OUTPATIENT
Start: 2021-05-19 | End: 2021-06-10

## 2021-05-19 NOTE — TELEPHONE ENCOUNTER
Pt and wife calls, pt needs venlafaxine extension, has appointment scheduled  Prescription approved per [facility] Refill Protocol.    Next 5 appointments (look out 90 days)    May 27, 2021  2:35 PM  (Arrive by 2:10 PM)  Adult Preventative Visit with Alberto Medley PA-C  [facility] ([facility] ) [address] [place] [id number] [phone]        Yuko Hernandez RN, BSN  Message handled by CLINIC NURSE.

## 2021-05-30 ENCOUNTER — ANCILLARY PROCEDURE (OUTPATIENT)
Dept: GENERAL RADIOLOGY | Facility: CLINIC | Age: 46
End: 2021-05-30
Attending: FAMILY MEDICINE
Payer: MEDICARE

## 2021-05-30 ENCOUNTER — OFFICE VISIT (OUTPATIENT)
Dept: URGENT CARE | Facility: URGENT CARE | Age: 46
End: 2021-05-30
Payer: MEDICARE

## 2021-05-30 VITALS
DIASTOLIC BLOOD PRESSURE: 72 MMHG | OXYGEN SATURATION: 100 % | WEIGHT: 227 LBS | RESPIRATION RATE: 16 BRPM | SYSTOLIC BLOOD PRESSURE: 124 MMHG | BODY MASS INDEX: 31.78 KG/M2 | HEIGHT: 71 IN | TEMPERATURE: 98.2 F | HEART RATE: 70 BPM

## 2021-05-30 DIAGNOSIS — M25.511 ACUTE PAIN OF RIGHT SHOULDER: ICD-10-CM

## 2021-05-30 DIAGNOSIS — M85.861 OSTEOPENIA OF RIGHT LOWER LEG: ICD-10-CM

## 2021-05-30 DIAGNOSIS — M25.561 ACUTE PAIN OF RIGHT KNEE: ICD-10-CM

## 2021-05-30 DIAGNOSIS — E55.9 HYPOVITAMINOSIS D: ICD-10-CM

## 2021-05-30 DIAGNOSIS — M75.101 ROTATOR CUFF SYNDROME, RIGHT: ICD-10-CM

## 2021-05-30 DIAGNOSIS — M25.561 ACUTE PAIN OF RIGHT KNEE: Primary | ICD-10-CM

## 2021-05-30 PROCEDURE — 82306 VITAMIN D 25 HYDROXY: CPT | Performed by: FAMILY MEDICINE

## 2021-05-30 PROCEDURE — 36415 COLL VENOUS BLD VENIPUNCTURE: CPT | Performed by: FAMILY MEDICINE

## 2021-05-30 PROCEDURE — 73560 X-RAY EXAM OF KNEE 1 OR 2: CPT | Mod: RT | Performed by: RADIOLOGY

## 2021-05-30 PROCEDURE — 99214 OFFICE O/P EST MOD 30 MIN: CPT | Performed by: FAMILY MEDICINE

## 2021-05-30 PROCEDURE — 73030 X-RAY EXAM OF SHOULDER: CPT | Mod: RT | Performed by: RADIOLOGY

## 2021-05-30 RX ORDER — HYDROCODONE BITARTRATE AND ACETAMINOPHEN 5; 325 MG/1; MG/1
1 TABLET ORAL EVERY 6 HOURS PRN
Qty: 10 TABLET | Refills: 0 | Status: SHIPPED | OUTPATIENT
Start: 2021-05-30 | End: 2021-06-02

## 2021-05-30 RX ORDER — ERGOCALCIFEROL 1.25 MG/1
50000 CAPSULE, LIQUID FILLED ORAL WEEKLY
Qty: 6 CAPSULE | Refills: 0 | Status: SHIPPED | OUTPATIENT
Start: 2021-05-30 | End: 2021-10-12

## 2021-05-30 RX ORDER — CALCIUM CARBONATE 500 MG/1
1 TABLET, CHEWABLE ORAL DAILY
Qty: 30 TABLET | Refills: 0 | Status: SHIPPED | OUTPATIENT
Start: 2021-05-30 | End: 2021-10-12

## 2021-05-30 ASSESSMENT — ENCOUNTER SYMPTOMS
NUMBNESS: 0
ARTHRALGIAS: 1
FEVER: 0
WOUND: 0

## 2021-05-30 ASSESSMENT — MIFFLIN-ST. JEOR: SCORE: 1931.8

## 2021-05-30 NOTE — PROGRESS NOTES
Assessment & Plan     Acute pain of right knee  No recent trauma, x-rays performed today without evidence of acute fractures.  Start Norco for acute pain.  Recommend follow-up with his PCP.  - HYDROcodone-acetaminophen (NORCO) 5-325 MG tablet  Dispense: 10 tablet; Refill: 0    Rotator cuff syndrome, right  No inciting injury or event.  Also consider possible adhesive capsulitis.  He has some reproducible tenderness of the right AC joint however x-rays without evidence of acute fractures or dislocations around that region.  He does have some significant pain with abduction, I recommend he starts outpatient physical therapy.  If not improving, consider MRI or referral to sports medicine.  - XR Shoulder Right G/E 3 Views  - HYDROcodone-acetaminophen (NORCO) 5-325 MG tablet  Dispense: 10 tablet; Refill: 0  - [facility] REFERR[facility]    Hypovitaminosis D  Patient has some severe hypovitaminosis D with a mild hypercalcemia.  Notably his right knee x-ray does show some surrounding osteopenia around the hardware replacement recommend he starts high-dose vitamin D2, 50,000 units weekly x6 weeks, given his concomitant acid reflux, he can start some calcium carbonate.  Recommend further follow-up with his PCP.  - vitamin D2 (ERGOCALCIFEROL) 37277 units (1250 mcg) capsule  Dispense: 6 capsule; Refill: 0  - calcium carbonate (TUMS) 500 MG chewable tablet  Dispense: 30 tablet; Refill: 0  - Vitamin D Deficiency    Osteopenia of right lower leg  - vitamin D2 (ERGOCALCIFEROL) 14129 units (1250 mcg) capsule  Dispense: 6 capsule; Refill: 0  - calcium carbonate (TUMS) 500 MG chewable tablet  Dispense: 30 tablet; Refill: 0        Return in about 1 week (around 6/6/2021) for follow up with PCP .    Naveen Campbell MD  [facility] 45 year [facility] who presents for the following health issues     HPI     Right shoulder pain, worse with outwards motion. No known injury or event.  History  "of sports injury to that shoulder as a child. Started 9 days ago. No numbness or tingling.    Patient has right knee pain that started around the same time.  Again, no inciting injury or event.  Ambulates normally with a walker.  Patient is worried that the hardware in his knee is loose.      Review of Systems   Constitutional: Negative for fever.   Musculoskeletal: Positive for arthralgias.   Skin: Negative for wound.   Neurological: Negative for numbness.            Objective    /72   Pulse 70   Temp 98.2  F (36.8  C) (Oral)   Resp 16   Ht 1.803 m (5' 11\")   Wt 103 kg (227 lb)   SpO2 100%   BMI 31.66 kg/m    Body mass index is 31.66 kg/m .  Physical Exam  Musculoskeletal:      Comments: Unable to elevate past 90 degrees right shoulder abduction with passive or active range of motion due to pain.    Right knee, no edema, effusion or ecchymosis.  Full range of motion.  Nontender.  Negative provocation.                  XR: IMPRESSION: Normal glenohumeral alignment. The acromioclavicular joint  is unremarkable. No fractures are evident. Findings are stable.     XR: IMPRESSION:  TKA with patellar resurfacing, with long stem femoral and  tibial components. Osseous remodeling in the distal femur and proximal  tibia. Osteopenia. No lucency adjacent to the components. No fractures  are evident. Findings are stable.                     "

## 2021-05-30 NOTE — LETTER
[facility]  [address] [address]  Phone: [phone]  Fax: [phone]    05/30/21    Andrea Urbie  [address] [address]      To whom it may concern:     Off work     Sincerely, 5-, and 6-1-2021 due to medical condition.      Naveen Campbell MD

## 2021-05-30 NOTE — PATIENT INSTRUCTIONS
Patient Education     Understanding Rotator Cuff Tendonitis    The rotator cuff is a group of 4 muscles and tendons in the shoulder. Tendons are tough tissues that connect muscles to bone. The 4 muscles and their tendons form a  cuff  around the head of the upper arm bone. The rotator cuff connects the upper arm to the shoulder blade. It keeps the shoulder joint stable and gives it strength. It also helps the shoulder joint with certain movements. These include reaching the arm over the head and rotating the arm.  If tendons are injured or strained, they may get irritated and swollen (inflamed). This is called tendonitis. Rotator cuff tendonitis may cause shoulder pain. It may make it hard to move your shoulder.  What causes rotator cuff tendonitis?  When the rotator cuff tendons are injured or overworked it causes tendonitis. The most common cause of injury is repetitive overhead activities. These can be work-related activities such as reaching, pushing, or lifting. Or they can be sports-related activities such as throwing, swimming, or lifting weights.  Symptoms of rotator cuff tendonitis  Pain on the side of the upper arm at the shoulder is the most common symptom. Pain may get worse with overhead movements. Or it can get worse when you raise the arm above shoulder level. It may also hurt to lie on the shoulder at night.  Treatment for rotator cuff tendonitis  Treatment may include:    Active rest. This lets the rotator cuff heal. Active rest means using your arm and shoulder, but not doing activities that cause pain. These might be reaching overhead or sleeping on the shoulder.    Cold packs. Putting ice packs on the shoulder helps reduce swelling and ease pain.    Medicines. Prescription or over-the-counter medicines can help ease pain and swelling. NSAIDs (nonsteroidal anti-inflammatory drugs) are the most common medicines used. They may be taken as pills. Or they may be put on the skin as a gel, cream, or  patch.    Arm and shoulder exercises. These help keep the shoulder joint moving as it heals. They also help improve the strength of muscles around the joint.  Possible complications  You may be tempted to stop using your shoulder completely to prevent pain. But doing so may lead to a condition called frozen shoulder. To help prevent this, follow instructions you are given for active rest and for doing exercises to help your shoulder heal.  When to call your healthcare provider  Call your healthcare provider right away if you have any of these:    Fever of 100.4 F (38 C) or higher, or as advised by your healthcare provider    Chills    Symptoms that don t get better, or get worse    New symptoms  Vidhya last reviewed this educational content on 6/1/2019 2000-2021 [employer]. All rights reserved. This information is not intended as a substitute for professional medical care. Always follow your healthcare professional's instructions.

## 2021-05-31 ENCOUNTER — NURSE TRIAGE (OUTPATIENT)
Dept: NURSING | Facility: CLINIC | Age: 46
End: 2021-05-31
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
## 2021-06-01 ENCOUNTER — OFFICE VISIT (OUTPATIENT)
Dept: FAMILY MEDICINE | Facility: CLINIC | Age: 46
End: 2021-06-01
Payer: MEDICARE

## 2021-06-01 VITALS
OXYGEN SATURATION: 96 % | DIASTOLIC BLOOD PRESSURE: 72 MMHG | WEIGHT: 227 LBS | TEMPERATURE: 99.1 F | RESPIRATION RATE: 16 BRPM | HEART RATE: 77 BPM | SYSTOLIC BLOOD PRESSURE: 122 MMHG | BODY MASS INDEX: 31.66 KG/M2

## 2021-06-01 DIAGNOSIS — E78.5 HYPERLIPIDEMIA LDL GOAL <100: ICD-10-CM

## 2021-06-01 DIAGNOSIS — M25.561 ACUTE PAIN OF RIGHT KNEE: ICD-10-CM

## 2021-06-01 DIAGNOSIS — M85.861 OSTEOPENIA OF RIGHT LOWER LEG: Primary | ICD-10-CM

## 2021-06-01 DIAGNOSIS — N18.2 CKD (CHRONIC KIDNEY DISEASE) STAGE 2, GFR 60-89 ML/MIN: ICD-10-CM

## 2021-06-01 DIAGNOSIS — E55.9 VITAMIN D DEFICIENCY: ICD-10-CM

## 2021-06-01 LAB
ALBUMIN SERPL-MCNC: 3.5 G/DL (ref 3.4–5)
ALP SERPL-CCNC: 125 U/L (ref 40–150)
ALT SERPL W P-5'-P-CCNC: 19 U/L (ref 0–70)
ANION GAP SERPL CALCULATED.3IONS-SCNC: 1 MMOL/L (ref 3–14)
AST SERPL W P-5'-P-CCNC: 15 U/L (ref 0–45)
BASOPHILS # BLD AUTO: 0 10E9/L (ref 0–0.2)
BASOPHILS NFR BLD AUTO: 0.4 %
BILIRUB SERPL-MCNC: 0.2 MG/DL (ref 0.2–1.3)
BUN SERPL-MCNC: 15 MG/DL (ref 7–30)
CALCIUM SERPL-MCNC: 8.8 MG/DL (ref 8.5–10.1)
CHLORIDE SERPL-SCNC: 106 MMOL/L (ref 94–109)
CHOLEST SERPL-MCNC: 213 MG/DL
CO2 SERPL-SCNC: 34 MMOL/L (ref 20–32)
CREAT SERPL-MCNC: 1.01 MG/DL (ref 0.66–1.25)
DEPRECATED CALCIDIOL+CALCIFEROL SERPL-MC: 12 UG/L (ref 20–75)
DIFFERENTIAL METHOD BLD: ABNORMAL
EOSINOPHIL # BLD AUTO: 0.1 10E9/L (ref 0–0.7)
EOSINOPHIL NFR BLD AUTO: 2 %
ERYTHROCYTE [DISTWIDTH] IN BLOOD BY AUTOMATED COUNT: 13.9 % (ref 10–15)
GFR SERPL CREATININE-BSD FRML MDRD: 88 ML/MIN/{1.73_M2}
GLUCOSE SERPL-MCNC: 97 MG/DL (ref 70–99)
HCT VFR BLD AUTO: 39 % (ref 40–53)
HDLC SERPL-MCNC: 80 MG/DL
HGB BLD-MCNC: 12.6 G/DL (ref 13.3–17.7)
LDLC SERPL CALC-MCNC: 101 MG/DL
LYMPHOCYTES # BLD AUTO: 1.7 10E9/L (ref 0.8–5.3)
LYMPHOCYTES NFR BLD AUTO: 24.2 %
MCH RBC QN AUTO: 31 PG (ref 26.5–33)
MCHC RBC AUTO-ENTMCNC: 32.3 G/DL (ref 31.5–36.5)
MCV RBC AUTO: 96 FL (ref 78–100)
MONOCYTES # BLD AUTO: 0.5 10E9/L (ref 0–1.3)
MONOCYTES NFR BLD AUTO: 7.2 %
NEUTROPHILS # BLD AUTO: 4.6 10E9/L (ref 1.6–8.3)
NEUTROPHILS NFR BLD AUTO: 66.2 %
NONHDLC SERPL-MCNC: 133 MG/DL
PLATELET # BLD AUTO: 188 10E9/L (ref 150–450)
POTASSIUM SERPL-SCNC: 4.1 MMOL/L (ref 3.4–5.3)
PROT SERPL-MCNC: 7.8 G/DL (ref 6.8–8.8)
RBC # BLD AUTO: 4.06 10E12/L (ref 4.4–5.9)
SODIUM SERPL-SCNC: 141 MMOL/L (ref 133–144)
TRIGL SERPL-MCNC: 161 MG/DL
WBC # BLD AUTO: 7 10E9/L (ref 4–11)

## 2021-06-01 PROCEDURE — 80061 LIPID PANEL: CPT | Performed by: PHYSICIAN ASSISTANT

## 2021-06-01 PROCEDURE — 99214 OFFICE O/P EST MOD 30 MIN: CPT | Performed by: PHYSICIAN ASSISTANT

## 2021-06-01 PROCEDURE — 36415 COLL VENOUS BLD VENIPUNCTURE: CPT | Performed by: PHYSICIAN ASSISTANT

## 2021-06-01 PROCEDURE — 85025 COMPLETE CBC W/AUTO DIFF WBC: CPT | Performed by: PHYSICIAN ASSISTANT

## 2021-06-01 PROCEDURE — 82043 UR ALBUMIN QUANTITATIVE: CPT | Performed by: PHYSICIAN ASSISTANT

## 2021-06-01 PROCEDURE — 80053 COMPREHEN METABOLIC PANEL: CPT | Performed by: PHYSICIAN ASSISTANT

## 2021-06-01 RX ORDER — AZD1222 50000000000 {VP}/.5ML
0.5 INJECTION, SUSPENSION INTRAMUSCULAR
Status: CANCELLED | OUTPATIENT
Start: 2021-06-01

## 2021-06-01 RX ORDER — CALCIUM CARBONATE 500(1250)
1 TABLET ORAL 2 TIMES DAILY
Qty: 180 TABLET | Refills: 3 | Status: SHIPPED | OUTPATIENT
Start: 2021-06-01 | End: 2023-06-22

## 2021-06-01 NOTE — LETTER
June 2, 2021      Andrea Uribe  [address] [facility] [address]        Dear ,    We are writing to inform you of your test results.    Albumin and cholesterol are both improved.     Resulted Orders   Lipid panel reflex to direct LDL Fasting   Result Value Ref Range    Cholesterol 213 (H) <200 mg/dL      Comment:      Desirable:       <200 mg/dl    Triglycerides 161 (H) <150 mg/dL      Comment:      Borderline high:  150-199 mg/dl  High:             200-499 mg/dl  Very high:       >499 mg/dl  Fasting specimen      HDL Cholesterol 80 >39 mg/dL    LDL Cholesterol Calculated 101 (H) <100 mg/dL      Comment:      Above desirable:  100-129 mg/dl  Borderline High:  130-159 mg/dL  High:             160-189 mg/dL  Very high:       >189 mg/dl      Non HDL Cholesterol 133 (H) <130 mg/dL      Comment:      Above Desirable:  130-159 mg/dl  Borderline high:  160-189 mg/dl  High:             190-219 mg/dl  Very high:       >219 mg/dl     Albumin Random Urine Quantitative with Creat Ratio   Result Value Ref Range    Creatinine Urine 176 mg/dL    Albumin Urine mg/L 522 mg/L    Albumin Urine mg/g Cr 296.59 (H) 0 - 17 mg/g Cr   Comprehensive metabolic panel (BMP + Alb, Alk Phos, ALT, AST, Total. Bili, TP)   Result Value Ref Range    Sodium 141 133 - 144 mmol/L    Potassium 4.1 3.4 - 5.3 mmol/L    Chloride 106 94 - 109 mmol/L    Carbon Dioxide 34 (H) 20 - 32 mmol/L    Anion Gap 1 (L) 3 - 14 mmol/L    Glucose 97 70 - 99 mg/dL      Comment:      Fasting specimen    Urea Nitrogen 15 7 - 30 mg/dL    Creatinine 1.01 0.66 - 1.25 mg/dL    GFR Estimate 88 >60 mL/min/[1.73_m2]      Comment:      Non  GFR Calc  Starting 12/18/2018, serum creatinine based estimated GFR (eGFR) will be   calculated using the Chronic Kidney Disease Epidemiology Collaboration   (CKD-EPI) equation.      GFR Estimate If Black >90 >60 mL/min/[1.73_m2]      Comment:       GFR Calc  Starting 12/18/2018, serum creatinine  based estimated GFR (eGFR) will be   calculated using the Chronic Kidney Disease Epidemiology Collaboration   (CKD-EPI) equation.      Calcium 8.8 8.5 - 10.1 mg/dL    Bilirubin Total 0.2 0.2 - 1.3 mg/dL    Albumin 3.5 3.4 - 5.0 g/dL    Protein Total 7.8 6.8 - 8.8 g/dL    Alkaline Phosphatase 125 40 - 150 U/L    ALT 19 0 - 70 U/L    AST 15 0 - 45 U/L   CBC with platelets and differential   Result Value Ref Range    WBC 7.0 4.0 - 11.0 10e9/L    RBC Count 4.06 (L) 4.4 - 5.9 10e12/L      Comment:      Results confirmed by repeat test    Hemoglobin 12.6 (L) 13.3 - 17.7 g/dL      Comment:      Results confirmed by repeat test    Hematocrit 39.0 (L) 40.0 - 53.0 %      Comment:      Results confirmed by repeat test    MCV 96 78 - 100 fl    MCH 31.0 26.5 - 33.0 pg    MCHC 32.3 31.5 - 36.5 g/dL    RDW 13.9 10.0 - 15.0 %    Platelet Count 188 150 - 450 10e9/L    % Neutrophils 66.2 %    % Lymphocytes 24.2 %    % Monocytes 7.2 %    % Eosinophils 2.0 %    % Basophils 0.4 %    Absolute Neutrophil 4.6 1.6 - 8.3 10e9/L    Absolute Lymphocytes 1.7 0.8 - 5.3 10e9/L    Absolute Monocytes 0.5 0.0 - 1.3 10e9/L    Absolute Eosinophils 0.1 0.0 - 0.7 10e9/L    Absolute Basophils 0.0 0.0 - 0.2 10e9/L    Diff Method Automated Method        If you have any questions or concerns, please call the clinic at the number listed above.       Sincerely,      Ministerio Pavon PA-C/AL

## 2021-06-01 NOTE — PROGRESS NOTES
Pre-Visit Planning   Next 5 appointments (look out 90 days)    Jun 01, 2021  2:00 PM  (Arrive by 1:40 PM)  Office Visit with Ministerio Pavon PA-C  [facility] ) [address] [place] [id number] [phone]        Appointment Notes for this encounter:   right leg pain     Questionnaires Reviewed/Assigned DAYAMI-7, PHQ-9    SCRIPTING IF PT ANSWERS Hi, my name is CARLITOS Huddleston RN and I am calling on behalf of your provider's office at [facility].  I am calling to confirm and prep your upcoming appointment on TUE 6/1/21 @ 1400 ([id number]) w/Ministerio Pavon PA-C for right leg pain. Are there any additional questions or concerns you'd like to review with your provider during your visit? no        Patient preferred phone number: [phone]    Spoke to patient via phone. Are there any additional questions or concerns you'd like to review with your provider during your visit? No    Patient does not have additional questions or concerns.        Visit is not preventive.    Meds  Is there anything on your medication list that needs to be updated? No    Current Outpatient Medications   Medication     albuterol (PROAIR HFA/PROVENTIL HFA/VENTOLIN HFA) 108 (90 Base) MCG/ACT inhaler     bacitracin 500 UNIT/GM external ointment     calcium carbonate (TUMS) 500 MG chewable tablet     Carbamide Peroxide (EAR DROPS OT)     cyanocobalamin (VITAMIN B-12) 1000 MCG tablet     cyanocobalamin (VITAMIN B12) 1000 MCG/ML injection     docusate sodium (COLACE) 100 MG capsule     fluticasone (FLONASE) 50 MCG/ACT nasal spray     fluticasone (FLONASE) 50 MCG/ACT nasal spray     HYDROcodone-acetaminophen (NORCO) 5-325 MG tablet     hydrOXYzine (ATARAX) 25 MG tablet     KEPPRA 1000 MG TABS     lisinopril (ZESTRIL) 5 MG tablet     order for DME     phenytoin (DILANTIN) 100 MG capsule     phenytoin (DILANTIN) 100 MG capsule     silver sulfADIAZINE (SILVADENE)  "1 % external cream     simvastatin (ZOCOR) 20 MG tablet     venlafaxine (EFFEXOR-XR) 150 MG 24 hr capsule     vitamin D2 (ERGOCALCIFEROL) 58263 units (1250 mcg) capsule     No current facility-administered medications for this visit.      Which pharmacy do you prefer to use for medications during this visit if needed? [id number] - [place], [place] - [address] [address]     Do you need refills on any of your medications? No    Health Maintenance Due   Topic Date Due     COVID-19 Vaccine (1) Never done     MEDICARE ANNUAL WELLNESS VISIT  Never done     ASTHMA ACTION PLAN  02/06/2020     LIPID  01/08/2021     MICROALBUMIN  01/08/2021     Patient is due for:    Patient declined appointment.    [employer]  Patient is active on [employer].    Questionnaire Review   Advised patient to arrive early in order to complete questionnaires.    Call Summary  \"Thank you for your time today.  If anything comes up before your appointment, please feel free to contact us at [phone].\"Inna Guzman, Registered Nurse, PAL (Patient Advocate Liaison) [facility]     [phone]       "

## 2021-06-01 NOTE — LETTER
June 1, 2021      Andrea Uribe  [address] [facility] [address]        To Whom It May Concern:    Andrea Uribe was seen on 6/1/2021.  Please excuse him until 6/7/21 due to injury. He may return to work after specialist appointment or per their instructions.         Sincerely,        Ministerio Pavon PA-C

## 2021-06-01 NOTE — PROGRESS NOTES
Assessment & Plan     Osteopenia of right lower leg    Start calcium supplement as he is unable to take tums (makes him nauseous and vomit).    - DX Hip/Pelvis/Spine; Future  - calcium carbonate (OS-LAURA) 500 MG tablet; Take 1 tablet (500 mg) by mouth 2 times daily      Acute pain of right knee    Referred back to orthopedics.     - Orthopedic & Spine  Referral; Future      Vitamin D deficiency    Started on vitamin D supplement at urgent care. Will check DEXA scan since osteopenia found on x-ray.    - DX Hip/Pelvis/Spine; Future      Hyperlipidemia LDL goal <100    Due for fasting labs.     - Lipid panel reflex to direct LDL Fasting      CKD (chronic kidney disease) stage 2, GFR 60-89 ml/min    Due for albumin.    - Albumin Random Urine Quantitative with Creat Ratio              There are no Patient Instructions on file for this visit.    No follow-ups on file.    Ministerio Pavon PA-C  [facility] LONA Mendez is a 45 year old who presents for the following health issues     HPI     ED/UC Followup:    Facility:  [facility]  Date of visit: 5/30/2021  Reason for visit: Rt knee pain  Current Status: Having a lot of pain in rt leg and arm, Tylenol not working, does not tolerate Norco extremely nauseous-having tingling in the knee, severe weakness in the rt shoulder, unable to lift anything or push anything           Review of Systems   Constitutional, HEENT, cardiovascular, pulmonary, gi and gu systems are negative, except as otherwise noted.        Objective    /72 (BP Location: Right arm, Patient Position: Chair, Cuff Size: Adult Regular)   Pulse 77   Temp 99.1  F (37.3  C) (Oral)   Resp 16   Wt 103 kg (227 lb)   SpO2 96%   BMI 31.66 kg/m    Body mass index is 31.66 kg/m .       Physical Exam   GENERAL: healthy, alert and no distress  MS: no gross musculoskeletal defects noted, no edema  SKIN: no suspicious lesions or rashes  NEURO:  Normal strength and tone, mentation intact and speech normal  PSYCH: mentation appears normal, affect normal/bright  Right knee: There is swelling present. Mildly tender to palpation of tibial plateau. Otherwise non-tender. ROM decreased.

## 2021-06-01 NOTE — LETTER
June 2, 2021      Andrea Uribe  [address] [facility] [address]        Dear ,    We are writing to inform you of your test results.    Anabelle Uribe.     Your lab results ordered by me previously which were done during your recent visit with your primary care provider were all normal, or very close to normal.  Your kidney function, glucose, electrolytes, and liver function tests were all normal.  Your carbon dioxide was slightly elevated.     This should not need any new management so long as you are not having any recent problems with breathing, and can simply be rechecked at your next routine exam.  Your CBC, or complete blood count showed mild anemia similar to 3 other studies over the past year.  I do not think any new management is needed for this, and can be rechecked at your next routine visit.     Please contact me if you have any further concerns or questions!     Resulted Orders   Comprehensive metabolic panel (BMP + Alb, Alk Phos, ALT, AST, Total. Bili, TP)   Result Value Ref Range    Sodium 141 133 - 144 mmol/L    Potassium 4.1 3.4 - 5.3 mmol/L    Chloride 106 94 - 109 mmol/L    Carbon Dioxide 34 (H) 20 - 32 mmol/L    Anion Gap 1 (L) 3 - 14 mmol/L    Glucose 97 70 - 99 mg/dL      Comment:      Fasting specimen    Urea Nitrogen 15 7 - 30 mg/dL    Creatinine 1.01 0.66 - 1.25 mg/dL    GFR Estimate 88 >60 mL/min/[1.73_m2]      Comment:      Non  GFR Calc  Starting 12/18/2018, serum creatinine based estimated GFR (eGFR) will be   calculated using the Chronic Kidney Disease Epidemiology Collaboration   (CKD-EPI) equation.      GFR Estimate If Black >90 >60 mL/min/[1.73_m2]      Comment:       GFR Calc  Starting 12/18/2018, serum creatinine based estimated GFR (eGFR) will be   calculated using the Chronic Kidney Disease Epidemiology Collaboration   (CKD-EPI) equation.      Calcium 8.8 8.5 - 10.1 mg/dL    Bilirubin Total 0.2 0.2 - 1.3 mg/dL    Albumin 3.5 3.4  - 5.0 g/dL    Protein Total 7.8 6.8 - 8.8 g/dL    Alkaline Phosphatase 125 40 - 150 U/L    ALT 19 0 - 70 U/L    AST 15 0 - 45 U/L   CBC with platelets and differential   Result Value Ref Range    WBC 7.0 4.0 - 11.0 10e9/L    RBC Count 4.06 (L) 4.4 - 5.9 10e12/L      Comment:      Results confirmed by repeat test    Hemoglobin 12.6 (L) 13.3 - 17.7 g/dL      Comment:      Results confirmed by repeat test    Hematocrit 39.0 (L) 40.0 - 53.0 %      Comment:      Results confirmed by repeat test    MCV 96 78 - 100 fl    MCH 31.0 26.5 - 33.0 pg    MCHC 32.3 31.5 - 36.5 g/dL    RDW 13.9 10.0 - 15.0 %    Platelet Count 188 150 - 450 10e9/L    % Neutrophils 66.2 %    % Lymphocytes 24.2 %    % Monocytes 7.2 %    % Eosinophils 2.0 %    % Basophils 0.4 %    Absolute Neutrophil 4.6 1.6 - 8.3 10e9/L    Absolute Lymphocytes 1.7 0.8 - 5.3 10e9/L    Absolute Monocytes 0.5 0.0 - 1.3 10e9/L    Absolute Eosinophils 0.1 0.0 - 0.7 10e9/L    Absolute Basophils 0.0 0.0 - 0.2 10e9/L    Diff Method Automated Method        If you have any questions or concerns, please call the clinic at the number listed above.       Sincerely,      Juan J Joiner,

## 2021-06-02 LAB
CREAT UR-MCNC: 176 MG/DL
MICROALBUMIN UR-MCNC: 522 MG/L
MICROALBUMIN/CREAT UR: 296.59 MG/G CR (ref 0–17)

## 2021-06-03 ENCOUNTER — HOSPITAL ENCOUNTER (OUTPATIENT)
Dept: BONE DENSITY | Facility: CLINIC | Age: 46
Discharge: HOME | End: 2021-06-03
Attending: PHYSICIAN ASSISTANT | Admitting: PHYSICIAN ASSISTANT
Payer: MEDICARE

## 2021-06-03 ENCOUNTER — TELEPHONE (OUTPATIENT)
Dept: FAMILY MEDICINE | Facility: CLINIC | Age: 46
End: 2021-06-03

## 2021-06-03 DIAGNOSIS — M85.861 OSTEOPENIA OF RIGHT LOWER LEG: ICD-10-CM

## 2021-06-03 DIAGNOSIS — M81.6 LOCALIZED OSTEOPOROSIS WITHOUT CURRENT PATHOLOGICAL FRACTURE: Primary | ICD-10-CM

## 2021-06-03 DIAGNOSIS — E55.9 VITAMIN D DEFICIENCY: ICD-10-CM

## 2021-06-03 PROCEDURE — 77080 DXA BONE DENSITY AXIAL: CPT

## 2021-06-03 RX ORDER — ALENDRONATE SODIUM 70 MG/1
70 TABLET ORAL
Qty: 12 TABLET | Refills: 3 | Status: SHIPPED | OUTPATIENT
Start: 2021-06-03 | End: 2022-07-07

## 2021-06-03 NOTE — LETTER
June 4, 2021      Andrea Uribe  [address] [facility] [address]        Dear Andrea,     Your DEXA scan shows osteoporosis that is moderate to severe in your right hip area/femur. I would recommend two things:     First, that we start you on once a week alendronate (Fosamax). I sent this to [facility] pharmacy. Continue the calcium and vitamin D that was already started.     Second, you should see an endocrinologist to see if there is a reason that you developed osteoporosis at a young age. It is likely related to your surgery but further testing should be done. I placed a referral for that and if you have not received a call to schedule the appointment please call [phone].     Sincerely,    Ministerio Pavon PA-C

## 2021-06-03 NOTE — TELEPHONE ENCOUNTER
Please call patient. His DEXA scan shows osteoporosis that is moderate to severe in his right hip area/femur. I would recommend two things. First, that we start him on fosamax. I sent this to the pharmacy. Continue the calcium and vitamin D that was already started. Second, you should see an endocrinologist to see if there is a reason that you developed osteoporosis at a young age. It is likely related to your surgery but further testing should be done. I placed a referral for that and someone should contact him.     Ministerio Pavon PA-C on 6/3/2021 at 5:30 PM

## 2021-06-04 NOTE — TELEPHONE ENCOUNTER
Patient informed and agrees plan. Reviewed how to take weekly alendronate and he agrees to consult with pharmacist at  too.   Will watch for call from endo . Patient accepts written copy of results comments. Mailed.  Marbin Danielle RN - Patient Advocate and Liaison (PAL)  [facility]   [phone]

## 2021-06-09 ENCOUNTER — TRANSFERRED RECORDS (OUTPATIENT)
Dept: HEALTH INFORMATION MANAGEMENT | Facility: CLINIC | Age: 46
End: 2021-06-09

## 2021-06-09 ENCOUNTER — NURSE TRIAGE (OUTPATIENT)
Dept: NURSING | Facility: CLINIC | Age: 46
End: 2021-06-09

## 2021-06-09 NOTE — TELEPHONE ENCOUNTER
"Patient calling.    Patient was seen in Urgent Care on 5/30/21 for right arm pain.   Diagnosed with rotator cuff syndrome. No injury or event.     Per discharge instructions.     If not improving, consider MRI or referral to sports medicine.    Patient reports right shoulder pain is increasing. Stating he is unable to lift arm high. Rating pain a \"10\" when lifting.     Pain level is a \"7\" at rest. Denies numbness tingling, or swelling.     Transferred patient to [facility] at  to request same day appointment.      Lovely Upton RN  [facility]      COVID 19 Nurse Triage Plan/Patient Instructions    Please be aware that novel coronavirus (COVID-19) may be circulating in the community. If you develop symptoms such as fever, cough, or SOB or if you have concerns about the presence of another infection including coronavirus (COVID-19), please contact your health care provider or visit [email or web address].     Disposition/Instructions    In-Person Visit with provider recommended. Reference Visit Selection Guide.    Thank you for taking steps to prevent the spread of this virus.  o Limit your contact with others.  o Wear a simple mask to cover your cough.  o Wash your hands well and often.    Resources    [facility]: About COVID-19: [email or web address]    CDC: What to Do If You're Sick: www.cdc.gov/coronavirus/2019-ncov/about/steps-when-sick.html    CDC: Ending Home Isolation: www.cdc.gov/coronavirus/2019-ncov/hcp/disposition-in-home-patients.html     CDC: Caring for Someone: www.cdc.gov/coronavirus/2019-ncov/if-you-are-sick/care-for-someone.html     [facility]: Interim Guidance for Hospital Discharge to Home: www.health.[facility].mn.us/diseases/coronavirus/hcp/hospdischarge.pdf    [facility] clinical trials (COVID-19 research studies): clinicalaffairs.[facility]/umn-clinical-trials     Below are the COVID-19 hotlines at the [place] Department of Health " (MD). Interpreters are available.   o For health questions: Call 193-734-6063 or 1-678.221.1492 (7 a.m. to 7 p.m.)  o For questions about schools and childcare: Call 313-137-9274 or 1-320.180.3077 (7 a.m. to 7 p.m.)                     Reason for Disposition    Patient wants to be seen    Additional Information    Negative: Shock suspected (e.g., cold/pale/clammy skin, too weak to stand, low BP, rapid pulse)    Negative: Similar pain previously and it was from 'heart attack'    Negative: Similar pain previously and it was from 'angina' and not relieved by nitroglycerin    Negative: Sounds like a life-threatening emergency to the triager    Negative: Difficulty breathing or unusual sweating (e.g., sweating without exertion)    Negative: Pain lasting > 5 minutes and pain also present in chest (Exception: pain is clearly made worse by movement)    Negative: Age > 40 and no obvious cause and pain even when not moving the arm (Exception: pain is clearly made worse by moving arm or bending neck)    Negative: Red area or streak and fever    Negative: Swollen joint and fever    Negative: Entire arm is swollen    Negative: Patient sounds very sick or weak to the triager    Negative: SEVERE pain (e.g., excruciating, unable to do any normal activities)    Negative: Shoulder pains with exertion (e.g., walking) and pain goes away on resting and not present now    Negative: Painful rash with multiple small blisters grouped together (i.e., dermatomal distribution or 'band' or 'stripe')    Negative: Looks like a boil, infected sore, deep ulcer or other infected rash (spreading redness, pus)    Negative: Localized rash is very painful (no fever)    Negative: Weakness (i.e., loss of strength) in hand or fingers (Exception: not truly weak; hand feels weak because of pain)    Negative: Numbness (i.e., loss of sensation) in hand or fingers    Negative: Unable to use arm at all and because of shoulder pain or stiffness    Protocols  used: SHOULDER PAIN-A-OH

## 2021-06-10 DIAGNOSIS — F32.1 MODERATE MAJOR DEPRESSION (H): ICD-10-CM

## 2021-06-10 DIAGNOSIS — F41.9 ANXIETY: ICD-10-CM

## 2021-06-10 RX ORDER — VENLAFAXINE HYDROCHLORIDE 150 MG/1
CAPSULE, EXTENDED RELEASE ORAL
Qty: 30 CAPSULE | Refills: 0 | Status: SHIPPED | OUTPATIENT
Start: 2021-06-10 | End: 2021-06-21

## 2021-06-10 NOTE — TELEPHONE ENCOUNTER
Patient has upcoming appointment w/ PCP. Prescription approved per [facility] Refill Protocol.  Wolf AGUIRRE RN

## 2021-06-11 DIAGNOSIS — E78.5 HYPERLIPIDEMIA LDL GOAL <100: ICD-10-CM

## 2021-06-11 RX ORDER — SIMVASTATIN 20 MG
TABLET ORAL
Qty: 90 TABLET | Refills: 3 | Status: SHIPPED | OUTPATIENT
Start: 2021-06-11 | End: 2023-02-15

## 2021-06-11 RX ORDER — SIMVASTATIN 20 MG
TABLET ORAL
Qty: 30 TABLET | Refills: 0 | Status: SHIPPED | OUTPATIENT
Start: 2021-06-11 | End: 2021-06-11

## 2021-06-11 NOTE — TELEPHONE ENCOUNTER
AB ordered lipids, refill extended  Prescription approved per [facility] Refill Protocol.  Yuko Hernandez RN, BSN  Message handled by CLINIC NURSE.

## 2021-06-20 DIAGNOSIS — F41.9 ANXIETY: ICD-10-CM

## 2021-06-20 DIAGNOSIS — F32.1 MODERATE MAJOR DEPRESSION (H): ICD-10-CM

## 2021-06-21 RX ORDER — VENLAFAXINE HYDROCHLORIDE 150 MG/1
CAPSULE, EXTENDED RELEASE ORAL
Qty: 30 CAPSULE | Refills: 0 | Status: SHIPPED | OUTPATIENT
Start: 2021-06-21 | End: 2021-08-05

## 2021-06-21 NOTE — TELEPHONE ENCOUNTER
Prescription approved per [facility] Refill Protocol.  CAROLEE ColeN, RN  [facility]

## 2021-07-13 ENCOUNTER — TRANSFERRED RECORDS (OUTPATIENT)
Dept: HEALTH INFORMATION MANAGEMENT | Facility: CLINIC | Age: 46
End: 2021-07-13

## 2021-07-21 ENCOUNTER — VIRTUAL VISIT (OUTPATIENT)
Dept: URGENT CARE | Facility: CLINIC | Age: 46
End: 2021-07-21
Payer: MEDICARE

## 2021-07-21 ENCOUNTER — OFFICE VISIT (OUTPATIENT)
Dept: URGENT CARE | Facility: URGENT CARE | Age: 46
End: 2021-07-21
Payer: MEDICARE

## 2021-07-21 ENCOUNTER — ANCILLARY PROCEDURE (OUTPATIENT)
Dept: GENERAL RADIOLOGY | Facility: CLINIC | Age: 46
End: 2021-07-21
Attending: PHYSICIAN ASSISTANT
Payer: MEDICARE

## 2021-07-21 VITALS
SYSTOLIC BLOOD PRESSURE: 120 MMHG | RESPIRATION RATE: 18 BRPM | TEMPERATURE: 98.2 F | DIASTOLIC BLOOD PRESSURE: 80 MMHG | WEIGHT: 234 LBS | OXYGEN SATURATION: 96 % | HEART RATE: 75 BPM | BODY MASS INDEX: 32.64 KG/M2

## 2021-07-21 DIAGNOSIS — R05.9 COUGH: ICD-10-CM

## 2021-07-21 DIAGNOSIS — J02.9 SORE THROAT: ICD-10-CM

## 2021-07-21 DIAGNOSIS — R50.9 FEVER IN ADULT: Primary | ICD-10-CM

## 2021-07-21 DIAGNOSIS — R05.8 PRODUCTIVE COUGH: ICD-10-CM

## 2021-07-21 DIAGNOSIS — R07.0 THROAT PAIN: Primary | ICD-10-CM

## 2021-07-21 LAB
BASOPHILS # BLD AUTO: 0 10E3/UL (ref 0–0.2)
BASOPHILS NFR BLD AUTO: 1 %
DEPRECATED S PYO AG THROAT QL EIA: NEGATIVE
EOSINOPHIL # BLD AUTO: 0.2 10E3/UL (ref 0–0.7)
EOSINOPHIL NFR BLD AUTO: 3 %
ERYTHROCYTE [DISTWIDTH] IN BLOOD BY AUTOMATED COUNT: 14.1 % (ref 10–15)
GROUP A STREP BY PCR: NOT DETECTED
HCT VFR BLD AUTO: 38.3 % (ref 40–53)
HGB BLD-MCNC: 12.4 G/DL (ref 13.3–17.7)
HOLD SPECIMEN: NORMAL
LYMPHOCYTES # BLD AUTO: 1.8 10E3/UL (ref 0.8–5.3)
LYMPHOCYTES NFR BLD AUTO: 23 %
MCH RBC QN AUTO: 30.6 PG (ref 26.5–33)
MCHC RBC AUTO-ENTMCNC: 32.4 G/DL (ref 31.5–36.5)
MCV RBC AUTO: 95 FL (ref 78–100)
MONOCYTES # BLD AUTO: 0.8 10E3/UL (ref 0–1.3)
MONOCYTES NFR BLD AUTO: 10 %
NEUTROPHILS # BLD AUTO: 5.1 10E3/UL (ref 1.6–8.3)
NEUTROPHILS NFR BLD AUTO: 64 %
PLATELET # BLD AUTO: 193 10E3/UL (ref 150–450)
RBC # BLD AUTO: 4.05 10E6/UL (ref 4.4–5.9)
SARS-COV-2 RNA RESP QL NAA+PROBE: NEGATIVE
WBC # BLD AUTO: 7.9 10E3/UL (ref 4–11)

## 2021-07-21 PROCEDURE — 85025 COMPLETE CBC W/AUTO DIFF WBC: CPT | Performed by: PHYSICIAN ASSISTANT

## 2021-07-21 PROCEDURE — 71046 X-RAY EXAM CHEST 2 VIEWS: CPT | Performed by: RADIOLOGY

## 2021-07-21 PROCEDURE — 99214 OFFICE O/P EST MOD 30 MIN: CPT | Performed by: PHYSICIAN ASSISTANT

## 2021-07-21 PROCEDURE — U0005 INFEC AGEN DETEC AMPLI PROBE: HCPCS | Performed by: PHYSICIAN ASSISTANT

## 2021-07-21 PROCEDURE — 36415 COLL VENOUS BLD VENIPUNCTURE: CPT | Performed by: PHYSICIAN ASSISTANT

## 2021-07-21 PROCEDURE — U0003 INFECTIOUS AGENT DETECTION BY NUCLEIC ACID (DNA OR RNA); SEVERE ACUTE RESPIRATORY SYNDROME CORONAVIRUS 2 (SARS-COV-2) (CORONAVIRUS DISEASE [COVID-19]), AMPLIFIED PROBE TECHNIQUE, MAKING USE OF HIGH THROUGHPUT TECHNOLOGIES AS DESCRIBED BY CMS-2020-01-R: HCPCS | Performed by: PHYSICIAN ASSISTANT

## 2021-07-21 PROCEDURE — 87651 STREP A DNA AMP PROBE: CPT | Performed by: PHYSICIAN ASSISTANT

## 2021-07-21 RX ORDER — BENZONATATE 200 MG/1
200 CAPSULE ORAL 3 TIMES DAILY PRN
Qty: 30 CAPSULE | Refills: 0 | Status: SHIPPED | OUTPATIENT
Start: 2021-07-21 | End: 2021-10-12

## 2021-07-21 NOTE — LETTER
July 21, 2021      Andrea Uribe  [address] [facility] [address]        To Whom It May Concern:    Andrea Uribe  was seen on 7/21/2021  Please excuse his absence from work 7/22/2021 and 7/23/2021 due to acute medical illness.        Sincerely,        Mere Salas PA-C
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
## 2021-07-21 NOTE — PATIENT INSTRUCTIONS
Patient Education     Self-Care for Sore Throats     Sore throats happen for many reasons, such as colds, allergies, cigarette smoke, air pollution, and infections caused by viruses or bacteria. In any case, your throat becomes red and sore. Your goal for self-care is to reduce your discomfort while giving your throat a chance to heal.  Moisten and soothe your throat  Tips include the following:    Try a sip of water first thing after waking up.    Keep your throat moist by drinking 6 or more glasses of clear liquids every day.    Run a cool-air humidifier in your room overnight.    Stay away from cigarette smoke.     Check the air quality index,if air pollution gives you a sore throat. On high pollution days, try to limit outdoor time.    Suck on throat lozenges, cough drops, hard candy, ice chips, or frozen fruit-juice bars. Use the sugar-free versions if your diet or medical condition requires them.  Gargle to ease irritation  Gargling every hour or 2 can ease irritation. Try gargling with 1 of these solutions:    1/4 teaspoon of salt in 1/2 cup of warm water    An over-the-counter anesthetic gargle  Use medicine for more relief  Over-the-counter medicine can reduce sore throat symptoms. Ask your pharmacist if you have questions about which medicine to use. To prevent possible medicine interactions, let the pharmacist know what medicines you take. To decrease symptoms:    Ease pain with anesthetic sprays. Aspirin or an aspirin substitute also helps. Remember, never give aspirin to anyone 18 or younger. Don't take aspirin if you are already taking blood thinners.     For sore throats caused by allergies, try antihistamines to block the allergic reaction.  Unless a sore throat is caused by a bacterial infection, antibiotics won t help you.  Prevent future sore throats  Prevention tips include:    Stop smoking or reduce contact with secondhand smoke. Smoke irritates the tender throat lining.    Limit contact with  pets and with allergy-causing substances, such as pollen and mold.    Wash your hands often when you re around someone with a sore throat or cold. This will keep viruses or bacteria from spreading.    Limit outdoor time when air pollution is bad.    Don t strain your vocal cords.  When to call your healthcare provider  Contact your healthcare provider if you have:    Fever of 100.4 F (38.0 C) or higher, or as directed by your healthcare provider    White spots on the throat    Great Trouble swallowing    A skin rash    Recent exposure to someone else with strep bacteria    Severe hoarseness and swollen glands in the neck or jaw  Call 911  Call 911 if any of the following occur:    Trouble breathing or catching your breath    Drooling and problems swallowing    Wheezing    Unable to talk    Feeling dizzy or faint    Feeling of doom  [employer] last reviewed this educational content on 9/1/2019 2000-2021 [employer]. All rights reserved. This information is not intended as a substitute for professional medical care. Always follow your healthcare professional's instructions.           Patient Education     Viral Upper Respiratory Illness (Adult)    You have a viral upper respiratory illness (URI), which is another term for the common cold. This illness is contagious during the first few days. It is spread through the air by coughing and sneezing. It may also be spread by direct contact (touching the sick person and then touching your own eyes, nose, or mouth). Frequent handwashing will decrease risk of spread. Most viral illnesses go away within 7 to 10 days with rest and simple home remedies. Sometimes the illness may last for several weeks. Antibiotics will not kill a virus, and they are generally not prescribed for this condition.  Home care    If symptoms are severe, rest at home for the first 2 to 3 days. When you resume activity, don't let yourself get too tired.    Don't smoke. If you need help  stopping, talk with your healthcare provider.    Avoid being exposed to cigarette smoke (yours or others ).    You may use acetaminophen or ibuprofen to control pain and fever, unless another medicine was prescribed. If you have chronic liver or kidney disease, have ever had a stomach ulcer or gastrointestinal bleeding, or are taking blood-thinning medicines, talk with your healthcare provider before using these medicines. Aspirin should never be given to anyone under 18 years of age who is ill with a viral infection or fever. It may cause severe liver or brain damage.    Your appetite may be poor, so a light diet is fine. Stay well hydrated by drinking 6 to 8 glasses of fluids per day (water, soft drinks, juices, tea, or soup). Extra fluids will help loosen secretions in the nose and lungs.    Over-the-counter cold medicines will not shorten the length of time you re sick, but they may be helpful for the following symptoms: cough, sore throat, and nasal and sinus congestion. If you take prescription medicines, ask your healthcare provider or pharmacist which over-the-counter medicines are safe to use. (Note: Don't use decongestants if you have high blood pressure.)  Follow-up care  Follow up with your healthcare provider, or as advised.  When to seek medical advice  Call your healthcare provider right away if any of these occur:    Cough with lots of colored sputum (mucus)    Severe headache; face, neck, or ear pain    Difficulty swallowing due to throat pain    Fever of 100.4 F (38 C) or higher, or as directed by your healthcare provider  Call 911  Call 911 if any of these occur:    Chest pain, shortness of breath, wheezing, or difficulty breathing    Coughing up blood    Very severe pain with swallowing, especially if it goes along with a muffled voice   [employer] last reviewed this educational content on 6/1/2018 2000-2021 [employer]. All rights reserved. This information is not intended as a  substitute for professional medical care. Always follow your healthcare professional's instructions.

## 2021-07-21 NOTE — PROGRESS NOTES
Assessment & Plan     Throat pain  Rapid strep is negative today.  Throat culture is pending.  Supportive care measures advised.  We will communicate any positive finding on the throat culture result.  Follow-up if any worsening symptoms.  Patient understands and agrees with the plan.    - Streptococcus A Rapid Scr w Reflx to PCR - Lab Collect  - Symptomatic COVID-19 Virus (Coronavirus) by PCR Nasopharyngeal  - Group A Streptococcus PCR Throat Swab    Productive cough  Lungs clear on exam.  Chest x-ray negative for acute infiltrates.  CBC not suggestive of infection.  Vitals are reassuring.  Tessalon Perles prescribed as needed for the cough.  We discussed most likely viral illness.  Symptomatic Covid PCR swab done in clinic today.  Awaiting COVID-19 PCR result.  If result return positive will need to self quarantine for 10 days or until 24 hrs after symptoms resolve (whichever comes first).Recommended to keep monitoring symptoms.  Push fluids and rest.  Follow-up if any worsening symptoms.  Patient agrees with the plan.  - XR Chest 2 Views  - CBC with platelets and differential  - CBC with platelets and differential  - benzonatate (TESSALON) 200 MG capsule  Dispense: 30 capsule; Refill: 0         Return in about 1 week (around 7/28/2021) for Symptoms failing to improve.    Mere Salas PA-C  [facility] [place]    Alek Mendez is a 45 year old male who presents to clinic today for the following health issues:  Chief Complaint   Patient presents with     Pharyngitis     x 3 days      HPI  Patient with history of TBI, asthma and stage II CKD.     URI Adult    Onset of symptoms was 2 day(s) ago.  Course of illness is same.    Severity moderate  Current and Associated symptoms: fever, fatigue, sore throat, productive cough--did notice some streak of blood in the phlegm, mild sob. Was sweating profusely 2 days ago, and when he took his temperature it was 103.2 Fahrenheit. Fever broke this  morning. This morning he felt very lightheaded and almost passed out.  Denies: vomiting and diarrhea. No CP. No HA. No abdominal pain.  Treatment measures tried include None tried.  Predisposing factors include Hx of asthma. He also reports hx of pneumonia.  No obvious exposure to anyone with Covid or strep.  He is not vaccinated for Covid.  He had Covid 9 months ago      Review of Systems  Constitutional, HEENT, cardiovascular, pulmonary, gi and gu systems are negative, except as otherwise noted.      Objective    /80 (BP Location: Right arm, Patient Position: Chair, Cuff Size: Adult Large)   Pulse 75   Temp 98.2  F (36.8  C) (Oral)   Resp 18   Wt 106.1 kg (234 lb)   SpO2 96%   BMI 32.64 kg/m    Physical Exam   GENERAL: healthy, alert and no distress  HENT: ear canals and TM's normal, nose and mouth without ulcers or lesions, throat is moist and pink  NECK: Submandibular glands are swollen bilaterally, mildly tender to palpation  RESP: lungs clear to auscultation - no rales, rhonchi or wheezes, normal work of breathing.  CV: regular rate and rhythm, normal S1 S2  MS: no gross musculoskeletal defects noted, no edema  SKIN: no suspicious lesions or rashes    CXR reviewed by me: no infiltrates, lungs are clear, cardiac pacemaker noted.      Results for orders placed or performed in visit on 07/21/21 (from the past 24 hour(s))   Streptococcus A Rapid Scr w Reflx to PCR - Lab Collect    Specimen: Throat; Swab   Result Value Ref Range    Group A Strep antigen Negative Negative   Symptomatic COVID-19 Virus (Coronavirus) by PCR Nasopharyngeal    Specimen: Nasopharyngeal; Swab    Narrative    The following orders were created for panel order Symptomatic COVID-19 Virus (Coronavirus) by PCR Nasopharyngeal.  Procedure                               Abnormality         Status                     ---------                               -----------         ------                     SARS-COV2 (COVID-19)  Vir...[[id number]]                      In process                   Please view results for these tests on the individual orders.   CBC with platelets and differential    Narrative    The following orders were created for panel order CBC with platelets and differential.  Procedure                               Abnormality         Status                     ---------                               -----------         ------                     CBC with platelets and d...[[id number]]  Abnormal            Final result                 Please view results for these tests on the individual orders.   CBC with platelets and differential   Result Value Ref Range    WBC Count 7.9 4.0 - 11.0 10e3/uL    RBC Count 4.05 (L) 4.40 - 5.90 10e6/uL    Hemoglobin 12.4 (L) 13.3 - 17.7 g/dL    Hematocrit 38.3 (L) 40.0 - 53.0 %    MCV 95 78 - 100 fL    MCH 30.6 26.5 - 33.0 pg    MCHC 32.4 31.5 - 36.5 g/dL    RDW 14.1 10.0 - 15.0 %    Platelet Count 193 150 - 450 10e3/uL    % Neutrophils 64 %    % Lymphocytes 23 %    % Monocytes 10 %    % Eosinophils 3 %    % Basophils 1 %    Absolute Neutrophils 5.1 1.6 - 8.3 10e3/uL    Absolute Lymphocytes 1.8 0.8 - 5.3 10e3/uL    Absolute Monocytes 0.8 0.0 - 1.3 10e3/uL    Absolute Eosinophils 0.2 0.0 - 0.7 10e3/uL    Absolute Basophils 0.0 0.0 - 0.2 10e3/uL   Extra Tube    Narrative    The following orders were created for panel order Extra Tube.  Procedure                               Abnormality         Status                     ---------                               -----------         ------                     Extra Red Top Tube[[id number]]                                                          Extra Green Top (Lithium...[[id number]]                      In process                 Extra Purple Top Tube[[id number]]                                                         Please view results for these tests on the individual orders.

## 2021-07-30 ENCOUNTER — HOSPITAL ENCOUNTER (EMERGENCY)
Facility: CLINIC | Age: 46
Discharge: HOME | End: 2021-07-30
Attending: EMERGENCY MEDICINE | Admitting: EMERGENCY MEDICINE
Payer: MEDICARE

## 2021-07-30 VITALS
DIASTOLIC BLOOD PRESSURE: 77 MMHG | RESPIRATION RATE: 16 BRPM | HEART RATE: 86 BPM | TEMPERATURE: 97.2 F | SYSTOLIC BLOOD PRESSURE: 140 MMHG | OXYGEN SATURATION: 100 %

## 2021-07-30 DIAGNOSIS — M25.511 RIGHT SHOULDER PAIN, UNSPECIFIED CHRONICITY: ICD-10-CM

## 2021-07-30 PROCEDURE — 99283 EMERGENCY DEPT VISIT LOW MDM: CPT

## 2021-07-30 PROCEDURE — 250N000013 HC RX MED GY IP 250 OP 250 PS 637: Performed by: EMERGENCY MEDICINE

## 2021-07-30 RX ORDER — METHYLPREDNISOLONE 4 MG
TABLET, DOSE PACK ORAL
Qty: 21 TABLET | Refills: 0 | Status: SHIPPED | OUTPATIENT
Start: 2021-07-30 | End: 2021-10-12

## 2021-07-30 RX ORDER — ACETAMINOPHEN 500 MG
1000 TABLET ORAL ONCE
Status: COMPLETED | OUTPATIENT
Start: 2021-07-30 | End: 2021-07-30

## 2021-07-30 RX ADMIN — ACETAMINOPHEN 1000 MG: 500 TABLET, FILM COATED ORAL at 08:25

## 2021-07-30 ASSESSMENT — ENCOUNTER SYMPTOMS
NUMBNESS: 0
CHILLS: 0
FEVER: 0
COLOR CHANGE: 0
ARTHRALGIAS: 1

## 2021-07-30 NOTE — ED PROVIDER NOTES
History   Chief Complaint:  Right shoulder pain     HPI   Andrea Uribe is a 45 year old male with history of head injury, right rotator cuff injury, and possible adhesive Capsulitis who presents with right shoulder pain. Andrea has had pain in his right shoulder for the last 3-4 weeks and was given a cortisone shot at [facility] weeks ago. Since then, he has treated it with Tylenol, with some success, but this morning he experienced sharp pain down the arm when he went to put on his pants for work, so he presents to the ED. Andrea describes his pain as pulsating and aching in the right shoulder, sharp with attempted movements, at time of interview. He reports that he uses his left hand more since his head injury, and only limits and has been limiting the use of his right hand at work.  He notes he cannot use ibuprofen since the head injury.  He has been using Tylenol and that seems to help.  He would like to get a cortisone injection but has not been able to yet due to the recent nature of his past injection.  He denies numbness in the arm or shoulder, erythema, fevers, chills, or having history of diabetes.    Review of Systems   Constitutional: Negative for chills and fever.   Musculoskeletal: Positive for arthralgias.   Skin: Negative for color change.   Neurological: Negative for numbness.   All other systems reviewed and are negative.    Allergies:  Iodine  Aspirin  Ibuprofen Sodium  Pollen Extract  Seasonal Allergies    Medications:  Albuterol inhaler  Benzonatate  Fosamax  Lisinopril  Simvastatin  Venlafaxine  Keppra  Dilantin  Atarax  Tylenol    Past Medical History:    Chronic infection  Chronic kidney disease stage 3  CPAP dependence  Hypertension  MRSA infection  Obesity  Osteoarthritis  Ptosis, right eyelid  Seizure disorder  Sleep apnea  Traumatic brain injury  Thyroid disease  Asthma  Heterotropic ossification  Short heel cord, right  Ataxic gait  Incisional hernia  Hyperlipidemia  Right  femur fracture  Depression  GERD  Pneumonia  Thromboembolism NOS  Arteriosclerotic vascular disease  Frozen shoulder  Rotator cuff injury  DVT    Past Surgical History:    Appendectomy  Knee arthroplasty and revision  Tracheostomy  EGD  Excise mass, trunk  Herniorrhaphy, incisional  Implant stimulator, vagus nerve  Cholecystectomy  Laparoscopy, exploratory  ORIF femur, distal  Orthopedic surgeries, hardware placement  Remove hardware, lower extremity/extremities x2  Spleen repair  IVC filter  Lengthening Achilles tendon    Family History:    Cardiovascular  Cerebrovascular disease  Colitis  Diabetes  Crohn's disease    Social History:  Works sharpening  blades.  Receives care at [facility].  Presents alone.    Physical Exam     Patient Vitals for the past 24 hrs:   BP Temp Pulse Resp SpO2   07/30/21 0730 (!) 140/77 97.2  F (36.2  C) 86 16 100 %       Physical Exam  General: Adult male sitting upright  CV: Right radial pulse intact.  MSK: Tender over the right anterior shoulder.  No palpable cord or mass.  No localized edema.  No crepitus. Attempts at range of motion increases pain.  Nontender over the remainder of the right upper extremity.  No distal edema.  5 out of 5  strength right upper extremity.  Skin: Warm and dry. No rashes or lesions or ecchymoses on visible skin.  Neuro: Alert and oriented. Responds appropriately to all questions and commands. No focal findings appreciated. Normal muscle tone.  Sensation intact to light touch over all dermatomes of the right upper extremity.  5 out of 5 finger abduction, thumb opposition and wrist extension strength right upper extremity.  Psych: Normal mood and affect. Pleasant.    Emergency Department Course     Reviewed:  I reviewed nursing notes, vitals, past medical history and care everywhere    Assessments:  0803 I obtained history and examined the patient as noted above.     Interventions:  0825 Tylenol 1000 mg PO    Disposition:  The  patient was discharged to home.       Impression & Plan     Medical Decision Making:  Andrea Uribe is a 45-year-old male who presents emergency department with concerns for acute on chronic right shoulder pain.  He has a probable diagnosis of adhesive capsulitis from recent [facility] visit.  He is neurovascularly intact.  He had benefited from steroid in the past and was given a Medrol Dosepak for use.  Tylenol has seemed to help him as well and is recommended to use this as needed for pain.  There is no indication for emergent imaging given lack of trauma.  There is no evidence of infection by history or examination.  He is recommended gentle movement exercises and close follow-up with [facility] for further guidance on care.  He should return if he experiences swelling, fevers, redness, numbness, etc.  All questions were answered prior to discharge.    Diagnosis:    ICD-10-CM    1. Right shoulder pain, unspecified chronicity  M25.511        Discharge Medications:  Discharge Medication List as of 7/30/2021  8:27 AM      START taking these medications    Details   methylPREDNISolone (MEDROL DOSEPAK) 4 MG tablet therapy pack Follow Package Directions, Disp-21 tablet, R-0, Local Print             Scribe Disclosure:  I, Nikko Kelsey, am serving as a scribe at 8:02 AM on 7/30/2021 to document services personally performed by Justa Montoya MD based on my observations and the provider's statements to me.      Justa Montoya MD  07/30/21 3241

## 2021-07-30 NOTE — Clinical Note
Andrea Uribe was seen and treated in our emergency department on 7/30/2021.  He may return to work on 07/31/2021.       If you have any questions or concerns, please don't hesitate to call.      Justa Montoya MD

## 2021-07-30 NOTE — ED TRIAGE NOTES
Pt presents to ED with shoulder pain x4 wks. Pt has had cortisone shot from [facility]. Taking tylenol with no relief. Pt has been diagnosed with frozen shoulder. Denies trauma to shoulder. ABC intact. A/O x4.

## 2021-08-04 DIAGNOSIS — F41.9 ANXIETY: ICD-10-CM

## 2021-08-04 DIAGNOSIS — F32.1 MODERATE MAJOR DEPRESSION (H): ICD-10-CM

## 2021-08-05 RX ORDER — VENLAFAXINE HYDROCHLORIDE 150 MG/1
CAPSULE, EXTENDED RELEASE ORAL
Qty: 30 CAPSULE | Refills: 0 | Status: SHIPPED | OUTPATIENT
Start: 2021-08-05 | End: 2021-09-20

## 2021-08-05 NOTE — TELEPHONE ENCOUNTER
Routing refill request to provider for review/approval because:  Labs out of range:  BP    BP Readings from Last 3 Encounters:   07/30/21 (!) 140/77   07/21/21 120/80   06/01/21 122/72     Wolf AGUIRRE RN

## 2021-08-22 DIAGNOSIS — F32.1 MODERATE MAJOR DEPRESSION (H): ICD-10-CM

## 2021-08-22 DIAGNOSIS — F41.9 ANXIETY: ICD-10-CM

## 2021-08-24 RX ORDER — VENLAFAXINE HYDROCHLORIDE 150 MG/1
CAPSULE, EXTENDED RELEASE ORAL
Qty: 30 CAPSULE | Refills: 0 | OUTPATIENT
Start: 2021-08-24

## 2021-08-24 NOTE — TELEPHONE ENCOUNTER
Denied. Multiple graces given to allow for follow ups and continued no shows. No refills until appt    trae brewer, pac

## 2021-08-24 NOTE — TELEPHONE ENCOUNTER
Routing refill request to provider for review/approval because:  Arabella given x1 already.  Scheduled for appointment in 6 days.  Criss Huber RN

## 2021-08-26 ENCOUNTER — TRANSFERRED RECORDS (OUTPATIENT)
Dept: HEALTH INFORMATION MANAGEMENT | Facility: CLINIC | Age: 46
End: 2021-08-26

## 2021-09-15 ENCOUNTER — TRANSFERRED RECORDS (OUTPATIENT)
Dept: HEALTH INFORMATION MANAGEMENT | Facility: CLINIC | Age: 46
End: 2021-09-15

## 2021-09-19 DIAGNOSIS — E55.9 HYPOVITAMINOSIS D: ICD-10-CM

## 2021-09-19 DIAGNOSIS — M85.861 OSTEOPENIA OF RIGHT LOWER LEG: ICD-10-CM

## 2021-09-20 RX ORDER — ERGOCALCIFEROL 1.25 MG/1
50000 CAPSULE, LIQUID FILLED ORAL WEEKLY
Qty: 6 CAPSULE | Refills: 0 | OUTPATIENT
Start: 2021-09-20

## 2021-09-20 NOTE — TELEPHONE ENCOUNTER
Denied. Medication is typically taken for short time to correct vitamin d deficiency with recheck after completion.  Patient also has had 7 no shows in the last year between us and specialist. Will need OV for any further refills of any medications. Changing sb to 4.     -trae brewer, pac

## 2021-10-04 ENCOUNTER — HOSPITAL ENCOUNTER (EMERGENCY)
Facility: CLINIC | Age: 46
Discharge: HOME | End: 2021-10-04
Attending: EMERGENCY MEDICINE | Admitting: EMERGENCY MEDICINE
Payer: MEDICARE

## 2021-10-04 ENCOUNTER — NURSE TRIAGE (OUTPATIENT)
Dept: NURSING | Facility: CLINIC | Age: 46
End: 2021-10-04

## 2021-10-04 ENCOUNTER — APPOINTMENT (OUTPATIENT)
Dept: GENERAL RADIOLOGY | Facility: CLINIC | Age: 46
End: 2021-10-04
Attending: EMERGENCY MEDICINE
Payer: MEDICARE

## 2021-10-04 VITALS
DIASTOLIC BLOOD PRESSURE: 74 MMHG | SYSTOLIC BLOOD PRESSURE: 126 MMHG | HEART RATE: 72 BPM | TEMPERATURE: 97.3 F | RESPIRATION RATE: 18 BRPM | OXYGEN SATURATION: 98 %

## 2021-10-04 DIAGNOSIS — J06.9 VIRAL URI WITH COUGH: ICD-10-CM

## 2021-10-04 DIAGNOSIS — Z20.822 SUSPECTED 2019 NOVEL CORONAVIRUS INFECTION: ICD-10-CM

## 2021-10-04 LAB
ALBUMIN SERPL-MCNC: 3.3 G/DL (ref 3.4–5)
ALP SERPL-CCNC: 132 U/L (ref 40–150)
ALT SERPL W P-5'-P-CCNC: 21 U/L (ref 0–70)
ANION GAP SERPL CALCULATED.3IONS-SCNC: 5 MMOL/L (ref 3–14)
AST SERPL W P-5'-P-CCNC: 12 U/L (ref 0–45)
BASOPHILS # BLD AUTO: 0 10E3/UL (ref 0–0.2)
BASOPHILS NFR BLD AUTO: 0 %
BILIRUB SERPL-MCNC: 0.4 MG/DL (ref 0.2–1.3)
BUN SERPL-MCNC: 14 MG/DL (ref 7–30)
CALCIUM SERPL-MCNC: 8.7 MG/DL (ref 8.5–10.1)
CHLORIDE BLD-SCNC: 106 MMOL/L (ref 94–109)
CO2 SERPL-SCNC: 28 MMOL/L (ref 20–32)
CREAT SERPL-MCNC: 1.01 MG/DL (ref 0.66–1.25)
EOSINOPHIL # BLD AUTO: 0.2 10E3/UL (ref 0–0.7)
EOSINOPHIL NFR BLD AUTO: 3 %
ERYTHROCYTE [DISTWIDTH] IN BLOOD BY AUTOMATED COUNT: 13.4 % (ref 10–15)
GFR SERPL CREATININE-BSD FRML MDRD: 88 ML/MIN/1.73M2
GLUCOSE BLD-MCNC: 104 MG/DL (ref 70–99)
HCT VFR BLD AUTO: 39.1 % (ref 40–53)
HGB BLD-MCNC: 12.6 G/DL (ref 13.3–17.7)
HOLD SPECIMEN: NORMAL
IMM GRANULOCYTES # BLD: 0 10E3/UL
IMM GRANULOCYTES NFR BLD: 0 %
LACTATE SERPL-SCNC: 1.1 MMOL/L (ref 0.7–2)
LYMPHOCYTES # BLD AUTO: 1.4 10E3/UL (ref 0.8–5.3)
LYMPHOCYTES NFR BLD AUTO: 17 %
MCH RBC QN AUTO: 31 PG (ref 26.5–33)
MCHC RBC AUTO-ENTMCNC: 32.2 G/DL (ref 31.5–36.5)
MCV RBC AUTO: 96 FL (ref 78–100)
MONOCYTES # BLD AUTO: 0.6 10E3/UL (ref 0–1.3)
MONOCYTES NFR BLD AUTO: 8 %
NEUTROPHILS # BLD AUTO: 5.7 10E3/UL (ref 1.6–8.3)
NEUTROPHILS NFR BLD AUTO: 72 %
NRBC # BLD AUTO: 0 10E3/UL
NRBC BLD AUTO-RTO: 0 /100
PLATELET # BLD AUTO: 191 10E3/UL (ref 150–450)
POTASSIUM BLD-SCNC: 3.9 MMOL/L (ref 3.4–5.3)
PROT SERPL-MCNC: 7 G/DL (ref 6.8–8.8)
RBC # BLD AUTO: 4.07 10E6/UL (ref 4.4–5.9)
SARS-COV-2 RNA RESP QL NAA+PROBE: NEGATIVE
SODIUM SERPL-SCNC: 139 MMOL/L (ref 133–144)
WBC # BLD AUTO: 8 10E3/UL (ref 4–11)

## 2021-10-04 PROCEDURE — 85025 COMPLETE CBC W/AUTO DIFF WBC: CPT | Performed by: EMERGENCY MEDICINE

## 2021-10-04 PROCEDURE — 80053 COMPREHEN METABOLIC PANEL: CPT | Performed by: EMERGENCY MEDICINE

## 2021-10-04 PROCEDURE — 258N000003 HC RX IP 258 OP 636: Performed by: EMERGENCY MEDICINE

## 2021-10-04 PROCEDURE — 96375 TX/PRO/DX INJ NEW DRUG ADDON: CPT

## 2021-10-04 PROCEDURE — 99284 EMERGENCY DEPT VISIT MOD MDM: CPT | Mod: 25

## 2021-10-04 PROCEDURE — C9803 HOPD COVID-19 SPEC COLLECT: HCPCS

## 2021-10-04 PROCEDURE — 96361 HYDRATE IV INFUSION ADD-ON: CPT

## 2021-10-04 PROCEDURE — 87040 BLOOD CULTURE FOR BACTERIA: CPT | Performed by: EMERGENCY MEDICINE

## 2021-10-04 PROCEDURE — 96374 THER/PROPH/DIAG INJ IV PUSH: CPT

## 2021-10-04 PROCEDURE — 36415 COLL VENOUS BLD VENIPUNCTURE: CPT | Performed by: EMERGENCY MEDICINE

## 2021-10-04 PROCEDURE — 71045 X-RAY EXAM CHEST 1 VIEW: CPT

## 2021-10-04 PROCEDURE — U0003 INFECTIOUS AGENT DETECTION BY NUCLEIC ACID (DNA OR RNA); SEVERE ACUTE RESPIRATORY SYNDROME CORONAVIRUS 2 (SARS-COV-2) (CORONAVIRUS DISEASE [COVID-19]), AMPLIFIED PROBE TECHNIQUE, MAKING USE OF HIGH THROUGHPUT TECHNOLOGIES AS DESCRIBED BY CMS-2020-01-R: HCPCS | Performed by: EMERGENCY MEDICINE

## 2021-10-04 PROCEDURE — 83605 ASSAY OF LACTIC ACID: CPT | Performed by: EMERGENCY MEDICINE

## 2021-10-04 PROCEDURE — 36415 COLL VENOUS BLD VENIPUNCTURE: CPT | Mod: 59 | Performed by: EMERGENCY MEDICINE

## 2021-10-04 PROCEDURE — 250N000011 HC RX IP 250 OP 636: Performed by: EMERGENCY MEDICINE

## 2021-10-04 RX ORDER — METOCLOPRAMIDE HYDROCHLORIDE 5 MG/ML
10 INJECTION INTRAMUSCULAR; INTRAVENOUS ONCE
Status: COMPLETED | OUTPATIENT
Start: 2021-10-04 | End: 2021-10-04

## 2021-10-04 RX ORDER — KETOROLAC TROMETHAMINE 15 MG/ML
15 INJECTION, SOLUTION INTRAMUSCULAR; INTRAVENOUS ONCE
Status: COMPLETED | OUTPATIENT
Start: 2021-10-04 | End: 2021-10-04

## 2021-10-04 RX ORDER — DIPHENHYDRAMINE HYDROCHLORIDE 50 MG/ML
25 INJECTION INTRAMUSCULAR; INTRAVENOUS ONCE
Status: COMPLETED | OUTPATIENT
Start: 2021-10-04 | End: 2021-10-04

## 2021-10-04 RX ADMIN — SODIUM CHLORIDE, POTASSIUM CHLORIDE, SODIUM LACTATE AND CALCIUM CHLORIDE 1000 ML: 600; 310; 30; 20 INJECTION, SOLUTION INTRAVENOUS at 08:23

## 2021-10-04 RX ADMIN — DIPHENHYDRAMINE HYDROCHLORIDE 25 MG: 50 INJECTION INTRAMUSCULAR; INTRAVENOUS at 08:27

## 2021-10-04 RX ADMIN — METOCLOPRAMIDE 10 MG: 5 INJECTION, SOLUTION INTRAMUSCULAR; INTRAVENOUS at 08:24

## 2021-10-04 RX ADMIN — KETOROLAC TROMETHAMINE 15 MG: 15 INJECTION, SOLUTION INTRAMUSCULAR; INTRAVENOUS at 08:23

## 2021-10-04 NOTE — ED PROVIDER NOTES
History   Chief Complaint:  Suspected Covid       HPI   Andrea Uribe is a 46 year old male with history of HTN, DVT, TBI, seizures who presents with multiple complaints including headache, sinus congestion, mild cough, sore throat, body aches, and fever several days prior.  Patient notes about 3 days of symptoms.  He reports it feels like when he had Covid around this time last year.  Patient is not vaccinated.  He denies any sick contacts.  Headache seems to be localized to the front of his head.  It does not radiate.  He notes a history of migraines.    Review of Systems   Constitutional: Positive for chills and fever.   HENT: Positive for congestion, sinus pain and sore throat.    Respiratory: Positive for cough. Negative for shortness of breath.    Gastrointestinal: Negative for abdominal pain, nausea and vomiting.   Neurological: Positive for headaches. Negative for seizures.   All other systems reviewed and are negative.        Allergies:  Iodine  Aspirin  Ibuprofen Sodium    Medications:  Albuterol   Fosamax   Atarax   Lisinopril   Dilantin   Simvastatin   Effexor   Keppra     Past Medical History:     ASVD   Asthma   GERD   SANGITA   Depression   TBI   Seizures   CKD   Hypertension   OA   Thyroid disease   Hyperlipidemia   DVT       Past Surgical History:    Vagal nerve stimulator   Appendectomy   Knee replacement X2  Tracheostomy   Hernia repair   Cholecystectomy   Laparotomy   ORIF left femur   IVC filter   Mass excision   Hernia repair   Orthopedic surgery   Spleen repair   Hardware removal X2      Family History:    CAD- mother   Cerebrovascular disease- mother   Diabetes- sister   Crohns disease- son     Social History:       Physical Exam     Patient Vitals for the past 24 hrs:   BP Temp Pulse Resp SpO2   10/04/21 0915 -- -- -- -- 98 %   10/04/21 0900 126/74 -- 72 -- 93 %   10/04/21 0845 -- -- -- -- 100 %   10/04/21 0830 130/73 -- 61 -- 97 %   10/04/21 0815 -- -- -- -- 98 %   10/04/21 0800 115/64  -- 63 -- 95 %   10/04/21 0745 121/76 -- -- -- 98 %   10/04/21 0731 126/50 -- -- -- --   10/04/21 0730 -- 97.3  F (36.3  C) 77 18 99 %       Physical Exam  Nursing note and vitals reviewed.    HENT:   Mouth/Throat: Moist mucous membranes.   Eyes: EOMI, nonicteric sclera  Cardiovascular: Normal rate, regular rhythm, no murmurs, rubs, or gallops  Pulmonary/Chest: Effort normal and breath sounds normal. No respiratory distress. No wheezes. No rales.   Abdominal: Soft. Nontender, nondistended, no guarding or rigidity.   Musculoskeletal: Normal range of motion.  No meningismus.  Neurological: Alert. Moves all extremities spontaneously.  Equal strength bilateral upper and lower extremities.  Normal gait.  Skin: Skin is warm and dry. No rash noted.   Psychiatric: Normal mood and affect.       Emergency Department Course       Imaging:  XR Chest Port 1 View   Final Result   IMPRESSION: No acute cardiothoracic process. Left chest implanted   device with leads overlying the left low neck..      MESERET VOSS MD            SYSTEM ID:  [id number]        Imaging independently reviewed and agree with radiologist interpretation.     Laboratory:  Labs Ordered and Resulted from Time of ED Arrival Up to the Time of Departure from the ED   COMPREHENSIVE METABOLIC PANEL - Abnormal; Notable for the following components:       Result Value    Glucose 104 (*)     Albumin 3.3 (*)     All other components within normal limits   CBC WITH PLATELETS AND DIFFERENTIAL - Abnormal; Notable for the following components:    RBC Count 4.07 (*)     Hemoglobin 12.6 (*)     Hematocrit 39.1 (*)     All other components within normal limits   LACTIC ACID WHOLE BLOOD - Normal   EXTRA BLUE TOP TUBE   EXTRA RED TOP TUBE   EXTRA GREEN TOP (LITHIUM HEPARIN) TUBE   EXTRA GREEN TOP (LITHIUM HEPARIN) TUBE   EXTRA PURPLE TOP TUBE   COVID-19 VIRUS (CORONAVIRUS) BY PCR   BLOOD CULTURE   BLOOD CULTURE   EXTRA TUBE    Narrative:     The following orders were created for  panel order [place] Draw.  Procedure                               Abnormality         Status                     ---------                               -----------         ------                     Extra Blue Top Tube[[id number]]                              Final result               Extra Red Top Tube[[id number]]                               Final result               Extra Green Top (Lithium...[[id number]]                      Final result               Extra Green Top (Lithium...[[id number]]                      Final result               Extra Purple Top Tube[[id number]]                            Final result                 Please view results for these tests on the individual orders.   CBC WITH PLATELETS & DIFFERENTIAL    Narrative:     The following orders were created for panel order CBC + differential.  Procedure                               Abnormality         Status                     ---------                               -----------         ------                     CBC with platelets and d...[[id number]]  Abnormal            Final result                 Please view results for these tests on the individual orders.        Procedures    Emergency Department Course:  Reviewed:  I reviewed nursing notes, vitals, past medical history, Care Everywhere and [facility]    Assessments:   I obtained history and examined the patient as noted above.      I rechecked the patient and explained findings.       Interventions:  0823 Toradol 15 mg IV     0823 LR 1 L IV     0824 Reglan 10 mg IV     0827 Benadryl 25 mg IV     Disposition:  The patient was discharged to home.     Impression & Plan     Medical Decision Making:  Patient presents with multiple URI complaints concerning for COVID-19.  Patient already had a COVID-19 infection a little bit less than 1 year prior, therefore broad differential was considered looking for other causes of fever.  Patient is afebrile here.  Exam is overall unremarkable.  Vital signs are  normal.  Chest x-ray is negative for infiltrate.  Labs obtained which show mild leukocytosis and normal lactic acid level.  In this context, severe bacterial infection is very unlikely.  Discussed with patient that given his lack of neck stiffness, this is highly unlikely to represent bacterial meningitis.  We discussed possibility of viral meningitis, though we discussed there is not specific therapy other than Tylenol/ibuprofen/hydration, and time.  Blood cultures are pending in case of bacteremia.  COVID-19 pending as well.  Ultimately, patient reported that he needed to leave in a hurry and felt improved from the standpoint of his headache.  We discussed reasons to return.  He is discharged in stable condition.  All questions answered.    Covid-19  Andrea Uribe was evaluated during a global COVID-19 pandemic, which necessitated consideration that the patient might be at risk for infection with the SARS-CoV-2 virus that causes COVID-19.   Applicable protocols for evaluation were followed during the patient's care.   COVID-19 was considered as part of the patient's evaluation. The plan for testing is:  a test was obtained during this visit.    Diagnosis:    ICD-10-CM    1. Viral URI with cough  J06.9    2. Suspected 2019 novel coronavirus infection  Z20.822        Scribe Disclosure:  Anegla SWEENEY, am serving as a scribe at 7:37 AM on 10/4/2021 to document services personally performed by Pavel Anna MD based on my observations and the provider's statements to me.            Pavel Anna MD  10/05/21 0550

## 2021-10-04 NOTE — ED NOTES
"Pt states he \"needs to leave now.\" Pt has taken off all monitoring devices and is requesting IV be pulled. MD notified.   "

## 2021-10-04 NOTE — TELEPHONE ENCOUNTER
Triage call  Patient called to report headache for last 3 days,his throat swollen, temp of 103, stuffy nose. When asked to touch his chin to his chest he could do it but it was painful. He was calling to get a appointment to get his prescriptions refilled.    Per protocol go to ED now he is going to [facility] in [facility]. Care advice given.  Verbalizes understanding and agrees with plan. Son will drive him.    Ana Montes RN   [facility] Nurse Advisor  7:02 AM 10/4/2021    COVID 19 Nurse Triage Plan/Patient Instructions    Please be aware that novel coronavirus (COVID-19) may be circulating in the community. If you develop symptoms such as fever, cough, or SOB or if you have concerns about the presence of another infection including coronavirus (COVID-19), please contact your health care provider or visit [email or web address].     Disposition/Instructions    ED Visit recommended. Follow protocol based instructions.     Bring Your Own Device:  Please also bring your smart device(s) (smart phones, tablets, laptops) and their charging cables for your personal use and to communicate with your care team during your visit.    Thank you for taking steps to prevent the spread of this virus.  o Limit your contact with others.  o Wear a simple mask to cover your cough.  o Wash your hands well and often.    Resources    [facility]: About COVID-19: [email or web address]    CDC: What to Do If You're Sick: www.cdc.gov/coronavirus/2019-ncov/about/steps-when-sick.html    CDC: Ending Home Isolation: www.cdc.gov/coronavirus/2019-ncov/hcp/disposition-in-home-patients.html     CDC: Caring for Someone: www.cdc.gov/coronavirus/2019-ncov/if-you-are-sick/care-for-someone.html     [facility]: Interim Guidance for Hospital Discharge to Home: www.health.[facility].[place]/diseases/coronavirus/hcp/hospdischarge.pdf    [facility] clinical trials (COVID-19 research studies):  [email or web address].[email or web address]-clinical-trials     Below are the COVID-19 hotlines at the [place] Department of Health ([facility]). Interpreters are available.   o For health questions: Call 867-849-0365 or 1-965.287.6707 (7 a.m. to 7 p.m.)  o For questions about schools and childcare: Call 894-984-0354 or 1-965.221.1818 (7 a.m. to 7 p.m.)     Reason for Disposition    [1] Headache AND [2] stiff neck (can't touch chin to chest)    Additional Information    Negative: SEVERE difficulty breathing (e.g., struggling for each breath, speaks in single words)    Negative: Difficult to awaken or acting confused (e.g., disoriented, slurred speech)    Negative: Bluish (or gray) lips or face now    Negative: Shock suspected (e.g., cold/pale/clammy skin, too weak to stand, low BP, rapid pulse)    Negative: Sounds like a life-threatening emergency to the triager    Negative: [1] COVID-19 exposure AND [2] has not completed COVID-19 vaccine series AND [3] no symptoms    Negative: [1] COVID-19 exposure AND [2] completed COVID-19 vaccine series (fully vaccinated) AND [3] no symptoms    Negative: COVID-19 vaccine reaction suspected (e.g., fever, headache, muscle aches) occurring during days 1-3 after getting vaccine    Negative: COVID-19 vaccine, questions about    Negative: [1] COVID-19 vaccine series completed (fully vaccinated) in past 3 months AND [2] new-onset of COVID-19 symptoms BUT [3] no known exposure    Negative: [1] Had lab test confirmed COVID-19 infection within last 3 monthsAND [2] new-onset of COVID-19 symptoms BUT [3] no known exposure    Negative: [1] Lives with someone known to have influenza (flu test positive) AND [2] flu-like symptoms (e.g., cough, runny nose, sore throat, SOB; with or without fever)    Negative: [1] Adult with possible COVID-19 symptoms AND [2] triager concerned about severity of symptoms or other causes    Negative: COVID-19 and breastfeeding, questions about    Negative: SEVERE or constant chest pain  or pressure (Exception: mild central chest pain, present only when coughing)    Negative: MODERATE difficulty breathing (e.g., speaks in phrases, SOB even at rest, pulse 100-120)    Protocols used: CORONAVIRUS (COVID-19) DIAGNOSED OR [id number] 3.25

## 2021-10-04 NOTE — DISCHARGE INSTRUCTIONS
Discharge Instructions  Upper Respiratory Infection    The upper respiratory tract includes the sinuses, nasal passages, pharynx, and larynx. A URI, or upper respiratory infection, is an infection of any of the parts of the upper airway. Symptoms include runny nose, congestion, sneezing, sore throat, cough, and fever. URIs are almost always caused by a virus. Antibiotics do not help with viral infections, so are generally not prescribed. A URI is very contagious through coughing and nasal secretions; make sure you wash your hands often and clean surfaces after sneezing, coughing or touching them. While you should start to improve in 3 - 5 days, remember that sometimes a cough can linger for several weeks.    Generally, every Emergency Department visit should have a follow-up clinic visit with either a primary or a specialty clinic/provider. Please follow-up as instructed by your emergency provider today.    Return to the Emergency Department if:  Any of your symptoms get much worse.  You seem very sick, like being too weak to get up.  You have chest pain or shortness of breath.   You have a severe headache.  You are vomiting (throwing up) so much you cannot keep fluids or medicines down.  You have confusion or seem unusually drowsy.  You have a seizure.    What can I do to help myself?  Fill any prescriptions the provider gave you and take them right away  If you have a fever, get plenty of rest and drink lots of fluids, especially water.  Using a humidifier or saline nose spray will also help loosen mucous.   Clothes or blankets will not change your fever. Do what is comfortable for you.  Bathing or sponging in lukewarm water may help you feel better.  Acetaminophen (Tylenol ) or ibuprofen (Advil , Motrin ) will help bring fever down and may help you feel more comfortable. Be sure to read and follow the package directions, and ask your provider if you have questions.  Do not drink alcohol.  Decongestants may help  you feel better. You may use decongestant nose sprays Afrin  (oxymetazoline) or Efrain-Synephrine  (phenylephrine hydrochloride) for up to 3 days, or may use a decongestant tablet like Sudafed  (pseudoephedrine).  If you were given a prescription for medicine here today, be sure to read all of the information (including the package insert) that comes with your prescription.  This will include important information about the medicine, its side effects, and any warnings that you need to know about.  The pharmacist who fills the prescription can provide more information and answer questions you may have about the medicine.  If you have questions or concerns that the pharmacist cannot address, please call or return to the Emergency Department.   Remember that you can always come back to the Emergency Department if you are not able to see your regular provider in the amount of time listed above, if you get any new symptoms, or if there is anything that worries you.    Discharge Instructions  COVID-19    COVID-19 is the disease caused by a new coronavirus. The virus spreads from person-to-person primarily by droplets when an infected person coughs or sneezes and the droplets are then breathed in by another person. There are tests available to diagnose COVID-19. You may have been diagnosed with COVID, may be being tested for COVID and have a pending test result, or may have been exposed to COVID.    Symptoms of COVID-19  Many people have no symptoms or mild symptoms.  Symptoms may usually appear 4 to 5 days (up to 14 days) after contact with a person with COVID-19. Some people will get severe symptoms and pneumonia. Usual symptoms are:     ? Fever  ? Cough  ? Trouble breathing    Less common symptoms are: Headache, body aches, sore throat, sneezing, diarrhea, loss of taste or smell.    Isolation and Quarantine    You may have been seen because you have symptoms, had an exposure, or had some other concern about possible COVID.  The best way to stop the spread of the virus is to avoid contact with others.    Isolation refers to sick people staying away from people who are not sick. A person in quarantine is limiting activity because they were exposed and are waiting to see if they might become sick.    If you test positive for COVID, you should stay home (isolation) for at least 10 days after your symptoms began, and for 24 hours with no fever and improvement of symptoms--whichever is longer. (Your fever should be gone for 24 hours without using fever-reducing medicine). If you have no symptoms, you should stay home (isolation) for 10 days from the day of the test. If you have been vaccinated for COVID, the vaccination will not cause you to test positive so a positive test result generally is a  true positive .    For example, if you have a fever and cough for 6 days, you need to stay home 4 more days with no fever for a total of 10 days. Or, if you have a fever and cough for 10 days, you need to stay home one more day with no fever for a total of 11 days.    If you have a high-risk exposure to COVID (you spent 15 minutes or more within six feet of somebody who has COVID), you should stay home (quarantine) for 14 days, unless you are vaccinated. Even if you test negative for COVID, the CDC recommends a 14-day quarantine from the time of your last exposure to that individual (unless you are vaccinated). There are options for a shortened (<14 day quarantine) you can review at:  https://www.health.[facility].[place]/diseases/coronavirus/close.html#long    If you live in the same house as somebody with COVID and cannot separate from them, you will need to quarantine for 14-days after that person's isolation (infectious) period. That means that you may need to quarantine for 24-days after that person became symptomatic/ill.    If you are vaccinated and do not develop symptoms, you do not need to quarantine after exposure.    If you have symptoms but a  negative test, you should stay at home until you are symptom-free and without fever for 24 hours, using the same judgment you would for when it is safe to return to work/school from strep throat, influenza, or the common cold. If you worsen, you should consider being re-evaluated.    If you are being tested for COVID because of symptoms and your test is pending, you should stay home until you know your test result.    If I have COVID, how should I protect myself and others?    Do not go to work or school. Have a friend or relative do your shopping. Do not use public transportation (bus, train) or ridesharing ([employer], Uber).    Separate yourself from other people in your home. As much as possible, you should stay in one room and away from other people in your home. Also, use a separate bathroom, if possible. Avoid handling pets or other animals while sick.     Wear a facemask if you need to be around other people and cover your mouth and nose with a tissue when you cough or sneeze.     Avoid sharing personal household items. You should not share dishes, drinking glasses, forks/knives/spoons, towels, or bedding with other people in your home. After using these items, they should be washed with soap and water. Clean parts of your home that are touched often (doorknobs, faucets, countertops, etc.) daily.     Wash your hands often with soap and water for at least 20 seconds or use an alcohol-based hand  containing at least 60% alcohol.     Avoid touching your face.    Treat your symptoms. You can take Acetaminophen (Tylenol) to treat body aches and fever as needed for comfort. Ibuprofen (Advil or Motrin) can be used as well if you still have symptoms after taking Tylenol. Drink fluids. Rest.    Watch for worsening symptoms such as shortness of breath/difficulty breathing or very severe weakness.    Employers/workplaces are being asked by the Centers for Disease Control (CDC) to not request notes/documentation for  you to return to work or prove that you were ill. You may choose to show your employer this paperwork. Also, repeat testing should not be required to return to work.    Exercise/Sports in rare cases, COVID could affect your heart in a way that makes exercise or participation in sports dangerous.    If you have a mild COVID illness (fever, cough, sore throat, and similar symptoms but no difficulty breathing or abnormalities of the lung): After your COVID symptoms have resolved, wait 14-days before returning to activity.  If you have more than a mild illness (meaning that you have problems with your breathing or lungs) or if you participate in competitive or strenuous activity or have a history of heart disease: Please see your primary doctor/provider prior to return to activity/competition.    Antibody treatments are available for patients with mild to moderate COVID illness in order to prevent severe illness. In general, only patients with risk factors for severe illness are eligible for treatment. For more information, to see if you are eligible, and to find treatment, go to the [facility]:  https://www.[email or web address].[place].us/diseases/coronavirus/mnrap.html     Return to the Emergency Department if:    If you are developing worsening breathing, shortness of breath, or feel worse you should seek medical attention.  If you are uncertain, contact your health care provider/clinic. If you need emergency medical attention, call 911 and tell them you have been ill.

## 2021-10-05 ENCOUNTER — TELEPHONE (OUTPATIENT)
Dept: FAMILY MEDICINE | Facility: CLINIC | Age: 46
End: 2021-10-05

## 2021-10-05 DIAGNOSIS — J01.01 ACUTE RECURRENT MAXILLARY SINUSITIS: Primary | ICD-10-CM

## 2021-10-05 ASSESSMENT — ENCOUNTER SYMPTOMS
NAUSEA: 0
COUGH: 1
HEADACHES: 1
VOMITING: 0
SEIZURES: 0
CHILLS: 1
FEVER: 1
SORE THROAT: 1
SINUS PAIN: 1
ABDOMINAL PAIN: 0
SHORTNESS OF BREATH: 0

## 2021-10-05 NOTE — TELEPHONE ENCOUNTER
Patient reports son and patient have similar symptoms from URI and would like them both to be seen. Patient reports he is taking son to [facility] later today. Informed patient we would not have any back-to-back appointments at  until Friday morning, patient declined that option.     Patient requesting a work note for the rest of the week due to symptoms noted in ER visit on 10/4, wanted to see if this was possible from PCP.     Please advise, thank you.     PENNY Harrell, RN - Patient Advocate and Liaison (PAL)   [facility]   [phone]

## 2021-10-05 NOTE — TELEPHONE ENCOUNTER
Patient is calling to see if [facility] will see him before 10/21, he is out of his medication venlafaxine (EFFEXOR-XR) 150 MG 24 hr capsule and was told before he can get a refill he needs to be seen. Please call patient at [phone].  Nika Queen   [facility]  Central Scheduler

## 2021-10-05 NOTE — TELEPHONE ENCOUNTER
General Call:     Who is calling:  Andrea    Reason for Call:  Patient called left a msg requesting a call from nurse symptom based did not give specifics.     What are your questions or concerns:  ? Did not say    Date of last appointment with provider: 10/15/20 Virtual    Okay to leave a detailed message:Yes at Cell number on file:    Telephone Information:   Mobile [phone]

## 2021-10-06 NOTE — TELEPHONE ENCOUNTER
It appears patient is scheduled with me on the 12 of October. If this encounter was sent prior to this scheduled, this encounter may be closed. Otherwise if more action is needed, please let me know.     Of note, patient has been given multiple refills to allow for follow ups in the recent past and patient will cancel or no show his appt after this is given. No further refills until his appt    -delphine romero

## 2021-10-06 NOTE — TELEPHONE ENCOUNTER
Patient aware of appointment on 10/12/21, encouraged patient to make appointment since he has missed or cancelled a few and really needs his medications refilled. Ruth Behrens.

## 2021-10-09 LAB
BACTERIA BLD CULT: NO GROWTH
BACTERIA BLD CULT: NO GROWTH

## 2021-10-12 ENCOUNTER — OFFICE VISIT (OUTPATIENT)
Dept: FAMILY MEDICINE | Facility: CLINIC | Age: 46
End: 2021-10-12
Payer: MEDICARE

## 2021-10-12 VITALS
DIASTOLIC BLOOD PRESSURE: 62 MMHG | BODY MASS INDEX: 33.75 KG/M2 | WEIGHT: 242 LBS | TEMPERATURE: 97.8 F | HEART RATE: 93 BPM | RESPIRATION RATE: 18 BRPM | OXYGEN SATURATION: 96 % | SYSTOLIC BLOOD PRESSURE: 120 MMHG

## 2021-10-12 DIAGNOSIS — J06.9 UPPER RESPIRATORY TRACT INFECTION, UNSPECIFIED TYPE: ICD-10-CM

## 2021-10-12 DIAGNOSIS — E55.9 VITAMIN D DEFICIENCY: ICD-10-CM

## 2021-10-12 DIAGNOSIS — F32.1 MODERATE MAJOR DEPRESSION (H): ICD-10-CM

## 2021-10-12 DIAGNOSIS — N18.30 STAGE 3 CHRONIC KIDNEY DISEASE, UNSPECIFIED WHETHER STAGE 3A OR 3B CKD (H): ICD-10-CM

## 2021-10-12 DIAGNOSIS — D51.8 OTHER VITAMIN B12 DEFICIENCY ANEMIA: ICD-10-CM

## 2021-10-12 DIAGNOSIS — F41.9 ANXIETY: ICD-10-CM

## 2021-10-12 DIAGNOSIS — E53.8 VITAMIN B12 DEFICIENCY (NON ANEMIC): Primary | ICD-10-CM

## 2021-10-12 DIAGNOSIS — J45.20 INTERMITTENT ASTHMA, UNCOMPLICATED: ICD-10-CM

## 2021-10-12 LAB
ERYTHROCYTE [DISTWIDTH] IN BLOOD BY AUTOMATED COUNT: 14.4 % (ref 10–15)
HCT VFR BLD AUTO: 38.7 % (ref 40–53)
HGB BLD-MCNC: 12.7 G/DL (ref 13.3–17.7)
MCH RBC QN AUTO: 31.4 PG (ref 26.5–33)
MCHC RBC AUTO-ENTMCNC: 32.8 G/DL (ref 31.5–36.5)
MCV RBC AUTO: 96 FL (ref 78–100)
PLATELET # BLD AUTO: 201 10E3/UL (ref 150–450)
RBC # BLD AUTO: 4.04 10E6/UL (ref 4.4–5.9)
WBC # BLD AUTO: 7.3 10E3/UL (ref 4–11)

## 2021-10-12 PROCEDURE — 99214 OFFICE O/P EST MOD 30 MIN: CPT | Performed by: PHYSICIAN ASSISTANT

## 2021-10-12 PROCEDURE — 36415 COLL VENOUS BLD VENIPUNCTURE: CPT | Performed by: PHYSICIAN ASSISTANT

## 2021-10-12 PROCEDURE — 82306 VITAMIN D 25 HYDROXY: CPT | Performed by: PHYSICIAN ASSISTANT

## 2021-10-12 PROCEDURE — 85027 COMPLETE CBC AUTOMATED: CPT | Performed by: PHYSICIAN ASSISTANT

## 2021-10-12 PROCEDURE — 82607 VITAMIN B-12: CPT | Performed by: PHYSICIAN ASSISTANT

## 2021-10-12 RX ORDER — LISINOPRIL 5 MG/1
5 TABLET ORAL DAILY
Qty: 90 TABLET | Refills: 1 | Status: SHIPPED | OUTPATIENT
Start: 2021-10-12 | End: 2022-05-25

## 2021-10-12 RX ORDER — ALBUTEROL SULFATE 90 UG/1
2 AEROSOL, METERED RESPIRATORY (INHALATION) EVERY 6 HOURS PRN
Qty: 18 G | Refills: 0 | Status: SHIPPED | OUTPATIENT
Start: 2021-10-12 | End: 2021-11-30

## 2021-10-12 RX ORDER — FLUTICASONE PROPIONATE 50 MCG
1-2 SPRAY, SUSPENSION (ML) NASAL DAILY
Qty: 16 G | Refills: 3 | Status: SHIPPED | OUTPATIENT
Start: 2021-10-12 | End: 2022-03-03

## 2021-10-12 RX ORDER — VENLAFAXINE HYDROCHLORIDE 150 MG/1
CAPSULE, EXTENDED RELEASE ORAL
Qty: 90 CAPSULE | Refills: 1 | Status: SHIPPED | OUTPATIENT
Start: 2021-10-12 | End: 2022-02-09

## 2021-10-12 RX ORDER — MONTELUKAST SODIUM 10 MG/1
10 TABLET ORAL AT BEDTIME
Qty: 90 TABLET | Refills: 0 | Status: SHIPPED | OUTPATIENT
Start: 2021-10-12 | End: 2022-01-20

## 2021-10-12 ASSESSMENT — ANXIETY QUESTIONNAIRES
7. FEELING AFRAID AS IF SOMETHING AWFUL MIGHT HAPPEN: NOT AT ALL
GAD7 TOTAL SCORE: 16
1. FEELING NERVOUS, ANXIOUS, OR ON EDGE: NEARLY EVERY DAY
IF YOU CHECKED OFF ANY PROBLEMS ON THIS QUESTIONNAIRE, HOW DIFFICULT HAVE THESE PROBLEMS MADE IT FOR YOU TO DO YOUR WORK, TAKE CARE OF THINGS AT HOME, OR GET ALONG WITH OTHER PEOPLE: VERY DIFFICULT
6. BECOMING EASILY ANNOYED OR IRRITABLE: NEARLY EVERY DAY
2. NOT BEING ABLE TO STOP OR CONTROL WORRYING: NEARLY EVERY DAY
5. BEING SO RESTLESS THAT IT IS HARD TO SIT STILL: SEVERAL DAYS
3. WORRYING TOO MUCH ABOUT DIFFERENT THINGS: NEARLY EVERY DAY

## 2021-10-12 ASSESSMENT — PATIENT HEALTH QUESTIONNAIRE - PHQ9: 5. POOR APPETITE OR OVEREATING: NEARLY EVERY DAY

## 2021-10-12 NOTE — PROGRESS NOTES
Assessment & Plan     Moderate major depression (H)  Uncontrolled though not on medication for 3 weeks. History of frequent no shows this is why it was not refilled. However, he does recall improvement and well control on 150 mg daily compared to prior 75 mg daily. Will refill today and recheck phq 9 and ayan 7 in 1 month. If not controlled, would recommend follow up virtual visit to discuss. If controlled,recheck virtually with medicare wellness in 3 months.   - venlafaxine (EFFEXOR-XR) 150 MG 24 hr capsule; TAKE 1 CAPSULE BY MOUTH EVERY DAY  -Medication use and side effects discussed with the patient. Patient is in complete understanding and agreement with plan.     Anxiety  As above   - venlafaxine (EFFEXOR-XR) 150 MG 24 hr capsule; TAKE 1 CAPSULE BY MOUTH EVERY DAY    Upper respiratory tract infection, unspecified type  Refill needed.   - fluticasone (FLONASE) 50 MCG/ACT nasal spray; Spray 1-2 sprays into both nostrils daily    Intermittent asthma, uncomplicated  Past history. Uncontrolled on ACT. Likely due to allergies. Will add singulair as well to regimen to aid seasonally. If no improvement in ACT in 1 month, follow up virtually to discuss. Otherwise, will reassess in 3 months.   - albuterol (PROAIR HFA/PROVENTIL HFA/VENTOLIN HFA) 108 (90 Base) MCG/ACT inhaler; Inhale 2 puffs into the lungs every 6 hours as needed for shortness of breath / dyspnea  -Medication use and side effects discussed with the patient. Patient is in complete understanding and agreement with plan.     Stage 3 chronic kidney disease, unspecified whether stage 3a or 3b CKD (H)  Labs up to date. ACE refilled.   - lisinopril (ZESTRIL) 5 MG tablet; Take 1 tablet (5 mg) by mouth daily    Vitamin B12 deficiency (non anemic)  Past history and recent continued mild anemia. Not current taking b12. Will recheck. If needed, to be restarted and rechecked values at lab only in 1 month.   - Vitamin B12; Future  - Vitamin B12    Vitamin D  "deficiency  Past history of this. No otc management. Did complete 0[address]75 international unit(s) in may. Will recheck today.   - Vitamin D Deficiency; Future  - Vitamin D Deficiency    Other vitamin B12 deficiency anemia  As above   - CBC with platelets; Future  - Vitamin B12; Future  - CBC with platelets  - Vitamin B12    Return in about 3 months (around 1/12/2022) for medicare check, depression/anxiety follow up.    Alberto Medley PA-C  [facility] LONA Mendez is a 46 year old who presents for the following health issues  accompanied by his with son:    HPI     Depression and Anxiety Follow-Up    How are you doing with your depression since your last visit? Worsened     How are you doing with your anxiety since your last visit?  Worsened     Are you having other symptoms that might be associated with depression or anxiety? Yes:  \" i am more short tempered, I have not had my medication for 3 weeks.\"    Have you had a significant life event? 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     Do you have any concerns with your use of alcohol or other drugs? No    Social History     Tobacco Use     Smoking status: Never Smoker     Smokeless tobacco: Never Used   Vaping Use     Vaping Use: Never used   Substance Use Topics     Alcohol use: No     Alcohol/week: 0.0 standard drinks     Drug use: No     PHQ 7/30/2020 5/10/2021 10/12/2021   PHQ-9 Total Score 1 0 11   Q9: Thoughts of better off dead/self-harm past 2 weeks Not at all Not at all Not at all     DAYAMI-7 SCORE 2/27/2020 7/30/2020 10/12/2021   Total Score - - -   Total Score - - -   Total Score 1 1 16     Last PHQ-9 10/12/2021   1.  Little interest or pleasure in doing things 1   2.  Feeling down, depressed, or hopeless 3   3.  Trouble falling or staying asleep, or sleeping too much 3   4.  Feeling tired or having little energy 3   5.  Poor appetite or overeating 0   6.  " Feeling bad about yourself 0   7.  Trouble concentrating 0   8.  Moving slowly or restless 1   Q9: Thoughts of better off dead/self-harm past 2 weeks 0   PHQ-9 Total Score 11   Difficulty at work, home, or with people Not difficult at all     DAYAMI-7  10/12/2021   1. Feeling nervous, anxious, or on edge 3   2. Not being able to stop or control worrying 3   3. Worrying too much about different things 3   4. Trouble relaxing 3   5. Being so restless that it is hard to sit still 1   6. Becoming easily annoyed or irritable 3   7. Feeling afraid, as if something awful might happen 0   DAYAMI-7 Total Score 16   If you checked any problems, how difficult have they made it for you to do your work, take care of things at home, or get along with other people? Very difficult       Hypertension Follow-up      Do you check your blood pressure regularly outside of the clinic? No     Are you following a low salt diet? Yes    Are your blood pressures ever more than 140 on the top number (systolic) OR more   than 90 on the bottom number (diastolic), for example 140/90? NA    Asthma Follow-Up    Was ACT completed today?    Yes    ACT Total Scores 10/12/2021   ACT TOTAL SCORE -   ASTHMA ER VISITS -   ASTHMA HOSPITALIZATIONS -   ACT TOTAL SCORE (Goal Greater than or Equal to 20) 16   In the past 12 months, how many times did you visit the emergency room for your asthma without being admitted to the hospital? 0   In the past 12 months, how many times were you hospitalized overnight because of your asthma? 0       How many days per week do you miss taking your asthma controller medication?  I do not have an asthma controller medication    Please describe any recent triggers for your asthma: upper respiratory infections, dust mites and pollens    Have you had any Emergency Room Visits, Urgent Care Visits, or Hospital Admissions since your last office visit?  Yes  Number of ER or Urgent Care visits for asthma: 1      Ongoing right shoulder  pain. Followed by [facility]. States recent injection did not help symptoms and home therapy not done due to severe pain. Was recommended to follow up with Dr. Chandler per emr review if needed for ongoing management through [facility].          How many servings of fruits and vegetables do you eat daily?  0-1    On average, how many sweetened beverages do you drink each day (Examples: soda, juice, sweet tea, etc.  Do NOT count diet or artificially sweetened beverages)?   2    How many days per week do you exercise enough to make your heart beat faster? 3 or less    How many minutes a day do you exercise enough to make your heart beat faster? 9 or less    How many days per week do you miss taking your medication? 0      Review of Systems   Constitutional, HEENT, cardiovascular, pulmonary, GI, , musculoskeletal, neuro, skin, endocrine and psych systems are negative, except as otherwise noted.      Objective    /62 (BP Location: Right arm, Patient Position: Sitting, Cuff Size: Adult Large)   Pulse 93   Temp 97.8  F (36.6  C) (Oral)   Resp 18   Wt 109.8 kg (242 lb)   SpO2 96%   BMI 33.75 kg/m    Body mass index is 33.75 kg/m .  Physical Exam   GENERAL: alert and moderate distress  RESP: lungs clear to auscultation - no rales, rhonchi or wheezes  CV: regular rates and rhythm, normal S1 S2, no S3 or S4 and no murmur, click or rub  MS: unable to move right shoulder via flexion or abduction against gravity. rom passively limited to 110 degrees abduction and flexion of right shoulder due to pain. No crepitus.   PSYCH: mentation appears normal, affect normal/bright

## 2021-10-12 NOTE — LETTER
October 14, 2021      Andrea Uribe  [address] [facility] [address]        Dear ,    We are writing to inform you of your test results.    All labs have returned. Your anemia is slightly present, but your b12 is actually normal. However, low normal. I feel we should restart this. I have sent this and we can recheck your value at your next appt.     Your vitamin d is in the normal range, but I would recommend taking an over the counter dose of 2000 international unit(s) to improve it.       Resulted Orders   CBC with platelets   Result Value Ref Range    WBC Count 7.3 4.0 - 11.0 10e3/uL    RBC Count 4.04 (L) 4.40 - 5.90 10e6/uL    Hemoglobin 12.7 (L) 13.3 - 17.7 g/dL    Hematocrit 38.7 (L) 40.0 - 53.0 %    MCV 96 78 - 100 fL    MCH 31.4 26.5 - 33.0 pg    MCHC 32.8 31.5 - 36.5 g/dL    RDW 14.4 10.0 - 15.0 %    Platelet Count 201 150 - 450 10e3/uL   Vitamin B12   Result Value Ref Range    Vitamin B12 196 193 - 986 pg/mL   Vitamin D Deficiency   Result Value Ref Range    Vitamin D, Total (25-Hydroxy) 24 20 - 75 ug/L    Narrative    Season, race, dietary intake, and treatment affect the concentration of 25-hydroxy-Vitamin D. Values may decrease during winter months and increase during summer months. Values 20-29 ug/L may indicate Vitamin D insufficiency and values <20 ug/L may indicate Vitamin D deficiency.    Vitamin D determination is routinely performed by an immunoassay specific for 25 hydroxyvitamin D3.  If an individual is on vitamin D2(ergocalciferol) supplementation, please specify 25 OH vitamin D2 and D3 level determination by LCMSMS test [id number].         If you have any questions or concerns, please call the clinic at the number listed above.       Sincerely,      Alberto Medley PA-C

## 2021-10-13 ENCOUNTER — HOSPITAL ENCOUNTER (EMERGENCY)
Facility: CLINIC | Age: 46
Discharge: HOME | End: 2021-10-13
Attending: EMERGENCY MEDICINE | Admitting: EMERGENCY MEDICINE
Payer: MEDICARE

## 2021-10-13 ENCOUNTER — APPOINTMENT (OUTPATIENT)
Dept: GENERAL RADIOLOGY | Facility: CLINIC | Age: 46
End: 2021-10-13
Attending: EMERGENCY MEDICINE
Payer: MEDICARE

## 2021-10-13 ENCOUNTER — TELEPHONE (OUTPATIENT)
Dept: FAMILY MEDICINE | Facility: CLINIC | Age: 46
End: 2021-10-13

## 2021-10-13 VITALS
OXYGEN SATURATION: 100 % | HEART RATE: 64 BPM | SYSTOLIC BLOOD PRESSURE: 113 MMHG | DIASTOLIC BLOOD PRESSURE: 88 MMHG | RESPIRATION RATE: 22 BRPM | TEMPERATURE: 98.9 F

## 2021-10-13 DIAGNOSIS — M25.511 RIGHT SHOULDER PAIN, UNSPECIFIED CHRONICITY: ICD-10-CM

## 2021-10-13 LAB
DEPRECATED CALCIDIOL+CALCIFEROL SERPL-MC: 24 UG/L (ref 20–75)
VIT B12 SERPL-MCNC: 196 PG/ML (ref 193–986)

## 2021-10-13 PROCEDURE — 250N000013 HC RX MED GY IP 250 OP 250 PS 637: Performed by: EMERGENCY MEDICINE

## 2021-10-13 PROCEDURE — 73030 X-RAY EXAM OF SHOULDER: CPT | Mod: RT

## 2021-10-13 PROCEDURE — 99283 EMERGENCY DEPT VISIT LOW MDM: CPT

## 2021-10-13 RX ORDER — LANOLIN ALCOHOL/MO/W.PET/CERES
1000 CREAM (GRAM) TOPICAL DAILY
Qty: 90 TABLET | Refills: 1 | Status: SHIPPED | OUTPATIENT
Start: 2021-10-13 | End: 2023-06-22

## 2021-10-13 RX ORDER — ACETAMINOPHEN 500 MG
1000 TABLET ORAL ONCE
Status: COMPLETED | OUTPATIENT
Start: 2021-10-13 | End: 2021-10-13

## 2021-10-13 RX ORDER — OXYCODONE HYDROCHLORIDE 5 MG/1
5 TABLET ORAL EVERY 6 HOURS PRN
Qty: 8 TABLET | Refills: 0 | Status: SHIPPED | OUTPATIENT
Start: 2021-10-13 | End: 2022-02-09

## 2021-10-13 RX ADMIN — ACETAMINOPHEN 1000 MG: 500 TABLET, FILM COATED ORAL at 07:17

## 2021-10-13 ASSESSMENT — ENCOUNTER SYMPTOMS
ARTHRALGIAS: 1
NUMBNESS: 0

## 2021-10-13 ASSESSMENT — ASTHMA QUESTIONNAIRES: ACT_TOTALSCORE: 16

## 2021-10-13 NOTE — ED PROVIDER NOTES
History   Chief Complaint:  Shoulder Pain       The history is provided by the patient.      Andrea Uribe is a 46 year old male with history of CKD, hypertension, GERD, and hyperlipidemia who presents with shoulder pain. Patient notes he has had intermittent right shoulder pain since an injury when he was younger. He has recently been following with [facility] for right shoulder pain since June. He has had two cortisone shots that do help. This morning while trying to get into his truck he slipped and fell onto his right shoulder which exacerbated his pain. When he presses on his shoulder the pain worsens.  He is no longer able to lay on his side or lift his arm. He has loss of strength in his right arm. No elbow pain. He has not had an MRI.     Review of Systems   Musculoskeletal: Positive for arthralgias (right shoulder).   Neurological: Negative for numbness.   All other systems reviewed and are negative.    Allergies:  Iodine  Aspirin  Ibuprofen sodium  Pollen extract  Seasonal    Medications:  Proair hfa  Fosamax  Augmentin  Os-ronnie  Atarax  Lisinopril  Singulair  Dilantin  Zocor  Effexor-xr    Past Medical History:     Chronic infection  CKD  Complication of anesthesia  Cpap dependence  Hypertension  MRSA  Obesity   Osteoarthritis  Ptosis  Renal insufficiency  Seizure disorder  Sleep apnea  TBI  Thyroid disease  Uncomplicated asthma   Obesity   GERD  Anemia  Femur fracture  Hyperlipidemia  Vitamin b12 deficiency  Vitamin d deficiency   Heterotopic ossification     Pneumonia  DVT  Arteriosclerotic vascular disease    Past Surgical History:    Total knee arthroplasty   Appendectomy open  Arthroplasty revision knee  Tracheostomy  EGD, combined  Excise mass trunk  Herniorrhaphy incisional   Implant stimulator vagus nerve  Laparoscopic cholecystectomy  Laparotomy exploratory  Orif femur distal  Remove hardware knee  Spleen surgery  IVC filter    Family History:    Mother: cardiovascular  Father: colitis   Sister:  diabetes  Son: Crohn's disease     Social History:  Presents to ED alone    Physical Exam     Patient Vitals for the past 24 hrs:   BP Temp Temp src Pulse Resp SpO2   10/13/21 0900 118/51 -- -- -- -- 99 %   10/13/21 0820 107/51 98.9  F (37.2  C) Oral 67 -- 100 %   10/13/21 0711 134/82 -- -- -- -- --   10/13/21 0709 -- 98.4  F (36.9  C) Oral 72 22 100 %       Physical Exam    Nursing note and vitals reviewed.  Head:  The scalp, face, and head appear normal  Eyes:  Conjunctivae are normal  ENT:    The nose is normal    Pinnae are normal  Neck:  Trachea midline  CV:  Normal rate, regular rhythm. Radial pulse normal.   Resp:  No respiratory distress   Musc:  Normal muscular tone    Pt guarding right shoulder in sling position.     Pain to palpation over spine of scapula, AC joint, and proximal humerus.     Pt unable to tolerate minimal ROM.   Skin:  No rash or lesions noted  Neuro: Speech is normal and fluent. Face is symmetric. Moving all extremities well.   Sensation intact & equal in median, ulnar, and radial nerve distributions bilaterally.  Pt able to perform 'OK' sign, thumb/little finger opposition, cross finger adduction, finger abduction, 3rd finger extension, thumb flexion/extension.   Psych:  Awake. Alert.  Normal affect.  Appropriate interactions.        Emergency Department Course     Imaging:  XR Shoulder Right G/E 3 Views   Final Result   IMPRESSION: Unremarkable exam.      SLY KNIGHT MD            SYSTEM ID:  [id number]        Report per radiology        Emergency Department Course:  Reviewed:  I reviewed nursing notes, vitals, past medical history and Care Everywhere    Assessments:  1012 I obtained history and examined the patient as noted above.     Interventions:  0717 Tylenol, 1000 mg, PO    Disposition:  The patient was discharged to home.     Impression & Plan       Medical Decision Making:  Pt presents with right shoulder pain. Pt quite tender on exam - unable to range shoulder at all. X-ray  negative for acute injury. Pt carries diagnosis of adhesive capsulitis from [facility] s/p 2 cortisone injections with incomplete relief. Pt looking for 2nd opinion essentially. Discussed with pt that there are several orthopedic practices in [facility]. Pt reports he'd like to see someone in the  system, therefore gave him contact information for  sports and ortho. Sling provided for comfort. Pt instructed to take arm out sling multiple times/day and perform ROM exercises.  reviewed. Small rx oxycodone given for pain relief until orthopedic follow-up. He is in stable condition at the time of discharge, indications for return to the ED were discussed as well as follow up. All questions were answered and he is in agreement with the plan.       Diagnosis:    ICD-10-CM    1. Right shoulder pain, acute on chronic  M25.511        Discharge Medications:  New Prescriptions    OXYCODONE (ROXICODONE) 5 MG TABLET    Take 1 tablet (5 mg) by mouth every 6 hours as needed for pain       Scribe Disclosure:  Jose SWEENEY, am serving as a scribe at 9:52 AM on 10/13/2021 to document services personally performed by Pavel Anna MD based on my observations and the provider's statements to me.            Pavel Anna MD  10/13/21 [id number]

## 2021-10-13 NOTE — DISCHARGE INSTRUCTIONS
Take arm out of sling at least 3 times per day and do shoulder mobilization exercises.  Follow-up with either [facility] or [facility].  Work restrictions would need to come from either orthopedist or your primary care provider for disability or FMLA information.

## 2021-10-13 NOTE — TELEPHONE ENCOUNTER
Patient is followed by [facility] for his shoulder. They have seen him for this since his ER visit and has all records. He needs to follow up with them. I informed him of this yesterday.     -trae brewer, pac

## 2021-10-13 NOTE — TELEPHONE ENCOUNTER
Called pt, informed, has memory issues so forgot, agrees to call ortho  Yuko Hernandez RN, BSN  Message handled by CLINIC NURSE.

## 2021-10-13 NOTE — ED NOTES
Patient discharged home . Vital signs stable at discharge. Education provided regarding avs reviewed. Pt verbalized understanding. All questions answered.

## 2021-10-13 NOTE — ED TRIAGE NOTES
"Pt arrives reporting right shoulder pain that has been going on for a month, pt reports this morning he was getting into his truck and heard something \"tear\", PT reports that he has an old injury to his Right shoulder. PT VSS and ABC's intact  "

## 2021-10-22 DIAGNOSIS — J45.20 INTERMITTENT ASTHMA, UNCOMPLICATED: ICD-10-CM

## 2021-10-22 NOTE — LETTER
[facility]  [address] [place], [address]  [phone]        October 29, 2021    Andrea Uribe                                                                                                                                                       [id number] [facility] [address]            Dear Andrea,  We have been trying to reach you but we are unable to leave a message for you. You recently inquired about an MRI referral and that you have not yet received a call to get that set up. After reviewing your chart, I'm wondering if it was [facility] that requested the MRI as we do not see any referrals submitted.. If so, please reach out to them about the status of that referral.    Please let us know if you need any further assistance        Alberto Medley PA-C / Tracey Montalvo/EMT-B

## 2021-10-25 RX ORDER — ALBUTEROL SULFATE 90 UG/1
AEROSOL, METERED RESPIRATORY (INHALATION)
OUTPATIENT
Start: 2021-10-25

## 2021-10-25 RX ORDER — MONTELUKAST SODIUM 10 MG/1
TABLET ORAL
Qty: 90 TABLET | Refills: 0 | OUTPATIENT
Start: 2021-10-25

## 2021-10-25 NOTE — TELEPHONE ENCOUNTER
Routing refill request to provider for review/approval because:  Leukotriene Inhibitors Protocol Tpdaaw07/22/2021 08:20 AM   Asthma control assessment score within normal limits in last 6 months     ACT Total Scores 7/30/2020 5/10/2021 10/12/2021   ACT TOTAL SCORE - - -   ASTHMA ER VISITS - - -   ASTHMA HOSPITALIZATIONS - - -   ACT TOTAL SCORE (Goal Greater than or Equal to 20) 20 25 16   In the past 12 months, how many times did you visit the emergency room for your asthma without being admitted to the hospital? 0 0 0   In the past 12 months, how many times were you hospitalized overnight because of your asthma? 0 0 0   Criss Huber RN

## 2021-10-27 NOTE — TELEPHONE ENCOUNTER
Patient may be confused. He is followed by [facility] for his shoulder. This may be what he is referring to? He needs to contact [facility]    -delphine romero

## 2021-10-27 NOTE — TELEPHONE ENCOUNTER
Pt was contacted advised refills should be at pharmacy already. Pt confirmed he understood.   Pt also stated he's been waiting for someone to call to set up MRI that provider requested during last visit but hasn't heard from anyone yet. No referral found on chart. Routed to provider to confirm    Tracey Montalvo/EMT-YARON

## 2021-10-29 NOTE — TELEPHONE ENCOUNTER
Unable to reach Pt by phone. Pt does not have MyChart active. Letter mailed.  Tracey Montalvo/EMT-B

## 2021-11-12 NOTE — PROGRESS NOTES
Called Pt to update ACT, PHQ9 & Lucien / msg left for Pt to callback.  Tracey Montalvo/EMT-B  [facility] / [place]    No significant past surgical history

## 2021-11-15 ASSESSMENT — ANXIETY QUESTIONNAIRES
7. FEELING AFRAID AS IF SOMETHING AWFUL MIGHT HAPPEN: NOT AT ALL
GAD7 TOTAL SCORE: 11
1. FEELING NERVOUS, ANXIOUS, OR ON EDGE: NEARLY EVERY DAY
3. WORRYING TOO MUCH ABOUT DIFFERENT THINGS: NEARLY EVERY DAY
6. BECOMING EASILY ANNOYED OR IRRITABLE: NOT AT ALL
5. BEING SO RESTLESS THAT IT IS HARD TO SIT STILL: MORE THAN HALF THE DAYS
2. NOT BEING ABLE TO STOP OR CONTROL WORRYING: SEVERAL DAYS

## 2021-11-15 ASSESSMENT — PATIENT HEALTH QUESTIONNAIRE - PHQ9: 5. POOR APPETITE OR OVEREATING: MORE THAN HALF THE DAYS

## 2021-11-15 NOTE — PROGRESS NOTES
Contacted Pt, Updated AT, PHQ9 & DAYAMI per provider    Tracey Montalvo/EMT-B  [facility] / [place]

## 2021-11-16 ASSESSMENT — PATIENT HEALTH QUESTIONNAIRE - PHQ9: SUM OF ALL RESPONSES TO PHQ QUESTIONS 1-9: 13

## 2021-11-16 ASSESSMENT — ANXIETY QUESTIONNAIRES: GAD7 TOTAL SCORE: 11

## 2021-11-27 DIAGNOSIS — J45.20 INTERMITTENT ASTHMA, UNCOMPLICATED: ICD-10-CM

## 2021-11-30 RX ORDER — ALBUTEROL SULFATE 90 UG/1
AEROSOL, METERED RESPIRATORY (INHALATION)
Qty: 18 G | Refills: 0 | Status: SHIPPED | OUTPATIENT
Start: 2021-11-30 | End: 2022-03-03

## 2021-11-30 RX ORDER — MONTELUKAST SODIUM 10 MG/1
TABLET ORAL
Qty: 90 TABLET | Refills: 0 | OUTPATIENT
Start: 2021-11-30

## 2021-12-01 ENCOUNTER — TRANSFERRED RECORDS (OUTPATIENT)
Dept: HEALTH INFORMATION MANAGEMENT | Facility: CLINIC | Age: 46
End: 2021-12-01
Payer: MEDICARE

## 2022-01-19 DIAGNOSIS — J45.20 INTERMITTENT ASTHMA, UNCOMPLICATED: ICD-10-CM

## 2022-01-20 RX ORDER — MONTELUKAST SODIUM 10 MG/1
TABLET ORAL
Qty: 90 TABLET | Refills: 0 | Status: SHIPPED | OUTPATIENT
Start: 2022-01-20 | End: 2022-02-09

## 2022-01-20 NOTE — TELEPHONE ENCOUNTER
Routing refill request to provider for review/approval because:  Labs out of range:  ACT    Wolf AGUIRRE RN

## 2022-01-26 DIAGNOSIS — F32.1 MODERATE MAJOR DEPRESSION (H): ICD-10-CM

## 2022-01-26 DIAGNOSIS — F41.9 ANXIETY: ICD-10-CM

## 2022-01-27 RX ORDER — VENLAFAXINE HYDROCHLORIDE 150 MG/1
CAPSULE, EXTENDED RELEASE ORAL
Qty: 90 CAPSULE | Refills: 1 | OUTPATIENT
Start: 2022-01-27

## 2022-01-27 NOTE — TELEPHONE ENCOUNTER
Patient given a 6 month supply of medication on 10/12/21. Refusing refill request and closing encounter.     Karlene Bond RN on 1/27/2022 at 2:31 PM

## 2022-02-09 ENCOUNTER — OFFICE VISIT (OUTPATIENT)
Dept: FAMILY MEDICINE | Facility: CLINIC | Age: 47
End: 2022-02-09
Payer: MEDICARE

## 2022-02-09 VITALS — SYSTOLIC BLOOD PRESSURE: 110 MMHG | DIASTOLIC BLOOD PRESSURE: 68 MMHG | HEART RATE: 70 BPM | OXYGEN SATURATION: 93 %

## 2022-02-09 DIAGNOSIS — Z12.11 SCREEN FOR COLON CANCER: ICD-10-CM

## 2022-02-09 DIAGNOSIS — Z01.818 PREOP GENERAL PHYSICAL EXAM: Primary | ICD-10-CM

## 2022-02-09 DIAGNOSIS — S43.431A LABRAL TEAR OF SHOULDER, RIGHT, INITIAL ENCOUNTER: ICD-10-CM

## 2022-02-09 DIAGNOSIS — J45.20 INTERMITTENT ASTHMA, UNCOMPLICATED: ICD-10-CM

## 2022-02-09 DIAGNOSIS — F41.9 ANXIETY: ICD-10-CM

## 2022-02-09 DIAGNOSIS — F32.1 MODERATE MAJOR DEPRESSION (H): ICD-10-CM

## 2022-02-09 DIAGNOSIS — K21.9 GASTROESOPHAGEAL REFLUX DISEASE, UNSPECIFIED WHETHER ESOPHAGITIS PRESENT: ICD-10-CM

## 2022-02-09 LAB
ANION GAP SERPL CALCULATED.3IONS-SCNC: 4 MMOL/L (ref 3–14)
BUN SERPL-MCNC: 20 MG/DL (ref 7–30)
CALCIUM SERPL-MCNC: 9.2 MG/DL (ref 8.5–10.1)
CHLORIDE BLD-SCNC: 107 MMOL/L (ref 94–109)
CO2 SERPL-SCNC: 30 MMOL/L (ref 20–32)
CREAT SERPL-MCNC: 1.09 MG/DL (ref 0.66–1.25)
GFR SERPL CREATININE-BSD FRML MDRD: 84 ML/MIN/1.73M2
GLUCOSE BLD-MCNC: 100 MG/DL (ref 70–99)
HGB BLD-MCNC: 12.2 G/DL (ref 13.3–17.7)
POTASSIUM BLD-SCNC: 4.8 MMOL/L (ref 3.4–5.3)
SODIUM SERPL-SCNC: 141 MMOL/L (ref 133–144)

## 2022-02-09 PROCEDURE — 85018 HEMOGLOBIN: CPT | Performed by: PHYSICIAN ASSISTANT

## 2022-02-09 PROCEDURE — 99214 OFFICE O/P EST MOD 30 MIN: CPT | Performed by: PHYSICIAN ASSISTANT

## 2022-02-09 PROCEDURE — 80048 BASIC METABOLIC PNL TOTAL CA: CPT | Performed by: PHYSICIAN ASSISTANT

## 2022-02-09 PROCEDURE — 36415 COLL VENOUS BLD VENIPUNCTURE: CPT | Performed by: PHYSICIAN ASSISTANT

## 2022-02-09 RX ORDER — VENLAFAXINE HYDROCHLORIDE 150 MG/1
CAPSULE, EXTENDED RELEASE ORAL
Qty: 90 CAPSULE | Refills: 1 | Status: SHIPPED | OUTPATIENT
Start: 2022-02-09 | End: 2022-09-06

## 2022-02-09 RX ORDER — MONTELUKAST SODIUM 10 MG/1
TABLET ORAL
Qty: 90 TABLET | Refills: 1 | Status: SHIPPED | OUTPATIENT
Start: 2022-02-09 | End: 2022-10-25

## 2022-02-09 RX ORDER — FAMOTIDINE 40 MG/1
40 TABLET, FILM COATED ORAL AT BEDTIME
Qty: 90 TABLET | Refills: 1 | Status: SHIPPED | OUTPATIENT
Start: 2022-02-09 | End: 2022-08-18

## 2022-02-09 ASSESSMENT — ANXIETY QUESTIONNAIRES
GAD7 TOTAL SCORE: 2
GAD7 TOTAL SCORE: 2
7. FEELING AFRAID AS IF SOMETHING AWFUL MIGHT HAPPEN: NOT AT ALL
3. WORRYING TOO MUCH ABOUT DIFFERENT THINGS: NOT AT ALL
2. NOT BEING ABLE TO STOP OR CONTROL WORRYING: SEVERAL DAYS
4. TROUBLE RELAXING: NOT AT ALL
6. BECOMING EASILY ANNOYED OR IRRITABLE: NOT AT ALL
1. FEELING NERVOUS, ANXIOUS, OR ON EDGE: SEVERAL DAYS
GAD7 TOTAL SCORE: 2
7. FEELING AFRAID AS IF SOMETHING AWFUL MIGHT HAPPEN: NOT AT ALL
5. BEING SO RESTLESS THAT IT IS HARD TO SIT STILL: NOT AT ALL

## 2022-02-09 ASSESSMENT — PATIENT HEALTH QUESTIONNAIRE - PHQ9
10. IF YOU CHECKED OFF ANY PROBLEMS, HOW DIFFICULT HAVE THESE PROBLEMS MADE IT FOR YOU TO DO YOUR WORK, TAKE CARE OF THINGS AT HOME, OR GET ALONG WITH OTHER PEOPLE: VERY DIFFICULT
SUM OF ALL RESPONSES TO PHQ QUESTIONS 1-9: 8
SUM OF ALL RESPONSES TO PHQ QUESTIONS 1-9: 8
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
## 2022-02-09 NOTE — PATIENT INSTRUCTIONS
Take all medications as prescribed except for lisinopril. Hold this the day of surgery. You may restart the day after surgery.     Preparing for Your Surgery  Getting started  A nurse will call you to review your health history and instructions. They will give you an arrival time based on your scheduled surgery time. Please be ready to share:    Your doctor's clinic name and phone number    Your medical, surgical and anesthesia history    A list of allergies and sensitivities    A list of medicines, including herbal treatments and over-the-counter drugs    Whether the patient has a legal guardian (ask how to send us the papers in advance)  Please tell us if you're pregnant--or if there's any chance you might be pregnant. Some surgeries may injure a fetus (unborn baby), so they require a pregnancy test. Surgeries that are safe for a fetus don't always need a test, and you can choose whether to have one.   If you have a child who's having surgery, please ask for a copy of Preparing for Your Child's Surgery.    Preparing for surgery    Within 30 days of surgery: Have a pre-op exam (sometimes called an H&P, or History and Physical). This can be done at a clinic or pre-operative center.  ? If you're having a , you may not need this exam. Talk to your care team.    At your pre-op exam, talk to your care team about all medicines you take. If you need to stop any medicines before surgery, ask when to start taking them again.  ? We do this for your safety. Many medicines can make you bleed too much during surgery. Some change how well surgery (anesthesia) drugs work.    Call your insurance company to let them know you're having surgery. (If you don't have insurance, call [phone].)    Call your clinic if there's any change in your health. This includes signs of a cold or flu (sore throat, runny nose, cough, rash, fever). It also includes a scrape or scratch near the surgery site.    If you have questions on the  day of surgery, call your hospital or surgery center.  COVID testing  You may need to be tested for COVID-19 before having surgery. If so, your surgical team will give you instructions for scheduling this test, separate from your preoperative history and physical.  Eating and drinking guidelines  For your safety: Unless your surgeon tells you otherwise, follow the guidelines below.    Eat and drink as usual until 8 hours before surgery. After that, no food or milk.    Drink clear liquids until 2 hours before surgery. These are liquids you can see through, like water, Gatorade and Propel Water. You may also have black coffee and tea (no cream or milk).    Nothing by mouth within 2 hours of surgery. This includes gum, candy and breath mints.    If you drink alcohol: Stop drinking it the night before surgery.    If your care team tells you to take medicine on the morning of surgery, it's okay to take it with a sip of water.  Preventing infection    Shower or bathe the night before and morning of your surgery. Follow the instructions your clinic gave you. (If no instructions, use regular soap.)    Don't shave or clip hair near your surgery site. We'll remove the hair if needed.    Don't smoke or vape the morning of surgery. You may chew nicotine gum up to 2 hours before surgery. A nicotine patch is okay.  ? Note: Some surgeries require you to completely quit smoking and nicotine. Check with your surgeon.    Your care team will make every effort to keep you safe from infection. We will:  ? Clean our hands often with soap and water (or an alcohol-based hand rub).  ? Clean the skin at your surgery site with a special soap that kills germs.  ? Give you a special gown to keep you warm. (Cold raises the risk of infection.)  ? Wear special hair covers, masks, gowns and gloves during surgery.  ? Give antibiotic medicine, if prescribed. Not all surgeries need antibiotics.  What to bring on the day of surgery    Photo ID and  insurance card    Copy of your health care directive, if you have one    Glasses and hearing aides (bring cases)  ? You can't wear contacts during surgery    Inhaler and eye drops, if you use them (tell us about these when you arrive)    CPAP machine or breathing device, if you use them    A few personal items, if spending the night    If you have . . .  ? A pacemaker, ICD (cardiac defibrillator) or other implant: Bring the ID card.  ? An implanted stimulator: Bring the remote control.  ? A legal guardian: Bring a copy of the certified (court-stamped) guardianship papers.  Please remove any jewelry, including body piercings. Leave jewelry and other valuables at home.  If you're going home the day of surgery    You must have a responsible adult drive you home. They should stay with you overnight as well.    If you don't have someone to stay with you, and you aren't safe to go home alone, we may keep you overnight. Insurance often won't pay for this.  After surgery  If it's hard to control your pain or you need more pain medicine, please call your surgeon's office.  Questions?   If you have any questions for your care team, list them here: _________________________________________________________________________________________________________________________________________________________________________ ____________________________________ ____________________________________ ____________________________________  For informational purposes only. Not to replace the advice of your health care provider. Copyright   2003, 2019 [facility]. All rights reserved. Clinically reviewed by Gloria Su MD. [place] [id number] - REV 07/21.

## 2022-02-10 ASSESSMENT — PATIENT HEALTH QUESTIONNAIRE - PHQ9: SUM OF ALL RESPONSES TO PHQ QUESTIONS 1-9: 8

## 2022-02-10 ASSESSMENT — ANXIETY QUESTIONNAIRES: GAD7 TOTAL SCORE: 2

## 2022-03-03 PROBLEM — J45.30 MILD PERSISTENT ASTHMA WITHOUT COMPLICATION: Status: ACTIVE | Noted: 2022-03-03

## 2022-03-17 ENCOUNTER — OFFICE VISIT (OUTPATIENT)
Dept: FAMILY MEDICINE | Facility: CLINIC | Age: 47
End: 2022-03-17
Payer: MEDICARE

## 2022-03-17 VITALS
TEMPERATURE: 98.2 F | WEIGHT: 263.6 LBS | BODY MASS INDEX: 36.9 KG/M2 | HEART RATE: 80 BPM | DIASTOLIC BLOOD PRESSURE: 84 MMHG | SYSTOLIC BLOOD PRESSURE: 132 MMHG | OXYGEN SATURATION: 98 % | RESPIRATION RATE: 18 BRPM | HEIGHT: 71 IN

## 2022-03-17 DIAGNOSIS — S06.9X9S TRAUMATIC BRAIN INJURY WITH LOSS OF CONSCIOUSNESS, SEQUELA (H): ICD-10-CM

## 2022-03-17 DIAGNOSIS — Z01.818 PRE-OP EXAM: Primary | ICD-10-CM

## 2022-03-17 DIAGNOSIS — G47.33 OBSTRUCTIVE SLEEP APNEA SYNDROME: ICD-10-CM

## 2022-03-17 DIAGNOSIS — G40.909 SEIZURE DISORDER (H): ICD-10-CM

## 2022-03-17 DIAGNOSIS — E66.01 MORBID OBESITY (H): ICD-10-CM

## 2022-03-17 PROCEDURE — 99214 OFFICE O/P EST MOD 30 MIN: CPT | Performed by: FAMILY MEDICINE

## 2022-03-17 PROCEDURE — 93000 ELECTROCARDIOGRAM COMPLETE: CPT | Performed by: FAMILY MEDICINE

## 2022-03-17 SDOH — ECONOMIC STABILITY: FOOD INSECURITY: WITHIN THE PAST 12 MONTHS, YOU WORRIED THAT YOUR FOOD WOULD RUN OUT BEFORE YOU GOT MONEY TO BUY MORE.: PATIENT DECLINED

## 2022-03-17 SDOH — HEALTH STABILITY: PHYSICAL HEALTH: ON AVERAGE, HOW MANY MINUTES DO YOU ENGAGE IN EXERCISE AT THIS LEVEL?: 0 MIN

## 2022-03-17 SDOH — ECONOMIC STABILITY: TRANSPORTATION INSECURITY
IN THE PAST 12 MONTHS, HAS THE LACK OF TRANSPORTATION KEPT YOU FROM MEDICAL APPOINTMENTS OR FROM GETTING MEDICATIONS?: NO

## 2022-03-17 SDOH — ECONOMIC STABILITY: FOOD INSECURITY: WITHIN THE PAST 12 MONTHS, THE FOOD YOU BOUGHT JUST DIDN'T LAST AND YOU DIDN'T HAVE MONEY TO GET MORE.: NEVER TRUE

## 2022-03-17 SDOH — ECONOMIC STABILITY: INCOME INSECURITY: HOW HARD IS IT FOR YOU TO PAY FOR THE VERY BASICS LIKE FOOD, HOUSING, MEDICAL CARE, AND HEATING?: SOMEWHAT HARD

## 2022-03-17 SDOH — ECONOMIC STABILITY: INCOME INSECURITY: IN THE LAST 12 MONTHS, WAS THERE A TIME WHEN YOU WERE NOT ABLE TO PAY THE MORTGAGE OR RENT ON TIME?: NO

## 2022-03-17 SDOH — HEALTH STABILITY: PHYSICAL HEALTH: ON AVERAGE, HOW MANY DAYS PER WEEK DO YOU ENGAGE IN MODERATE TO STRENUOUS EXERCISE (LIKE A BRISK WALK)?: 0 DAYS

## 2022-03-17 ASSESSMENT — LIFESTYLE VARIABLES
HOW OFTEN DO YOU HAVE A DRINK CONTAINING ALCOHOL: NEVER
HOW OFTEN DO YOU HAVE SIX OR MORE DRINKS ON ONE OCCASION: NEVER
HOW MANY STANDARD DRINKS CONTAINING ALCOHOL DO YOU HAVE ON A TYPICAL DAY: PATIENT DECLINED

## 2022-03-17 ASSESSMENT — SOCIAL DETERMINANTS OF HEALTH (SDOH)
IN A TYPICAL WEEK, HOW MANY TIMES DO YOU TALK ON THE PHONE WITH FAMILY, FRIENDS, OR NEIGHBORS?: TWICE A WEEK
HOW OFTEN DO YOU GET TOGETHER WITH FRIENDS OR RELATIVES?: ONCE A WEEK
DO YOU BELONG TO ANY CLUBS OR ORGANIZATIONS SUCH AS CHURCH GROUPS UNIONS, FRATERNAL OR ATHLETIC GROUPS, OR SCHOOL GROUPS?: NO
HOW OFTEN DO YOU ATTEND CHURCH OR RELIGIOUS SERVICES?: PATIENT DECLINED
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
## 2022-04-19 ENCOUNTER — TRANSFERRED RECORDS (OUTPATIENT)
Dept: HEALTH INFORMATION MANAGEMENT | Facility: CLINIC | Age: 47
End: 2022-04-19
Payer: MEDICARE

## 2022-04-28 ENCOUNTER — TELEPHONE (OUTPATIENT)
Dept: FAMILY MEDICINE | Facility: CLINIC | Age: 47
End: 2022-04-28

## 2022-04-28 NOTE — TELEPHONE ENCOUNTER
Patient Quality Outreach    Patient is due for the following:   Asthma  -  ACT needed, Asthma follow-up visit and AAP    NEXT STEPS:   Schedule a office visit for Asthma    Type of outreach:    Sent letter. and Copy of ACT mailed to patient.      Questions for provider review:    None     Michael Godinez

## 2022-04-28 NOTE — LETTER
[facility]  [address] [address] [id number] [phone]  April 28, 2022    Andrea Uribe  [address] [address]    Dear Andrea,    I care about your health and have reviewed your health plan. I have reviewed your medical conditions, medication list, and lab results and am making recommendations based on this review, to better manage your health.    You are in particular need of attention regarding:  -Asthma    I am recommending that you:  {recommendations:-schedule a FOLLOWUP OFFICE APPOINTMENT with me.       Here is a list of Health Maintenance topics that are due now or due soon:  Health Maintenance Due   Topic Date Due     ADVANCE CARE PLANNING  Never done     COVID-19 Vaccine (1) Never done     COLORECTAL CANCER SCREENING  Never done     MEDICARE ANNUAL WELLNESS VISIT  Never done     ASTHMA ACTION PLAN  02/06/2020     INFLUENZA VACCINE (1) 09/01/2021     ANNUAL REVIEW OF HM ORDERS  05/10/2022       Please call us at [phone] (or use [employer]) to address the above recommendations.     Thank you for trusting [facility] and we appreciate the opportunity to serve you.  We look forward to supporting your healthcare needs in the future.    Healthy Regards,    Alberto Medley PA-C

## 2022-05-03 NOTE — TELEPHONE ENCOUNTER
Central Prior Authorization Team   Phone: [phone]      PA Initiation    Medication: ondansetron (ZOFRAN-ODT) 4 MG ODT tab  Insurance Company: [employer] - Phone [phone] Fax [phone]  Pharmacy Filling the Rx: [id number] - [place], [place] - [address] [address]  Filling Pharmacy Phone: [phone]  Filling Pharmacy Fax:    Start Date: 5/12/2021        
Dr. Joiner please see below.    Andrew DAVIST  
PRIOR AUTHORIZATION DENIED    Medication: ondansetron (ZOFRAN-ODT) 4 MG ODT tab    Denial Date: 5/12/2021    Denial Rational:                Appeal Information:    If you would like to appeal, please supply P/A team with a letter of medical necessity with clinical reason.         
Prior Authorization Retail Medication Request    Medication/Dose: ondansetron (ZOFRAN-ODT) 4 MG ODT tab  ICD code (if different than what is on RX):    Previously Tried and Failed: ondansetron (ZOFRAN) injection 4 mg     Rationale:  Patient has been taking the tablets since 08/22/2012.    Insurance Name:  [employer]  Insurance ID:  [id number]      Pharmacy Information (if different than what is on RX)  Name:    Phone:        Andrew DAHL    
Her/She

## 2022-05-05 ENCOUNTER — TRANSFERRED RECORDS (OUTPATIENT)
Dept: HEALTH INFORMATION MANAGEMENT | Facility: CLINIC | Age: 47
End: 2022-05-05
Payer: MEDICARE

## 2022-05-24 ENCOUNTER — TRANSFERRED RECORDS (OUTPATIENT)
Dept: HEALTH INFORMATION MANAGEMENT | Facility: CLINIC | Age: 47
End: 2022-05-24
Payer: MEDICARE

## 2022-05-24 DIAGNOSIS — N18.30 STAGE 3 CHRONIC KIDNEY DISEASE, UNSPECIFIED WHETHER STAGE 3A OR 3B CKD (H): ICD-10-CM

## 2022-05-25 RX ORDER — LISINOPRIL 5 MG/1
TABLET ORAL
Qty: 90 TABLET | Refills: 1 | Status: SHIPPED | OUTPATIENT
Start: 2022-05-25 | End: 2022-11-22

## 2022-07-04 DIAGNOSIS — M81.6 LOCALIZED OSTEOPOROSIS WITHOUT CURRENT PATHOLOGICAL FRACTURE: ICD-10-CM

## 2022-07-07 RX ORDER — ALENDRONATE SODIUM 70 MG/1
TABLET ORAL
Qty: 12 TABLET | Refills: 3 | Status: SHIPPED | OUTPATIENT
Start: 2022-07-07 | End: 2023-05-26

## 2022-07-17 ENCOUNTER — NURSE TRIAGE (OUTPATIENT)
Dept: NURSING | Facility: CLINIC | Age: 47
End: 2022-07-17

## 2022-07-18 NOTE — TELEPHONE ENCOUNTER
"   Nurse Triage SBAR    Is this a 2nd Level Triage? YES, LICENSED PRACTITIONER REVIEW IS REQUIRED    Situation: Patient's wife Gabriella, who has consent to communicate, calling to report left leg swelling last few days that is worsening and is draining \"blood\" through the pours.     Background: Patient wears a brace up to his calf on this leg for drop foot.  Has had knee surgeries on this leg twice plus history of foot drop.    Has had left leg swelling previously \"a couple years\" ago and was put on \"water pills\" that helped right away.    Assessment:  Denies chest pain, leg pain, dyspnea, redness.    Protocol Recommended Disposition:   See HCP Within 4 Hours (Or PCP Triage)    Recommendation: Secondary triage disposition required.  Caller is saying patient will want to go to  tomorrow.     Paged to provider    Paged on call Dr. Valencia via cell per answering service.  Provider recommneds ruling out DVT in the ER tonight.     Provider Recommendation Follow Up:   Reached patient/caregiver. Informed of provider's recommendations. Patient caregiver  verbalized understanding and said patient would not agree with plan.  RN encouraged caller to explain need to rule out DVT tonight per provider recommendation.    Génesis Haynes RN  [facility]        Does the patient meet one of the following criteria for ADS visit consideration? 16+ years old, with an [facility] PCP     TIP  Providers, please consider if this condition is appropriate for management at one of our Acute and Diagnostic Services sites.     If patient is a good candidate, please use dotphrase <dot>triageresponse and select Refer to ADS to document.    Reason for Disposition    [1] Thigh, calf, or ankle swelling AND [2] only 1 side    Additional Information    Negative: Severe difficulty breathing (e.g., struggling for each breath, speaks in single words)    Negative: Looks like a broken bone or dislocated joint (e.g., crooked or deformed)    Negative: " Sounds like a life-threatening emergency to the triager    Negative: Chest pain    Negative: Followed a leg injury    Negative: [1] Small area of swelling AND [2] followed an insect bite to the area    Negative: Swelling of one ankle joint    Negative: Swelling of knee is main symptom    Negative: Pregnant    Negative: Postpartum (from 0 to 6 weeks after delivery)    Negative: Difficulty breathing at rest    Negative: Entire foot is cool or blue in comparison to other side    Negative: [1] Can't walk or can barely walk AND [2] new onset    Negative: [1] Difficulty breathing with exertion (e.g., walking) AND [2] new onset or worsening    Negative: [1] Red area or streak AND [2] fever    Negative: [1] Swelling is painful to touch AND [2] fever    Negative: [1] Cast on leg or ankle AND [2] now increased pain    Negative: Patient sounds very sick or weak to the triager    Negative: SEVERE leg swelling (e.g., swelling extends above knee, entire leg is swollen, weeping fluid)    Negative: [1] Red area or streak [2] large (> 2 in. or 5 cm)    Negative: [1] Thigh or calf pain AND [2] only 1 side AND [3] present > 1 hour    Protocols used: LEG SWELLING AND EDEMA-A-AH

## 2022-08-04 ENCOUNTER — TRANSFERRED RECORDS (OUTPATIENT)
Dept: HEALTH INFORMATION MANAGEMENT | Facility: CLINIC | Age: 47
End: 2022-08-04

## 2022-08-18 DIAGNOSIS — K21.9 GASTROESOPHAGEAL REFLUX DISEASE, UNSPECIFIED WHETHER ESOPHAGITIS PRESENT: ICD-10-CM

## 2022-08-18 RX ORDER — FAMOTIDINE 40 MG/1
TABLET, FILM COATED ORAL
Qty: 90 TABLET | Refills: 1 | Status: SHIPPED | OUTPATIENT
Start: 2022-08-18 | End: 2023-01-03

## 2022-09-03 DIAGNOSIS — F41.9 ANXIETY: ICD-10-CM

## 2022-09-03 DIAGNOSIS — J45.30 MILD PERSISTENT ASTHMA WITHOUT COMPLICATION: ICD-10-CM

## 2022-09-03 DIAGNOSIS — F32.1 MODERATE MAJOR DEPRESSION (H): ICD-10-CM

## 2022-09-06 RX ORDER — VENLAFAXINE HYDROCHLORIDE 150 MG/1
CAPSULE, EXTENDED RELEASE ORAL
Qty: 90 CAPSULE | Refills: 1 | Status: SHIPPED | OUTPATIENT
Start: 2022-09-06 | End: 2023-02-15

## 2022-09-06 RX ORDER — ALBUTEROL SULFATE 90 UG/1
AEROSOL, METERED RESPIRATORY (INHALATION)
Qty: 18 G | Refills: 1 | Status: SHIPPED | OUTPATIENT
Start: 2022-09-06 | End: 2023-02-15

## 2022-09-06 RX ORDER — DEXAMETHASONE 4 MG/1
TABLET ORAL
Qty: 36 G | Refills: 0 | Status: SHIPPED | OUTPATIENT
Start: 2022-09-06 | End: 2023-02-15

## 2022-09-06 NOTE — TELEPHONE ENCOUNTER
Routing refill request to provider for review/approval because:  Labs not current:  Asthma control assessment  Labs out of range: PHQ-9  PHQ-9 score:    PHQ 2/9/2022   PHQ-9 Total Score 8   Q9: Thoughts of better off dead/self-harm past 2 weeks Not at all     Patient needs to be seen because:  It has been more than 6 months since last visit.

## 2022-09-12 ENCOUNTER — OFFICE VISIT (OUTPATIENT)
Dept: URGENT CARE | Facility: URGENT CARE | Age: 47
End: 2022-09-12
Payer: MEDICARE

## 2022-09-12 VITALS
SYSTOLIC BLOOD PRESSURE: 122 MMHG | OXYGEN SATURATION: 97 % | TEMPERATURE: 99 F | BODY MASS INDEX: 33.75 KG/M2 | DIASTOLIC BLOOD PRESSURE: 84 MMHG | RESPIRATION RATE: 20 BRPM | WEIGHT: 242 LBS | HEART RATE: 62 BPM

## 2022-09-12 DIAGNOSIS — L82.0 INFLAMED SEBORRHEIC KERATOSIS: Primary | ICD-10-CM

## 2022-09-12 DIAGNOSIS — S50.861A INSECT BITE OF RIGHT FOREARM, INITIAL ENCOUNTER: ICD-10-CM
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
## 2022-09-12 DIAGNOSIS — D23.5 DERMOID CYST OF SKIN OF BACK: ICD-10-CM

## 2022-09-12 PROCEDURE — 99213 OFFICE O/P EST LOW 20 MIN: CPT | Performed by: PHYSICIAN ASSISTANT

## 2022-09-12 NOTE — PATIENT INSTRUCTIONS
Patient was educated on the natural course of skin lesions. All of them see benign. He has right cheek seborrheic keratosis, back cyst, and right forearm insect bite.  Advised to stop picking at skin lesions as this may worsen them. Referred to dermatology as requested by patient. Seek emergency care if you develop severe pain/redness, shortness of breath, or difficulty swallowing.
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
## 2022-09-27 ENCOUNTER — TRANSFERRED RECORDS (OUTPATIENT)
Dept: HEALTH INFORMATION MANAGEMENT | Facility: CLINIC | Age: 47
End: 2022-09-27

## 2022-11-22 DIAGNOSIS — N18.30 STAGE 3 CHRONIC KIDNEY DISEASE, UNSPECIFIED WHETHER STAGE 3A OR 3B CKD (H): ICD-10-CM

## 2022-11-22 RX ORDER — LISINOPRIL 5 MG/1
TABLET ORAL
Qty: 90 TABLET | Refills: 0 | Status: SHIPPED | OUTPATIENT
Start: 2022-11-22 | End: 2023-01-03

## 2022-11-22 NOTE — TELEPHONE ENCOUNTER
Prescription approved per [facility] Refill Protocol.  Lovely Chopra, RN, BSN, PHN  [facility]

## 2023-02-03 ENCOUNTER — VIRTUAL VISIT (OUTPATIENT)
Dept: FAMILY MEDICINE | Facility: CLINIC | Age: 48
End: 2023-02-03
Payer: MEDICARE

## 2023-02-03 ENCOUNTER — PATIENT OUTREACH (OUTPATIENT)
Dept: FAMILY MEDICINE | Facility: CLINIC | Age: 48
End: 2023-02-03

## 2023-02-03 DIAGNOSIS — S06.9X9S TRAUMATIC BRAIN INJURY WITH LOSS OF CONSCIOUSNESS, SEQUELA (H): ICD-10-CM

## 2023-02-03 DIAGNOSIS — E66.01 MORBID OBESITY (H): ICD-10-CM

## 2023-02-03 DIAGNOSIS — F33.42 RECURRENT MAJOR DEPRESSIVE DISORDER, IN FULL REMISSION (H): Primary | ICD-10-CM

## 2023-02-03 DIAGNOSIS — G40.909 SEIZURE DISORDER (H): ICD-10-CM

## 2023-02-03 PROBLEM — F32.1 MODERATE MAJOR DEPRESSION (H): Status: ACTIVE | Noted: 2023-02-03

## 2023-02-03 PROCEDURE — 99213 OFFICE O/P EST LOW 20 MIN: CPT | Mod: 95 | Performed by: PHYSICIAN ASSISTANT

## 2023-02-03 ASSESSMENT — ANXIETY QUESTIONNAIRES
7. FEELING AFRAID AS IF SOMETHING AWFUL MIGHT HAPPEN: NOT AT ALL
2. NOT BEING ABLE TO STOP OR CONTROL WORRYING: NOT AT ALL
6. BECOMING EASILY ANNOYED OR IRRITABLE: NOT AT ALL
GAD7 TOTAL SCORE: 1
3. WORRYING TOO MUCH ABOUT DIFFERENT THINGS: NOT AT ALL
7. FEELING AFRAID AS IF SOMETHING AWFUL MIGHT HAPPEN: NOT AT ALL
4. TROUBLE RELAXING: NOT AT ALL
GAD7 TOTAL SCORE: 1
5. BEING SO RESTLESS THAT IT IS HARD TO SIT STILL: NOT AT ALL
GAD7 TOTAL SCORE: 1
1. FEELING NERVOUS, ANXIOUS, OR ON EDGE: SEVERAL DAYS

## 2023-02-03 ASSESSMENT — PATIENT HEALTH QUESTIONNAIRE - PHQ9
SUM OF ALL RESPONSES TO PHQ QUESTIONS 1-9: 2
SUM OF ALL RESPONSES TO PHQ QUESTIONS 1-9: 2

## 2023-02-03 NOTE — TELEPHONE ENCOUNTER
Call to patient to schedule in person med check with PCP. Patient reports challenge to find a time of day because spouse provides transportation and she works a split AM-PM bus shift. We found an appointment time that will allow for spouse to not miss work.   We emphasized importance of face-to-face visits at minimum annually for ongoing care and prescription refills. Patient verbalized understanding.     Patient accepts appointment card in the mail. Sent.     Feb 15, 2023 11:00 AM  (Arrive by 10:40 AM)  Provider Visit with Alberto Medley PA-C  [facility] ([facility] ) [address] [address] [address] [phone]        Marbin Danielle RN

## 2023-02-03 NOTE — PROGRESS NOTES
"Andrea is a 47 year old who is being evaluated via a billable video visit.      How would you like to obtain your AVS? MyChart  If the video visit is dropped, the invitation should be resent by: Text to cell phone: [phone] (doximity)  Will anyone else be joining your video visit? No        {PROVIDER CHARTING PREFERENCE:311893}    Subjective   Andrea is a 47 year old{ACCOMPANIED BY STATEMENT (Optional):734782}, presenting for the following health issues:  Form Request (Handicap sticker renewal)      HPI     Ongoing chronic right should pain. Underwent surgery for this through [facility] in April of last year. Was seen by them as recent as sept of 2022. Has not yet returned for consult but states ongoing severe pain that has limited any range of motion. Cannot lift objects that are left than 10 lbs despite ongoing his ongoing home physical therapy.     Of note, ongoing right knee pain over time as well. This seems to worsening. Going to see [place] as recent as feb 8th for a second opinion on not only for his right shoulder but to also evaluate knee pain.       Past history of seizures and TBI. No recent seizures. Stable on current regimen and  Followed by neurology. However, his TBI has resulted in impairment of gait and ability to ambulate more than  feet.     Past history of MDD. Stable on current effexor. No side effects. phq 9 in remission.         Review of Systems   Constitutional, HEENT, cardiovascular, pulmonary, GI, , musculoskeletal, neuro, skin, endocrine and psych systems are negative, except as otherwise noted.      Objective           Vitals:  No vitals were obtained today due to virtual visit.    Physical Exam   {video visit exam brief selected:280696::\"GENERAL: Healthy, alert and no distress\",\"EYES: Eyes grossly normal to inspection.  No discharge or erythema, or obvious scleral/conjunctival abnormalities.\",\"RESP: No audible wheeze, cough, or visible cyanosis.  No visible retractions or increased " "work of breathing.  \",\"SKIN: Visible skin clear. No significant rash, abnormal pigmentation or lesions.\",\"NEURO: Cranial nerves grossly intact.  Mentation and speech appropriate for age.\",\"PSYCH: Mentation appears normal, affect normal/bright, judgement and insight intact, normal speech and appearance well-groomed.\"}    {Diagnostic Test Results (Optional):506966}    {AMBULATORY ATTESTATION (Optional):869865}        Video-Visit Details    Type of service:  Video Visit   Video Start Time: {video visit start/end time for provider to select:617618}  Video End Time:{video visit start/end time for provider to select:363652}    Originating Location (pt. Location): {video visit patient location:257403::\"Home\"}  {PROVIDER LOCATION On-site should be selected for visits conducted from your clinic location or adjoining [facility] hospital, academic office, or other nearby [facility] building. Off-site should be selected for all other provider locations, including home:239110}  Distant Location (provider location):  {virtual location provider:888632}  Platform used for Video Visit: {Virtual Visit Platforms:514041::\"Casengo\"}    "

## 2023-02-03 NOTE — PROGRESS NOTES
"Andrea is a 47 year old who is being evaluated via a billable telephone visit.      What phone number would you like to be contacted at? See demographics  How would you like to obtain your AVS? Ladyhart  Distant Location (provider location):  Off-site    Assessment & Plan     Recurrent major depressive disorder, in full remission (H)  Stable. OV scheduled for formal exam in 1.5 weeks. Stable on current regimen.     Traumatic brain injury with loss of consciousness, sequela (H)  Followed by neurology. Limits mobility as well as ongoing subacute knee pain. Patient has future ortho consult for knee, but for TBI, will update disability parking pass and patient will  at clinic on 2/6/23.     Morbid obesity (H)  Due to bmi and asthma. Will assess in person in 1.5 weeks.     Seizure disorder (H)  Followed by neuro.          BMI:   Estimated body mass index is 33.75 kg/m  as calculated from the following:    Height as of 3/17/22: 1.803 m (5' 11\").    Weight as of 9/12/22: 109.8 kg (242 lb).   Weight management plan: Discussed healthy diet and exercise guidelines      No follow-ups on file.    Alberto Medley PA-C  [facility]    Alek Mendez is a 47 year old accompanied by his self, presenting for the following health issues:  Form Request (Handicap sticker renewal)      HPI     Ongoing chronic right should pain. Underwent surgery for this through [facility] in April of last year. Was seen by them as recent as sept of 2022. Has not yet returned for consult but states ongoing severe pain that has limited any range of motion. Cannot lift objects that are left than 10 lbs despite ongoing his ongoing home physical therapy.     Of note, ongoing right knee pain over time as well. This seems to worsening. Going to see [place] as recent as feb 8th for a second opinion on not only for his right shoulder but to also evaluate knee pain.       Past history of seizures and TBI. No recent seizures. Stable " on current regimen and  Followed by neurology. However, his TBI has resulted in impairment of gait and ability to ambulate more than  feet.     Past history of MDD. Stable on current effexor. No side effects. phq 9 in remission.     Review of Systems   Constitutional, HEENT, cardiovascular, pulmonary, GI, , musculoskeletal, neuro, skin, endocrine and psych systems are negative, except as otherwise noted.      Objective           Vitals:  No vitals were obtained today due to virtual visit.    Physical Exam   healthy, alert and no distress  PSYCH: Alert and oriented times 3; coherent speech, normal   rate and volume, able to articulate logical thoughts, able   to abstract reason, no tangential thoughts, no hallucinations   or delusions  His affect is normal  RESP: No cough, no audible wheezing, able to talk in full sentences  Remainder of exam unable to be completed due to telephone visits            Phone call duration: 21 minutes      Answers for HPI/ROS submitted by the patient on 2/3/2023  PHQ9 TOTAL SCORE: 2  DAYAMI 7 TOTAL SCORE: 1  What is the reason for your visit today? : renewal  How many servings of fruits and vegetables do you eat daily?: 0-1  On average, how many sweetened beverages do you drink each day (Examples: soda, juice, sweet tea, etc.  Do NOT count diet or artificially sweetened beverages)?: 0  How many minutes a day do you exercise enough to make your heart beat faster?: 9 or less  How many days a week do you exercise enough to make your heart beat faster?: 3 or less  How many days per week do you miss taking your medication?: 0

## 2023-02-15 ENCOUNTER — OFFICE VISIT (OUTPATIENT)
Dept: FAMILY MEDICINE | Facility: CLINIC | Age: 48
End: 2023-02-15
Payer: MEDICARE

## 2023-02-15 VITALS
TEMPERATURE: 98.1 F | SYSTOLIC BLOOD PRESSURE: 118 MMHG | RESPIRATION RATE: 27 BRPM | DIASTOLIC BLOOD PRESSURE: 70 MMHG | HEIGHT: 71 IN | OXYGEN SATURATION: 99 % | HEART RATE: 72 BPM | WEIGHT: 266 LBS | BODY MASS INDEX: 37.24 KG/M2

## 2023-02-15 DIAGNOSIS — F41.9 ANXIETY: ICD-10-CM

## 2023-02-15 DIAGNOSIS — E78.5 HYPERLIPIDEMIA LDL GOAL <130: ICD-10-CM

## 2023-02-15 DIAGNOSIS — M81.0 OSTEOPOROSIS WITHOUT CURRENT PATHOLOGICAL FRACTURE, UNSPECIFIED OSTEOPOROSIS TYPE: Primary | ICD-10-CM

## 2023-02-15 DIAGNOSIS — J45.20 INTERMITTENT ASTHMA, UNCOMPLICATED: ICD-10-CM

## 2023-02-15 DIAGNOSIS — Z13.220 SCREENING FOR HYPERLIPIDEMIA: ICD-10-CM

## 2023-02-15 DIAGNOSIS — Z12.11 SCREEN FOR COLON CANCER: ICD-10-CM

## 2023-02-15 DIAGNOSIS — N18.2 CKD (CHRONIC KIDNEY DISEASE) STAGE 2, GFR 60-89 ML/MIN: ICD-10-CM

## 2023-02-15 DIAGNOSIS — F33.42 RECURRENT MAJOR DEPRESSIVE DISORDER, IN FULL REMISSION (H): ICD-10-CM

## 2023-02-15 DIAGNOSIS — J45.30 MILD PERSISTENT ASTHMA WITHOUT COMPLICATION: ICD-10-CM

## 2023-02-15 DIAGNOSIS — N18.30 STAGE 3 CHRONIC KIDNEY DISEASE, UNSPECIFIED WHETHER STAGE 3A OR 3B CKD (H): ICD-10-CM

## 2023-02-15 DIAGNOSIS — E78.5 HYPERLIPIDEMIA LDL GOAL <100: ICD-10-CM

## 2023-02-15 PROCEDURE — 90677 PCV20 VACCINE IM: CPT | Performed by: PHYSICIAN ASSISTANT

## 2023-02-15 PROCEDURE — 90715 TDAP VACCINE 7 YRS/> IM: CPT | Performed by: PHYSICIAN ASSISTANT

## 2023-02-15 PROCEDURE — 90472 IMMUNIZATION ADMIN EACH ADD: CPT | Performed by: PHYSICIAN ASSISTANT

## 2023-02-15 PROCEDURE — G0009 ADMIN PNEUMOCOCCAL VACCINE: HCPCS | Performed by: PHYSICIAN ASSISTANT

## 2023-02-15 PROCEDURE — 99214 OFFICE O/P EST MOD 30 MIN: CPT | Mod: 25 | Performed by: PHYSICIAN ASSISTANT

## 2023-02-15 RX ORDER — ALBUTEROL SULFATE 90 UG/1
2 AEROSOL, METERED RESPIRATORY (INHALATION) EVERY 6 HOURS PRN
Qty: 18 G | Refills: 1 | Status: SHIPPED | OUTPATIENT
Start: 2023-02-15 | End: 2023-05-26

## 2023-02-15 RX ORDER — FLUTICASONE PROPIONATE 110 UG/1
AEROSOL, METERED RESPIRATORY (INHALATION)
Qty: 36 G | Refills: 1 | Status: SHIPPED | OUTPATIENT
Start: 2023-02-15 | End: 2023-09-13

## 2023-02-15 RX ORDER — LISINOPRIL 5 MG/1
5 TABLET ORAL DAILY
Qty: 90 TABLET | Refills: 1 | Status: SHIPPED | OUTPATIENT
Start: 2023-02-15 | End: 2023-08-18

## 2023-02-15 RX ORDER — MONTELUKAST SODIUM 10 MG/1
1 TABLET ORAL AT BEDTIME
Qty: 90 TABLET | Refills: 1 | Status: SHIPPED | OUTPATIENT
Start: 2023-02-15 | End: 2023-08-18

## 2023-02-15 RX ORDER — SIMVASTATIN 20 MG
TABLET ORAL
Qty: 90 TABLET | Refills: 3 | Status: SHIPPED | OUTPATIENT
Start: 2023-02-15 | End: 2024-01-23

## 2023-02-15 RX ORDER — VENLAFAXINE HYDROCHLORIDE 150 MG/1
150 CAPSULE, EXTENDED RELEASE ORAL DAILY
Qty: 90 CAPSULE | Refills: 1 | Status: SHIPPED | OUTPATIENT
Start: 2023-02-15 | End: 2023-08-18

## 2023-02-15 ASSESSMENT — ASTHMA QUESTIONNAIRES
QUESTION_4 LAST FOUR WEEKS HOW OFTEN HAVE YOU USED YOUR RESCUE INHALER OR NEBULIZER MEDICATION (SUCH AS ALBUTEROL): ONCE A WEEK OR LESS
ACT_TOTALSCORE: 21
EMERGENCY_ROOM_LAST_YEAR_TOTAL: ONE
ACT_TOTALSCORE: 21
QUESTION_1 LAST FOUR WEEKS HOW MUCH OF THE TIME DID YOUR ASTHMA KEEP YOU FROM GETTING AS MUCH DONE AT WORK, SCHOOL OR AT HOME: A LITTLE OF THE TIME
QUESTION_5 LAST FOUR WEEKS HOW WOULD YOU RATE YOUR ASTHMA CONTROL: WELL CONTROLLED
QUESTION_3 LAST FOUR WEEKS HOW OFTEN DID YOUR ASTHMA SYMPTOMS (WHEEZING, COUGHING, SHORTNESS OF BREATH, CHEST TIGHTNESS OR PAIN) WAKE YOU UP AT NIGHT OR EARLIER THAN USUAL IN THE MORNING: NOT AT ALL
QUESTION_2 LAST FOUR WEEKS HOW OFTEN HAVE YOU HAD SHORTNESS OF BREATH: ONCE OR TWICE A WEEK

## 2023-02-15 ASSESSMENT — PATIENT HEALTH QUESTIONNAIRE - PHQ9
SUM OF ALL RESPONSES TO PHQ QUESTIONS 1-9: 1
SUM OF ALL RESPONSES TO PHQ QUESTIONS 1-9: 1
10. IF YOU CHECKED OFF ANY PROBLEMS, HOW DIFFICULT HAVE THESE PROBLEMS MADE IT FOR YOU TO DO YOUR WORK, TAKE CARE OF THINGS AT HOME, OR GET ALONG WITH OTHER PEOPLE: NOT DIFFICULT AT ALL

## 2023-02-15 ASSESSMENT — PAIN SCALES - GENERAL: PAINLEVEL: EXTREME PAIN (8)

## 2023-02-15 NOTE — PROGRESS NOTES
Assessment & Plan     Screen for colon cancer  Due.   - COLOGUAANISH(EXACT SCIENCES); Future    Screening for hyperlipidemia  Due. Non fasting. On statin. Will monitor and if ldl stable, maintain and recheck in 1 year.   - Lipid panel reflex to direct LDL Non-fasting; Future    Hyperlipidemia LDL goal <130  As above   - Lipid panel reflex to direct LDL Non-fasting; Future    CKD (chronic kidney disease) stage 2, GFR 60-89 ml/min  Stable on ACE. Urine albumin and cmp pending. If stable, recheck annually.   - Albumin Random Urine Quantitative with Creat Ratio; Future  - COMPREHENSIVE METABOLIC PANEL; Future    Recurrent major depressive disorder, in full remission (H)  Stable on current regimen. Will maintain. Virtual follow up in 6 months recommended.   - venlafaxine (EFFEXOR XR) 150 MG 24 hr capsule; Take 1 capsule (150 mg) by mouth daily    Anxiety  As above   - venlafaxine (EFFEXOR XR) 150 MG 24 hr capsule; Take 1 capsule (150 mg) by mouth daily    Mild persistent asthma without complication  Stable on current regimen. ACT wnl. Will maintain and recheck virtually in 6 months.   - fluticasone (FLOVENT HFA) 110 MCG/ACT inhaler; TAKE 1 PUFF BY MOUTH TWICE A DAY Strength: 110 MCG/ACT  - albuterol (PROAIR HFA/PROVENTIL HFA/VENTOLIN HFA) 108 (90 Base) MCG/ACT inhaler; Inhale 2 puffs into the lungs every 6 hours as needed for shortness of breath  Medication use and side effects discussed with the patient. Patient is in complete understanding and agreement with plan.     Stage 3 chronic kidney disease, unspecified whether stage 3a or 3b CKD (H)  As above   - lisinopril (ZESTRIL) 5 MG tablet; Take 1 tablet (5 mg) by mouth daily  Medication use and side effects discussed with the patient. Patient is in complete understanding and agreement with plan.     Intermittent asthma, uncomplicated  As above   - montelukast (SINGULAIR) 10 MG tablet; Take 1 tablet (10 mg) by mouth At Bedtime    Hyperlipidemia LDL goal <100  As above   -  simvastatin (ZOCOR) 20 MG tablet; TAKE 1 TABLET BY MOUTH EVERYDAY AT BEDTIME    Osteoporosis without current pathological fracture, unspecified osteoporosis type  Has ye to tfollow up with endocrine. Continue on fosamax for now. Due for repeat dexa in ~5 months. Referral renewed.   - Adult Endocrinology  Referral; Future  - DX Hip/Pelvis/Spine; Future      No follow-ups on file.   Follow-up Visit   Expected date:  Aug 15, 2023 (Approximate)      Follow Up Appointment Details:     Follow-up with whom?: Me    Follow-Up for what?: Chronic Disease f/u    Chronic Disease f/u:  Depression  Anxiety  Asthma       How?: Mahnaz Medley PA-C  [facility] LONA Mendez is a 47 year old accompanied by his spouse, presenting for the following health issues:  Asthma and Recheck Medication      HPI     Asthma Follow-Up    Was ACT completed today?    Yes    ACT Total Scores 2/15/2023   ACT TOTAL SCORE -   ASTHMA ER VISITS -   ASTHMA HOSPITALIZATIONS -   ACT TOTAL SCORE (Goal Greater than or Equal to 20) 21   In the past 12 months, how many times did you visit the emergency room for your asthma without being admitted to the hospital? 1   In the past 12 months, how many times were you hospitalized overnight because of your asthma? 0          How many days per week do you miss taking your asthma controller medication?  1    Please describe any recent triggers for your asthma: exercise or sports    Have you had any Emergency Room Visits, Urgent Care Visits, or Hospital Admissions since your last office visit?  No   *ER visit was due to shoulder injury.    How many servings of fruits and vegetables do you eat daily?  0-1    On average, how many sweetened beverages do you drink each day (Examples: soda, juice, sweet tea, etc.  Do NOT count diet or artificially sweetened beverages)?   2    How many days per week do you exercise enough to make your heart beat  faster? 3 or less    How many minutes a day do you exercise enough to make your heart beat faster? 9 or less  How many days per week do you miss taking your medication? 1    What makes it hard for you to take your medications?  remembering to take    Medication Followup of Multiple    Taking Medication as prescribed: yes    Side Effects:  None    Medication Helping Symptoms:  yes    Hyperlipidemia Follow-Up      Are you regularly taking any medication or supplement to lower your cholesterol?   Yes- statin    Are you having muscle aches or other side effects that you think could be caused by your cholesterol lowering medication?  No    Depression and Anxiety Follow-Up    How are you doing with your depression since your last visit? No change    How are you doing with your anxiety since your last visit?  No change    Are you having other symptoms that might be associated with depression or anxiety? No    Have you had a significant life event? No     Do you have any concerns with your use of alcohol or other drugs? No    Social History     Tobacco Use     Smoking status: Never     Smokeless tobacco: Never   Vaping Use     Vaping Use: Never used   Substance Use Topics     Alcohol use: No     Alcohol/week: 0.0 standard drinks     Drug use: No     PHQ 2/9/2022 2/3/2023 2/15/2023   PHQ-9 Total Score 8 2 1   Q9: Thoughts of better off dead/self-harm past 2 weeks Not at all Not at all Not at all     DAYAMI-7 SCORE 11/15/2021 2/9/2022 2/3/2023   Total Score - - -   Total Score - 2 (minimal anxiety) 1 (minimal anxiety)   Total Score 11 2 1     Last PHQ-9 2/15/2023   1.  Little interest or pleasure in doing things 0   2.  Feeling down, depressed, or hopeless 0   3.  Trouble falling or staying asleep, or sleeping too much 0   4.  Feeling tired or having little energy 1   5.  Poor appetite or overeating 0   6.  Feeling bad about yourself 0   7.  Trouble concentrating 0   8.  Moving slowly or restless 0   Q9: Thoughts of better off  "dead/self-harm past 2 weeks 0   PHQ-9 Total Score 1   Difficulty at work, home, or with people -     DAYAMI-7  2/3/2023   1. Feeling nervous, anxious, or on edge 1   2. Not being able to stop or control worrying 0   3. Worrying too much about different things 0   4. Trouble relaxing 0   5. Being so restless that it is hard to sit still 0   6. Becoming easily annoyed or irritable 0   7. Feeling afraid, as if something awful might happen 0   DAYAMI-7 Total Score 1   If you checked any problems, how difficult have they made it for you to do your work, take care of things at home, or get along with other people? -       Suicide Assessment Five-step Evaluation and Treatment (SAFE-T)    Chronic Kidney Disease Follow-up      Do you take any over the counter pain medicine?: No    Was found to have osteoporosis on dexa in 2021. Was started on fosamax and referred to endocrinology. However, no showed endocrinology appt. Has not followed up on this since. Is seeing [facility] for orthopedic care and states has follow up appt on 2/24/23 per patient.       Review of Systems   Constitutional, HEENT, cardiovascular, pulmonary, GI, , musculoskeletal, neuro, skin, endocrine and psych systems are negative, except as otherwise noted.      Objective    /70 (BP Location: Right arm, Patient Position: Sitting, Cuff Size: Adult Large)   Pulse 72   Temp 98.1  F (36.7  C) (Oral)   Resp 27   Ht 1.803 m (5' 11\")   Wt 120.7 kg (266 lb)   SpO2 99%   BMI 37.10 kg/m    Body mass index is 37.1 kg/m .  Physical Exam   GENERAL: alert and no distress  RESP: lungs clear to auscultation - no rales, rhonchi or wheezes  CV: regular rates and rhythm, normal S1 S2, no S3 or S4 and no murmur, click or rub  PSYCH: mentation appears normal, affect normal/bright                Answers for HPI/ROS submitted by the patient on 2/15/2023  If you checked off any problems, how difficult have these problems made it for you to do your work, take care of things at " home, or get along with other people?: Not difficult at all  PHQ9 TOTAL SCORE: 1

## 2023-02-15 NOTE — PATIENT INSTRUCTIONS
Discuss with [place] your osteoporosis. We referred you to endocrine for this ~1.5 years ago. You have not seen them yet. You are taking fosamax weekly for this, but given young age and male gender, we want to make sure we are not missing something correctable.

## 2023-02-27 NOTE — TELEPHONE ENCOUNTER
Prescription approved per [facility] Refill Protocol.    Pt last seen by Orestes Medley PA-C for a pre-op on 3/17/2022.  Orestes Medley PA-C indicated pt to return in approx 3 months-approx 6/17/2022 for a F/U-pt will call w/a PCP.    Inna CRUZ, Registered Nurse, PAL (Patient Advocate Liaison)   [facility]     [phone]    
DISPLAY PLAN FREE TEXT

## 2023-03-01 ENCOUNTER — LAB (OUTPATIENT)
Dept: FAMILY MEDICINE | Facility: CLINIC | Age: 48
End: 2023-03-01
Payer: MEDICARE

## 2023-03-01 DIAGNOSIS — Z12.11 SCREEN FOR COLON CANCER: ICD-10-CM

## 2023-03-17 LAB — NONINV COLON CA DNA+OCC BLD SCRN STL QL: NEGATIVE

## 2023-05-25 DIAGNOSIS — J45.30 MILD PERSISTENT ASTHMA WITHOUT COMPLICATION: ICD-10-CM

## 2023-05-25 DIAGNOSIS — K21.9 GASTROESOPHAGEAL REFLUX DISEASE, UNSPECIFIED WHETHER ESOPHAGITIS PRESENT: ICD-10-CM

## 2023-05-25 DIAGNOSIS — M81.6 LOCALIZED OSTEOPOROSIS WITHOUT CURRENT PATHOLOGICAL FRACTURE: ICD-10-CM

## 2023-05-26 RX ORDER — ALBUTEROL SULFATE 90 UG/1
AEROSOL, METERED RESPIRATORY (INHALATION)
Qty: 18 G | Refills: 0 | Status: SHIPPED | OUTPATIENT
Start: 2023-05-26 | End: 2023-10-03

## 2023-05-26 RX ORDER — ALENDRONATE SODIUM 70 MG/1
TABLET ORAL
Qty: 12 TABLET | Refills: 3 | Status: SHIPPED | OUTPATIENT
Start: 2023-05-26 | End: 2024-05-30

## 2023-05-26 RX ORDER — FAMOTIDINE 40 MG/1
TABLET, FILM COATED ORAL
Qty: 90 TABLET | Refills: 1 | OUTPATIENT
Start: 2023-05-26

## 2023-05-26 NOTE — TELEPHONE ENCOUNTER
Prescription approved per [facility] Refill Protocol.    Lovely KUMAR RN   PAL (Patient Advocate Liaison)  [facility]  [phone]

## 2023-05-26 NOTE — TELEPHONE ENCOUNTER
Already approved. famotidine (PEPCID) 40 MG tablet 90 tablet 1 1/3/2023. Should have refills on file.     Lovely KUMAR RN   PAL (Patient Advocate Liaison)  [facility]  [phone]

## 2023-06-09 ENCOUNTER — HOSPITAL ENCOUNTER (OUTPATIENT)
Facility: CLINIC | Age: 48
Setting detail: OBSERVATION
Discharge: NURSING, REHAB OR LONG-TERM CARE | End: 2023-06-13
Attending: EMERGENCY MEDICINE | Admitting: STUDENT IN AN ORGANIZED HEALTH CARE EDUCATION/TRAINING PROGRAM
Payer: MEDICARE

## 2023-06-09 ENCOUNTER — APPOINTMENT (OUTPATIENT)
Dept: ULTRASOUND IMAGING | Facility: CLINIC | Age: 48
End: 2023-06-09
Attending: EMERGENCY MEDICINE
Payer: MEDICARE

## 2023-06-09 ENCOUNTER — TELEPHONE (OUTPATIENT)
Dept: FAMILY MEDICINE | Facility: CLINIC | Age: 48
End: 2023-06-09
Payer: MEDICARE

## 2023-06-09 DIAGNOSIS — R31.9 HEMATURIA, UNSPECIFIED TYPE: ICD-10-CM

## 2023-06-09 DIAGNOSIS — Z96.651 STATUS POST TOTAL RIGHT KNEE REPLACEMENT: Primary | ICD-10-CM

## 2023-06-09 DIAGNOSIS — R60.0 LOWER EXTREMITY EDEMA: ICD-10-CM

## 2023-06-09 DIAGNOSIS — L76.82 OTHER POSTOPERATIVE COMPLICATION OF SUBCUTANEOUS TISSUE: ICD-10-CM

## 2023-06-09 DIAGNOSIS — I89.0 LYMPHEDEMA: ICD-10-CM

## 2023-06-09 DIAGNOSIS — R26.2 UNABLE TO WALK: ICD-10-CM

## 2023-06-09 DIAGNOSIS — D53.9 MACROCYTIC ANEMIA: ICD-10-CM

## 2023-06-09 LAB
ALBUMIN UR-MCNC: 100 MG/DL
ANION GAP SERPL CALCULATED.3IONS-SCNC: 9 MMOL/L (ref 7–15)
APPEARANCE UR: CLEAR
BASOPHILS # BLD AUTO: 0 10E3/UL (ref 0–0.2)
BASOPHILS NFR BLD AUTO: 1 %
BILIRUB UR QL STRIP: NEGATIVE
BUN SERPL-MCNC: 10.7 MG/DL (ref 6–20)
CALCIUM SERPL-MCNC: 9 MG/DL (ref 8.6–10)
CHLORIDE SERPL-SCNC: 101 MMOL/L (ref 98–107)
COLOR UR AUTO: YELLOW
CREAT SERPL-MCNC: 1.03 MG/DL (ref 0.67–1.17)
DEPRECATED HCO3 PLAS-SCNC: 29 MMOL/L (ref 22–29)
EOSINOPHIL # BLD AUTO: 0.2 10E3/UL (ref 0–0.7)
EOSINOPHIL NFR BLD AUTO: 2 %
ERYTHROCYTE [DISTWIDTH] IN BLOOD BY AUTOMATED COUNT: 14.7 % (ref 10–15)
FERRITIN SERPL-MCNC: 339 NG/ML (ref 31–409)
GFR SERPL CREATININE-BSD FRML MDRD: 90 ML/MIN/1.73M2
GLUCOSE SERPL-MCNC: 110 MG/DL (ref 70–99)
GLUCOSE UR STRIP-MCNC: NEGATIVE MG/DL
HCT VFR BLD AUTO: 29 % (ref 40–53)
HGB BLD-MCNC: 9 G/DL (ref 13.3–17.7)
HGB UR QL STRIP: ABNORMAL
HOLD SPECIMEN: NORMAL
HYALINE CASTS: 1 /LPF
IMM GRANULOCYTES # BLD: 0.2 10E3/UL
IMM GRANULOCYTES NFR BLD: 3 %
IRON BINDING CAPACITY (ROCHE): 222 UG/DL (ref 240–430)
IRON SATN MFR SERPL: 24 % (ref 15–46)
IRON SERPL-MCNC: 54 UG/DL (ref 61–157)
KETONES UR STRIP-MCNC: NEGATIVE MG/DL
LEUKOCYTE ESTERASE UR QL STRIP: NEGATIVE
LYMPHOCYTES # BLD AUTO: 1.5 10E3/UL (ref 0.8–5.3)
LYMPHOCYTES NFR BLD AUTO: 21 %
MCH RBC QN AUTO: 31.4 PG (ref 26.5–33)
MCHC RBC AUTO-ENTMCNC: 31 G/DL (ref 31.5–36.5)
MCV RBC AUTO: 101 FL (ref 78–100)
MONOCYTES # BLD AUTO: 0.6 10E3/UL (ref 0–1.3)
MONOCYTES NFR BLD AUTO: 9 %
MUCOUS THREADS #/AREA URNS LPF: PRESENT /LPF
NEUTROPHILS # BLD AUTO: 4.6 10E3/UL (ref 1.6–8.3)
NEUTROPHILS NFR BLD AUTO: 64 %
NITRATE UR QL: NEGATIVE
NRBC # BLD AUTO: 0 10E3/UL
NRBC BLD AUTO-RTO: 0 /100
PH UR STRIP: 5.5 [PH] (ref 5–7)
PLATELET # BLD AUTO: 326 10E3/UL (ref 150–450)
POTASSIUM SERPL-SCNC: 3.8 MMOL/L (ref 3.4–5.3)
RBC # BLD AUTO: 2.87 10E6/UL (ref 4.4–5.9)
RBC URINE: 3 /HPF
RETICS # AUTO: 0.11 10E6/UL (ref 0.03–0.1)
RETICS/RBC NFR AUTO: 3.9 % (ref 0.5–2)
SODIUM SERPL-SCNC: 139 MMOL/L (ref 136–145)
SP GR UR STRIP: 1.03 (ref 1–1.03)
SQUAMOUS EPITHELIAL: <1 /HPF
UROBILINOGEN UR STRIP-MCNC: NORMAL MG/DL
VIT B12 SERPL-MCNC: 264 PG/ML (ref 232–1245)
WBC # BLD AUTO: 7.2 10E3/UL (ref 4–11)
WBC URINE: 1 /HPF

## 2023-06-09 PROCEDURE — 36415 COLL VENOUS BLD VENIPUNCTURE: CPT | Performed by: EMERGENCY MEDICINE

## 2023-06-09 PROCEDURE — 99285 EMERGENCY DEPT VISIT HI MDM: CPT | Mod: 25

## 2023-06-09 PROCEDURE — 99223 1ST HOSP IP/OBS HIGH 75: CPT | Mod: AI | Performed by: STUDENT IN AN ORGANIZED HEALTH CARE EDUCATION/TRAINING PROGRAM

## 2023-06-09 PROCEDURE — 82607 VITAMIN B-12: CPT | Performed by: STUDENT IN AN ORGANIZED HEALTH CARE EDUCATION/TRAINING PROGRAM

## 2023-06-09 PROCEDURE — 250N000013 HC RX MED GY IP 250 OP 250 PS 637: Performed by: EMERGENCY MEDICINE

## 2023-06-09 PROCEDURE — 82435 ASSAY OF BLOOD CHLORIDE: CPT | Performed by: EMERGENCY MEDICINE

## 2023-06-09 PROCEDURE — 83550 IRON BINDING TEST: CPT | Performed by: STUDENT IN AN ORGANIZED HEALTH CARE EDUCATION/TRAINING PROGRAM

## 2023-06-09 PROCEDURE — 81001 URINALYSIS AUTO W/SCOPE: CPT | Performed by: EMERGENCY MEDICINE

## 2023-06-09 PROCEDURE — G0378 HOSPITAL OBSERVATION PER HR: HCPCS

## 2023-06-09 PROCEDURE — 93970 EXTREMITY STUDY: CPT

## 2023-06-09 PROCEDURE — 85025 COMPLETE CBC W/AUTO DIFF WBC: CPT | Performed by: EMERGENCY MEDICINE

## 2023-06-09 PROCEDURE — 85045 AUTOMATED RETICULOCYTE COUNT: CPT | Performed by: STUDENT IN AN ORGANIZED HEALTH CARE EDUCATION/TRAINING PROGRAM

## 2023-06-09 PROCEDURE — 82728 ASSAY OF FERRITIN: CPT | Performed by: STUDENT IN AN ORGANIZED HEALTH CARE EDUCATION/TRAINING PROGRAM

## 2023-06-09 PROCEDURE — 85014 HEMATOCRIT: CPT | Performed by: EMERGENCY MEDICINE

## 2023-06-09 PROCEDURE — 82374 ASSAY BLOOD CARBON DIOXIDE: CPT | Performed by: EMERGENCY MEDICINE

## 2023-06-09 PROCEDURE — 250N000013 HC RX MED GY IP 250 OP 250 PS 637: Performed by: STUDENT IN AN ORGANIZED HEALTH CARE EDUCATION/TRAINING PROGRAM

## 2023-06-09 PROCEDURE — 93926 LOWER EXTREMITY STUDY: CPT | Mod: RT

## 2023-06-09 RX ORDER — AMOXICILLIN 250 MG
1 CAPSULE ORAL 2 TIMES DAILY PRN
Status: DISCONTINUED | OUTPATIENT
Start: 2023-06-09 | End: 2023-06-13 | Stop reason: HOSPADM

## 2023-06-09 RX ORDER — PROCHLORPERAZINE MALEATE 5 MG
10 TABLET ORAL EVERY 6 HOURS PRN
Status: DISCONTINUED | OUTPATIENT
Start: 2023-06-09 | End: 2023-06-13 | Stop reason: HOSPADM

## 2023-06-09 RX ORDER — LEVETIRACETAM 500 MG/1
2000 TABLET ORAL 2 TIMES DAILY
Status: DISCONTINUED | OUTPATIENT
Start: 2023-06-09 | End: 2023-06-10 | Stop reason: DRUGHIGH

## 2023-06-09 RX ORDER — OXYCODONE HYDROCHLORIDE 5 MG/1
5 TABLET ORAL ONCE
Status: COMPLETED | OUTPATIENT
Start: 2023-06-09 | End: 2023-06-09

## 2023-06-09 RX ORDER — ONDANSETRON 4 MG/1
4 TABLET, ORALLY DISINTEGRATING ORAL EVERY 6 HOURS PRN
Status: DISCONTINUED | OUTPATIENT
Start: 2023-06-09 | End: 2023-06-13 | Stop reason: HOSPADM

## 2023-06-09 RX ORDER — LISINOPRIL 5 MG/1
5 TABLET ORAL DAILY
Status: DISCONTINUED | OUTPATIENT
Start: 2023-06-10 | End: 2023-06-13 | Stop reason: HOSPADM

## 2023-06-09 RX ORDER — ACETAMINOPHEN 650 MG/1
650 SUPPOSITORY RECTAL EVERY 6 HOURS PRN
Status: DISCONTINUED | OUTPATIENT
Start: 2023-06-09 | End: 2023-06-13 | Stop reason: HOSPADM

## 2023-06-09 RX ORDER — AMOXICILLIN 250 MG
2 CAPSULE ORAL 2 TIMES DAILY PRN
Status: DISCONTINUED | OUTPATIENT
Start: 2023-06-09 | End: 2023-06-13 | Stop reason: HOSPADM

## 2023-06-09 RX ORDER — MONTELUKAST SODIUM 10 MG/1
10 TABLET ORAL AT BEDTIME
Status: DISCONTINUED | OUTPATIENT
Start: 2023-06-09 | End: 2023-06-13 | Stop reason: HOSPADM

## 2023-06-09 RX ORDER — OXYCODONE HYDROCHLORIDE 5 MG/1
5 TABLET ORAL EVERY 4 HOURS PRN
Status: ON HOLD | COMMUNITY
End: 2023-06-11

## 2023-06-09 RX ORDER — PHENYTOIN SODIUM 100 MG/1
300 CAPSULE, EXTENDED RELEASE ORAL EVERY EVENING
Status: DISCONTINUED | OUTPATIENT
Start: 2023-06-09 | End: 2023-06-13 | Stop reason: HOSPADM

## 2023-06-09 RX ORDER — VENLAFAXINE HYDROCHLORIDE 150 MG/1
150 CAPSULE, EXTENDED RELEASE ORAL DAILY
Status: DISCONTINUED | OUTPATIENT
Start: 2023-06-09 | End: 2023-06-13 | Stop reason: HOSPADM

## 2023-06-09 RX ORDER — ONDANSETRON 2 MG/ML
4 INJECTION INTRAMUSCULAR; INTRAVENOUS EVERY 6 HOURS PRN
Status: DISCONTINUED | OUTPATIENT
Start: 2023-06-09 | End: 2023-06-13 | Stop reason: HOSPADM

## 2023-06-09 RX ORDER — PROCHLORPERAZINE 25 MG
25 SUPPOSITORY, RECTAL RECTAL EVERY 12 HOURS PRN
Status: DISCONTINUED | OUTPATIENT
Start: 2023-06-09 | End: 2023-06-13 | Stop reason: HOSPADM

## 2023-06-09 RX ORDER — ACETAMINOPHEN 325 MG/1
650 TABLET ORAL EVERY 6 HOURS PRN
Status: DISCONTINUED | OUTPATIENT
Start: 2023-06-09 | End: 2023-06-13 | Stop reason: HOSPADM

## 2023-06-09 RX ORDER — SIMVASTATIN 20 MG
20 TABLET ORAL AT BEDTIME
Status: DISCONTINUED | OUTPATIENT
Start: 2023-06-09 | End: 2023-06-13 | Stop reason: HOSPADM

## 2023-06-09 RX ORDER — CEFADROXIL 500 MG/1
500 CAPSULE ORAL 2 TIMES DAILY
Status: ON HOLD | COMMUNITY
End: 2023-06-11

## 2023-06-09 RX ORDER — PHENYTOIN SODIUM 100 MG/1
200 CAPSULE, EXTENDED RELEASE ORAL EVERY MORNING
Status: DISCONTINUED | OUTPATIENT
Start: 2023-06-10 | End: 2023-06-13 | Stop reason: HOSPADM

## 2023-06-09 RX ADMIN — OXYCODONE HYDROCHLORIDE 5 MG: 5 TABLET ORAL at 16:21

## 2023-06-09 RX ADMIN — SIMVASTATIN 20 MG: 20 TABLET, FILM COATED ORAL at 21:15

## 2023-06-09 RX ADMIN — MONTELUKAST 10 MG: 10 TABLET, FILM COATED ORAL at 21:18

## 2023-06-09 ASSESSMENT — ACTIVITIES OF DAILY LIVING (ADL)
ADLS_ACUITY_SCORE: 35
ADLS_ACUITY_SCORE: 33
ADLS_ACUITY_SCORE: 35

## 2023-06-09 NOTE — ED PROVIDER NOTES
History     Chief Complaint:  Leg Swelling and Hematuria       HPI   Andreaag Uribe is a 47 year old male with history of hypertension, CKD, and TBI who presents with leg swelling. On June 1st he underwent R total knee arthroplasty revision and was later discharged two days ago. He explains that since being discharged he has had increasing swelling in his right leg. Today his home nurse noted the patient losing sensation at the foot and recommended his present to the ED. He presents with swelling in the left leg as well and describes this not being new but recently worsening. He explains that the swelling in both legs have led him to be unable to walk.  Had trouble walking before but now wife states she can't take care of him since he cannot transfer.  He further endorses hematuria, twice yesterday, and chills. Denies fever, abdominal pain, back pain, shortness of breath, or testicular pain. He has a history of blood clots, he does not use blood thinning medication, and has a filter. He has a wound vac atop his right knee.  States this was planned as he was told it would heal better that way.    Independent Historian:   Spouse/Partner - They report patient medications and portion of HPI    Review of External Notes:   I reviewed patients pre-op note from his revised total knee arthroplasty on 06/01/23; pre-op could stand 3-5 min and walk up the stairs with his walker    Medications:    Fosamax  Os-ronnie  Vitamin B-12  Pepcid  Flovent  Keppra  Zestril  Singulair  Dilantin  Zocor  Effexor  Ventolin  Duricef  Lovenox  Zofran  Roxicodone    Past Medical History:    CKD, stage 3  CPAP dependence  Hypertension  MRSA  Obesity  Osteoarthritis  Ptosis, right  Renal insufficiency  Seizure disorder  TBI  Thyroid disease  Asthma  Depression  Anxiety  Sleep apnea  GERD  Anemia  Hyperlipidemia  Incisional hernia  Ataxic gait  Heterotopic ossification    Past Surgical History:    Appendectomy   Arthroplasty knee, right  x2  Tracheostomy  EGD  Excise mass trunk  Herniorrhaphy incisional  Implant stimulator vagus nerve  Laparoscopic cholecystectomy  Laparotomy exploratory   Open reduction internal fixation femur distal  Enlonging muscle  Remove hardware knee, right  Remove hardware lower extremity, right  Spleen surgery  IVC removed     Physical Exam     Patient Vitals for the past 24 hrs:   BP Temp Temp src Pulse Resp SpO2   06/09/23 1920 118/64 -- -- -- -- 94 %   06/09/23 1537 -- -- -- -- -- 95 %   06/09/23 1340 135/72 97.9  F (36.6  C) Temporal 90 20 95 %        Physical Exam  Eyes:               Sclera white; Pupils are equal and round  ENT:                External ears and nares normal  CV:                  Rate as above with regular rhythm   Resp:               Breath sounds clear and equal bilaterally                          Non-labored, no retractions or accessory muscle use  MS:                  Moves all extremities                          Severe edema R leg                          Edema L leg he states is chronic  Skin:                Wound vac anterior R knee w/o surrounding cellulitis  Neuro:             Speech is normal and fluent. Feels pain in toes.  Reduced ROM legs.    Emergency Department Course     Imaging:  US Lower Extremity Arterial Duplex Right   Final Result   IMPRESSION:   1.  Patent right lower extremity arterial vasculature with multiphasic flow. No significant stenosis identified.      US Lower Extremity Venous Duplex Bilateral   Final Result   IMPRESSION:    The bilateral lower extremities are negative for deep venous   thrombosis.      GREG COLLIER MD            SYSTEM ID:  [id number]         Report per radiology    Laboratory:  Labs Ordered and Resulted from Time of ED Arrival to Time of ED Departure   ROUTINE UA WITH MICROSCOPIC REFLEX TO CULTURE - Abnormal       Result Value    Color Urine Yellow      Appearance Urine Clear      Glucose Urine Negative      Bilirubin Urine Negative      Ketones  Urine Negative      Specific Gravity Urine 1.028      Blood Urine Trace (*)     pH Urine 5.5      Protein Albumin Urine 100 (*)     Urobilinogen Urine Normal      Nitrite Urine Negative      Leukocyte Esterase Urine Negative      Mucus Urine Present (*)     RBC Urine 3 (*)     WBC Urine 1      Squamous Epithelials Urine <1      Hyaline Casts Urine 1     BASIC METABOLIC PANEL - Abnormal    Sodium 139      Potassium 3.8      Chloride 101      Carbon Dioxide (CO2) 29      Anion Gap 9      Urea Nitrogen 10.7      Creatinine 1.03      Calcium 9.0      Glucose 110 (*)     GFR Estimate 90     CBC WITH PLATELETS AND DIFFERENTIAL - Abnormal    WBC Count 7.2      RBC Count 2.87 (*)     Hemoglobin 9.0 (*)     Hematocrit 29.0 (*)      (*)     MCH 31.4      MCHC 31.0 (*)     RDW 14.7      Platelet Count 326      % Neutrophils 64      % Lymphocytes 21      % Monocytes 9      % Eosinophils 2      % Basophils 1      % Immature Granulocytes 3      NRBCs per 100 WBC 0      Absolute Neutrophils 4.6      Absolute Lymphocytes 1.5      Absolute Monocytes 0.6      Absolute Eosinophils 0.2      Absolute Basophils 0.0      Absolute Immature Granulocytes 0.2      Absolute NRBCs 0.0        Emergency Department Course & Assessments:    Interventions:  Medications   oxyCODONE (ROXICODONE) tablet 5 mg (5 mg Oral $Given 6/9/23 1621)      Assessments:  1517 I obtained history and examined the patient as noted above.  1933 I rechecked the patient and explained findings.    Independent Interpretation (X-rays, CTs, rhythm strip):   tech notes reviewed    Consultations/Discussion of Management or Tests:  1952 I consulted with Dr. Stevens of the hospitalist service and discussed patient admission. They accepted care of the patient.    Social Determinants of Health affecting care:   None    Disposition:  The patient was admitted to the hospital under the care of Dr. Stevens.     Impression & Plan      Medical Decision Making:  He has very  significant edema to the right leg.  I cannot palpate a pulse because of this.  There is no evidence for cellulitis at the wound.  Ultrasounds obtained to evaluate the arteries as well as the veins for any recurrent DVT.  No significant abnormalities noted.  Blood work shows no evidence for other contributing electrolyte abnormalities or acute renal insufficiency.  Hemoglobin is noted to be lower than preop at baseline which was 12.  He has had some hematuria but urinalysis here shows 3 red blood cells.  He has not had any abdominal or flank symptoms to suggest kidney stone.  There is no urinary tract infection.  At this time I do not think that CT scan of the abdomen would be helpful for further evaluation but if symptoms are worsening then this could be reconsidered.  Where as he had limited mobility before he currently does not have any mobility and is not safe for discharge home.  Admission arranged.    Diagnosis:    ICD-10-CM    1. Lower extremity edema  R60.0       2. Other postoperative complication of subcutaneous tissue  L76.82       3. Unable to walk  R26.2       4. Hematuria, unspecified type  R31.9          Scribe Disclosure:  I, GUERRERO READ, am serving as a scribe at 3:28 PM on 6/9/2023 to document services personally performed by Lorrie Dave, based on my observations and the provider's statements to me.        Lorrie Dave MD  06/09/23 4089

## 2023-06-09 NOTE — TELEPHONE ENCOUNTER
Penny from [facility] calls, wants FYI to ZB, discussed:    ~pt had knee revision at [place]  ~they ordered home care  ~pt has TBI and not able to follow recommendations from ortho for after care, wonders if TCU better for rehab  ~saw pt today, sent to ER to rule out DVT, leg was red, skin taut and swollen, also noticed blood in urine  ~aware [place] [facility] ordered home care, they will call them to update  Routed FYI to B, no action needed  Yuko Hernandez RN, BSN  [facility]

## 2023-06-09 NOTE — ED TRIAGE NOTES
Patient reports right knee pain and swelling after total knee surgery on June 1st.  He reports getting discharged from the hospital 2 days ago.  Since then he reports increase pain, swelling, irritation.  He also reports new hematuria that started yesterday.  History of chronic kidney disease.  ABCs intact, A&Ox4.     Triage Assessment     Row Name 06/09/23 1342       Triage Assessment (Adult)    Airway WDL WDL       Respiratory WDL    Respiratory WDL WDL       Skin Circulation/Temperature WDL    Skin Circulation/Temperature WDL WDL       Cardiac WDL    Cardiac WDL WDL       Peripheral/Neurovascular WDL    Peripheral Neurovascular WDL WDL       Cognitive/Neuro/Behavioral WDL    Cognitive/Neuro/Behavioral WDL WDL

## 2023-06-09 NOTE — PHARMACY-ADMISSION MEDICATION HISTORY
Pharmacist Admission Medication History    Admission medication history is complete. The information provided in this note is only as accurate as the sources available at the time of the update.    Medication reconciliation/reorder completed by provider prior to medication history? No    Information Source(s): Patient / Spouse via in-person    Pertinent Information: none    Changes made to PTA medication list:    Added: cefadroxil, oxycodone    Deleted: carbamide peroxide    Changed: None    Medication Affordability:  Not including over the counter (OTC) medications, was there a time in the past 3 months when you did not take your medications as prescribed because of cost?: No    Allergies reviewed with patient and updates made in EHR: yes    Medication History Completed By: Bentley Perry RPH 6/9/2023 4:17 PM    Prior to Admission medications    Medication Sig Last Dose Taking? Auth Provider Long Term End Date   alendronate (FOSAMAX) 70 MG tablet TAKE 1 TABLET BY MOUTH ONE TIME PER WEEK  Patient taking differently: Take 70 mg by mouth once a week On Saturday 6/3/2023 Yes Alberto Medley PA-C Yes    calcium carbonate (OS-LAURA) 500 MG tablet Take 1 tablet (500 mg) by mouth 2 times daily 6/9/2023 at x1 Yes Ministerio Pavon PA-C     cefadroxil (DURICEF) 500 MG capsule Take 500 mg by mouth 2 times daily For 7 days starting 6/2/23 6/9/2023 at x1 Yes Unknown, Entered By History     famotidine (PEPCID) 40 MG tablet TAKE 1 TABLET BY MOUTH EVERYDAY AT BEDTIME 6/8/2023 Yes Alberto Medley PA-C     fluticasone (FLOVENT HFA) 110 MCG/ACT inhaler TAKE 1 PUFF BY MOUTH TWICE A DAY Strength: 110 MCG/ACT 6/9/2023 at x1 Yes Alberto Medley PA-C Yes    KEPPRA 1000 MG TABS Take 2,000 mg by mouth 2 times daily At 0800 and 2000 6/9/2023 at x1 Yes Reported, Patient     lisinopril (ZESTRIL) 5 MG tablet Take 1 tablet (5 mg) by mouth daily 6/9/2023 Yes Alberto Medley PA-C Yes    montelukast (SINGULAIR) 10 MG  tablet Take 1 tablet (10 mg) by mouth At Bedtime 6/8/2023 Yes Alberto Medley PA-C Yes    oxyCODONE (ROXICODONE) 5 MG tablet Take 5 mg by mouth every 4 hours as needed for severe pain 6/8/2023 Yes Unknown, Entered By History No    phenytoin (DILANTIN) 100 MG capsule Take 300 mg by mouth every evening 6/8/2023 Yes Reported, Patient No    phenytoin (DILANTIN) 100 MG capsule Take 200 mg by mouth every morning 6/9/2023 Yes Reported, Patient No    simvastatin (ZOCOR) 20 MG tablet TAKE 1 TABLET BY MOUTH EVERYDAY AT BEDTIME 6/8/2023 Yes Alberto Medley PA-C Yes    venlafaxine (EFFEXOR XR) 150 MG 24 hr capsule Take 1 capsule (150 mg) by mouth daily 6/9/2023 Yes Alberto Medley PA-C Yes    VENTOLIN  (90 Base) MCG/ACT inhaler INHALE 2 PUFFS INTO THE LUNGS EVERY 6 HOURS AS NEEDED FOR SHORTNESS OF BREATH  Yes Alberto Medley PA-C Yes    cyanocobalamin (VITAMIN B-12) 1000 MCG tablet Take 1 tablet (1,000 mcg) by mouth daily  Patient not taking: Reported on 6/9/2023 Not Taking  Alberto Medley PA-C     order for DME Equipment being ordered: Wheelchair   Alberto Medley PA-C

## 2023-06-10 ENCOUNTER — APPOINTMENT (OUTPATIENT)
Dept: PHYSICAL THERAPY | Facility: CLINIC | Age: 48
End: 2023-06-10
Attending: STUDENT IN AN ORGANIZED HEALTH CARE EDUCATION/TRAINING PROGRAM
Payer: MEDICARE

## 2023-06-10 ENCOUNTER — APPOINTMENT (OUTPATIENT)
Dept: CARDIOLOGY | Facility: CLINIC | Age: 48
End: 2023-06-10
Attending: STUDENT IN AN ORGANIZED HEALTH CARE EDUCATION/TRAINING PROGRAM
Payer: MEDICARE

## 2023-06-10 LAB
ALBUMIN SERPL BCG-MCNC: 3.2 G/DL (ref 3.5–5.2)
ALP SERPL-CCNC: 286 U/L (ref 40–129)
ALT SERPL W P-5'-P-CCNC: 27 U/L (ref 10–50)
ANION GAP SERPL CALCULATED.3IONS-SCNC: 8 MMOL/L (ref 7–15)
AST SERPL W P-5'-P-CCNC: 37 U/L (ref 10–50)
BILIRUB SERPL-MCNC: 0.2 MG/DL
BUN SERPL-MCNC: 10.5 MG/DL (ref 6–20)
CALCIUM SERPL-MCNC: 8.7 MG/DL (ref 8.6–10)
CHLORIDE SERPL-SCNC: 104 MMOL/L (ref 98–107)
CREAT SERPL-MCNC: 1.09 MG/DL (ref 0.67–1.17)
DEPRECATED HCO3 PLAS-SCNC: 28 MMOL/L (ref 22–29)
ERYTHROCYTE [DISTWIDTH] IN BLOOD BY AUTOMATED COUNT: 14.6 % (ref 10–15)
FOLATE SERPL-MCNC: 3.3 NG/ML (ref 4.6–34.8)
GFR SERPL CREATININE-BSD FRML MDRD: 84 ML/MIN/1.73M2
GLUCOSE SERPL-MCNC: 113 MG/DL (ref 70–99)
HCT VFR BLD AUTO: 26.2 % (ref 40–53)
HGB BLD-MCNC: 8.2 G/DL (ref 13.3–17.7)
MAGNESIUM SERPL-MCNC: 2.1 MG/DL (ref 1.7–2.3)
MCH RBC QN AUTO: 32 PG (ref 26.5–33)
MCHC RBC AUTO-ENTMCNC: 31.3 G/DL (ref 31.5–36.5)
MCV RBC AUTO: 102 FL (ref 78–100)
PHOSPHATE SERPL-MCNC: 4 MG/DL (ref 2.5–4.5)
PLATELET # BLD AUTO: 314 10E3/UL (ref 150–450)
POTASSIUM SERPL-SCNC: 4 MMOL/L (ref 3.4–5.3)
PROT SERPL-MCNC: 6.2 G/DL (ref 6.4–8.3)
RBC # BLD AUTO: 2.56 10E6/UL (ref 4.4–5.9)
SODIUM SERPL-SCNC: 140 MMOL/L (ref 136–145)
TSH SERPL DL<=0.005 MIU/L-ACNC: 3.86 UIU/ML (ref 0.3–4.2)
WBC # BLD AUTO: 6.9 10E3/UL (ref 4–11)

## 2023-06-10 PROCEDURE — 250N000011 HC RX IP 250 OP 636: Performed by: INTERNAL MEDICINE

## 2023-06-10 PROCEDURE — 36415 COLL VENOUS BLD VENIPUNCTURE: CPT | Performed by: STUDENT IN AN ORGANIZED HEALTH CARE EDUCATION/TRAINING PROGRAM

## 2023-06-10 PROCEDURE — 84443 ASSAY THYROID STIM HORMONE: CPT | Performed by: STUDENT IN AN ORGANIZED HEALTH CARE EDUCATION/TRAINING PROGRAM

## 2023-06-10 PROCEDURE — 97140 MANUAL THERAPY 1/> REGIONS: CPT | Mod: GP

## 2023-06-10 PROCEDURE — G0378 HOSPITAL OBSERVATION PER HR: HCPCS

## 2023-06-10 PROCEDURE — 85014 HEMATOCRIT: CPT | Performed by: STUDENT IN AN ORGANIZED HEALTH CARE EDUCATION/TRAINING PROGRAM

## 2023-06-10 PROCEDURE — 82746 ASSAY OF FOLIC ACID SERUM: CPT | Performed by: STUDENT IN AN ORGANIZED HEALTH CARE EDUCATION/TRAINING PROGRAM

## 2023-06-10 PROCEDURE — 93306 TTE W/DOPPLER COMPLETE: CPT | Mod: 26 | Performed by: INTERNAL MEDICINE

## 2023-06-10 PROCEDURE — 80053 COMPREHEN METABOLIC PANEL: CPT | Performed by: STUDENT IN AN ORGANIZED HEALTH CARE EDUCATION/TRAINING PROGRAM

## 2023-06-10 PROCEDURE — 250N000013 HC RX MED GY IP 250 OP 250 PS 637: Performed by: STUDENT IN AN ORGANIZED HEALTH CARE EDUCATION/TRAINING PROGRAM

## 2023-06-10 PROCEDURE — 97161 PT EVAL LOW COMPLEX 20 MIN: CPT | Mod: GP

## 2023-06-10 PROCEDURE — 99232 SBSQ HOSP IP/OBS MODERATE 35: CPT | Performed by: INTERNAL MEDICINE

## 2023-06-10 PROCEDURE — 93306 TTE W/DOPPLER COMPLETE: CPT

## 2023-06-10 PROCEDURE — 83735 ASSAY OF MAGNESIUM: CPT | Performed by: STUDENT IN AN ORGANIZED HEALTH CARE EDUCATION/TRAINING PROGRAM

## 2023-06-10 PROCEDURE — 85041 AUTOMATED RBC COUNT: CPT | Performed by: STUDENT IN AN ORGANIZED HEALTH CARE EDUCATION/TRAINING PROGRAM

## 2023-06-10 PROCEDURE — 97530 THERAPEUTIC ACTIVITIES: CPT | Mod: GP

## 2023-06-10 PROCEDURE — 84100 ASSAY OF PHOSPHORUS: CPT | Performed by: STUDENT IN AN ORGANIZED HEALTH CARE EDUCATION/TRAINING PROGRAM

## 2023-06-10 RX ORDER — NALOXONE HYDROCHLORIDE 0.4 MG/ML
0.2 INJECTION, SOLUTION INTRAMUSCULAR; INTRAVENOUS; SUBCUTANEOUS
Status: DISCONTINUED | OUTPATIENT
Start: 2023-06-10 | End: 2023-06-13 | Stop reason: HOSPADM

## 2023-06-10 RX ORDER — NALOXONE HYDROCHLORIDE 0.4 MG/ML
0.4 INJECTION, SOLUTION INTRAMUSCULAR; INTRAVENOUS; SUBCUTANEOUS
Status: DISCONTINUED | OUTPATIENT
Start: 2023-06-10 | End: 2023-06-13 | Stop reason: HOSPADM

## 2023-06-10 RX ORDER — FLUTICASONE PROPIONATE 110 UG/1
1 AEROSOL, METERED RESPIRATORY (INHALATION) EVERY 12 HOURS
Status: DISCONTINUED | OUTPATIENT
Start: 2023-06-10 | End: 2023-06-13

## 2023-06-10 RX ORDER — FUROSEMIDE 10 MG/ML
40 INJECTION INTRAMUSCULAR; INTRAVENOUS ONCE
Status: COMPLETED | OUTPATIENT
Start: 2023-06-10 | End: 2023-06-10

## 2023-06-10 RX ORDER — LEVETIRACETAM 1000 MG/1
2000 TABLET ORAL 2 TIMES DAILY
Status: DISCONTINUED | OUTPATIENT
Start: 2023-06-10 | End: 2023-06-13 | Stop reason: HOSPADM

## 2023-06-10 RX ADMIN — HYDROMORPHONE HYDROCHLORIDE 1 MG: 2 TABLET ORAL at 14:50

## 2023-06-10 RX ADMIN — SIMVASTATIN 20 MG: 20 TABLET, FILM COATED ORAL at 21:47

## 2023-06-10 RX ADMIN — HYDROMORPHONE HYDROCHLORIDE 1 MG: 2 TABLET ORAL at 21:47

## 2023-06-10 RX ADMIN — PHENYTOIN SODIUM 200 MG: 100 CAPSULE, EXTENDED RELEASE ORAL at 09:14

## 2023-06-10 RX ADMIN — ACETAMINOPHEN 650 MG: 325 TABLET, FILM COATED ORAL at 17:57

## 2023-06-10 RX ADMIN — Medication 1 MG: at 02:37

## 2023-06-10 RX ADMIN — VENLAFAXINE HYDROCHLORIDE 150 MG: 150 CAPSULE, EXTENDED RELEASE ORAL at 08:38

## 2023-06-10 RX ADMIN — LEVETIRACETAM 2000 MG: 1000 TABLET ORAL at 21:48

## 2023-06-10 RX ADMIN — LEVETIRACETAM 2000 MG: 1000 TABLET ORAL at 09:15

## 2023-06-10 RX ADMIN — HYDROMORPHONE HYDROCHLORIDE 1 MG: 2 TABLET ORAL at 02:37

## 2023-06-10 RX ADMIN — PHENYTOIN SODIUM 300 MG: 100 CAPSULE, EXTENDED RELEASE ORAL at 21:47

## 2023-06-10 RX ADMIN — LISINOPRIL 5 MG: 5 TABLET ORAL at 08:38

## 2023-06-10 ASSESSMENT — ACTIVITIES OF DAILY LIVING (ADL)
ADLS_ACUITY_SCORE: 33
ADLS_ACUITY_SCORE: 37
ADLS_ACUITY_SCORE: 33
ADLS_ACUITY_SCORE: 37
ADLS_ACUITY_SCORE: 37
ADLS_ACUITY_SCORE: 31
ADLS_ACUITY_SCORE: 37
ADLS_ACUITY_SCORE: 33
ADLS_ACUITY_SCORE: 37
ADLS_ACUITY_SCORE: 31
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
## 2023-06-10 NOTE — PLAN OF CARE
"PRIMARY DIAGNOSIS: BLE EDEMA D/T POST OP COMPLICATION  OUTPATIENT/OBSERVATION GOALS TO BE MET BEFORE DISCHARGE:  1. ADLs back to baseline: No     2. Activity and level of assistance: Up with maximum assistance. Consider SW and/or PT evaluation.      3. Pain status: Improved-controlled with oral pain medications.     4. Return to near baseline physical activity: No, unable to walk          Discharge Planner Nurse   Safe discharge environment identified: No  Barriers to discharge: Yes       Entered by: Jillian Arevalo RN 06/10/2023 8:00 AM  /76 (BP Location: Right arm)   Pulse 70   Temp 98.6  F (37  C) (Oral)   Resp 18   Ht 1.803 m (5' 11\")   Wt 124.7 kg (274 lb 14.6 oz)   SpO2 92%   BMI 38.34 kg/m       Pt is alert and oriented. Reports throbbing pain on legs. Recent R TKA, with a wound vac in place. Staying under covers, takes a lot of coaxing to get pt out of covers to communicate. Pt's personal Keppra and Dilantin supply administered per order. Currently using his personal CPAP and sleeping intermittently through cares, ha no IV access. Continue plan of care.    Please review provider order for any additional goals.   Nurse to notify provider when observation goals have been met and patient is ready for discharge.          "

## 2023-06-10 NOTE — PLAN OF CARE
"PRIMARY DIAGNOSIS: BLE EDEMA D/T POST OP COMPLICATION  OUTPATIENT/OBSERVATION GOALS TO BE MET BEFORE DISCHARGE:  1. ADLs back to baseline: No    2. Activity and level of assistance: Up with maximum assistance. Consider SW and/or PT evaluation.     3. Pain status: Improved-controlled with oral pain medications.    4. Return to near baseline physical activity: No, unable to walk     Discharge Planner Nurse   Safe discharge environment identified: No  Barriers to discharge: Yes       Entered by: Taniya Randall RN 06/10/2023 2:55 AM  Blood pressure (!) 152/80, pulse 78, temperature 97.9  F (36.6  C), temperature source Oral, resp. rate 22, height 1.803 m (5' 11\"), weight 124.7 kg (274 lb 14.6 oz), SpO2 95 %.   Pt is alert and oriented. Reports throbbing pain on legs. Recent R TKA, with a wound vac in place. Ultra sound on BLE was negative for DVT.  Pt stated that he is \"lethally allergic to hospital brand Keppra and Dilantin and brought his own supply. Pharmacy was notified and was verified to use his supply. Meds put in lock cabin. His wife will take home his other med supply in the AM. Currently using his personal CPAP.  Please review provider order for any additional goals.   Nurse to notify provider when observation goals have been met and patient is ready for discharge.  "

## 2023-06-10 NOTE — PROGRESS NOTES
[facility]    Hospitalist Progress Note  Name: Andrea Uribe    MRN: [id number]  Provider:  David Bone DO  Date of Service: 06/10/2023    Summary of Stay: Andrea Uribe is a 47 year old male with a history of hypertension, chronic kidney disease, seizure disorder, hyperlipidemia, asthma admitted on 6/9/2023 with inability to take care of himself and lower extremity edema.  In the emergency department, patient was found to have a temperature of 97.9  F, blood pressure 118/64, heart rate 90, respiratory rate 20, SPO2 94% on room air.  Initial lab work showed hemoglobin 9.0, glucose 110.  Bilateral lower extremity venous duplex Doppler was negative for DVT.  Arterial duplex Doppler of the right lower extremity showed multiphasic flow and no significant stenosis.  The patient was given a dose of IV Lasix.  Physical therapy and lymphedema therapy were ordered.  An echocardiogram showed a grossly normal left ventricle though poor visualization of the endomyocardial border was noted.    TODAY'S PLAN: Patient with 2+ pitting edema in his right lower extremity.  Does have chronic lower extremity swelling.  Venous and arterial Dopplers are negative.  Patient does have a wound VAC on his right knee after a recent surgery 1 week ago.  Has had multiple surgeries on his right leg.  Suspect some degree of lymphedema given his multiple surgeries.  Echocardiogram pending.  We will ask for PT and lymphedema therapy consults and appreciate recommendations.  Will ask for wound care evaluation regarding wound VAC, though limited availability on the weekends.  Will also give lasix 40 mg IV x1 today.    Problem List:   Right leg swelling post operatively s/p revision to knee replacement at [facility] ~ 1 week prior to admission  History of bilateral LE edema preoperatively   Multiple RLE Surgeries  Inability to perform ADLs/care for oneself  Patient is a history of chronic lower extremity edema which is present  bilaterally and has been for a number of years.  The patient reports that postoperatively he has noticed some worsening right lower extremity edema and pain causing inability to walk.  He does not report any fever or chills.  He reports no chest pain pressure or shortness of breath.  His functional status has been declining but he feels that this is related to his musculoskeletal issues.  At this time his lower extremity edema is likely multifactorial, complete work-up as outlined below otherwise will need PT evaluation and probable home cares prior to discharge.  - Has wound vac in place  - Skin intact around swelling , no evidence for cellulitis, no bullae noted  - DVT US negative, DVT ruled out   - PAD vs other arterial process on differential, artery US vasculature and no stenosis noted  - PT patient for function and PT/OT evaluation for lymphedema cares  - Echocardiogram with grossly normal EF, though difficulty study  - Albumin = 3.2, TSH = 3.86     Anemia, macrocytic, suspect acute postoperative on chronic  - Anemia studies pending, given macrocytosis add on B12 and folate - borderline low B12  - No evidence of active bleeding at this time  - CBC in a.m.     Hematuria  Has a history of CKD but creatinine is now normal.  The patient reports that he had intermittent hematuria, now resolved.  -We will continue to monitor     HTN - PTA lisinopril      Seizure disorder-PTA Keppra and Dilantin     HLD-PTA simvastatin     Asthma-PTA montelukast, inhalers as needed    I spent 45 minutes in reviewing this patient's labs, imaging, medications, medical history.  In addition time was spent interviewing the patient, communicating with family, and medical decision making.     DVT Prophylaxis: Pneumatic Compression Devices  Code Status: Full Code  Diet: Regular Diet Adult    Jordan Catheter: Not present  Disposition: Expected discharge today vs tomorrow to home with home care vs TCU. Goals prior to discharge include echo  negative, PT/lymphedema recommendations placed.   Family updated today: No     Interval History   Pt seen and examined.  Pt denies cp, sob over the last week.      -Data reviewed today: I personally reviewed all new labs and imaging results over the last 24 hours.     Physical Exam   Temp: 98.6  F (37  C) Temp src: Oral BP: 123/76 Pulse: 70   Resp: 18 SpO2: 92 % O2 Device: BiPAP/CPAP    Vitals:    06/09/23 2241   Weight: 124.7 kg (274 lb 14.6 oz)     Vital Signs with Ranges  Temp:  [97.6  F (36.4  C)-98.6  F (37  C)] 98.6  F (37  C)  Pulse:  [70-90] 70  Resp:  [18-22] 18  BP: (117-152)/(64-80) 123/76  SpO2:  [92 %-95 %] 92 %  No intake/output data recorded.    GENERAL: No apparent distress. Awake, alert, and fully oriented.  HEENT: Normocephalic, atraumatic. Extraocular movements intact.  CARDIOVASCULAR: Regular rate and rhythm without murmurs or rubs. No S3.  PULMONARY: Clear bilaterally.  GASTROINTESTINAL: Soft, non-tender, non-distended. Bowel sounds normoactive.   EXTREMITIES: No cyanosis or clubbing. 2+ pitting edema of RLE  NEUROLOGICAL: CN 2-12 grossly intact, no focal neurological deficits.  DERMATOLOGICAL: No rash, ulcer, bruising, nor jaundice.    Medications       levETIRAcetam  2,000 mg Oral BID     lisinopril  5 mg Oral Daily     montelukast  10 mg Oral At Bedtime     phenytoin  200 mg Oral QAM     phenytoin  300 mg Oral QPM     simvastatin  20 mg Oral At Bedtime     venlafaxine  150 mg Oral Daily     Data     Laboratory:  Recent Labs   Lab 06/10/23  0721 06/09/23  1449   WBC 6.9 7.2   HGB 8.2* 9.0*   HCT 26.2* 29.0*   * 101*    326     Recent Labs   Lab 06/10/23  0721 06/09/23  1449    139   POTASSIUM 4.0 3.8   CHLORIDE 104 101   CO2 28 29   ANIONGAP 8 9   * 110*   BUN 10.5 10.7   CR 1.09 1.03   GFRESTIMATED 84 90   LAURA 8.7 9.0     No results for input(s): CULT in the last 168 hours.    Imaging:  Recent Results (from the past 24 hour(s))   US Lower Extremity Venous Duplex  Bilateral    Narrative    ULTRASOUND VENOUS LOWER EXTREMITY BILATERAL   6/9/2023 7:00 PM     HISTORY: bilateral lower extremity pain and edema, severe on right  which is post op leg    COMPARISON: None.    TECHNIQUE: Ultrasound gray scale, Color Doppler flow, and spectral  Doppler waveform analysis performed.    FINDINGS:   The bilateral common femoral, superficial femoral, popliteal and  segmentally visualized calf veins are patent, fully compressible and  demonstrate antegrade Doppler flow. The visualized cephalad great  saphenous vein is negative for thrombus.      Impression    IMPRESSION:   The bilateral lower extremities are negative for deep venous  thrombosis.    GREG COLLIER MD         SYSTEM ID:  [id number]   US Lower Extremity Arterial Duplex Right    Narrative    EXAM: US LOWER EXTREMITY ARTERIAL DUPLEX RIGHT  LOCATION: [facility]  DATE/TIME: 6/9/2023 7:00 PM CDT    INDICATION: Severe edema, post op, pain, cannot palpate or appreciate pulse, confirm arterial flow, decreased lower extremity pulses, lower extremity pain.  COMPARISON: None.  TECHNIQUE: Duplex utilizing 2D gray-scale imaging, Doppler interrogation with color-flow and spectral waveform analysis.    FINDINGS:    RIGHT LOWER EXTREMITY ARTERIAL ASSESSMENT:  Common femoral artery: 202/38 cm/s, T/B  Profunda femoris artery: 131/28 cm/s, T  SFA (proximal): 152/38 cm/s, T/B  SFA (mid): 156/44 cm/s, B  SFA (distal): 197/40 cm/s, B  Popliteal artery: 171/37 cm/s, B  Anterior tibial artery: 76/13 cm/s, B  Posterior tibial artery: 84/15 cm/s, B  Peroneal artery: 77/18 cm/s, T      Impression    IMPRESSION:  1.  Patent right lower extremity arterial vasculature with multiphasic flow. No significant stenosis identified.         David Bone DO  [facility] Hospitalist  [address] [place] [address]  Securely message with [employer] (more info)  Text page via [employer] Paging/[phone]   06/10/2023

## 2023-06-10 NOTE — PLAN OF CARE
"PRIMARY DIAGNOSIS: BLE EDEMA D/T POST OP COMPLICATION  OUTPATIENT/OBSERVATION GOALS TO BE MET BEFORE DISCHARGE:  1. ADLs back to baseline: No    2. Activity and level of assistance: Up with maximum assistance. Consider SW and/or PT evaluation.     3. Pain status: Improved-controlled with oral pain medications.    4. Return to near baseline physical activity: No, unable to walk     Discharge Planner Nurse   Safe discharge environment identified: No  Barriers to discharge: Yes       Entered by: Taniya Randall RN 06/10/2023 5:38 AM  Blood pressure (!) 152/80, pulse 78, temperature 97.9  F (36.6  C), temperature source Oral, resp. rate 22, height 1.803 m (5' 11\"), weight 124.7 kg (274 lb 14.6 oz), SpO2 95 %.   Pt is alert and oriented. Reports throbbing pain on legs. Recent R TKA, with a wound vac in place. Ultra sound on BLE was negative for DVT.  Pt stated that he is \"lethally allergic to hospital brand Keppra and Dilantin and brought his own supply. Pharmacy was notified and was verified to use his supply. Meds put in lock cabin. His wife will take home his other med supply in the AM. Currently using his personal CPAP.  Please review provider order for any additional goals.   Nurse to notify provider when observation goals have been met and patient is ready for discharge.  "

## 2023-06-10 NOTE — PLAN OF CARE
"BP 98/58 (BP Location: Right arm)   Pulse 78   Temp 98.5  F (36.9  C) (Oral)   Resp 20   Ht 1.803 m (5' 11\")   Wt 124.7 kg (274 lb 14.6 oz)   SpO2 94%   BMI 38.34 kg/m       PRIMARY DIAGNOSIS: BLE EDEMA D/T POST OP COMPLICATION  OUTPATIENT/OBSERVATION GOALS TO BE MET BEFORE DISCHARGE:  1. ADLs back to baseline: No     2. Activity and level of assistance: Up with maximum assistance. Consider SW and/or PT evaluation.      3. Pain status: Improved-controlled with oral pain medications.     4. Return to near baseline physical activity: No, unable to walk          Discharge Planner Nurse   Safe discharge environment identified: No  Barriers to discharge: Yes       Entered by: Bay Arevalo RN 06/10/2023 12:00 PM  /76 (BP Location: Right arm)   Pulse 70   Temp 98.6  F (37  C) (Oral)   Resp 18   Ht 1.803 m (5' 11\")   Wt 124.7 kg (274 lb 14.6 oz)   SpO2 92%   BMI 38.34 kg/m        Pt is alert and oriented. Reports throbbing pain on legs rated at 4/10. Managed with oral pain meds PRN Dilaudid given x 1 and ice to area. Recent R TKA, with a wound vac in place. Pt has not ambulated today r/t pain and swelling in leg, PT assessed, recommends TCU, pt has no IV access. Spouse was at bedside some of the shift, voiding adequately, wound vac I place, wound dressing intact, continues to sleep with CPAP in between cares. Continue plan of care.     Please review provider order for any additional goals.   Nurse to notify provider when observation goals have been met and patient is ready for discharge.  "

## 2023-06-10 NOTE — PROGRESS NOTES
ROOM # 204-1    Living Situation Home w/ wife  : Gabriella (wife)    Activity level at baseline: Ind  Activity level on admit: Ax1-2    Who will be transporting you at discharge: Family    Patient registered to observation; given Patient Bill of Rights; given the opportunity to ask questions about observation status and their plan of care.  Patient has been oriented to the observation room, bathroom and call light is in place.    Discussed discharge goals and expectations with patient/family.
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
## 2023-06-10 NOTE — ED NOTES
[facility]  ED Nurse Handoff Report    ED Chief complaint: Leg Swelling and Hematuria  . ED Diagnosis:   Final diagnoses:   Lower extremity edema   Other postoperative complication of subcutaneous tissue   Unable to walk   Hematuria, unspecified type       Allergies:   Allergies   Allergen Reactions     Iodine      Kidney disease per patient (high doses)     Asa [Aspirin]      Ibuprofen Sodium Other (See Comments)     Kidney problems (with high doses)     Pollen Extract Other (See Comments)     Seasonal Allergies        Code Status: Full Code    Activity level - Baseline/Home:  independent.  Activity Level - Current:   assist of 1.   Lift room needed: No.   Bariatric: No   Needed: No   Isolation: No.   Infection: Not Applicable.     Respiratory status: Room air    Vital Signs (within 30 minutes):   Vitals:    06/09/23 1340 06/09/23 1537 06/09/23 1920   BP: 135/72  118/64   Pulse: 90     Resp: 20     Temp: 97.9  F (36.6  C)     TempSrc: Temporal     SpO2: 95% 95% 94%       Cardiac Rhythm:  ,      Pain level:    Patient confused: No.   Patient Falls Risk: nonskid shoes/slippers when out of bed, patient and family education and mobility aid in reach.   Elimination Status: Has voided     Patient Report - Initial Complaint: BLE swelling.   Focused Assessment: Andrea Uribe is a 47 year old male with history of hypertension, CKD, and TBI who presents with leg swelling. On June 1st he underwent total knee surgery and was later discharged two days ago. He explains that since being discharged he has had increasing swelling in his right leg. Today his home nurse noted the patient losing sensation at the site and recommended his present to the ED. He presents with swelling in the left leg as well and describes this not being new but recently worsening. He explains that the swelling in both legs have led him to be unable to walk. He further endorses hematuria, twice yesterday, and chills.  Denies fever, abdominal pain, back pain, shortness of breath, or testicular pain. He has a history of blood clots, he does not use blood thinning medication. He has a wound vac atop his right knee.    Abnormal Results:   Labs Ordered and Resulted from Time of ED Arrival to Time of ED Departure   ROUTINE UA WITH MICROSCOPIC REFLEX TO CULTURE - Abnormal       Result Value    Color Urine Yellow      Appearance Urine Clear      Glucose Urine Negative      Bilirubin Urine Negative      Ketones Urine Negative      Specific Gravity Urine 1.028      Blood Urine Trace (*)     pH Urine 5.5      Protein Albumin Urine 100 (*)     Urobilinogen Urine Normal      Nitrite Urine Negative      Leukocyte Esterase Urine Negative      Mucus Urine Present (*)     RBC Urine 3 (*)     WBC Urine 1      Squamous Epithelials Urine <1      Hyaline Casts Urine 1     BASIC METABOLIC PANEL - Abnormal    Sodium 139      Potassium 3.8      Chloride 101      Carbon Dioxide (CO2) 29      Anion Gap 9      Urea Nitrogen 10.7      Creatinine 1.03      Calcium 9.0      Glucose 110 (*)     GFR Estimate 90     CBC WITH PLATELETS AND DIFFERENTIAL - Abnormal    WBC Count 7.2      RBC Count 2.87 (*)     Hemoglobin 9.0 (*)     Hematocrit 29.0 (*)      (*)     MCH 31.4      MCHC 31.0 (*)     RDW 14.7      Platelet Count 326      % Neutrophils 64      % Lymphocytes 21      % Monocytes 9      % Eosinophils 2      % Basophils 1      % Immature Granulocytes 3      NRBCs per 100 WBC 0      Absolute Neutrophils 4.6      Absolute Lymphocytes 1.5      Absolute Monocytes 0.6      Absolute Eosinophils 0.2      Absolute Basophils 0.0      Absolute Immature Granulocytes 0.2      Absolute NRBCs 0.0     RETICULOCYTE COUNT   FERRITIN   IRON AND IRON BINDING CAPACITY   VITAMIN B12        US Lower Extremity Arterial Duplex Right   Final Result   IMPRESSION:   1.  Patent right lower extremity arterial vasculature with multiphasic flow. No significant stenosis identified.       US Lower Extremity Venous Duplex Bilateral   Final Result   IMPRESSION:    The bilateral lower extremities are negative for deep venous   thrombosis.      GREG COLLIER MD            SYSTEM ID:  [id number]          Treatments provided: See MAR  Family Comments: none  OBS brochure/video discussed/provided to patient:  N/A  ED Medications:   Medications   melatonin tablet 1 mg (has no administration in time range)   ondansetron (ZOFRAN ODT) ODT tab 4 mg (has no administration in time range)     Or   ondansetron (ZOFRAN) injection 4 mg (has no administration in time range)   acetaminophen (TYLENOL) tablet 650 mg (has no administration in time range)     Or   acetaminophen (TYLENOL) Suppository 650 mg (has no administration in time range)   HYDROmorphone (DILAUDID) half-tab 1 mg (has no administration in time range)   senna-docusate (SENOKOT-S/PERICOLACE) 8.6-50 MG per tablet 1 tablet (has no administration in time range)     Or   senna-docusate (SENOKOT-S/PERICOLACE) 8.6-50 MG per tablet 2 tablet (has no administration in time range)   magnesium hydroxide (MILK OF MAGNESIA) suspension 30 mL (has no administration in time range)   prochlorperazine (COMPAZINE) injection 10 mg (has no administration in time range)     Or   prochlorperazine (COMPAZINE) tablet 10 mg (has no administration in time range)     Or   prochlorperazine (COMPAZINE) suppository 25 mg (has no administration in time range)   levETIRAcetam (KEPPRA) TABS 2,000 mg (has no administration in time range)   lisinopril (ZESTRIL) tablet 5 mg (has no administration in time range)   montelukast (SINGULAIR) tablet 10 mg (has no administration in time range)   phenytoin (DILANTIN) ER capsule 300 mg (has no administration in time range)   phenytoin (DILANTIN) ER capsule 200 mg (has no administration in time range)   simvastatin (ZOCOR) tablet 20 mg (has no administration in time range)   venlafaxine (EFFEXOR XR) 24 hr capsule 150 mg (has no administration in  time range)   oxyCODONE (ROXICODONE) tablet 5 mg (5 mg Oral $Given 6/9/23 8648)       Drips infusing:  No  For the majority of the shift this patient was Green.   Interventions performed were none.    Sepsis treatment initiated: No    Cares/treatment/interventions/medications to be completed following ED care: n/a      ED Nurse Name: Jessica Luevano RN  8:04 PM   RECEIVING UNIT ED HANDOFF REVIEW    Above ED Nurse Handoff Report was reviewed: Yes  Reviewed by: Linda Overton RN on June 9, 2023 at 10:21 PM

## 2023-06-11 ENCOUNTER — APPOINTMENT (OUTPATIENT)
Dept: PHYSICAL THERAPY | Facility: CLINIC | Age: 48
End: 2023-06-11
Payer: MEDICARE

## 2023-06-11 LAB
ANION GAP SERPL CALCULATED.3IONS-SCNC: 9 MMOL/L (ref 7–15)
BUN SERPL-MCNC: 12.1 MG/DL (ref 6–20)
CALCIUM SERPL-MCNC: 8.5 MG/DL (ref 8.6–10)
CHLORIDE SERPL-SCNC: 104 MMOL/L (ref 98–107)
CREAT SERPL-MCNC: 1.03 MG/DL (ref 0.67–1.17)
DEPRECATED HCO3 PLAS-SCNC: 27 MMOL/L (ref 22–29)
ERYTHROCYTE [DISTWIDTH] IN BLOOD BY AUTOMATED COUNT: 14.7 % (ref 10–15)
GFR SERPL CREATININE-BSD FRML MDRD: 90 ML/MIN/1.73M2
GLUCOSE SERPL-MCNC: 111 MG/DL (ref 70–99)
HCT VFR BLD AUTO: 24.8 % (ref 40–53)
HGB BLD-MCNC: 7.7 G/DL (ref 13.3–17.7)
MAGNESIUM SERPL-MCNC: 2 MG/DL (ref 1.7–2.3)
MCH RBC QN AUTO: 31.6 PG (ref 26.5–33)
MCHC RBC AUTO-ENTMCNC: 31 G/DL (ref 31.5–36.5)
MCV RBC AUTO: 102 FL (ref 78–100)
PLATELET # BLD AUTO: 340 10E3/UL (ref 150–450)
POTASSIUM SERPL-SCNC: 3.8 MMOL/L (ref 3.4–5.3)
RBC # BLD AUTO: 2.44 10E6/UL (ref 4.4–5.9)
SODIUM SERPL-SCNC: 140 MMOL/L (ref 136–145)
WBC # BLD AUTO: 6.3 10E3/UL (ref 4–11)

## 2023-06-11 PROCEDURE — 85027 COMPLETE CBC AUTOMATED: CPT | Performed by: INTERNAL MEDICINE

## 2023-06-11 PROCEDURE — 80048 BASIC METABOLIC PNL TOTAL CA: CPT | Performed by: INTERNAL MEDICINE

## 2023-06-11 PROCEDURE — 36415 COLL VENOUS BLD VENIPUNCTURE: CPT | Performed by: INTERNAL MEDICINE

## 2023-06-11 PROCEDURE — 250N000013 HC RX MED GY IP 250 OP 250 PS 637: Performed by: STUDENT IN AN ORGANIZED HEALTH CARE EDUCATION/TRAINING PROGRAM

## 2023-06-11 PROCEDURE — 250N000011 HC RX IP 250 OP 636: Performed by: INTERNAL MEDICINE

## 2023-06-11 PROCEDURE — 250N000013 HC RX MED GY IP 250 OP 250 PS 637: Performed by: INTERNAL MEDICINE

## 2023-06-11 PROCEDURE — 96372 THER/PROPH/DIAG INJ SC/IM: CPT | Performed by: INTERNAL MEDICINE

## 2023-06-11 PROCEDURE — 97140 MANUAL THERAPY 1/> REGIONS: CPT | Mod: GP

## 2023-06-11 PROCEDURE — 99232 SBSQ HOSP IP/OBS MODERATE 35: CPT | Performed by: INTERNAL MEDICINE

## 2023-06-11 PROCEDURE — 97530 THERAPEUTIC ACTIVITIES: CPT | Mod: GP

## 2023-06-11 PROCEDURE — 83735 ASSAY OF MAGNESIUM: CPT | Performed by: INTERNAL MEDICINE

## 2023-06-11 PROCEDURE — G0378 HOSPITAL OBSERVATION PER HR: HCPCS

## 2023-06-11 RX ORDER — FOLIC ACID 1 MG/1
1 TABLET ORAL DAILY
Status: DISCONTINUED | OUTPATIENT
Start: 2023-06-11 | End: 2023-06-13 | Stop reason: HOSPADM

## 2023-06-11 RX ORDER — POTASSIUM CHLORIDE 1500 MG/1
20 TABLET, EXTENDED RELEASE ORAL ONCE
Status: COMPLETED | OUTPATIENT
Start: 2023-06-11 | End: 2023-06-11

## 2023-06-11 RX ORDER — MAGNESIUM OXIDE 400 MG/1
400 TABLET ORAL EVERY 4 HOURS
Status: COMPLETED | OUTPATIENT
Start: 2023-06-11 | End: 2023-06-11

## 2023-06-11 RX ORDER — OXYCODONE HYDROCHLORIDE 5 MG/1
5 TABLET ORAL EVERY 4 HOURS PRN
Qty: 6 TABLET | Refills: 0 | Status: SHIPPED | OUTPATIENT
Start: 2023-06-11 | End: 2024-02-02

## 2023-06-11 RX ORDER — CYANOCOBALAMIN 1000 UG/ML
1000 INJECTION, SOLUTION INTRAMUSCULAR; SUBCUTANEOUS
Status: DISCONTINUED | OUTPATIENT
Start: 2023-06-11 | End: 2023-06-13 | Stop reason: HOSPADM

## 2023-06-11 RX ORDER — FOLIC ACID 1 MG/1
1 TABLET ORAL DAILY
DISCHARGE
Start: 2023-06-12 | End: 2023-06-22

## 2023-06-11 RX ADMIN — LEVETIRACETAM 2000 MG: 1000 TABLET ORAL at 20:16

## 2023-06-11 RX ADMIN — POTASSIUM CHLORIDE 20 MEQ: 1500 TABLET, EXTENDED RELEASE ORAL at 17:56

## 2023-06-11 RX ADMIN — PHENYTOIN SODIUM 300 MG: 100 CAPSULE, EXTENDED RELEASE ORAL at 20:12

## 2023-06-11 RX ADMIN — MAGNESIUM OXIDE TAB 400 MG (241.3 MG ELEMENTAL MG) 400 MG: 400 (241.3 MG) TAB at 20:12

## 2023-06-11 RX ADMIN — MAGNESIUM OXIDE TAB 400 MG (241.3 MG ELEMENTAL MG) 400 MG: 400 (241.3 MG) TAB at 17:56

## 2023-06-11 RX ADMIN — MONTELUKAST 10 MG: 10 TABLET, FILM COATED ORAL at 21:34

## 2023-06-11 RX ADMIN — PHENYTOIN SODIUM 200 MG: 100 CAPSULE, EXTENDED RELEASE ORAL at 08:12

## 2023-06-11 RX ADMIN — LISINOPRIL 5 MG: 5 TABLET ORAL at 08:12

## 2023-06-11 RX ADMIN — CYANOCOBALAMIN 1000 MCG: 1000 INJECTION, SOLUTION INTRAMUSCULAR at 10:02

## 2023-06-11 RX ADMIN — HYDROMORPHONE HYDROCHLORIDE 1 MG: 2 TABLET ORAL at 10:49

## 2023-06-11 RX ADMIN — LEVETIRACETAM 2000 MG: 1000 TABLET ORAL at 08:12

## 2023-06-11 RX ADMIN — FOLIC ACID 1 MG: 1 TABLET ORAL at 10:02

## 2023-06-11 RX ADMIN — VENLAFAXINE HYDROCHLORIDE 150 MG: 150 CAPSULE, EXTENDED RELEASE ORAL at 08:12

## 2023-06-11 RX ADMIN — SIMVASTATIN 20 MG: 20 TABLET, FILM COATED ORAL at 21:34

## 2023-06-11 ASSESSMENT — ACTIVITIES OF DAILY LIVING (ADL)
ADLS_ACUITY_SCORE: 34

## 2023-06-11 NOTE — UTILIZATION REVIEW
Concurrent stay review; Secondary Review Determination - [facility]        Under the authority of the Utilization Management Committee, the utilization review process indicated a secondary review on the above patient.  The review outcome is based on review of the medical records, discussions with staff, and applying clinical experience noted on the date of the review.        (x) Observation/outpatient Status Appropriate - Concurrent stay review       RATIONALE FOR DETERMINATION:   47-year-old male with a history of hypertension, chronic kidney disease, seizure disorder, hyperlipidemia, and asthma was admitted on 6/9/2023 due to self-care difficulties and lower extremity edema. Initial evaluation in the emergency department showed stable vital signs, hemoglobin level of 9.0, and negative lower extremity venous duplex Doppler for DVT. An arterial duplex Doppler of the right lower extremity revealed multiphasic flow without significant stenosis. IV Lasix was administered, and physical therapy and lymphedema therapy were ordered. An echocardiogram showed a grossly normal left ventricle, but poor visualization of the endomyocardial border was noted. The patient expressed agreement with the recommendation for transfer to a transitional care unit (TCU) and social work assistance for discharge planning. Today's plan involved continued improvement of right lower extremity edema with lymphedema wraps and consideration of TCU placement.     Patient delayed discharge is related to disposition, there is no medical necessity for inpatient admission at the time of this review. If there is a change in patient status, please resend for review.    The information on this document is developed by the utilization review team in order for the business office to ensure compliance.  This only denotes the appropriateness of proper admission status and does not reflect the quality of care rendered.       The  definitions of Inpatient Status and Observation Status used in making the determination above are those provided in the CMS Coverage Manual, Chapter 1 and Chapter 6, section 70.4.       Sincerely,    Dustin Martin MD

## 2023-06-11 NOTE — PLAN OF CARE
"PRIMARY DIAGNOSIS: BLE EDEMA D/T POST OP COMPLICATION  OUTPATIENT/OBSERVATION GOALS TO BE MET BEFORE DISCHARGE:  1. ADLs back to baseline: No     2. Activity and level of assistance: Up with assist x1 with GB and walker    3. Pain status: Improved-controlled with oral pain medications.    4. Return to near baseline physical activity: No,      Discharge Planner Nurse   Safe discharge environment identified: No PT recommends TCU, SW   Barriers to discharge: Yes       Entered by: Taniya Randall RN 06/11/2023 12:17 AM  Blood pressure 120/51, pulse 76, temperature 98.2  F (36.8  C), temperature source Oral, resp. rate 17, height 1.803 m (5' 11\"), weight 124.7 kg (274 lb 14.6 oz), SpO2 96 %.   Pt is alert and oriented. States he is having spasms on R leg. CPAP on. Partial weight bearing on R leg, Up with SBA to assist x1.  Lymphedema wraps left on overnight. Wound Vac in place, awaiting WOC consult.      Please review provider order for any additional goals.   Nurse to notify provider when observation goals have been met and patient is ready for discharge.  "

## 2023-06-11 NOTE — PLAN OF CARE
"PRIMARY DIAGNOSIS: BLE EDEMA D/T POST OP COMPLICATION  OUTPATIENT/OBSERVATION GOALS TO BE MET BEFORE DISCHARGE:  1. ADLs back to baseline: No      2. Activity and level of assistance: Up with assist x1 with GB and walker     3. Pain status: Improved-controlled with oral pain medications.     4. Return to near baseline physical activity: No,           Discharge Planner Nurse   Safe discharge environment identified: No PT recommends TCU, SW   Barriers to discharge: Yes       Entered by: Taniya Randall RN 06/11/2023 8:40 am  /59 (BP Location: Left arm)   Pulse 78   Temp 98.5  F (36.9  C) (Oral)   Resp 18   Ht 1.803 m (5' 11\")   Wt 124.7 kg (274 lb 14.6 oz)   SpO2 95%   BMI 38.34 kg/m    Pt is alert and oriented. States he is having spasms on R leg and denies need for any interventions. CPAP on. Partial weight bearing on R leg, Up with SBA to assist x1.  Lymphedema wraps left on overnight. Wound Vac is beeping after Pt said he accidentally pulled it, spouse paused the wound vac,  place, awaiting WOC consult. On K and Mag protocol, both results stable at this time, Continue plan of care.        Please review provider order for any additional goals.   Nurse to notify provider when observation goals have been met and patient is ready for discharge.    "

## 2023-06-11 NOTE — PLAN OF CARE
"PRIMARY DIAGNOSIS: Right LE Swelling   OUTPATIENT/OBSERVATION GOALS TO BE MET BEFORE DISCHARGE:  1. Pain Status: Improved-controlled with oral pain medications.    2. Return to near baseline physical activity: No    3. Cleared for discharge by consultants (if involved): No    Discharge Planner Nurse   Safe discharge environment identified: No  Barriers to discharge: Yes       Entered by: Gloria Morocho RN 06/11/2023 12:15 AM     Please review provider order for any additional goals.   Nurse to notify provider when observation goals have been met and patient is ready for discharge.    Temp: 98.2  F (36.8  C) Temp src: Oral BP: 120/51 Pulse: 76   Resp: 17 SpO2: 96 % O2 Device: None (Room air)       A&Ox4. Up SBA-Ax1 with walker, refusing gait belt. Encouraged pt to sit at edge of bed, pt agreeable, pt also ambulated in hallway to his new room/ambulated to the bathroom in his new room, tolerated fairly well considering his RLE swelling. Pt able to bear weight. Per pt \"it felt good\" to sit at the edge of the bed and walk. Prn tylenol given x1 for right knee pain that radiates to his right foot. Lymphedema wraps on RLE. Wound vac in place. Plan- PT recommending TCU- SW will need to get involved for placement, wound vac/pain control/lymphedema wraps, WOC Monday- wound vac, encourage oob.                        "

## 2023-06-11 NOTE — PLAN OF CARE
"PRIMARY DIAGNOSIS: Right LE Swelling   OUTPATIENT/OBSERVATION GOALS TO BE MET BEFORE DISCHARGE:  1. Pain Status: Improved-controlled with oral pain medications.     2. Return to near baseline physical activity: No     3. Cleared for discharge by consultants (if involved): No     Discharge Planner Nurse   Safe discharge environment identified: No  Barriers to discharge: Yes       Entered by: Gloria Morocho RN 06/11/2023 12:15 AM  Please review provider order for any additional goals.   Nurse to notify provider when observation goals have been met and patient is ready for discharge.     Temp: 98.2  F (36.8  C) Temp src: Oral BP: 120/51 Pulse: 76   Resp: 17 SpO2: 96 % O2 Device: None (Room air) cpap on for overnight     A&Ox4. Up SBA-Ax1 with walker, refusing gait belt. Encouraged pt to sit at edge of bed, pt agreeable, pt also ambulated in hallway to his new room/ambulated to the bathroom in his new room, tolerated fairly well considering his RLE swelling. Pt able to bear weight. Per pt \"it felt good\" to sit at the edge of the bed and walk. Prn po dilaudid given x1 for right knee pain that radiates to his right foot. Lymphedema wraps on RLE. Wound vac in place. Plan- PT recommending TCU- SW will need to get involved for placement, wound vac/pain control/lymphedema wraps, WOC Monday- wound vac, encourage oob.                        "

## 2023-06-11 NOTE — PLAN OF CARE
"  PRIMARY DIAGNOSIS: Leg Swelling and Hematuria/Post Op Complications  OUTPATIENT/OBSERVATION GOALS TO BE MET BEFORE DISCHARGE:  1. ADLs back to baseline: No    2. Activity and level of assistance: Up with standby assistance.    3. Pain status: Pain free.    4. Return to near baseline physical activity: No     Discharge Planner Nurse   Safe discharge environment identified: Yes  Barriers to discharge: Yes       Entered by: Nereida Box RN 06/11/2023   Patient is A & O x 4, Lung sounds CTA. Bowel sounds active, LBM 6/10. Pt is SBA, continent of bladder. Pt denies pain at this time and prefers to take a nap. Dressing to R leg intact, swelling. Pt encouraged to elevate the extremity. PT recommends TCU, SW consulted for placement. Consults - SW, PT, OT & Wound ostomy.  /60 (BP Location: Left arm)   Pulse 78   Temp 98  F (36.7  C) (Oral)   Resp 18   Ht 1.803 m (5' 11\")   Wt 124.7 kg (274 lb 14.6 oz)   SpO2 97%   BMI 38.34 kg/m    Please review provider order for any additional goals.   Nurse to notify provider when observation goals have been met and patient is ready for discharge.  "

## 2023-06-11 NOTE — PLAN OF CARE
"PRIMARY DIAGNOSIS: BLE EDEMA D/T POST OP COMPLICATION  OUTPATIENT/OBSERVATION GOALS TO BE MET BEFORE DISCHARGE:  1. ADLs back to baseline: No     2. Activity and level of assistance: Up with assist x1 with GB and walker    3. Pain status: Improved-controlled with oral pain medications.    4. Return to near baseline physical activity: No,      Discharge Planner Nurse   Safe discharge environment identified: No PT recommends TCU, SW   Barriers to discharge: Yes       Entered by: Taniya Randall RN 06/11/2023 4:46 AM  Blood pressure 114/67, pulse 89, temperature 97.9  F (36.6  C), temperature source Oral, resp. rate 18, height 1.803 m (5' 11\"), weight 124.7 kg (274 lb 14.6 oz), SpO2 97 %.   Pt is alert and oriented. States he is having spasms on R leg. CPAP on. Partial weight bearing on R leg, Up with SBA to assist x1.  Lymphedema wraps left on overnight. Wound Vac in place, awaiting WOC consult. On K and Mag protocol.      Please review provider order for any additional goals.   Nurse to notify provider when observation goals have been met and patient is ready for discharge.  "

## 2023-06-11 NOTE — PROGRESS NOTES
[facility]    Hospitalist Progress Note  Name: Andrea Uribe    MRN: [id number]  Provider:  David Bone DO  Date of Service: 06/11/2023    Summary of Stay: Andrea Uribe is a 47 year old male with a history of hypertension, chronic kidney disease, seizure disorder, hyperlipidemia, asthma admitted on 6/9/2023 with inability to take care of himself and lower extremity edema.  In the emergency department, patient was found to have a temperature of 97.9  F, blood pressure 118/64, heart rate 90, respiratory rate 20, SPO2 94% on room air.  Initial lab work showed hemoglobin 9.0, glucose 110.  Bilateral lower extremity venous duplex Doppler was negative for DVT.  Arterial duplex Doppler of the right lower extremity showed multiphasic flow and no significant stenosis.  The patient was given a dose of IV Lasix.  Physical therapy and lymphedema therapy were ordered.  An echocardiogram showed a grossly normal left ventricle though poor visualization of the endomyocardial border was noted.  Physical therapy recommended TCU.  The patient was agreeable.  Social work was consulted for discharge planning.  The patient swelling did improve with the lymphedema wraps.    TODAY'S PLAN: Patient reports some improvement in his right lower extremity edema.  Did not urinate much after the Lasix yesterday.  Lymphedema wrap overnight and he seemed to tolerate it well.  PT recommending TCU.  Patient is agreeable.  Appreciate social work assistance with placement.  In addition, patient states that he got up to use the restroom overnight and his wound VAC tubing got unplugged.  He plugged it back in but now the machine is beeping and there appears to be an air leak in the tubing.  ADDENDUM:  Per chart review, wound vac was to be removed on 6/8.  Will contact primary ortho team tomorrow to see if wound vac removal is still recommended, in hopes to remove this prior to discharge to TCU.    Problem List:   Right leg  swelling post operatively s/p revision to knee replacement at [facility] ~ 1 week prior to admission  History of bilateral LE edema preoperatively   Multiple RLE Surgeries  Inability to perform ADLs/care for oneself  Patient is a history of chronic lower extremity edema which is present bilaterally and has been for a number of years.  The patient reports that postoperatively he has noticed some worsening right lower extremity edema and pain causing inability to walk.  He does not report any fever or chills.  He reports no chest pain pressure or shortness of breath.  His functional status has been declining but he feels that this is related to his musculoskeletal issues.  - Appreciate Wound Care recommendations regarding wound vac  - Skin intact around swelling , no evidence for cellulitis, no bullae noted  - DVT US negative, DVT ruled out   - RLE Arterial US negative for stenosis  - PT recommending TCU.  Pt is agreeable.  Appreciate SW assistance with discharge planning  - Appreciate Lymphedema Therapy recommendations - edema improved with RLE wrap  - Echocardiogram with grossly normal EF, though difficulty study  - Albumin = 3.2, TSH = 3.86     Macrocytic Anemia  - Likely secondary to low folate and borderline low B12 in addition to recent surgery  - Start folic acid 1 mg daily  - Give one dose of B12 1000 mg IM on 6/11  - No evidence of active bleeding at this time  - Daily CBC     Hematuria  Has a history of CKD but creatinine is now normal.  The patient reports that he had intermittent hematuria, now resolved.  -We will continue to monitor     HTN - PTA lisinopril      Seizure disorder-PTA Keppra and Dilantin     HLD-PTA simvastatin     Asthma-PTA montelukast, inhalers as needed    I spent 45 minutes in reviewing this patient's labs, imaging, medications, medical history.  In addition time was spent interviewing the patient, communicating with family, and medical decision making.     DVT Prophylaxis: Pneumatic  Compression Devices  Code Status: Full Code  Diet: Regular Diet Adult    Jordan Catheter: Not present  Disposition: Expected discharge today vs tomorrow to TCU. Goals prior to discharge include wound vac tubing issue addressed, TCU bed available.   Family updated today: Yes      Interval History   Pt seen and examined.  Pt reports improvement in his swelling overnight.  Denies cp, sob.    -Data reviewed today: I personally reviewed all new labs and imaging results over the last 24 hours.     Physical Exam   Temp: 97.9  F (36.6  C) Temp src: Oral BP: 114/67 Pulse: 89   Resp: 18 SpO2: 97 % O2 Device: BiPAP/CPAP    Vitals:    06/09/23 2241   Weight: 124.7 kg (274 lb 14.6 oz)     Vital Signs with Ranges  Temp:  [97.9  F (36.6  C)-98.8  F (37.1  C)] 97.9  F (36.6  C)  Pulse:  [73-89] 89  Resp:  [17-20] 18  BP: ()/(51-76) 114/67  SpO2:  [94 %-97 %] 97 %  I/O last 3 completed shifts:  In: 1000 [P.O.:1000]  Out: 300 [Urine:300]    GENERAL: No apparent distress. Awake, alert, and fully oriented.  HEENT: Normocephalic, atraumatic. Extraocular movements intact.  CARDIOVASCULAR: Regular rate and rhythm without murmurs or rubs. No S3.  PULMONARY: Clear bilaterally.  GASTROINTESTINAL: Soft, non-tender, non-distended. Bowel sounds normoactive.   EXTREMITIES: No cyanosis or clubbing. Nonpitting edema to BLE, worse on R  NEUROLOGICAL: CN 2-12 grossly intact, no focal neurological deficits.  DERMATOLOGICAL: No rash, ulcer, bruising, nor jaundice.    Medications       fluticasone  1 puff Inhalation Q12H     levETIRAcetam  2,000 mg Oral BID     lisinopril  5 mg Oral Daily     montelukast  10 mg Oral At Bedtime     phenytoin  200 mg Oral QAM     phenytoin  300 mg Oral QPM     simvastatin  20 mg Oral At Bedtime     venlafaxine  150 mg Oral Daily     Data     Laboratory:  Recent Labs   Lab 06/11/23  0610 06/10/23  0721 06/09/23  1449   WBC 6.3 6.9 7.2   HGB 7.7* 8.2* 9.0*   HCT 24.8* 26.2* 29.0*   * 102* 101*    314 137      Recent Labs   Lab 23  0610 06/10/23  0721 23  1449    140 139   POTASSIUM 3.8 4.0 3.8   CHLORIDE 104 104 101   CO2 27 28 29   ANIONGAP 9 8 9   * 113* 110*   BUN 12.1 10.5 10.7   CR 1.03 1.09 1.03   GFRESTIMATED 90 84 90   LAURA 8.5* 8.7 9.0     No results for input(s): CULT in the last 168 hours.    Imaging:  Recent Results (from the past 24 hour(s))   Echocardiogram Complete    Narrative    [id number]  [id number]  [id number]  [id number]^JUANCARLOS^GLENNA     [facility]  Echocardiography Laboratory  [address] [place] [address]     Name: МАРИЯ HOOPER  MRN: [id number]  : 1975  Study Date: 06/10/2023 08:44 AM  Age: 47 yrs  Gender: Male  Patient Location: [facility]  Reason For Study: Edema  Ordering Physician: GLENNA BAUER  Referring Physician: Alberto Medley  Performed By: Stephy Melgoza RDCS     BSA: 2.4 m2  Height: 71 in  Weight: 274 lb  HR: 73  BP: 118/64 mmHg  ______________________________________________________________________________  Procedure  Complete Portable Echo Adult. Technically difficult study.Extremely poor  acoustic windows.  ______________________________________________________________________________  Interpretation Summary     - Extremely poor 2D image quality given poor acoustic windows and lack of echo  contrast. Consider repeat study with echo contrast.     1. LV is grossly normal in size and wall thickness. Given poor visualization  of the endomyocardial border, cannot definitively assess LVEF or wall motion.  LVEF appears mildly reduced, but suspect that with echo contrast this may  actually be normal (given normal diastolic parameters).  2. RV appears grossly normal in size and function.  3. LA normal size. RA borderline dilated.  4. Valve structures cannot be assessed due to poor image quality. However, on  Doppler assessment there is no significant stenosis or regurgitation.  5. Small posterior pericardial effusion with no echo  evidence of tamponade.     Prior TTE from 2010 with echo contrast showed normal LVEF and wall motion.  ______________________________________________________________________________  Left Ventricle  The left ventricle is normal in size. Grossly normal wall thickness. Due to  the poor quality of the echocardiogram, an assessment of left ventricular  ejection fraction cannot be made. Left ventricular diastolic function is  normal. Regional wall motion abnormalities cannot be excluded due to limited  visualization.     Right Ventricle  The right ventricle is grossly normal size. The right ventricular systolic  function is normal.     Atria  Normal left atrial size. Borderline right atrial enlargement.     Mitral Valve  The mitral valve is not well visualized.     Tricuspid Valve  The tricuspid valve is not well visualized. Right ventricular systolic  pressure could not be approximated due to inadequate tricuspid regurgitation.  IVC diameter <2.1 cm collapsing >50% with sniff suggests a normal RA pressure  of 3 mmHg.     Aortic Valve  The aortic valve is not well visualized. No aortic regurgitation is present.  No aortic stenosis is present.     Pulmonic Valve  The pulmonic valve is not well visualized.     Vessels  The aortic root is normal size. Normal size ascending aorta.     Pericardium  Small posterior pericardial effusion. There are no echocardiographic  indications of cardiac tamponade.     ______________________________________________________________________________  MMode/2D Measurements & Calculations  IVSd: 1.1 cm  LVIDd: 5.4 cm  LVIDs: 4.4 cm  LVPWd: 0.72 cm  IVC diam: 1.7 cm  FS: 18.6 %  LV mass(C)d: 176.1 grams  LV mass(C)dI: 73.1 grams/m2     Ao root diam: 3.4 cm  asc Aorta Diam: 2.6 cm  LA Volume (BP): 43.9 ml  LA Volume Index (BP): 18.2 ml/m2  RV Base: 4.1 cm  RWT: 0.27  TAPSE: 2.6 cm     Doppler Measurements & Calculations  MV E max pino: 83.2 cm/sec  MV A max pino: 70.6 cm/sec  MV E/A: 1.2     MV max  PG: 3.1 mmHg  MV mean P.5 mmHg  MV V2 VTI: 23.5 cm  MV dec time: 0.21 sec  E/E' av.5  Lateral E/e': 7.1  Medial E/e': 9.9  RV S Fadi: 12.0 cm/sec     ______________________________________________________________________________  Report approved by: MD David Nieves 06/10/2023 10:51 AM               David Bone DO  Pending sale to [facility]  [address] [place] [address]  Securely message with [phone] (more info)  Text page via [employer] Paging/Directory   2023

## 2023-06-11 NOTE — PLAN OF CARE
"PRIMARY DIAGNOSIS: BLE EDEMA D/T POST OP COMPLICATION  OUTPATIENT/OBSERVATION GOALS TO BE MET BEFORE DISCHARGE:  1. ADLs back to baseline: No      2. Activity and level of assistance: Up with assist x1 with GB and walker     3. Pain status: Improved-controlled with oral pain medications.     4. Return to near baseline physical activity: No,           Discharge Planner Nurse   Safe discharge environment identified: No PT recommends TCU, SW   Barriers to discharge: Yes       Entered by: Taniya Randall RN 06/11/2023 8:40 am  /59 (BP Location: Left arm)   Pulse 78   Temp 98.5  F (36.9  C) (Oral)   Resp 18   Ht 1.803 m (5' 11\")   Wt 124.7 kg (274 lb 14.6 oz)   SpO2 95%   BMI 38.34 kg/m    Pt is alert and oriented x3. States he is having spasms on R leg and pain, prn Dilaudid administered x 1 with relief. CPAP on intermittently all shift when client is asleep. Voiding spontaneously. Partial weight bearing on R leg, Up with SBA to assist x1. Lymphedema wraps in place. Wound Vac is beeping after Pt said he accidentally pulled it, WOC consulted for tomorrow. Pt On K and Mag protocol, both results stable at this time,PT recommends TCU, SW consulted for placement. Continue plan of care.        Please review provider order for any additional goals.   Nurse to notify provider when observation goals have been met and patient is ready for discharge.  "

## 2023-06-12 LAB
HOLD SPECIMEN: NORMAL
MAGNESIUM SERPL-MCNC: 2.1 MG/DL (ref 1.7–2.3)
POTASSIUM SERPL-SCNC: 4 MMOL/L (ref 3.4–5.3)

## 2023-06-12 PROCEDURE — 250N000013 HC RX MED GY IP 250 OP 250 PS 637: Performed by: INTERNAL MEDICINE

## 2023-06-12 PROCEDURE — G0378 HOSPITAL OBSERVATION PER HR: HCPCS

## 2023-06-12 PROCEDURE — 84132 ASSAY OF SERUM POTASSIUM: CPT | Performed by: INTERNAL MEDICINE

## 2023-06-12 PROCEDURE — G0463 HOSPITAL OUTPT CLINIC VISIT: HCPCS

## 2023-06-12 PROCEDURE — 250N000013 HC RX MED GY IP 250 OP 250 PS 637: Performed by: STUDENT IN AN ORGANIZED HEALTH CARE EDUCATION/TRAINING PROGRAM

## 2023-06-12 PROCEDURE — 99232 SBSQ HOSP IP/OBS MODERATE 35: CPT | Performed by: INTERNAL MEDICINE

## 2023-06-12 PROCEDURE — 36415 COLL VENOUS BLD VENIPUNCTURE: CPT | Performed by: INTERNAL MEDICINE

## 2023-06-12 PROCEDURE — 83735 ASSAY OF MAGNESIUM: CPT | Performed by: INTERNAL MEDICINE

## 2023-06-12 RX ADMIN — LISINOPRIL 5 MG: 5 TABLET ORAL at 09:47

## 2023-06-12 RX ADMIN — PHENYTOIN SODIUM 300 MG: 100 CAPSULE, EXTENDED RELEASE ORAL at 21:36

## 2023-06-12 RX ADMIN — PHENYTOIN SODIUM 200 MG: 100 CAPSULE, EXTENDED RELEASE ORAL at 09:48

## 2023-06-12 RX ADMIN — LEVETIRACETAM 2000 MG: 1000 TABLET ORAL at 09:48

## 2023-06-12 RX ADMIN — ACETAMINOPHEN 650 MG: 325 TABLET, FILM COATED ORAL at 14:56

## 2023-06-12 RX ADMIN — MONTELUKAST 10 MG: 10 TABLET, FILM COATED ORAL at 21:36

## 2023-06-12 RX ADMIN — HYDROMORPHONE HYDROCHLORIDE 1 MG: 2 TABLET ORAL at 14:56

## 2023-06-12 RX ADMIN — VENLAFAXINE HYDROCHLORIDE 150 MG: 150 CAPSULE, EXTENDED RELEASE ORAL at 09:46

## 2023-06-12 RX ADMIN — SIMVASTATIN 20 MG: 20 TABLET, FILM COATED ORAL at 21:36

## 2023-06-12 RX ADMIN — LEVETIRACETAM 2000 MG: 1000 TABLET ORAL at 21:36

## 2023-06-12 RX ADMIN — FOLIC ACID 1 MG: 1 TABLET ORAL at 09:46

## 2023-06-12 ASSESSMENT — ACTIVITIES OF DAILY LIVING (ADL)
ADLS_ACUITY_SCORE: 34

## 2023-06-12 NOTE — CONSULTS
[facility] Nurse Inpatient Assessment     Consulted for: Right knee    Patient History (according to provider note(s):      Andrea Uribe is a 47 year old male with a history of hypertension, chronic kidney disease, seizure disorder, hyperlipidemia, asthma admitted on 6/9/2023 with inability to take care of himself and lower extremity edema.  In the emergency department, patient was found to have a temperature of 97.9  F, blood pressure 118/64, heart rate 90, respiratory rate 20, SPO2 94% on room air.  Initial lab work showed hemoglobin 9.0, glucose 110.  Bilateral lower extremity venous duplex Doppler was negative for DVT.  Arterial duplex Doppler of the right lower extremity showed multiphasic flow and no significant stenosis.  The patient was given a dose of IV Lasix.  Physical therapy and lymphedema therapy were ordered.  An echocardiogram showed a grossly normal left ventricle though poor visualization of the endomyocardial border was noted.  Physical therapy recommended TCU.  The patient was agreeable.  Social work was consulted for discharge planning.  The patient swelling did improve with the lymphedema wraps.    Assessment:      Areas visualized during today's visit: Focused:    Wound location: Right knee  Last photo: 6/12/23    Wound due to: Surgical Wound  Wound history/plan of care: Patient s/p right TKA on 6/1/23 with Prevena VAC placement.  Patient was due to have VAC removed on 6/8/23 and VAC stopped working over weekend.  Wound base: 33cm incision which is well approximated with sutures, one small intact 0.7x0.5cm serosang blister      Palpation of the wound bed: normal      Drainage: none     Description of drainage: none     Measurements (length x width x depth, in cm): see above      Tunneling: N/A     Undermining: N/A  Periwound skin: Intact      Color: normal and consistent with surrounding tissue      Temperature: normal   Odor: none  Pain: moderate  Pain  interventions prior to dressing change: slow and gentle cares   Treatment goal: Heal   STATUS: initial assessment  Supplies ordered: supplies stored on unit       Treatment Plan:     Right knee wound(s): Daily    1. Cleanse with wound cleanser and dry  2. Cover with dry gauze and secure with spandage       Orders: Written    RECOMMEND PRIMARY TEAM ORDER: None, at this time, recommend outpatient follow up with his ortho to determine when sutures can be removed  Education provided: plan of care  Discussed plan of care with: Patient  WO nurse follow-up plan: weekly  Notify WO if wound(s) deteriorate.  Nursing to notify the Provider(s) and re-consult the [facility] Nurse if new skin concern.    DATA:     Current support surface: Standard  Standard gel/foam mattress (IsoFlex, Atmos air, etc)  BMI: Body mass index is 38.34 kg/m .   Active diet order: Orders Placed This Encounter      Regular Diet Adult      Diet     Output: I/O last 3 completed shifts:  In: 640 [P.O.:640]  Out: 1150 [Urine:1150]     Labs: Recent Labs   Lab 06/11/23  0610 06/10/23  0721   ALBUMIN  --  3.2*   HGB 7.7* 8.2*   WBC 6.3 6.9     Pressure injury risk assessment:   Sensory Perception: 4-->no impairment  Moisture: 4-->rarely moist  Activity: 3-->walks occasionally  Mobility: 3-->slightly limited  Nutrition: 3-->adequate  Friction and Shear: 3-->no apparent problem  Shaun Score: 19    HUBER Jones [facility] [employer]  Dept. Office Number: [phone]

## 2023-06-12 NOTE — CONSULTS
Care Management Note    Length of Stay (days): 0    Expected Discharge Date: 06/12/2023     Concerns to be Addressed: discharge planning     Patient plan of care discussed at interdisciplinary rounds: Yes    Anticipated Discharge Disposition:  TCU     Anticipated Discharge Services:    Anticipated Discharge DME:      Education Provided on the Discharge Plan:  yes  Patient/Family in Agreement with the Plan:  yes    Referrals Placed by CM/SW:  Skilled nursing facilities  Private pay costs discussed: Not applicable    Additional Information:  SW following for discharge planning.     PT recommending TCU. Pt has orders to discharge today once placement is found.     Met with pt this date and introduced self and social work role. Pt is in agreement with TCU referrals being sent in his residential area. Per pt, his wife or parents are available to provide transport.     TCU referrals sent to the following facilities:    - [facility]  - [facility]  - [facility]  - [facility]    ADDENDUM @ 1415:    Pt has been accepted at [facility] for admission tomorrow (6/13). Met with pt and pt's spouse to provide update. Pt agreeable to accepting facility. Pt's dad plans to transport tomorrow at 12:30pm.     Created On: 6/12/2023 2:11 PM  Your confirmation number is: [id number]    Social work will continue to follow and assist with discharge planning as needed.    REJI Cristina, LSW  Inpatient Care Coordination  OBS unit, [facility]    REJI Bustillos

## 2023-06-12 NOTE — PLAN OF CARE
PRIMARY DIAGNOSIS: LOWER EXTREMITY EDEMA   OUTPATIENT/OBSERVATION GOALS TO BE MET BEFORE DISCHARGE:  1. Pain Status: Minimal since PRN DILAUDID and TYLENOL given  2. Return to near baseline physical activity: No  3. Cleared for discharge by consultants (if involved): Yes    Discharge Planner Nurse   Safe discharge environment identified: Yes  Barriers to discharge: Yes       Entered by: Elaine Segura RN 06/12/2023 4:16 PM     Please review provider order for any additional goals. Nurse to notify provider when observation goals have been met and patient is ready for discharge.    Alert and oriented. Up with 1 assist, walker, and gait belt. Reports 8/10 pain, PRN DILAUDID and TYLENOL given. No IV site. Bilateral legs covered, unsure what to call these wraps. RLE edematous. Regular diet. Uses a CPAP at night. SW, PT, WOC, and lymphedema therapy consulted. Uses the urinal at the bedside. Patient is to discharge to a TCU on 06/13, family will transport.

## 2023-06-12 NOTE — PLAN OF CARE
PRIMARY DIAGNOSIS: LOWER EXTREMITY EDEMA   OUTPATIENT/OBSERVATION GOALS TO BE MET BEFORE DISCHARGE:  1. Pain Status: 2/10  2. Return to near baseline physical activity: No  3. Cleared for discharge by consultants (if involved): Yes    Discharge Planner Nurse   Safe discharge environment identified: No  Barriers to discharge: Yes       Entered by: Elaine Segura RN 06/12/2023 9:58 AM     Please review provider order for any additional goals. Nurse to notify provider when observation goals have been met and patient is ready for discharge.    Alert and oriented. Up with 1 assist, walker, and gait belt. No IV site. Uses the urinal at the bedside. Reports 2/10 pain in right knee/leg area, patient reports it feels like a spasm. Bilateral legs wrapped, unsure what to call these wraps, RLE edematous and painful. NO wound vac present. Regular diet. Uses a CPAP at night. SW, PT, WOC, and lymphedema therapy consulted. Patient has discharge orders - will go to a TCU.     Vital signs:  Temp: 98.9  F (37.2  C) Temp src: Oral BP: 98/67 Pulse: 80   Resp: 18 SpO2: 97 % O2 Device: None (Room air)

## 2023-06-12 NOTE — PLAN OF CARE
"END OF SHIFT SUMMARY    2300 - 0700    BP (!) 131/113 (BP Location: Other (Comment))   Pulse 75   Temp 98.2  F (36.8  C) (Oral)   Resp 20   Ht 1.803 m (5' 11\")   Wt 124.7 kg (274 lb 14.6 oz)   SpO2 95%   BMI 38.34 kg/m       Pt alert and oriented x 4. Denies SOB, On RA. Denies pain. SBA with walker. Wears CPAP at night. No PIV. Uses urinal. Mg + K protocol. Uses urinal, voiding adequately. TCU placement pending, SW following. WOC consult pending.   "

## 2023-06-12 NOTE — PLAN OF CARE
PRIMARY DIAGNOSIS: LOWER EXTREMITY EDEMA   OUTPATIENT/OBSERVATION GOALS TO BE MET BEFORE DISCHARGE:  1. Pain Status: JUNE - patient is asleep  2. Return to near baseline physical activity: No  3. Cleared for discharge by consultants (if involved): Yes    Discharge Planner Nurse   Safe discharge environment identified: No  Barriers to discharge: Yes       Entered by: Elaine Segura RN 06/12/2023 12:35 PM     Please review provider order for any additional goals. Nurse to notify provider when observation goals have been met and patient is ready for discharge.    Alert and oriented. Up with 1 assist, walker, and gait belt. No IV site. Uses the urinal at the bedside. Bilateral legs wrapped, unsure what to call these wraps. RLE edematous. NO wound vac present. Regular diet. Uses a CPAP at night. SW, PT, WOC, and lymphedema therapy consulted. Patient has discharge orders - will go to a TCU once a rehab is found.     Vital signs:  Temp: 97.8  F (36.6  C) Temp src: Oral BP: 109/60 Pulse: 77   Resp: 18 SpO2: 99 % O2 Device: None (Room air)

## 2023-06-12 NOTE — PROGRESS NOTES
[facility]    Hospitalist Progress Note  Name: Andrea Uribe    MRN: [id number]  Provider:  David Bone DO  Date of Service: 06/12/2023    Summary of Stay: Andrea Uribe is a 47 year old male with a history of hypertension, chronic kidney disease, seizure disorder, hyperlipidemia, asthma admitted on 6/9/2023 with inability to take care of himself and lower extremity edema.  In the emergency department, patient was found to have a temperature of 97.9  F, blood pressure 118/64, heart rate 90, respiratory rate 20, SPO2 94% on room air.  Initial lab work showed hemoglobin 9.0, glucose 110.  Bilateral lower extremity venous duplex Doppler was negative for DVT.  Arterial duplex Doppler of the right lower extremity showed multiphasic flow and no significant stenosis.  The patient was given a dose of IV Lasix.  Physical therapy and lymphedema therapy were ordered.  An echocardiogram showed a grossly normal left ventricle though poor visualization of the endomyocardial border was noted.  Physical therapy recommended TCU.  The patient was agreeable.  Social work was consulted for discharge planning.  The patient swelling did improve with the lymphedema wraps.    TODAY'S PLAN:  Pt is medically stable for discharge to TCU.  Appreciate Wound Care assistance with wound vac.  Appreciate SW assistance with placement.  Per chart review, wound vac was to be removed on 6/8.  Recommend pt contact his Orthopedic Surgery team at [facility] to discuss when the wound vac should be removed.    Problem List:   Right leg swelling post operatively s/p revision to knee replacement at [place] ~ 1 week prior to admission  History of bilateral LE edema preoperatively   Multiple RLE Surgeries  Inability to perform ADLs/care for oneself  Patient is a history of chronic lower extremity edema which is present bilaterally and has been for a number of years.  The patient reports that postoperatively he has noticed some worsening  right lower extremity edema and pain causing inability to walk.  He does not report any fever or chills.  He reports no chest pain pressure or shortness of breath.  His functional status has been declining but he feels that this is related to his musculoskeletal issues.  - Appreciate Wound Care recommendations regarding wound vac  - Skin intact around swelling , no evidence for cellulitis, no bullae noted  - DVT US negative, DVT ruled out   - RLE Arterial US negative for stenosis  - PT recommending TCU.  Pt is agreeable.  Appreciate SW assistance with discharge planning  - Appreciate Lymphedema Therapy recommendations - edema improved with RLE wrap  - Echocardiogram with grossly normal EF, though difficulty study  - Albumin = 3.2, TSH = 3.86  - Per chart review, wound vac was to be removed on 6/8.  Recommend pt contact his orthopedic surgery team at [facility] to discuss when the wound vac should be removed.     Macrocytic Anemia  - Likely secondary to low folate and borderline low B12 in addition to recent surgery  - Start folic acid 1 mg daily  - Give one dose of B12 1000 mg IM on 6/11  - No evidence of active bleeding at this time  - Daily CBC     Hematuria  Has a history of CKD but creatinine is now normal.  The patient reports that he had intermittent hematuria, now resolved.  -We will continue to monitor     HTN - PTA lisinopril      Seizure disorder-PTA Keppra and Dilantin     HLD-PTA simvastatin     Asthma-PTA montelukast, inhalers as needed    I spent 45 minutes in reviewing this patient's labs, imaging, medications, medical history.  In addition time was spent interviewing the patient, communicating with family, and medical decision making.     DVT Prophylaxis: Pneumatic Compression Devices  Code Status: Full Code  Diet: Regular Diet Adult  Diet    Jordan Catheter: Not present  Disposition: Expected discharge today to TCU. Goals prior to discharge include TCU bed available.   Family updated today:  No     Interval History   Pt seen and examined.  Pt reports discomfort in his R knee when he rolls in bed.  Slept ok last night.    -Data reviewed today: I personally reviewed all new labs and imaging results over the last 24 hours.     Physical Exam   Temp: 98.9  F (37.2  C) Temp src: Oral BP: 98/67 Pulse: 80   Resp: 18 SpO2: 97 % O2 Device: None (Room air)    Vitals:    06/09/23 2241   Weight: 124.7 kg (274 lb 14.6 oz)     Vital Signs with Ranges  Temp:  [98  F (36.7  C)-98.9  F (37.2  C)] 98.9  F (37.2  C)  Pulse:  [75-90] 80  Resp:  [18-20] 18  BP: ()/() 98/67  SpO2:  [95 %-97 %] 97 %  I/O last 3 completed shifts:  In: 640 [P.O.:640]  Out: 1150 [Urine:1150]    GENERAL: No apparent distress. Awake, alert, and fully oriented.  HEENT: Normocephalic, atraumatic. Extraocular movements intact.  CARDIOVASCULAR: Regular rate and rhythm without murmurs or rubs. No S3.  PULMONARY: Clear bilaterally.  GASTROINTESTINAL: Soft, non-tender, non-distended. Bowel sounds normoactive.   EXTREMITIES: No cyanosis or clubbing. BLE compression hose/wraps  NEUROLOGICAL: CN 2-12 grossly intact, no focal neurological deficits.  DERMATOLOGICAL: No rash, ulcer, bruising, nor jaundice.    Medications       cyanocobalamin  1,000 mcg Intramuscular Q30 Days     fluticasone  1 puff Inhalation Q12H     folic acid  1 mg Oral Daily     levETIRAcetam  2,000 mg Oral BID     lisinopril  5 mg Oral Daily     montelukast  10 mg Oral At Bedtime     phenytoin  200 mg Oral QAM     phenytoin  300 mg Oral QPM     simvastatin  20 mg Oral At Bedtime     venlafaxine  150 mg Oral Daily     Data     Laboratory:  Recent Labs   Lab 06/11/23  0610 06/10/23  0721 06/09/23  1449   WBC 6.3 6.9 7.2   HGB 7.7* 8.2* 9.0*   HCT 24.8* 26.2* 29.0*   * 102* 101*    314 326     Recent Labs   Lab 06/12/23  0532 06/11/23  0610 06/10/23  0721 06/09/23  1449   NA  --  140 140 139   POTASSIUM 4.0 3.8 4.0 3.8   CHLORIDE  --  104 104 101   CO2  --  27 28 29    ANIONGAP  --  9 8 9   GLC  --  111* 113* 110*   BUN  --  12.1 10.5 10.7   CR  --  1.03 1.09 1.03   GFRESTIMATED  --  90 84 90   LAURA  --  8.5* 8.7 9.0     No results for input(s): CULT in the last 168 hours.    Imaging:  No results found for this or any previous visit (from the past 24 hour(s)).      David Bone DO  [facility] Hospitalist  [address] [place] [address]  Securely message with VSS Monitoring (more info)  Text page via [facility] Paging/Directory   06/12/2023

## 2023-06-12 NOTE — PLAN OF CARE
"  PRIMARY DIAGNOSIS: Leg Swelling and Hematuria/Post Op Complications  OUTPATIENT/OBSERVATION GOALS TO BE MET BEFORE DISCHARGE:  1. ADLs back to baseline: No    2. Activity and level of assistance: Up with standby assistance.    3. Pain status: Pain free.    4. Return to near baseline physical activity: No     Discharge Planner Nurse   Safe discharge environment identified: Yes  Barriers to discharge: Yes       Entered by: Nereida Box RN 06/11/2023   Patient is A & O x 4, Lung sounds CTA. Bowel sounds active, LBM 6/10. Pt is SBA, been sitting on the bed side for stretching. Continent of bladder, using urinal. Pt noted with flat affect, not communicating much. Denies pain, has been taking naps, now sleeping for the night. CPAP on. Right leg dressing intact still swollen. Pt encouraged to elevate the extremity. WOC will see pt tomorrow to see if there is need of continued wound vac. PT recommends TCU, SW consulted for placement. Consults - SW, PT, OT & Wound ostomy.  /53 (BP Location: Right arm)   Pulse 83   Temp 98.2  F (36.8  C) (Oral)   Resp 18   Ht 1.803 m (5' 11\")   Wt 124.7 kg (274 lb 14.6 oz)   SpO2 95%   BMI 38.34 kg/m      Please review provider order for any additional goals.   Nurse to notify provider when observation goals have been met and patient is ready for discharge.  "

## 2023-06-13 VITALS
OXYGEN SATURATION: 94 % | DIASTOLIC BLOOD PRESSURE: 59 MMHG | TEMPERATURE: 98.2 F | BODY MASS INDEX: 38.49 KG/M2 | HEART RATE: 78 BPM | SYSTOLIC BLOOD PRESSURE: 125 MMHG | WEIGHT: 274.91 LBS | HEIGHT: 71 IN | RESPIRATION RATE: 16 BRPM

## 2023-06-13 LAB
MAGNESIUM SERPL-MCNC: 2 MG/DL (ref 1.7–2.3)
POTASSIUM SERPL-SCNC: 4.2 MMOL/L (ref 3.4–5.3)

## 2023-06-13 PROCEDURE — 84132 ASSAY OF SERUM POTASSIUM: CPT | Performed by: STUDENT IN AN ORGANIZED HEALTH CARE EDUCATION/TRAINING PROGRAM

## 2023-06-13 PROCEDURE — 250N000013 HC RX MED GY IP 250 OP 250 PS 637: Performed by: STUDENT IN AN ORGANIZED HEALTH CARE EDUCATION/TRAINING PROGRAM

## 2023-06-13 PROCEDURE — 83735 ASSAY OF MAGNESIUM: CPT | Performed by: STUDENT IN AN ORGANIZED HEALTH CARE EDUCATION/TRAINING PROGRAM

## 2023-06-13 PROCEDURE — 36415 COLL VENOUS BLD VENIPUNCTURE: CPT | Performed by: STUDENT IN AN ORGANIZED HEALTH CARE EDUCATION/TRAINING PROGRAM

## 2023-06-13 PROCEDURE — 250N000013 HC RX MED GY IP 250 OP 250 PS 637: Performed by: INTERNAL MEDICINE

## 2023-06-13 PROCEDURE — G0378 HOSPITAL OBSERVATION PER HR: HCPCS

## 2023-06-13 PROCEDURE — 99239 HOSP IP/OBS DSCHRG MGMT >30: CPT | Performed by: HOSPITALIST

## 2023-06-13 PROCEDURE — 250N000013 HC RX MED GY IP 250 OP 250 PS 637: Performed by: HOSPITALIST

## 2023-06-13 RX ORDER — MAGNESIUM OXIDE 400 MG/1
400 TABLET ORAL EVERY 4 HOURS
Status: DISCONTINUED | OUTPATIENT
Start: 2023-06-13 | End: 2023-06-13 | Stop reason: HOSPADM

## 2023-06-13 RX ADMIN — FOLIC ACID 1 MG: 1 TABLET ORAL at 08:34

## 2023-06-13 RX ADMIN — HYDROMORPHONE HYDROCHLORIDE 1 MG: 2 TABLET ORAL at 04:22

## 2023-06-13 RX ADMIN — VENLAFAXINE HYDROCHLORIDE 150 MG: 150 CAPSULE, EXTENDED RELEASE ORAL at 08:34

## 2023-06-13 RX ADMIN — LISINOPRIL 5 MG: 5 TABLET ORAL at 08:34

## 2023-06-13 RX ADMIN — LEVETIRACETAM 2000 MG: 1000 TABLET ORAL at 08:37

## 2023-06-13 RX ADMIN — MAGNESIUM OXIDE TAB 400 MG (241.3 MG ELEMENTAL MG) 400 MG: 400 (241.3 MG) TAB at 08:34

## 2023-06-13 RX ADMIN — ACETAMINOPHEN 650 MG: 325 TABLET, FILM COATED ORAL at 04:22

## 2023-06-13 RX ADMIN — PHENYTOIN SODIUM 200 MG: 100 CAPSULE, EXTENDED RELEASE ORAL at 08:37

## 2023-06-13 ASSESSMENT — ACTIVITIES OF DAILY LIVING (ADL)
ADLS_ACUITY_SCORE: 34
ADLS_ACUITY_SCORE: 33
ADLS_ACUITY_SCORE: 34
ADLS_ACUITY_SCORE: 34
ADLS_ACUITY_SCORE: 33
ADLS_ACUITY_SCORE: 34

## 2023-06-13 NOTE — PROGRESS NOTES
Care Management Discharge Note    Discharge Date: 06/13/2023     Discharge Disposition: [facility]    Discharge Services:      Discharge DME:      Discharge Transportation:  Pt's spouse    Private pay costs discussed: Not applicable    PAS Confirmation Code: [id number]  Patient/family educated on Medicare website which has current facility and service quality ratings: yes    Education Provided on the Discharge Plan:  yes  Persons Notified of Discharge Plans: Pt, pt's spouse, nursing, MD, TCU admissions  Patient/Family in Agreement with the Plan: yes    Handoff Referral Completed: Yes    Additional Information:  Pt discharging today to [facility]. Pt's spouse is planning to transport at 1230.     Orders reviewed and faxed.     REJI Cristina, LSW  OBS unit, [facility]    REJI Bustillos

## 2023-06-13 NOTE — PLAN OF CARE
PRIMARY DIAGNOSIS: BLE Edema, post-op complications  OUTPATIENT/OBSERVATION GOALS TO BE MET BEFORE DISCHARGE:  1. ADLs back to baseline: No    2. Activity and level of assistance: Up with standby assistance.    3. Pain status: Improved-controlled with oral pain medications.    4. Return to near baseline physical activity: No     Discharge Planner Nurse   Safe discharge environment identified: Yes  Barriers to discharge: Yes       Entered by: Angelic Michelle RN 06/13/2023 2:11 AM   A&O, resting in bed w/ CPAP on. Did sit at EOB this evening to stretch legs. No pain meds needed this evening. BLE wrapped w/ edema wraps. Awaiting TCU tomorrow, family to transport.     Please review provider order for any additional goals.   Nurse to notify provider when observation goals have been met and patient is ready for discharge.

## 2023-06-13 NOTE — PLAN OF CARE
PRIMARY DIAGNOSIS: LOWER EXT EDEMA  OUTPATIENT/OBSERVATION GOALS TO BE MET BEFORE DISCHARGE:  ADLs back to baseline: No    Activity and level of assistance: Assist of 1 WG    Pain status: Pain free.    Return to near baseline physical activity: Yes     Discharge Planner Nurse   Safe discharge environment identified: Yes  Barriers to discharge: No       Entered by: Taisha Holden RN 06/13/2023      Please review provider order for any additional goals.   Nurse to notify provider when observation goals have been met and patient is ready for discharge.

## 2023-06-13 NOTE — PLAN OF CARE
Patient's After Visit Summary was reviewed with patient .   Patient verbalized understanding of After Visit Summary, recommended follow up and was given an opportunity to ask questions.   Discharge medications sent home with patient/family: YES,  Discharged with mother and father

## 2023-06-13 NOTE — PLAN OF CARE
PRIMARY DIAGNOSIS: BLE Edema, post-op complications  OUTPATIENT/OBSERVATION GOALS TO BE MET BEFORE DISCHARGE:  1. ADLs back to baseline: No    2. Activity and level of assistance: Up with standby assistance.    3. Pain status: Improved-controlled with oral pain medications.    4. Return to near baseline physical activity: No     Discharge Planner Nurse   Safe discharge environment identified: Yes  Barriers to discharge: Yes       Entered by: Angelic Michelle RN 06/13/2023 6:07 AM   A&O, resting in bed w/ CPAP on. No pain meds needed overnight. BLE wrapped w/ edema wraps. Awaiting TCU tomorrow, family to transport.     Please review provider order for any additional goals.   Nurse to notify provider when observation goals have been met and patient is ready for discharge.

## 2023-06-13 NOTE — PLAN OF CARE
PRIMARY DIAGNOSIS: LOWER EXT EDEMA  OUTPATIENT/OBSERVATION GOALS TO BE MET BEFORE DISCHARGE:  1. ADLs back to baseline: No    2. Activity and level of assistance: Assist of 1 WG    3. Pain status: Pain free.    4. Return to near baseline physical activity: Yes     Vitals are Temp: 98.2  F (36.8  C) Temp src: Oral BP: 125/59 Pulse: 78   Resp: 16 SpO2: 94 %.  Patient is Alert and Oriented x4. They are 1 Assist with Gait Belt and Walker.  Pt is a Regular diet.  They are denying pain.  Patient has no IV access. Will be discharging to TCU today. Will cont to monitor.     Discharge Planner Nurse   Safe discharge environment identified: Yes  Barriers to discharge: No       Entered by: Taisha Holden RN 06/13/2023      Please review provider order for any additional goals.   Nurse to notify provider when observation goals have been met and patient is ready for discharge.

## 2023-06-13 NOTE — DISCHARGE SUMMARY
"[facility]  Hospitalist Discharge Summary      Date of Admission:  6/9/2023  Date of Discharge:  6/13/2023  Discharging Provider: Marbin Espino MD  Discharge Service: Hospitalist Service    Discharge Diagnoses   Lower extremity swelling  Inability to perform ADLs    Clinically Significant Risk Factors     # Obesity: Estimated body mass index is 38.34 kg/m  as calculated from the following:    Height as of this encounter: 1.803 m (5' 11\").    Weight as of this encounter: 124.7 kg (274 lb 14.6 oz).       Follow-ups Needed After Discharge   Follow-up Appointments     Follow Up and recommended labs and tests      Follow up with Nursing home physician.  Follow-up on hemoglobin with CBC.    Recommend you follow up with your Orthopedic Surgery team at [facility]   regarding your wound vac.  Recommend you contact them today or tomorrow.             Unresulted Labs Ordered in the Past 30 Days of this Admission     No orders found from 5/10/2023 to 6/10/2023.      These results will be followed up by NA    Discharge Disposition   Discharged to home  Condition at discharge: Stable    Hospital Course   Mr. Andrea Uribe is a 47 year old male with a past medical history of hypertension, CKD, seizure disorder, hyperlipidemia, asthma admitted on 6/9/2023 after a revision to knee replacement that was performed at [place] about a week prior to admission, with lower extremity swelling meeting his ADLs and function at home.     Patient is a history of chronic lower extremity edema which is present bilaterally and has been for a number of years.  The patient reports that postoperatively he has noticed some worsening right lower extremity edema and pain causing inability to walk.  He does not report any fever or chills.  He reports no chest pain pressure or shortness of breath.  His functional status has been declining but he feels that this is related to his musculoskeletal issues. He denies any fever or chills.  " He denies any injury or trauma to the area.  He reports no other new concerns or complaints today.    Right leg swelling post operatively s/p revision to knee replacement at [place] ~ 1 week prior to admission  History of bilateral LE edema preoperatively   Multiple RLE Surgeries  Inability to perform ADLs/care for oneself  Patient is a history of chronic lower extremity edema which is present bilaterally and has been for a number of years.  The patient reports that postoperatively he has noticed some worsening right lower extremity edema and pain causing inability to walk.  He does not report any fever or chills.  He reports no chest pain pressure or shortness of breath.  His functional status has been declining but he feels that this is related to his musculoskeletal issues.  - Appreciate Wound Care recommendations regarding wound vac  - Skin intact around swelling , no evidence for cellulitis, no bullae noted  - DVT US negative, DVT ruled out   - RLE Arterial US negative for stenosis  - PT recommending TCU.  Pt is agreeable.  Appreciate SW assistance with discharge planning  - Appreciate Lymphedema Therapy recommendations - edema improved with RLE wrap  - Echocardiogram with grossly normal EF, though difficulty study  - Albumin = 3.2, TSH = 3.86  - Per chart review, wound vac was to be removed on 6/8.  Recommend pt contact his orthopedic surgery team at [facility] to discuss when the wound vac should be removed.     Macrocytic Anemia  - Likely secondary to low folate and borderline low B12 in addition to recent surgery  - Start folic acid 1 mg daily  - Give one dose of B12 1000 mg IM on 6/11  - No evidence of active bleeding at this time  - Daily CBC     Hematuria  Has a history of CKD but creatinine is now normal.  The patient reports that he had intermittent hematuria, now resolved.  -We will continue to monitor     HTN - PTA lisinopril      Seizure disorder-PTA Keppra and Dilantin     HLD-PTA  simvastatin     Asthma-PTA montelukast, inhalers as needed  I assumed care of patient today.  He is ready for discharge to TCU today.  His orders have been completed.  Patient is doing well.  He has no new complaints.  His wife has been kept updated.  He will discharge to TCU.  Consultations This Hospital Stay   PHYSICAL THERAPY ADULT IP CONSULT  LYMPHEDEMA THERAPY IP CONSULT  WOUND OSTOMY CONTINENCE NURSE  IP CONSULT  SOCIAL WORK IP CONSULT  PHYSICAL THERAPY ADULT IP CONSULT  OCCUPATIONAL THERAPY ADULT IP CONSULT    Code Status   Full Code    Time Spent on this Encounter   I, Marbin Espino MD, personally saw the patient today and spent greater than 30 minutes discharging this patient.       Marbin Espino MD  [facility] OBSERVATION DEPT  [address] [place] [address]  Phone: [phone]  ______________________________________________________________________    Physical Exam   Vital Signs: Temp: 98.2  F (36.8  C) Temp src: Oral BP: 127/64 Pulse: 75   Resp: 16 SpO2: 100 % O2 Device: None (Room air)    Weight: 274 lbs 14.62 oz  Constitutional: awake, alert, cooperative, no apparent distress, and appears stated age  Eyes: Lids and lashes normal, pupils equal, round and reactive to light, extra ocular muscles intact, sclera clear, conjunctiva normal  ENT: Normocephalic, without obvious abnormality, atraumatic, sinuses nontender on palpation, external ears without lesions, oral pharynx with moist mucous membranes, tonsils without erythema or exudates, gums normal and good dentition.  Respiratory: No increased work of breathing, good air exchange, clear to auscultation bilaterally, no crackles or wheezing  Cardiovascular: Normal apical impulse, regular rate and rhythm, normal S1 and S2, no S3 or S4, and no murmur noted       Primary Care Physician   Alberto Medley    Discharge Orders      Lymphedema Therapy Referral      General info for SNF    Length of Stay Estimate: Short Term  Care: Estimated # of Days <30  Condition at Discharge: Stable  Level of care:skilled   Rehabilitation Potential: Fair  Admission H&P remains valid and up-to-date: Yes  Recent Chemotherapy: N/A  Use Nursing Home Standing Orders: Yes     Mantoux instructions    Give two-step Mantoux (PPD) Per Facility Policy Yes     Reason for your hospital stay    Lymphedema     Intake and output    Every shift     Activity - Up with nursing assistance     Follow Up and recommended labs and tests    Follow up with Nursing home physician.  Follow-up on hemoglobin with CBC.    Recommend you follow up with your Orthopedic Surgery team at [facility] regarding your wound vac.  Recommend you contact them today or tomorrow.     Full Code     Physical Therapy Adult Consult    Evaluate and treat as clinically indicated.    Reason:  recent R knee TKA revision with wound vac     Occupational Therapy Adult Consult    Evaluate and treat as clinically indicated.    Reason:  recent R knee TKA revision with wound vac     Diet    Follow this diet upon discharge: Orders Placed This Encounter      Regular Diet Adult       Significant Results and Procedures   Most Recent Hemoglobin A1c:No lab results found.,   Results for orders placed or performed during the hospital encounter of 06/09/23   US Lower Extremity Venous Duplex Bilateral    Narrative    ULTRASOUND VENOUS LOWER EXTREMITY BILATERAL   6/9/2023 7:00 PM     HISTORY: bilateral lower extremity pain and edema, severe on right  which is post op leg    COMPARISON: None.    TECHNIQUE: Ultrasound gray scale, Color Doppler flow, and spectral  Doppler waveform analysis performed.    FINDINGS:   The bilateral common femoral, superficial femoral, popliteal and  segmentally visualized calf veins are patent, fully compressible and  demonstrate antegrade Doppler flow. The visualized cephalad great  saphenous vein is negative for thrombus.      Impression    IMPRESSION:   The bilateral lower extremities are  negative for deep venous  thrombosis.    GREG COLLIER MD         SYSTEM ID:  [id number]   US Lower Extremity Arterial Duplex Right    Narrative    EXAM: US LOWER EXTREMITY ARTERIAL DUPLEX RIGHT  LOCATION: [facility]  DATE/TIME: 2023 7:00 PM CDT    INDICATION: Severe edema, post op, pain, cannot palpate or appreciate pulse, confirm arterial flow, decreased lower extremity pulses, lower extremity pain.  COMPARISON: None.  TECHNIQUE: Duplex utilizing 2D gray-scale imaging, Doppler interrogation with color-flow and spectral waveform analysis.    FINDINGS:    RIGHT LOWER EXTREMITY ARTERIAL ASSESSMENT:  Common femoral artery: 202/38 cm/s, T/B  Profunda femoris artery: 131/28 cm/s, T  SFA (proximal): 152/38 cm/s, T/B  SFA (mid): 156/44 cm/s, B  SFA (distal): 197/40 cm/s, B  Popliteal artery: 171/37 cm/s, B  Anterior tibial artery: 76/13 cm/s, B  Posterior tibial artery: 84/15 cm/s, B  Peroneal artery: 77/18 cm/s, T      Impression    IMPRESSION:  1.  Patent right lower extremity arterial vasculature with multiphasic flow. No significant stenosis identified.   Echocardiogram Complete    Narrative    [id number]  [id number]  [id number]  [id number]^[id number]^GLENNA     [facility]  Echocardiography Laboratory  [address] [place] [address]     Name: МАРИЯ HOOPER  MRN: [id number]  : 1975  Study Date: 06/10/2023 08:44 AM  Age: 47 yrs  Gender: Male  Patient Location: [facility]  Reason For Study: Edema  Ordering Physician: GLENNA BAUER  Referring Physician: Alberto Medley  Performed By: Stephy Melgoza RDCS     BSA: 2.4 m2  Height: 71 in  Weight: 274 lb  HR: 73  BP: 118/64 mmHg  ______________________________________________________________________________  Procedure  Complete Portable Echo Adult. Technically difficult study.Extremely poor  acoustic windows.  ______________________________________________________________________________  Interpretation Summary     -  Extremely poor 2D image quality given poor acoustic windows and lack of echo  contrast. Consider repeat study with echo contrast.     1. LV is grossly normal in size and wall thickness. Given poor visualization  of the endomyocardial border, cannot definitively assess LVEF or wall motion.  LVEF appears mildly reduced, but suspect that with echo contrast this may  actually be normal (given normal diastolic parameters).  2. RV appears grossly normal in size and function.  3. LA normal size. RA borderline dilated.  4. Valve structures cannot be assessed due to poor image quality. However, on  Doppler assessment there is no significant stenosis or regurgitation.  5. Small posterior pericardial effusion with no echo evidence of tamponade.     Prior TTE from 2010 with echo contrast showed normal LVEF and wall motion.  ______________________________________________________________________________  Left Ventricle  The left ventricle is normal in size. Grossly normal wall thickness. Due to  the poor quality of the echocardiogram, an assessment of left ventricular  ejection fraction cannot be made. Left ventricular diastolic function is  normal. Regional wall motion abnormalities cannot be excluded due to limited  visualization.     Right Ventricle  The right ventricle is grossly normal size. The right ventricular systolic  function is normal.     Atria  Normal left atrial size. Borderline right atrial enlargement.     Mitral Valve  The mitral valve is not well visualized.     Tricuspid Valve  The tricuspid valve is not well visualized. Right ventricular systolic  pressure could not be approximated due to inadequate tricuspid regurgitation.  IVC diameter <2.1 cm collapsing >50% with sniff suggests a normal RA pressure  of 3 mmHg.     Aortic Valve  The aortic valve is not well visualized. No aortic regurgitation is present.  No aortic stenosis is present.     Pulmonic Valve  The pulmonic valve is not well visualized.      Vessels  The aortic root is normal size. Normal size ascending aorta.     Pericardium  Small posterior pericardial effusion. There are no echocardiographic  indications of cardiac tamponade.     ______________________________________________________________________________  MMode/2D Measurements & Calculations  IVSd: 1.1 cm  LVIDd: 5.4 cm  LVIDs: 4.4 cm  LVPWd: 0.72 cm  IVC diam: 1.7 cm  FS: 18.6 %  LV mass(C)d: 176.1 grams  LV mass(C)dI: 73.1 grams/m2     Ao root diam: 3.4 cm  asc Aorta Diam: 2.6 cm  LA Volume (BP): 43.9 ml  LA Volume Index (BP): 18.2 ml/m2  RV Base: 4.1 cm  RWT: 0.27  TAPSE: 2.6 cm     Doppler Measurements & Calculations  MV E max fadi: 83.2 cm/sec  MV A max fadi: 70.6 cm/sec  MV E/A: 1.2     MV max PG: 3.1 mmHg  MV mean P.5 mmHg  MV V2 VTI: 23.5 cm  MV dec time: 0.21 sec  E/E' av.5  Lateral E/e': 7.1  Medial E/e': 9.9  RV S Fadi: 12.0 cm/sec     ______________________________________________________________________________  Report approved by: MD David Nieves 06/10/2023 10:51 AM               Discharge Medications   Current Discharge Medication List      START taking these medications    Details   folic acid (FOLVITE) 1 MG tablet Take 1 tablet (1 mg) by mouth daily    Associated Diagnoses: Macrocytic anemia         CONTINUE these medications which have CHANGED    Details   oxyCODONE (ROXICODONE) 5 MG tablet Take 1 tablet (5 mg) by mouth every 4 hours as needed for severe pain  Qty: 6 tablet, Refills: 0    Associated Diagnoses: Status post total right knee replacement         CONTINUE these medications which have NOT CHANGED    Details   alendronate (FOSAMAX) 70 MG tablet TAKE 1 TABLET BY MOUTH ONE TIME PER WEEK  Qty: 12 tablet, Refills: 3    Associated Diagnoses: Localized osteoporosis without current pathological fracture      calcium carbonate (OS-LAURA) 500 MG tablet Take 1 tablet (500 mg) by mouth 2 times daily  Qty: 180 tablet, Refills: 3    Associated Diagnoses: Osteopenia of right  lower leg      famotidine (PEPCID) 40 MG tablet TAKE 1 TABLET BY MOUTH EVERYDAY AT BEDTIME  Qty: 90 tablet, Refills: 1    Associated Diagnoses: Gastroesophageal reflux disease, unspecified whether esophagitis present      fluticasone (FLOVENT HFA) 110 MCG/ACT inhaler TAKE 1 PUFF BY MOUTH TWICE A DAY Strength: 110 MCG/ACT  Qty: 36 g, Refills: 1    Comments: Pharmacy may dispense brand if preferred by insurance.  Associated Diagnoses: Mild persistent asthma without complication      KEPPRA 1000 MG TABS Take 2,000 mg by mouth 2 times daily At 0800 and 2000      lisinopril (ZESTRIL) 5 MG tablet Take 1 tablet (5 mg) by mouth daily  Qty: 90 tablet, Refills: 1    Associated Diagnoses: Stage 3 chronic kidney disease, unspecified whether stage 3a or 3b CKD (H)      montelukast (SINGULAIR) 10 MG tablet Take 1 tablet (10 mg) by mouth At Bedtime  Qty: 90 tablet, Refills: 1    Associated Diagnoses: Intermittent asthma, uncomplicated      !! phenytoin (DILANTIN) 100 MG capsule Take 300 mg by mouth every evening      !! phenytoin (DILANTIN) 100 MG capsule Take 200 mg by mouth every morning      simvastatin (ZOCOR) 20 MG tablet TAKE 1 TABLET BY MOUTH EVERYDAY AT BEDTIME  Qty: 90 tablet, Refills: 3    Associated Diagnoses: Hyperlipidemia LDL goal <100      venlafaxine (EFFEXOR XR) 150 MG 24 hr capsule Take 1 capsule (150 mg) by mouth daily  Qty: 90 capsule, Refills: 1    Associated Diagnoses: Recurrent major depressive disorder, in full remission (H); Anxiety      VENTOLIN  (90 Base) MCG/ACT inhaler INHALE 2 PUFFS INTO THE LUNGS EVERY 6 HOURS AS NEEDED FOR SHORTNESS OF BREATH  Qty: 18 g, Refills: 0    Comments: Pharmacy may dispense brand covered by insurance (Proair, or proventil or ventolin or generic albuterol inhaler)  Associated Diagnoses: Mild persistent asthma without complication      cyanocobalamin (VITAMIN B-12) 1000 MCG tablet Take 1 tablet (1,000 mcg) by mouth daily  Qty: 90 tablet, Refills: 1    Associated  Diagnoses: Vitamin B12 deficiency (non anemic)      order for DME Equipment being ordered: Wheelchair  Qty: 1 Device, Refills: 0    Associated Diagnoses: Ataxic gait; Traumatic brain injury with loss of consciousness, subsequent encounter       !! - Potential duplicate medications found. Please discuss with provider.      STOP taking these medications       cefadroxil (DURICEF) 500 MG capsule Comments:   Reason for Stopping:             Allergies   Allergies   Allergen Reactions     Iodine      Kidney disease per patient (high doses)     Asa [Aspirin]      Ibuprofen Sodium Other (See Comments)     Kidney problems (with high doses)     Pollen Extract Other (See Comments)     Seasonal Allergies

## 2023-06-13 NOTE — PLAN OF CARE
Physical Therapy Discharge Summary     Reason for therapy discharge:    Discharged to transitional care facility.     Progress towards therapy goal(s). See goals on Care Plan in Psychiatric electronic health record for goal details.  Goals not met.  Barriers to achieving goals:   discharge from facility.     Therapy recommendation(s):    Continued therapy is recommended.  Rationale/Recommendations:  Patient would benefit from PT eval at TCU in order to increase strength, activity tolerance, balance, independence with mobility and management of LE edema.

## 2023-06-13 NOTE — PLAN OF CARE
PRIMARY DIAGNOSIS: BLE Edema, post-op complications  OUTPATIENT/OBSERVATION GOALS TO BE MET BEFORE DISCHARGE:  ADLs back to baseline: No    Activity and level of assistance: Up with standby assistance.    Pain status: Improved-controlled with oral pain medications.    Return to near baseline physical activity: No     Discharge Planner Nurse   Safe discharge environment identified: Yes  Barriers to discharge: Yes       Entered by: Angelic Michelle RN 06/13/2023 1:40 AM   A&O, placed on CPAP this evening. Rates pain 2/10, declines interventions. Awaiting TCU tomorrow, family to transport.     Please review provider order for any additional goals.   Nurse to notify provider when observation goals have been met and patient is ready for discharge.

## 2023-06-14 ENCOUNTER — TELEPHONE (OUTPATIENT)
Dept: FAMILY MEDICINE | Facility: CLINIC | Age: 48
End: 2023-06-14
Payer: MEDICARE

## 2023-06-14 ENCOUNTER — PATIENT OUTREACH (OUTPATIENT)
Dept: CARE COORDINATION | Facility: CLINIC | Age: 48
End: 2023-06-14
Payer: MEDICARE
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
## 2023-06-14 NOTE — PROGRESS NOTES
"Clinic Care Coordination Contact  Care Coordination Transition Communication    Referral Source: IP Handoff    Clinical Data: Patient was hospitalized at  from 06/09/23 to 06/13/23 with diagnosis of: \"Lower extremity swelling  Inability to perform ADLs\".     Transition to Facility:              Facility Name: [facility]               Contact name and phone number/fax:   Ambulatory Care Coordination to TCU Hand In Communication:     Name: Andrea Uribe is a patient of Alberto Medley PA-C at [facility] and I am the care coordinator.   CC Contact Information: Email: [email or web address]   Phone: [phone]   Follow up recommendations:   Follow-up on hemoglobin with CBC.   - Scheduled: NA  - To be scheduled: Orthopedic Surgery team at [facility] regarding wound vac. Recommend contacting them 06/13/23 or 06/14/23, Primary Care Provider follow up within 14 days of TCU discharge.      I would like to collaborate with the TCU Care team during this patient's stay; please invite me to any care conferences.     Please feel free to contact me with questions or further collaboration in discharge planning.       Plan: RN Care Coordinator will await notification from facility staff informing RN Care Coordinator of patient's discharge plans/needs. RN Care Coordinator will review chart and outreach to facility staff every 4 weeks and as needed.     Cora Boss RN Care Coordinator  [facility]  Email: [email or web address]  Phone: [phone]       "

## 2023-06-14 NOTE — TELEPHONE ENCOUNTER
TCU calls, pt had TKR and needs to know weight bearing status, recommend call orthopedic at [place], Dr Abhi Aguirre, provided number from past visit to call [phone], they agree  Yuko Hernandez RN, BSN  [facility]

## 2023-06-15 ENCOUNTER — TRANSITIONAL CARE UNIT VISIT (OUTPATIENT)
Dept: GERIATRICS | Facility: CLINIC | Age: 48
End: 2023-06-15
Payer: MEDICARE

## 2023-06-15 ENCOUNTER — LAB REQUISITION (OUTPATIENT)
Dept: LAB | Facility: CLINIC | Age: 48
End: 2023-06-15
Payer: MEDICARE

## 2023-06-15 VITALS
RESPIRATION RATE: 18 BRPM | WEIGHT: 275.4 LBS | OXYGEN SATURATION: 98 % | BODY MASS INDEX: 38.56 KG/M2 | TEMPERATURE: 97.6 F | DIASTOLIC BLOOD PRESSURE: 64 MMHG | HEART RATE: 70 BPM | SYSTOLIC BLOOD PRESSURE: 135 MMHG | HEIGHT: 71 IN

## 2023-06-15 DIAGNOSIS — G40.909 SEIZURE DISORDER (H): ICD-10-CM

## 2023-06-15 DIAGNOSIS — Z11.1 ENCOUNTER FOR SCREENING FOR RESPIRATORY TUBERCULOSIS: ICD-10-CM

## 2023-06-15 DIAGNOSIS — D64.9 POSTOPERATIVE ANEMIA: ICD-10-CM

## 2023-06-15 DIAGNOSIS — I10 ESSENTIAL HYPERTENSION: ICD-10-CM

## 2023-06-15 DIAGNOSIS — J45.30 MILD PERSISTENT ASTHMA WITHOUT COMPLICATION: ICD-10-CM

## 2023-06-15 DIAGNOSIS — D53.9 MACROCYTIC ANEMIA: ICD-10-CM

## 2023-06-15 DIAGNOSIS — R53.81 PHYSICAL DECONDITIONING: ICD-10-CM

## 2023-06-15 DIAGNOSIS — L76.82 OTHER POSTOPERATIVE COMPLICATION OF SUBCUTANEOUS TISSUE: ICD-10-CM

## 2023-06-15 DIAGNOSIS — R60.0 LOWER EXTREMITY EDEMA: Primary | ICD-10-CM

## 2023-06-15 PROCEDURE — 99310 SBSQ NF CARE HIGH MDM 45: CPT | Performed by: NURSE PRACTITIONER
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
## 2023-06-16 ENCOUNTER — DOCUMENTATION ONLY (OUTPATIENT)
Dept: GERIATRICS | Facility: CLINIC | Age: 48
End: 2023-06-16
Payer: MEDICARE

## 2023-06-16 ENCOUNTER — DOCUMENTATION ONLY (OUTPATIENT)
Dept: OTHER | Facility: CLINIC | Age: 48
End: 2023-06-16
Payer: MEDICARE

## 2023-06-16 DIAGNOSIS — D64.9 POSTOPERATIVE ANEMIA: ICD-10-CM

## 2023-06-16 DIAGNOSIS — R53.81 PHYSICAL DECONDITIONING: ICD-10-CM

## 2023-06-16 DIAGNOSIS — J45.30 MILD PERSISTENT ASTHMA WITHOUT COMPLICATION: ICD-10-CM

## 2023-06-16 DIAGNOSIS — L76.82 OTHER POSTOPERATIVE COMPLICATION OF SUBCUTANEOUS TISSUE: ICD-10-CM

## 2023-06-16 DIAGNOSIS — G40.909 SEIZURE DISORDER (H): ICD-10-CM

## 2023-06-16 DIAGNOSIS — I10 ESSENTIAL HYPERTENSION: ICD-10-CM

## 2023-06-16 DIAGNOSIS — R60.0 LOWER EXTREMITY EDEMA: Primary | ICD-10-CM

## 2023-06-16 DIAGNOSIS — D53.9 MACROCYTIC ANEMIA: ICD-10-CM

## 2023-06-16 PROCEDURE — 86481 TB AG RESPONSE T-CELL SUSP: CPT | Mod: ORL | Performed by: NURSE PRACTITIONER

## 2023-06-16 PROCEDURE — P9604 ONE-WAY ALLOW PRORATED TRIP: HCPCS | Performed by: NURSE PRACTITIONER

## 2023-06-16 PROCEDURE — 36415 COLL VENOUS BLD VENIPUNCTURE: CPT | Performed by: NURSE PRACTITIONER

## 2023-06-16 NOTE — PROGRESS NOTES
[facility] Discharge Orders    Name: Andrea Uribe  : 1975  Planned Discharge Date: 23  Discharged to: previous independent home      MEDICAL FOLLOW UP  Follow up with PCP in 1-2 weeks   Follow up with Specialists - Follow-up with Ortho as directed for suture removal      FUTURE LABS:   - Monitor Hgb with PCP    ORDER CHANGES:  N/A    DISCHARGE MEDICATIONS:  The patient s pharmacy is authorized to dispense a 30-day supply of medications. Refill requests should be directed to the primary provider, Alberto Medley.   Patient reports has home supply of oxycodone PRN.    Current Outpatient Medications   Medication Sig Dispense Refill     alendronate (FOSAMAX) 70 MG tablet TAKE 1 TABLET BY MOUTH ONE TIME PER WEEK (Patient taking differently: Take 70 mg by mouth once a week On Saturday) 12 tablet 3     calcium carbonate (OS-LAURA) 500 MG tablet Take 1 tablet (500 mg) by mouth 2 times daily 180 tablet 3     cyanocobalamin (VITAMIN B-12) 1000 MCG tablet Take 1 tablet (1,000 mcg) by mouth daily 90 tablet 1     famotidine (PEPCID) 40 MG tablet TAKE 1 TABLET BY MOUTH EVERYDAY AT BEDTIME 90 tablet 1     fluticasone (FLOVENT HFA) 110 MCG/ACT inhaler TAKE 1 PUFF BY MOUTH TWICE A DAY Strength: 110 MCG/ACT 36 g 1     folic acid (FOLVITE) 1 MG tablet Take 1 tablet (1 mg) by mouth daily       KEPPRA 1000 MG TABS Take 2,000 mg by mouth 2 times daily At 0800 and 2000       lisinopril (ZESTRIL) 5 MG tablet Take 1 tablet (5 mg) by mouth daily 90 tablet 1     montelukast (SINGULAIR) 10 MG tablet Take 1 tablet (10 mg) by mouth At Bedtime 90 tablet 1     order for DME Equipment being ordered: Wheelchair 1 Device 0     oxyCODONE (ROXICODONE) 5 MG tablet Take 1 tablet (5 mg) by mouth every 4 hours as needed for severe pain 6 tablet 0     phenytoin (DILANTIN) 100 MG capsule Take 300 mg by mouth every evening       phenytoin (DILANTIN) 100 MG capsule Take 200 mg by mouth every morning       simvastatin (ZOCOR)  20 MG tablet TAKE 1 TABLET BY MOUTH EVERYDAY AT BEDTIME 90 tablet 3     venlafaxine (EFFEXOR XR) 150 MG 24 hr capsule Take 1 capsule (150 mg) by mouth daily 90 capsule 1     VENTOLIN  (90 Base) MCG/ACT inhaler INHALE 2 PUFFS INTO THE LUNGS EVERY 6 HOURS AS NEEDED FOR SHORTNESS OF BREATH 18 g 0       SERVICES:  Home Care:  Occupational Therapy, Physical Therapy, Registered Nurse, Home Health Aide and From:  [facility]    ADDITIONAL INSTRUCTIONS:  Weight bearing restrictions: 50% weight bearing to RLE  Incision may be kept open to air.  Clarify with Ortho when to follow-up for suture removal.  Notify your surgeon if you have increased redness, swelling, tenderness, or drainage at your incision site.    Notify PCP if you have a fever greater than 100.5 degrees.    DO NOT DRIVE while taking narcotic pain medications.         JANNA Andersen CNP  This document was electronically signed on June 16, 2023

## 2023-06-17 LAB
GAMMA INTERFERON BACKGROUND BLD IA-ACNC: 0 IU/ML
M TB IFN-G BLD-IMP: NEGATIVE
M TB IFN-G CD4+ BCKGRND COR BLD-ACNC: 2.2 IU/ML
MITOGEN IGNF BCKGRD COR BLD-ACNC: 0 IU/ML
MITOGEN IGNF BCKGRD COR BLD-ACNC: 0 IU/ML
QUANTIFERON MITOGEN: 2.2 IU/ML
QUANTIFERON NIL TUBE: 0 IU/ML
QUANTIFERON TB1 TUBE: 0 IU/ML
QUANTIFERON TB2 TUBE: 0

## 2023-06-19 ENCOUNTER — TELEPHONE (OUTPATIENT)
Dept: FAMILY MEDICINE | Facility: CLINIC | Age: 48
End: 2023-06-19
Payer: MEDICARE

## 2023-06-19 NOTE — TELEPHONE ENCOUNTER
Order/Referral Request    [facility]    Orders being requested: Delay of care, start of care 06-22-23.    Reason service is needed/diagnosis: Nurse eval and PT.    When are orders needed by: asap    Has this been discussed with Provider: Yes    Does patient have a preference on a Group/Provider/Facility? [facility]    Does patient have an appointment scheduled?: No    Where to send orders: Verbal, can speak with the nurse that answers.    Okay to leave a detailed message?: Yes at Other phone number:  [phone]

## 2023-06-19 NOTE — TELEPHONE ENCOUNTER
It appears home care was ordered after recent hospitalization. Likely related to postoperative care. Verbal delay needs to come from ordering/managing team. It appears Dr. Aguirre at [facility] is the orthopedic surgeon managing this. This would be orthopedics.     -trae brewer, pac

## 2023-06-22 ENCOUNTER — PATIENT OUTREACH (OUTPATIENT)
Dept: FAMILY MEDICINE | Facility: CLINIC | Age: 48
End: 2023-06-22
Payer: MEDICARE

## 2023-06-22 DIAGNOSIS — M85.861 OSTEOPENIA OF RIGHT LOWER LEG: ICD-10-CM

## 2023-06-22 DIAGNOSIS — E53.8 VITAMIN B12 DEFICIENCY (NON ANEMIC): ICD-10-CM

## 2023-06-22 DIAGNOSIS — D53.9 MACROCYTIC ANEMIA: ICD-10-CM

## 2023-06-22 RX ORDER — FOLIC ACID 1 MG/1
1 TABLET ORAL DAILY
Qty: 90 TABLET | Refills: 0 | Status: SHIPPED | OUTPATIENT
Start: 2023-06-22 | End: 2023-08-18

## 2023-06-22 RX ORDER — CALCIUM CARBONATE 500(1250)
1 TABLET ORAL 2 TIMES DAILY
Qty: 180 TABLET | Refills: 0 | Status: SHIPPED | OUTPATIENT
Start: 2023-06-22

## 2023-06-22 RX ORDER — LANOLIN ALCOHOL/MO/W.PET/CERES
1000 CREAM (GRAM) TOPICAL DAILY
Qty: 90 TABLET | Refills: 0 | Status: SHIPPED | OUTPATIENT
Start: 2023-06-22 | End: 2023-08-18

## 2023-06-22 NOTE — TELEPHONE ENCOUNTER
Alberto Medley PA-C- Home care calling regarding med discrepancies.     -Pt does not have Vitamin b12, folvite, and calcium in home, however listed on discharge list and on med profile.     -Spouse states she does not know where folic acid was sent, she did not receive.     -Will need refills if he is to be taking.     -Assisted pt and spouse in scheduling a hospital   follow-up with Dr. Correa on 6/26 at 4:40 PM.     -Pended medications.     HUBER Kahn from [facility] calling,     Medication discrepancies reported during home care admission visit.     -Pt does not have Vitamin b12, folvite, and calcium in home, however listed on discharge list and on med profile. Will need refills if he is to be taking.     -Assisted pt and spouse in scheduling a hospital follow-up with Dr. Correa on 6/26 at 4:40 PM.     Routed to PCP    Lovely KUMAR RN   PAL (Patient Advocate Liaison)  [facility]  [phone]

## 2023-06-22 NOTE — TELEPHONE ENCOUNTER
Called and spoke to Gabriella, pt's spouse (CTC),     -Informed her the medications were sent to pharmacy by SEEMA Sancheza verbalizes understanding and is agreeable with plan.     Lovely KUMAR RN   PAL (Patient Advocate Liaison)  [facility]  [phone]

## 2023-06-25 ENCOUNTER — NURSE TRIAGE (OUTPATIENT)
Dept: NURSING | Facility: CLINIC | Age: 48
End: 2023-06-25
Payer: MEDICARE

## 2023-06-26 ENCOUNTER — OFFICE VISIT (OUTPATIENT)
Dept: FAMILY MEDICINE | Facility: CLINIC | Age: 48
End: 2023-06-26
Payer: MEDICARE

## 2023-06-26 VITALS
SYSTOLIC BLOOD PRESSURE: 120 MMHG | DIASTOLIC BLOOD PRESSURE: 60 MMHG | OXYGEN SATURATION: 94 % | WEIGHT: 275 LBS | HEIGHT: 71 IN | HEART RATE: 74 BPM | RESPIRATION RATE: 16 BRPM | BODY MASS INDEX: 38.5 KG/M2 | TEMPERATURE: 98.6 F

## 2023-06-26 DIAGNOSIS — M17.11 PRIMARY OSTEOARTHRITIS OF RIGHT KNEE: ICD-10-CM

## 2023-06-26 DIAGNOSIS — F51.01 PRIMARY INSOMNIA: ICD-10-CM

## 2023-06-26 DIAGNOSIS — G47.33 OSA (OBSTRUCTIVE SLEEP APNEA): Primary | ICD-10-CM

## 2023-06-26 DIAGNOSIS — M67.01: ICD-10-CM

## 2023-06-26 DIAGNOSIS — G82.22 PARAPLEGIA, INCOMPLETE (H): ICD-10-CM

## 2023-06-26 PROCEDURE — 99214 OFFICE O/P EST MOD 30 MIN: CPT | Performed by: FAMILY MEDICINE

## 2023-06-26 RX ORDER — TRAZODONE HYDROCHLORIDE 50 MG/1
50 TABLET, FILM COATED ORAL AT BEDTIME
Qty: 90 TABLET | Refills: 1 | Status: SHIPPED | OUTPATIENT
Start: 2023-06-26 | End: 2023-08-18

## 2023-06-26 RX ORDER — LEVETIRACETAM 1000 MG/1
2000 TABLET ORAL 2 TIMES DAILY
Start: 2023-06-26

## 2023-06-26 NOTE — PROGRESS NOTES
"  Assessment & Plan   Problem List Items Addressed This Visit     Short heel cord, right     He is hinged foot drop orthotic is broken, and he has prop riveted it back together.  He needs a replacement         Relevant Orders    Orthotics and Prosthetics DME Other (Comments) (hinged foot drop brace)    Primary osteoarthritis of right knee     Status post arthroplasty with wound dehiscence, thereafter revision.  Wound is now completely closed except for a few punctate openings.  Noninflamed nontender.  PT starts tomorrow         Primary insomnia     As discussed.  He needs a functioning CPAP         Relevant Medications    traZODone (DESYREL) 50 MG tablet    Paraplegia, incomplete (H)     PT starts tomorrow         SANGITA (obstructive sleep apnea) - Primary     He is developing progressive daytime fatigue.  He cannot sleep at night.  In spite of melatonin.  His CPAP is quite old, will not maintain pressure.  He may continue melatonin, and trazodone, refer.  Discussed         Relevant Orders    Adult Sleep Eval & Management  Referral                 MED REC REQUIRED    Post Medication Reconciliation Status: discharge medications reconciled, continue medications without change      Luis Correa MD  [facility] SKYLER Mendez is a 47 year old, presenting for the following health issues:  Hospital F/U        6/26/2023     3:57 PM   Additional Questions   Roomed by Malini AHMADI     [facility]     ED/UC Followup:    Facility:  [facility]   Date of visit: 06/09/2023-06/13/2023  Reason for visit: Hematuria  Current Status: Patient can't fall alseep          Review of Systems   No falls no drainage      Objective    /60 (BP Location: Right arm, Patient Position: Sitting, Cuff Size: Adult Large)   Pulse 74   Temp 98.6  F (37  C) (Oral)   Resp 16   Ht 1.803 m (5' 11\")   Wt 124.7 kg (275 lb)   SpO2 94%   BMI 38.35 kg/m    Body mass index is 38.35 kg/m .  Physical Exam "   As described  Right leg is massive compared to left

## 2023-06-26 NOTE — TELEPHONE ENCOUNTER
Nurse Triage SBAR    Situation: Medication refill    Background: Significant Other, Gabriella, calling. Consent: on file in chart.    Assessment: Pt is out of Keppra 1000 Mg tab - Take 2,000 mg by mouth 2 times daily. At .0800 and 2000. They states they can only us [facility] pharmacies and they stated they are closed.     Recommendation: RN offered to page the on call provider to get a small supply but they might need to pay out of pocket if the pts insurance does not work outside of [facility]. They declined and opted to go to the ED for the Keppra.       Jessica Moura, RN Nursing Advisor 6/25/2023 8:22 PM     Reason for Disposition    [1] Prescription refill request for ESSENTIAL medicine (i.e., likelihood of harm to patient if not taken) AND [2] triager unable to refill per department policy    Additional Information    Negative: New-onset or worsening symptoms, see that guideline (e.g., diarrhea, runny nose, sore throat)    Negative: Medicine question not related to refill or renewal    Negative: Caller (e.g., patient or pharmacist) requesting information about a new medicine    Negative: Caller requesting information unrelated to medicine    Protocols used: MEDICATION REFILL AND RENEWAL CALL-A-

## 2023-06-26 NOTE — TELEPHONE ENCOUNTER
Denied. This medication is not prescribed by me. Managed by his neurologist. Refill to go to prescribing provider.     -trae brewer,pac

## 2023-06-27 NOTE — ASSESSMENT & PLAN NOTE
Status post arthroplasty with wound dehiscence, thereafter revision.  Wound is now completely closed except for a few punctate openings.  Noninflamed nontender.  PT starts tomorrow

## 2023-06-27 NOTE — ASSESSMENT & PLAN NOTE
He is hinged foot drop orthotic is broken, and he has prop riveted it back together.  He needs a replacement

## 2023-06-27 NOTE — ASSESSMENT & PLAN NOTE
He is developing progressive daytime fatigue.  He cannot sleep at night.  In spite of melatonin.  His CPAP is quite old, will not maintain pressure.  He may continue melatonin, and trazodone, refer.  Discussed

## 2023-06-27 NOTE — TELEPHONE ENCOUNTER
Wife has CTC, left detailed message informing to follow up with prescribing provider, call if ?'s  Yuko Hernandez RN, BSN  [facility]

## 2023-06-30 ENCOUNTER — TELEPHONE (OUTPATIENT)
Dept: FAMILY MEDICINE | Facility: CLINIC | Age: 48
End: 2023-06-30
Payer: MEDICARE

## 2023-06-30 NOTE — TELEPHONE ENCOUNTER
MTM referral from: Transitions of Care (recent hospital discharge or ED visit)    MTM referral outreach attempt on June 30, 2023 at 1:32 PM      Outcome: Patient is not interested at this time because has a nurse visit every week to assist, will route to MTM Pharmacist/Provider as an FYI. Thank you for the referral.     Use VBC for the carrier/Plan on the flowsheet    Tristan Nunez [facility]

## 2023-07-13 ENCOUNTER — PATIENT OUTREACH (OUTPATIENT)
Dept: CARE COORDINATION | Facility: CLINIC | Age: 48
End: 2023-07-13
Payer: MEDICARE

## 2023-07-13 ASSESSMENT — ACTIVITIES OF DAILY LIVING (ADL): DEPENDENT_IADLS:: TRANSPORTATION

## 2023-07-13 NOTE — PROGRESS NOTES
Clinic Care Coordination Contact  OUTREACH    Referral Information:  Referral Source: IP Handoff  Primary Diagnosis: Orthopedic    Chief Complaint   Patient presents with     Clinic Care Coordination - Initial      Universal Utilization: 51% Risk of Admission or ED Visit   Clinic Utilization  Difficulty keeping appointments:: No  Compliance Concerns: No  No-Show Concerns: No  No PCP office visit in Past Year: No  Utilization    Hospital Admissions  1             ED Visits  1             No Show Count (past year)  1                Current as of: 7/6/2023  4:14 AM            Clinical Concerns:  Current Medical Concerns:    Patient Active Problem List   Diagnosis     Seizure disorder (H)     TBI (traumatic brain injury) (H)     Moderate major depression (H)     Obesity     Anxiety     SANGITA (obstructive sleep apnea)     GERD (gastroesophageal reflux disease)     Intermittent asthma     CKD (chronic kidney disease) stage 2, GFR 60-89 ml/min     MRSA cellulitis     Anemia     Femur fracture, right (H)     Physical deconditioning     S/P total knee replacement     Health Care Home     Hyperlipidemia LDL goal <130     Incisional hernia, without obstruction or gangrene     Ataxic gait     S/P total knee arthroplasty     Acute post-operative pain     Short heel cord, right     Diarrhea, unspecified type     Essential hypertension     Vitamin B12 deficiency (non anemic)     Vitamin D deficiency     Heterotopic ossification     Mild persistent asthma without complication     Morbid obesity (H)     Moderate major depression (H)     Traumatic brain injury with loss of consciousness, sequela (H)     Lower extremity edema     Unable to walk     Other postoperative complication of subcutaneous tissue     Hematuria, unspecified type     Paraplegia, incomplete (H)     Primary insomnia     Primary osteoarthritis of right knee      RNCC was not notified of patient's discharge from TCU.   Patient completed follow up appointment with  provider 06/26/2023.   Hasn't scheduled orthotic appointment yet due to spouse working and unable to provide transportation. No concerns.     Current Behavioral Concerns: none noted.    Education Provided to patient: Care Coordination role, clinic after hours, medications, appointments discussed/reviewed. Support provided.    Pain  Pain (GOAL):: No  Health Maintenance Reviewed: Due/Overdue   Health Maintenance Due   Topic Date Due     HEPATITIS B IMMUNIZATION (1 of 3 - 3-dose series) Never done     COVID-19 Vaccine (1) Never done     MEDICARE ANNUAL WELLNESS VISIT  Never done     ASTHMA ACTION PLAN  02/06/2020     LIPID  06/01/2022     MICROALBUMIN  06/01/2022      Clinical Pathway: None    Medication Management:  Medication review status: Medications reviewed and no changes reported per patient.           Functional Status:  Dependent IADLs:: Transportation  Bed or wheelchair confined:: No  Mobility Status: Independent w/Device    Living Situation:  Current living arrangement:: I live in a private home with spouse  Type of residence:: Private home - stairs    Lifestyle & Psychosocial Needs:    Social Determinants of Health     Tobacco Use: Low Risk  (6/26/2023)    Patient History      Smoking Tobacco Use: Never      Smokeless Tobacco Use: Never      Passive Exposure: Not on file   Alcohol Use: Not At Risk (3/17/2022)    AUDIT-C      Frequency of Alcohol Consumption: Never      Average Number of Drinks: Patient refused      Frequency of Binge Drinking: Never   Financial Resource Strain: Medium Risk (3/17/2022)    Overall Financial Resource Strain (CARDIA)      Difficulty of Paying Living Expenses: Somewhat hard   Food Insecurity: Unknown (3/17/2022)    Hunger Vital Sign      Worried About Running Out of Food in the Last Year: Patient refused      Ran Out of Food in the Last Year: Never true   Transportation Needs: No Transportation Needs (3/17/2022)    PRAPARE - Transportation      Lack of Transportation (Medical):  "No      Lack of Transportation (Non-Medical): No   Physical Activity: Inactive (3/17/2022)    Exercise Vital Sign      Days of Exercise per Week: 0 days      Minutes of Exercise per Session: 0 min   Stress: Stress Concern Present (3/17/2022)    [employer] [place] [employer] - Occupational Stress Questionnaire      Feeling of Stress : Rather much   Social Connections: Unknown (3/17/2022)    Social Connection and Isolation Panel [NHANES]      Frequency of Communication with Friends and Family: Twice a week      Frequency of Social Gatherings with Friends and Family: Once a week      Attends Denominational Services: Patient refused      Active Member of Clubs or Organizations: No      Attends Club or Organization Meetings: Not on file      Marital Status:    Intimate Partner Violence: Not on file   Depression: Not at risk (2/15/2023)    PHQ-2      PHQ-2 Score: 0   Housing Stability: High Risk (3/17/2022)    Housing Stability Vital Sign      Unable to Pay for Housing in the Last Year: No      Number of Places Lived in the Last Year: 1      Unstable Housing in the Last Year: Yes     Diet:: Regular  Inadequate nutrition (GOAL):: No  Tube Feeding: No  Inadequate activity/exercise (GOAL):: No  Significant changes in sleep pattern (GOAL): No (\"getting better\")  Transportation means:: Family, Regular car  Denominational or spiritual beliefs that impact treatment:: No  Mental health DX:: No  Mental health management concern (GOAL):: No  Chemical Dependency Status: No Current Concerns  Chemical Dependency Management:  (NA)  Informal Support system:: Spouse      Resources and Interventions:  Current Resources:   Community Resources: [place] Programs, [facility]  Equipment Currently Used at Home: walker, rolling, wheelchair, manual  Advance Care Plan/Directive  Advanced Care Plans/Directives on file:: No  Type Advanced Care Plans/Directives:  (NA)  Advanced Care Plan/Directive Status: Declined Further Information    Referrals " Placed: None    Patient/Caregiver understanding: Patient/caregiver verbalized understanding and denies any additional questions or concerns at this time. RNCC engaged in AIDET communications during encounter.       Future Appointments              In 4 months Savannah Boudreaux PA-C [facility]        Plan: At this time, patient/caregiver denies outstanding need for connection or referral to resources or assistance navigating recommended follow up care. No further Care Coordination outreaches scheduled at this time, patient/caregiver was provided with this writer's number and encouraged to call with future needs or questions.     Cora Boss RN Care Coordinator  [facility] [place], [place], [place]  Email: [email or web address]  Phone: [phone]

## 2023-07-25 ENCOUNTER — TELEPHONE (OUTPATIENT)
Dept: FAMILY MEDICINE | Facility: CLINIC | Age: 48
End: 2023-07-25
Payer: MEDICARE

## 2023-07-25 NOTE — TELEPHONE ENCOUNTER
Pt called in via Central Scheduling advising that his landlord is threatening to take his cat away from him. Pt is wondering if he could get something from PCP stating he needs his cat for emotional support.    New Landlord is having an issue with Pt's cat. Since head injury, Pt states that the cat makes him feel better. Pt is wondering if he could get a letter explaining that he needs his cat for emotional support.    Routed to PCP.    Tracey Montalvo/EMT-B  [facility] / [place]

## 2023-08-17 ENCOUNTER — TELEPHONE (OUTPATIENT)
Dept: FAMILY MEDICINE | Facility: CLINIC | Age: 48
End: 2023-08-17
Payer: MEDICARE

## 2023-08-17 NOTE — TELEPHONE ENCOUNTER
Spoke to Pt, he does not have Mychart set up therefore cannot setup e-visit. Scheduled a phone visit for tomorrow with PCP for  8/18/23 at 1130 to discuss letter regarding the need for Emotional Support Animal (2 cats).    Tracey Montalvo/EMT-B  [facility] / [place]

## 2023-08-17 NOTE — TELEPHONE ENCOUNTER
Forms/Letter Request    Type of form/letter: Needs new letter for emotional support animal    Have you been seen for this request: N/A    Do we have the form/letter: Yes: Patient stated he called two weeks ago and requested this letter    Who is the form from? Needs a letter from provider stating he can have his two cats at his [facility] for  (if other please explain)    Where did/will the form come from?     When is form/letter needed by: ASAP    How would you like the form/letter returned: Mail  Is this the correct address?: Yes  [id number] [address] [id number]    Patient Notified form requests are processed in 3-5 business days:Yes    Okay to leave a detailed message?: Yes at Cell number on file:    Telephone Information:   Mobile [phone]

## 2023-08-18 ENCOUNTER — VIRTUAL VISIT (OUTPATIENT)
Dept: FAMILY MEDICINE | Facility: CLINIC | Age: 48
End: 2023-08-18
Payer: MEDICARE

## 2023-08-18 DIAGNOSIS — F33.42 RECURRENT MAJOR DEPRESSIVE DISORDER, IN FULL REMISSION (H): Primary | ICD-10-CM

## 2023-08-18 DIAGNOSIS — F41.9 ANXIETY: ICD-10-CM

## 2023-08-18 DIAGNOSIS — E53.8 VITAMIN B12 DEFICIENCY (NON ANEMIC): ICD-10-CM

## 2023-08-18 DIAGNOSIS — F51.01 PRIMARY INSOMNIA: ICD-10-CM

## 2023-08-18 DIAGNOSIS — D53.9 MACROCYTIC ANEMIA: ICD-10-CM

## 2023-08-18 DIAGNOSIS — J45.20 INTERMITTENT ASTHMA, UNCOMPLICATED: ICD-10-CM

## 2023-08-18 DIAGNOSIS — N18.30 STAGE 3 CHRONIC KIDNEY DISEASE, UNSPECIFIED WHETHER STAGE 3A OR 3B CKD (H): ICD-10-CM

## 2023-08-18 PROCEDURE — 99214 OFFICE O/P EST MOD 30 MIN: CPT | Mod: 95 | Performed by: PHYSICIAN ASSISTANT

## 2023-08-18 RX ORDER — TRAZODONE HYDROCHLORIDE 50 MG/1
50 TABLET, FILM COATED ORAL AT BEDTIME
Qty: 90 TABLET | Refills: 1 | Status: SHIPPED | OUTPATIENT
Start: 2023-08-18 | End: 2024-01-23

## 2023-08-18 RX ORDER — LANOLIN ALCOHOL/MO/W.PET/CERES
1000 CREAM (GRAM) TOPICAL DAILY
Qty: 90 TABLET | Refills: 0 | Status: SHIPPED | OUTPATIENT
Start: 2023-08-18

## 2023-08-18 RX ORDER — MONTELUKAST SODIUM 10 MG/1
1 TABLET ORAL AT BEDTIME
Qty: 90 TABLET | Refills: 1 | Status: SHIPPED | OUTPATIENT
Start: 2023-08-18 | End: 2023-12-29

## 2023-08-18 RX ORDER — LISINOPRIL 5 MG/1
5 TABLET ORAL DAILY
Qty: 90 TABLET | Refills: 1 | Status: SHIPPED | OUTPATIENT
Start: 2023-08-18 | End: 2024-01-23

## 2023-08-18 RX ORDER — VENLAFAXINE HYDROCHLORIDE 150 MG/1
150 CAPSULE, EXTENDED RELEASE ORAL DAILY
Qty: 90 CAPSULE | Refills: 1 | Status: SHIPPED | OUTPATIENT
Start: 2023-08-18 | End: 2024-01-23

## 2023-08-18 RX ORDER — FOLIC ACID 1 MG/1
1 TABLET ORAL DAILY
Qty: 90 TABLET | Refills: 0 | Status: SHIPPED | OUTPATIENT
Start: 2023-08-18

## 2023-08-18 NOTE — PROGRESS NOTES
Andrea is a 47 year old who is being evaluated via a billable telephone visit.      What phone number would you like to be contacted at? See demographics  How would you like to obtain your AVS? Mail a copy    Distant Location (provider location):  Off-site    Assessment & Plan     Recurrent major depressive disorder, in full remission (H)  Stable o ncurrent regimen. His cats do aid in this control. Letter written. To be mailed to patient once I return to clinic on Monday.   - venlafaxine (EFFEXOR XR) 150 MG 24 hr capsule; Take 1 capsule (150 mg) by mouth daily  Medication use and side effects discussed with the patient. Patient is in complete understanding and agreement with plan.     Anxiety  As above   - venlafaxine (EFFEXOR XR) 150 MG 24 hr capsule; Take 1 capsule (150 mg) by mouth daily    Vitamin B12 deficiency (non anemic)  Refill needed.   - cyanocobalamin (VITAMIN B-12) 1000 MCG tablet; Take 1 tablet (1,000 mcg) by mouth daily    Macrocytic anemia  As above   - folic acid (FOLVITE) 1 MG tablet; Take 1 tablet (1 mg) by mouth daily    Stage 3 chronic kidney disease, unspecified whether stage 3a or 3b CKD (H)  Past history. Refill needed. No side effects of medication per patient.   - lisinopril (ZESTRIL) 5 MG tablet; Take 1 tablet (5 mg) by mouth daily    Intermittent asthma, uncomplicated  Stable. Refill needed.   - montelukast (SINGULAIR) 10 MG tablet; Take 1 tablet (10 mg) by mouth At Bedtime    Primary insomnia  As above   - traZODone (DESYREL) 50 MG tablet; Take 1 tablet (50 mg) by mouth At Bedtime         Alberto Medley PA-C  [facility]    Alek Mendez is a 47 year old, presenting for the following health issues:  Letter Request ( Emotional support letter)      8/18/2023    11:02 AM   Additional Questions   Roomed by Andrew GUZMÁN   Patient would like to get an Emotional support letter for his cat. Patient has two cats. One is  19 years old (bumper) and  recently obtained a kitten (wild cat). His cat(s) has aided in both management in depression and anxiety. Specifically, when patient's significant other and kids are gone and patient is home alone, his cats help control his symptoms and prevent panic attacks, which he has had in the past when.     Patient declined answering PHQ 9  questionnaires    Depression and Anxiety Follow-Up  How are you doing with your depression since your last visit? No change  How are you doing with your anxiety since your last visit?  No change  Are you having other symptoms that might be associated with depression or anxiety? No  Have you had a significant life event? No   Do you have any concerns with your use of alcohol or other drugs? No    Social History     Tobacco Use    Smoking status: Never    Smokeless tobacco: Never   Vaping Use    Vaping Use: Never used   Substance Use Topics    Alcohol use: No     Alcohol/week: 0.0 standard drinks of alcohol    Drug use: No         2/9/2022     6:58 AM 2/3/2023     9:19 AM 2/15/2023    10:41 AM   PHQ   PHQ-9 Total Score 8 2 1   Q9: Thoughts of better off dead/self-harm past 2 weeks Not at all Not at all Not at all         11/15/2021    11:27 AM 2/9/2022     7:04 AM 2/3/2023     9:20 AM   DAYAMI-7 SCORE   Total Score  2 (minimal anxiety) 1 (minimal anxiety)   Total Score 11 2 1         2/15/2023    10:41 AM   Last PHQ-9   1.  Little interest or pleasure in doing things 0   2.  Feeling down, depressed, or hopeless 0   3.  Trouble falling or staying asleep, or sleeping too much 0   4.  Feeling tired or having little energy 1   5.  Poor appetite or overeating 0   6.  Feeling bad about yourself 0   7.  Trouble concentrating 0   8.  Moving slowly or restless 0   Q9: Thoughts of better off dead/self-harm past 2 weeks 0   PHQ-9 Total Score 1         2/3/2023     9:20 AM   DAYAMI-7    1. Feeling nervous, anxious, or on edge 1   2. Not being able to stop or control worrying 0   3. Worrying too much about  "different things 0   4. Trouble relaxing 0   5. Being so restless that it is hard to sit still 0   6. Becoming easily annoyed or irritable 0   7. Feeling afraid, as if something awful might happen 0   DAYAMI-7 Total Score 1       Suicide Assessment Five-step Evaluation and Treatment (SAFE-T)    Asthma Follow-Up    Was ACT completed today?  No    Do you have a cough?  No  Are you experiencing any wheezing in your chest?  No  Do you have any shortness of breath?  No   How often are you using a short-acting (rescue) inhaler or nebulizer, such as Albuterol?  A few times a month  How many days per week do you miss taking your asthma controller medication?  I do not have an asthma controller medication  Please describe any recent triggers for your asthma: None  Have you had any Emergency Room Visits, Urgent Care Visits, or Hospital Admissions since your last office visit?  No        Review of Systems   Constitutional, HEENT, cardiovascular, pulmonary, GI, , musculoskeletal, neuro, skin, endocrine and psych systems are negative, except as otherwise noted.      Objective    Vitals - Patient Reported  Weight (Patient Reported): 111.6 kg (246 lb)  Height (Patient Reported): 180.3 cm (5' 11\")  BMI (Based on Pt Reported Ht/Wt): 34.31        Physical Exam   healthy, alert, and no distress  PSYCH: Alert and oriented times 3; coherent speech, normal   rate and volume, able to articulate logical thoughts, able   to abstract reason, no tangential thoughts, no hallucinations   or delusions  His affect is normal  RESP: No cough, no audible wheezing, able to talk in full sentences  Remainder of exam unable to be completed due to telephone visits          Phone call duration: 11 minutes      "

## 2023-08-18 NOTE — PROGRESS NOTES
"Andrea is a 47 year old who is being evaluated via a billable video visit.      How would you like to obtain your AVS? Mail a copy  If the video visit is dropped, the invitation should be resent by: Send to e-mail at: [email or web address]  Will anyone else be joining your video visit? No  {If patient encounters technical issues they should call [phone] :391882}        {PROVIDER CHARTING PREFERENCE:485458}    Subjective   Andrea is a 47 year old, presenting for the following health issues:  Letter Request ( Emotional support letter)        8/18/2023    11:02 AM   Additional Questions   Roomed by Andrew SANDERS CMA       HPI       Patient would like to get an Emotional support letter for his cat    Patient declined answering PHQ 9  questionnaires    {SUPERLIST (Optional):053302}  {additonal problems for provider to add (Optional):339239}      Review of Systems   {ROS COMP (Optional):923076}      Objective    Vitals - Patient Reported  Weight (Patient Reported): 111.6 kg (246 lb)  Height (Patient Reported): 180.3 cm (5' 11\")  BMI (Based on Pt Reported Ht/Wt): 34.31        Physical Exam   {video visit exam brief selected:120976}    {Diagnostic Test Results (Optional):063987}    {AMBULATORY ATTESTATION (Optional):725536}        Video-Visit Details    Type of service:  Video Visit   Video Start Time: {video visit start/end time for provider to select:401511}  Video End Time:{video visit start/end time for provider to select:152955}    Originating Location (pt. Location): {video visit patient location:181682::\"Home\"}  {PROVIDER LOCATION On-site should be selected for visits conducted from your clinic location or adjoining [facility] hospital, academic office, or other nearby [facility] building. Off-site should be selected for all other provider locations, including home:569436}  Distant Location (provider location):  {virtual location provider:761039}  Platform used for Video Visit: {Virtual Visit Platforms:627085::\"eEyeWell\"}      "

## 2023-08-18 NOTE — LETTER
August 18, 2023      Andrea STACY Singletaryse  [address] [facility] [address]        To Whom It May Concern,     Patient is under my care for anxiety and depression. While these are being well managed, part of this management includes the ability to have emotional support from his official emotional support animals. These are two cats; Bumper and Wild Cat. These animals help control his symptoms  in his home and I recommend they maintain to be a part of this patient's home.       Sincerely,        Alberto Medley PA-C

## 2023-09-01 ENCOUNTER — TELEPHONE (OUTPATIENT)
Dept: FAMILY MEDICINE | Facility: CLINIC | Age: 48
End: 2023-09-01
Payer: MEDICARE

## 2023-09-01 NOTE — TELEPHONE ENCOUNTER
Forms/Letter Request    Type of form/letter:  [employer]cessary [employer] form    Have you been seen for this request: N/A    Do we have the form/letter: Yes:     Who is the form from? Patient    Where did/will the form come from? Patient or family brought in       When is form/letter needed by: ASAP    How would you like the form/letter returned:  and fax at [phone]    Patient Notified form requests are processed in 3-5 business days:Yes    Okay to leave a detailed message?: Yes at Cell number on file:    Telephone Information:   Mobile [phone]

## 2023-09-11 NOTE — TELEPHONE ENCOUNTER
Form reviewed. These forms are typically for devices that maintain life. If the power would go out temporarily,this would recommend in possible danger. What is this for? Is this in regards to his cpap?    -trae brewer, pac

## 2023-09-13 DIAGNOSIS — J45.30 MILD PERSISTENT ASTHMA WITHOUT COMPLICATION: ICD-10-CM

## 2023-09-13 RX ORDER — FLUTICASONE PROPIONATE 110 UG/1
AEROSOL, METERED RESPIRATORY (INHALATION)
Qty: 36 G | Refills: 1 | Status: SHIPPED | OUTPATIENT
Start: 2023-09-13 | End: 2023-12-29

## 2023-09-19 NOTE — TELEPHONE ENCOUNTER
Patients wife called on status of form. I did read her the statement on these forms are typically for devices that maintain life. She states it is for cpap , and seizures and states it is to maintain life. Please call patient or wife if additional questions. Ruth Behrens.

## 2023-09-20 NOTE — TELEPHONE ENCOUNTER
FYI - Status Update    Who is Calling: family member, REGGIE, WIFE    Update: WIFE WOULD LIKE CALL BACK TO KNOW WHEN THIS FORM CAN BE PICKED UP. DUE TOMORROW MORNING.     Does caller want a call/response back: Yes     Okay to leave a detailed message?: Yes at Other phone number:  [phone]*

## 2023-09-25 DIAGNOSIS — K21.9 GASTROESOPHAGEAL REFLUX DISEASE, UNSPECIFIED WHETHER ESOPHAGITIS PRESENT: ICD-10-CM

## 2023-09-25 RX ORDER — FAMOTIDINE 40 MG/1
TABLET, FILM COATED ORAL
Qty: 90 TABLET | Refills: 1 | Status: SHIPPED | OUTPATIENT
Start: 2023-09-25 | End: 2024-01-23

## 2023-10-03 ENCOUNTER — TELEPHONE (OUTPATIENT)
Dept: FAMILY MEDICINE | Facility: CLINIC | Age: 48
End: 2023-10-03
Payer: MEDICARE

## 2023-10-03 DIAGNOSIS — J45.30 MILD PERSISTENT ASTHMA WITHOUT COMPLICATION: ICD-10-CM

## 2023-10-03 RX ORDER — ALBUTEROL SULFATE 90 UG/1
AEROSOL, METERED RESPIRATORY (INHALATION)
Qty: 18 G | Refills: 1 | Status: SHIPPED | OUTPATIENT
Start: 2023-10-03 | End: 2023-12-19

## 2023-10-03 NOTE — TELEPHONE ENCOUNTER
Wife and pt calls, CTC on file, needs ventolin refill, saw ZB in August, would like refill now, reviewed    Prescription approved per [facility] Refill Protocol.  Yuko Hernandez RN, BSN  [facility]

## 2023-11-13 ENCOUNTER — APPOINTMENT (OUTPATIENT)
Dept: GENERAL RADIOLOGY | Facility: CLINIC | Age: 48
End: 2023-11-13
Attending: EMERGENCY MEDICINE
Payer: MEDICARE

## 2023-11-13 ENCOUNTER — HOSPITAL ENCOUNTER (EMERGENCY)
Facility: CLINIC | Age: 48
Discharge: HOME | End: 2023-11-13
Attending: EMERGENCY MEDICINE | Admitting: EMERGENCY MEDICINE
Payer: MEDICARE

## 2023-11-13 VITALS
SYSTOLIC BLOOD PRESSURE: 143 MMHG | HEIGHT: 71 IN | WEIGHT: 245 LBS | DIASTOLIC BLOOD PRESSURE: 81 MMHG | TEMPERATURE: 97.7 F | BODY MASS INDEX: 34.3 KG/M2 | RESPIRATION RATE: 20 BRPM | HEART RATE: 66 BPM | OXYGEN SATURATION: 94 %

## 2023-11-13 DIAGNOSIS — S61.219A LACERATION OF FINGER OF LEFT HAND WITHOUT DAMAGE TO NAIL, FOREIGN BODY PRESENCE UNSPECIFIED, UNSPECIFIED FINGER, INITIAL ENCOUNTER: ICD-10-CM

## 2023-11-13 DIAGNOSIS — S60.222A CONTUSION OF LEFT HAND, INITIAL ENCOUNTER: ICD-10-CM

## 2023-11-13 PROCEDURE — 250N000013 HC RX MED GY IP 250 OP 250 PS 637: Performed by: EMERGENCY MEDICINE

## 2023-11-13 PROCEDURE — 73130 X-RAY EXAM OF HAND: CPT | Mod: LT

## 2023-11-13 PROCEDURE — 99284 EMERGENCY DEPT VISIT MOD MDM: CPT

## 2023-11-13 PROCEDURE — 29125 APPL SHORT ARM SPLINT STATIC: CPT | Mod: LT

## 2023-11-13 RX ORDER — ACETAMINOPHEN 500 MG
1000 TABLET ORAL ONCE
Status: COMPLETED | OUTPATIENT
Start: 2023-11-13 | End: 2023-11-13

## 2023-11-13 RX ADMIN — ACETAMINOPHEN 1000 MG: 500 TABLET, FILM COATED ORAL at 09:54

## 2023-11-13 ASSESSMENT — ACTIVITIES OF DAILY LIVING (ADL): ADLS_ACUITY_SCORE: 33

## 2023-11-13 NOTE — ED PROVIDER NOTES
"  History     Chief Complaint:  Hand Injury       The history is provided by the patient.      Andrea Uribe is a 48 year old male who presents to the ED following a left hand injury 2 hours ago. Patient reports him and his son were moving a dual axle into their pick-up truck when the large metal beam fell on his left hand. Patient reports pain on the 2nd, 3rd and 4th fingers of the left hand. Denies pain in wrist, thumb or pinky finger.No numbness in the fingers. He has trouble bending middle three fingers due to pain.     Independent Historian:   Son - They report supplemental history.    Review of External Notes:   I reviewed patient's vaccination record and patient had a tetanus vaccine in 2023.      Medications:    Fosamax  Pepcid  Keppra  Lisinopril  Singulair  Dilantin  Zocor  Trazodone  Effexor    Past Medical History:    CKD  CPAP dependence  History of blood transfusion  Hypertension   MRSA  Obesity  Osteoarthritis  Ptosis, right eye  Renal insufficiency  Seizure disorder (H)  Sleep apnea  TBI  Thyroid disease  Asthma    Past Surgical History:    Appendectomy open  Knee arthroplasty x 2  Tracheostomy  EGD  Excise abdominal wall  Herniorrhaphy   Stimulatory vagus nerve  Cholecystectomy  Remove hardware knee  Hand orthopedic surgery  Open reduction internal fixation right femur  Spleen surgery       Physical Exam   Patient Vitals for the past 24 hrs:   BP Temp Temp src Pulse Resp SpO2 Height Weight   11/13/23 1315 (!) 143/81 -- -- 66 -- 94 % -- --   11/13/23 0945 (!) 140/84 97.7  F (36.5  C) Temporal 71 20 96 % 1.803 m (5' 11\") 111.1 kg (245 lb)        Physical Exam  VS: Reviewed per above  HENT: Mucous membranes moist  EYES: sclera anicteric  CV: Rate as noted,  regular rhythm.   RESP: Effort normal.   NEURO: Alert, moving all extremities  MSK: No deformity of the extremities.  Tenderness to palpation of the left middle 3 fingers.  No open wounds aside from superficial laceration to the dorsal left middle " finger proximal phalanx.  Impairment of finger flexion of the middle 3 fingers.  Intact radial pulse of the left wrist.  Good cap refill of the middle 3 fingers.  SKIN: Warm and dry      Emergency Department Course     Imaging:  XR Hand Left G/E 3 Views   Final Result   IMPRESSION:   1.  No acute fracture or joint malalignment.   2.  Old healed fracture of the third finger distal phalanx tuft and   fourth finger middle phalanx neck. Old partially healed fractures of   the fourth and fifth distal phalangeal ronel.   3.  Scattered tiny foci of punctate radiodensities in the second   finger, third finger, fourth finger, and fifth finger. This could   represent superficial debris. Clinical correlation recommended.      LUCIAN CLEANING MD            SYSTEM ID:  [id number]         Emergency Department Course & Assessments:       Interventions:  Medications   acetaminophen (TYLENOL) tablet 1,000 mg (1,000 mg Oral $Given 11/13/23 0987)        Assessments:  1142 I obtained history and examined the patient as noted above.  1204 I rechecked the patient and explained findings. We discussed plan for discharge and patient is in agreement with plan.     PROCEDURES:    Splint Placement     Procedure: Splint Placement     Indication: hand contusion/crush injury    Consent: Verbal     Location: Left Hand    Preparation: Wounds were cleansed and dressed with a non-adherent bandage     Procedure detail:   Splint was applied by Myself  Splint type: volar hand splint  Splint materilal: Fiberglass  After placement I checked and adjusted the fit as needed to ensure proper positioning/fit   Sensation and circulation are intact after splint placement     Patient Status: The patient tolerated the procedure well: Yes. There were no complications.    Independent Interpretation (X-rays, CTs, rhythm strip):  None    Consultations/Discussion of Management or Tests:  None          Disposition:  The patient was discharged to home.     Impression & Plan     CMS Diagnoses: None    Medical Decision Making:  Patient presents to the ER for evaluation of crush injury to the left hand prior to arrival.  Vital signs reassuring.  No evidence of vascular compromise in the distal left upper extremity.  There is some concern for tendon function impairment, specifically flexor tendon function.  However, this could be related to crush injury as I do not appreciate open wound.  No evidence of neurologic compromise in the distal left upper extremity/hand.  X-rays not show acute fracture or dislocation.  Superficial laceration did not require suturing and was cleansed copiously and dressed with bacitracin and Telfa and gauze.  A custom volar Ortho-Glass splint was placed in the hand for comfort.  Tdap is up-to-date per review of the [facility] database.  If not improving in the days to week, recommend orthopedic hand surgery follow-up.  Discussed pain control ibuprofen and Tylenol.  Return precautions discussed prior to discharge.      Diagnosis:    ICD-10-CM    1. Laceration of finger of left hand without damage to nail, foreign body presence unspecified, unspecified finger, initial encounter  S61.219A       2. Contusion of left hand, initial encounter  S60.222A            Scribe Disclosure:  I, Mckenzie Bass, am serving as a scribe at 12:15 PM on 11/13/2023 to document services personally performed by Seth Guzmán MD based on my observations and the provider's statements to me.   11/13/2023   Seth Guzmán MD Lindenbaum, Elan, MD  11/13/23 3128

## 2023-11-13 NOTE — Clinical Note
Andrea Uribe was seen and treated in our emergency department on 11/13/2023.  He may return to work on 11/14/2023.  Andrea was seen in the ER for a hand injury.  He should not use the left hand at work while it heals over the next week.  If he requires further accommodations after this time frame, he should follow-up with the orthopedic hand surgery team to discuss further.     If you have any questions or concerns, please don't hesitate to call.      Seth Guzmán MD

## 2023-11-13 NOTE — ED TRIAGE NOTES
Pt was working on his own truck, had the rear end of his truck fall onto his left hand. That rear end weights 619lb. Pt has a laceration on his left middle finger and abrasions on 4th finger.      Triage Assessment (Adult)       Row Name 11/13/23 0946          Triage Assessment    Airway WDL WDL        Respiratory WDL    Respiratory WDL WDL        Skin Circulation/Temperature WDL    Skin Circulation/Temperature WDL --  2 cm lac left 3rd finger, abrasions on 4th finger        Cardiac WDL    Cardiac WDL WDL        Peripheral/Neurovascular WDL    Peripheral Neurovascular WDL WDL        Cognitive/Neuro/Behavioral WDL    Cognitive/Neuro/Behavioral WDL WDL

## 2023-11-13 NOTE — DISCHARGE INSTRUCTIONS
You came to the ER for evaluation of injury to the hand and superficial laceration that did not require sutures.  You can wear the splint for comfort over the next few days to a week.  If you have lingering pain or issues with mobility of the fingers, please follow-up with orthopedic hand surgery team for ongoing evaluation as an outpatient.  You can take ibuprofen and Tylenol for pain.  You can use ice to help with swelling as well.  
[facility]  [facility]  [address] [place] [id number]  Phone: [phone]  Fax: [phone]

## 2023-11-21 ENCOUNTER — DOCUMENTATION ONLY (OUTPATIENT)
Dept: SLEEP MEDICINE | Facility: CLINIC | Age: 48
End: 2023-11-21
Payer: MEDICARE

## 2023-11-21 NOTE — PROGRESS NOTES
Request for sleep study data and interpretation sent to [facility] at [phone].

## 2023-12-06 ENCOUNTER — OFFICE VISIT (OUTPATIENT)
Dept: URGENT CARE | Facility: URGENT CARE | Age: 48
End: 2023-12-06
Payer: MEDICARE

## 2023-12-06 VITALS
DIASTOLIC BLOOD PRESSURE: 77 MMHG | TEMPERATURE: 98 F | HEART RATE: 71 BPM | OXYGEN SATURATION: 96 % | BODY MASS INDEX: 36.26 KG/M2 | SYSTOLIC BLOOD PRESSURE: 133 MMHG | WEIGHT: 260 LBS

## 2023-12-06 DIAGNOSIS — J11.1 INFLUENZA-LIKE ILLNESS: Primary | ICD-10-CM

## 2023-12-06 DIAGNOSIS — R07.0 THROAT PAIN: ICD-10-CM

## 2023-12-06 LAB
DEPRECATED S PYO AG THROAT QL EIA: NEGATIVE
FLUAV AG SPEC QL IA: NEGATIVE
FLUBV AG SPEC QL IA: NEGATIVE

## 2023-12-06 PROCEDURE — 87651 STREP A DNA AMP PROBE: CPT | Performed by: PHYSICIAN ASSISTANT

## 2023-12-06 PROCEDURE — 87804 INFLUENZA ASSAY W/OPTIC: CPT | Performed by: PHYSICIAN ASSISTANT

## 2023-12-06 PROCEDURE — 99213 OFFICE O/P EST LOW 20 MIN: CPT | Performed by: PHYSICIAN ASSISTANT

## 2023-12-06 RX ORDER — OSELTAMIVIR PHOSPHATE 75 MG/1
75 CAPSULE ORAL 2 TIMES DAILY
Qty: 10 CAPSULE | Refills: 0 | Status: SHIPPED | OUTPATIENT
Start: 2023-12-06 | End: 2023-12-11

## 2023-12-06 NOTE — PROGRESS NOTES
Assessment & Plan     Influenza-like illness  Tamiflu is prescribed today.  On exam patient is in no acute respiratory distress.  Tamiflu is prescribed today.  Tylenol recommended as needed for pain and discomfort.  Push fluids and rest.  Advised to keep monitoring symptoms.  Follow-up if any worsening symptoms.  Patient agrees.  - oseltamivir (TAMIFLU) 75 MG capsule  Dispense: 10 capsule; Refill: 0    Throat pain  Rapid strep test is negative today.  Throat culture is pending.  No evidence of peritonsillar abscess.  Patient will be notified if any positive finding on the throat culture result.  Influenza test is negative today however still suspect influenza-like illness.  Please see above recommendations.  - Influenza A/B antigen  - Streptococcus A Rapid Screen w/Reflex to PCR - Clinic Collect  - Group A Streptococcus PCR Throat Swab         Return in about 1 week (around 12/13/2023) for Symptoms failing to improve.    SEEMA Sorenson [facility] SCOTT Mendez is a 48 year old male who presents to clinic today for the following health issues:  Chief Complaint   Patient presents with    Urgent Care     X3 days throat pain, left ear pain, chills and sweats, swollen tonsils, hard to swallow, temp 100 at home today, cough, runny nose   Son has strep   Taking tylenol      HPI    Patient is presenting to urgent care today with complaint of sore throat, left ear pain, chills, body aches, fatigue, slight cough and runny nose.  No vomiting or diarrhea.  Exposure to strep.  Onset of symptoms 2 days ago.  Treatment tried: Tylenol.      Review of Systems  Constitutional, HEENT, cardiovascular, pulmonary, GI, , musculoskeletal, neuro, skin, endocrine and psych systems are negative, except as otherwise noted.      Objective    /77   Pulse 71   Temp 98  F (36.7  C) (Oral)   Wt 117.9 kg (260 lb)   SpO2 96%   BMI 36.26 kg/m    Physical Exam   GENERAL: healthy, alert and no  distress  HENT: ear canals and TM's normal, mouth without ulcers or lesions, throat is moist and pink, uvula is midline  RESP: lungs clear to auscultation - no rales, rhonchi or wheezes  CV: regular rate and rhythm, normal S1 S2  MS: no gross musculoskeletal defects noted, no edema    Results for orders placed or performed in visit on 12/06/23 (from the past 24 hour(s))   Influenza A/B antigen    Specimen: Nose; Swab   Result Value Ref Range    Influenza A antigen Negative Negative    Influenza B antigen Negative Negative    Narrative    Test results must be correlated with clinical data. If necessary, results should be confirmed by a molecular assay or viral culture.   Streptococcus A Rapid Screen w/Reflex to PCR - Clinic Collect    Specimen: Throat; Swab   Result Value Ref Range    Group A Strep antigen Negative Negative

## 2023-12-06 NOTE — LETTER
December 6, 2023      Andrea Uribe  [address] [facility] [address]        To Whom It May Concern:    Andrea Uribe  was seen on 12/6/2023  Please excuse his absence from work tomorrow 12/7/2023 due to acute medical illness.    Dx: Influenza like illness, acute pharyngitis        Sincerely,        Mere Salas PA-C

## 2023-12-07 LAB — GROUP A STREP BY PCR: NOT DETECTED

## 2023-12-15 ENCOUNTER — TELEPHONE (OUTPATIENT)
Dept: FAMILY MEDICINE | Facility: CLINIC | Age: 48
End: 2023-12-15
Payer: MEDICARE

## 2023-12-15 ENCOUNTER — TELEPHONE (OUTPATIENT)
Dept: SLEEP MEDICINE | Facility: CLINIC | Age: 48
End: 2023-12-15
Payer: MEDICARE

## 2023-12-15 DIAGNOSIS — G47.33 OSA (OBSTRUCTIVE SLEEP APNEA): Primary | ICD-10-CM

## 2023-12-15 NOTE — TELEPHONE ENCOUNTER
Signed order. Printed in my office. I am working from home and cannot place in outbox.     Orestes brewer, pac

## 2023-12-15 NOTE — TELEPHONE ENCOUNTER
Patient has not been seen. Order cannot be provided before patient establishes care.     Spoke with patient. He will contact PCP to see if he is willing to provide orders. Patient also added to wait list for sooner appointment if one becomes available.    Marce GONZALEZ RN  [facility]

## 2023-12-15 NOTE — TELEPHONE ENCOUNTER
Talked with patient who understands order will not be signed until Monday when provider is in the office.  Pt would like order faxed to [facility] in [place] for them to send to patient.  Put printed order in PCP in basket.  Ruthann Correa, TC

## 2023-12-15 NOTE — TELEPHONE ENCOUNTER
Order/Referral Request    Who is requesting: patient    Orders being requested: head gear for C-pap    Reason service is needed/diagnosis: patient's head gear broke    When are orders needed by: ASAP    Has this been discussed with Provider: No    Does patient have a preference on a Group/Provider/Facility? [facility]    Does patient have an appointment scheduled?: Yes: 4/4/2024    Where to send orders: Place orders within Epic    Okay to leave a detailed message?: Yes at Cell number on file:    Telephone Information:   [employer] [phone]

## 2023-12-15 NOTE — TELEPHONE ENCOUNTER
Order/Referral Request    Who is requesting: Pt    Orders being requested: Pt advised that his head gear is broken and he can no longer use his cpap. Pt is looking for a temp prescription on this    Reason service is needed/diagnosis: see above    When are orders needed by: asap    Has this been discussed with Provider: No    Does patient have a preference on a Group/Provider/Facility? [facility]    Does patient have an appointment scheduled?: Yes: 4.4.24    Where to send orders: Fax    Okay to leave a detailed message?: Yes at Home number on file [phone] (home)

## 2023-12-15 NOTE — TELEPHONE ENCOUNTER
Forms/Letter Request    Type of form/letter:  support animal    Have you been seen for this request: Yes already been discussed    Do we have the form/letter: No    Who is the form from? [facility] in [place](if other please explain)    Where did/will the form come from? Patient or family brought in       When is form/letter needed by: ASAP    How would you like the form/letter returned:     Patient Notified form requests are processed in 3-5 business days:Yes    Okay to leave a detailed message?: Yes at Cell number on file:    Telephone Information:   Mobile [phone]

## 2023-12-15 NOTE — TELEPHONE ENCOUNTER
Order T'd up.  When signed can go to TC to fax to home medical supply of his choice.  Bea Dutton RN

## 2023-12-18 NOTE — TELEPHONE ENCOUNTER
"Called patient, he is requesting that we re-print and sign letter from 08/18/23. He states \"they: lost it.   Ruth Behrens.   "

## 2023-12-18 NOTE — TELEPHONE ENCOUNTER
Looks like no form was dropped off. I wrote a letter for him in regards to this in August.     Orestes brewer, pac

## 2023-12-19 ENCOUNTER — TELEPHONE (OUTPATIENT)
Dept: FAMILY MEDICINE | Facility: CLINIC | Age: 48
End: 2023-12-19
Payer: MEDICARE

## 2023-12-19 NOTE — TELEPHONE ENCOUNTER
Forms/Letter Request    Type of form/letter: [employer] -Medically Necessary Equipment and Emergency Certification Form    Have you been seen for this request:     Do we have the form/letter: yes    Who is the form from? [facility]    Where did/will the form come from? Patient or family brought in       When is form/letter needed by:     How would you like the form/letter returned: fax, then mail to patient    Patient Notified form requests are processed in 3-5 business days:Yes    Okay to leave a detailed message?: Yes at Cell number on file:    Telephone Information:   Mobile [phone]

## 2023-12-19 NOTE — TELEPHONE ENCOUNTER
Filled out again and in outbox. This was also filled out in sept. He recently needed a past letter reprinted and signed due to losing. I am assuming this form was lost too. Please inform patient the importance of managing his paperwork. We cannot continue to redo forms in the future.     Orestes brewer, pac

## 2023-12-21 ENCOUNTER — TELEPHONE (OUTPATIENT)
Dept: FAMILY MEDICINE | Facility: CLINIC | Age: 48
End: 2023-12-21
Payer: MEDICARE

## 2023-12-29 ENCOUNTER — VIRTUAL VISIT (OUTPATIENT)
Dept: FAMILY MEDICINE | Facility: CLINIC | Age: 48
End: 2023-12-29
Payer: MEDICARE

## 2023-12-29 DIAGNOSIS — F32.1 MODERATE MAJOR DEPRESSION (H): ICD-10-CM

## 2023-12-29 DIAGNOSIS — S06.9X9S TRAUMATIC BRAIN INJURY WITH LOSS OF CONSCIOUSNESS, SEQUELA (H): ICD-10-CM

## 2023-12-29 DIAGNOSIS — J45.30 MILD PERSISTENT ASTHMA WITHOUT COMPLICATION: ICD-10-CM

## 2023-12-29 DIAGNOSIS — F33.42 RECURRENT MAJOR DEPRESSIVE DISORDER, IN FULL REMISSION (H): ICD-10-CM

## 2023-12-29 DIAGNOSIS — F41.9 ANXIETY: Primary | ICD-10-CM

## 2023-12-29 DIAGNOSIS — J45.20 INTERMITTENT ASTHMA, UNCOMPLICATED: ICD-10-CM

## 2023-12-29 PROCEDURE — 99214 OFFICE O/P EST MOD 30 MIN: CPT | Mod: 95 | Performed by: PHYSICIAN ASSISTANT

## 2023-12-29 RX ORDER — MONTELUKAST SODIUM 10 MG/1
1 TABLET ORAL AT BEDTIME
Qty: 90 TABLET | Refills: 1 | Status: SHIPPED | OUTPATIENT
Start: 2023-12-29 | End: 2024-08-12

## 2023-12-29 RX ORDER — FLUTICASONE PROPIONATE 220 UG/1
1 AEROSOL, METERED RESPIRATORY (INHALATION) 2 TIMES DAILY
Qty: 36 G | Refills: 1 | Status: SHIPPED | OUTPATIENT
Start: 2023-12-29 | End: 2024-01-02

## 2023-12-29 NOTE — PROGRESS NOTES
Andrea is a 48 year old who is being evaluated via a billable telephone visit.      What phone number would you like to be contacted at? [phone]  How would you like to obtain your AVS? Mail a copy    Distant Location (provider location):  Off-site    Assessment & Plan     Mild persistent asthma without complication  Worsening during fall and winter months. To increase flovent. Recheck act in 1 month. If not improved, adviar to be attempted.   - fluticasone (FLOVENT HFA) 220 MCG/ACT inhaler; Inhale 1 puff into the lungs 2 times daily  Medication use and side effects discussed with the patient. Patient is in complete understanding and agreement with plan.     Intermittent asthma, uncomplicated  As above   - montelukast (SINGULAIR) 10 MG tablet; Take 1 tablet (10 mg) by mouth at bedtime    Anxiety  Older cat is likely to pass away shortly. This is why both cats are needed. To update his form that was sent to me to reflect this.     Recurrent major depressive disorder, in full remission (H24)  As above         Alberto Medley PA-C  [facility]   Andrea is a 48 year old, presenting for the following health issues:  No chief complaint on file.        12/29/2023    10:07 AM   Additional Questions   Roomed by Andrew GUZMÁN     Asthma Follow-Up    Was ACT completed today?  No    Do you have a cough?  No  Are you experiencing any wheezing in your chest?  YES, when getting up in the morning    Do you have any shortness of breath?  No, when starting walking gets worse   How often are you using a short-acting (rescue) inhaler or nebulizer, such as Albuterol?  2-3 times per day  How many days per week do you miss taking your asthma controller medication?  0  Please describe any recent triggers for your asthma: dust mites, pollens, and cold air  Have you had any Emergency Room Visits, Urgent Care Visits, or Hospital Admissions since your last office visit?  No    Also, has  "two cats and his landlord is not allow both. He uses both to aid in management of flares of anxiety and depression and since obtaining cats symptoms have been much better controlled. He states the oldest (bumper) is 20 years old and is not doing well with his health and therefore does not sooth his symptoms as much as he used to, but his younger cat does. This is why patient states he needs both cats.       Review of Systems   Constitutional, HEENT, cardiovascular, pulmonary, GI, , musculoskeletal, neuro, skin, endocrine and psych systems are negative, except as otherwise noted.      Objective    Vitals - Patient Reported  Weight (Patient Reported): 112.5 kg (248 lb)  Height (Patient Reported): 180.3 cm (5' 11\")  BMI (Based on Pt Reported Ht/Wt): 34.59        Physical Exam   healthy, alert, and no distress  PSYCH: Alert and oriented times 3; coherent speech, normal   rate and volume, able to articulate logical thoughts, able   to abstract reason, no tangential thoughts, no hallucinations   or delusions  His affect is normal  RESP: No cough, no audible wheezing, able to talk in full sentences  Remainder of exam unable to be completed due to telephone visits            Phone call duration: 13 minutes      "

## 2024-01-01 DIAGNOSIS — J45.30 MILD PERSISTENT ASTHMA WITHOUT COMPLICATION: ICD-10-CM

## 2024-01-17 ENCOUNTER — OFFICE VISIT (OUTPATIENT)
Dept: URGENT CARE | Facility: URGENT CARE | Age: 49
End: 2024-01-17
Payer: MEDICARE

## 2024-01-17 VITALS
WEIGHT: 267 LBS | TEMPERATURE: 99.4 F | OXYGEN SATURATION: 95 % | SYSTOLIC BLOOD PRESSURE: 132 MMHG | DIASTOLIC BLOOD PRESSURE: 70 MMHG | HEART RATE: 102 BPM | BODY MASS INDEX: 37.24 KG/M2

## 2024-01-17 DIAGNOSIS — Z98.890 S/P SHOULDER SURGERY: ICD-10-CM

## 2024-01-17 DIAGNOSIS — M25.512 BILATERAL SHOULDER PAIN, UNSPECIFIED CHRONICITY: Primary | ICD-10-CM

## 2024-01-17 DIAGNOSIS — M25.511 BILATERAL SHOULDER PAIN, UNSPECIFIED CHRONICITY: Primary | ICD-10-CM

## 2024-01-17 PROCEDURE — 99214 OFFICE O/P EST MOD 30 MIN: CPT | Performed by: PHYSICIAN ASSISTANT

## 2024-01-17 RX ORDER — CYCLOBENZAPRINE HCL 10 MG
10 TABLET ORAL
Qty: 30 TABLET | Refills: 0 | Status: SHIPPED | OUTPATIENT
Start: 2024-01-17

## 2024-01-17 NOTE — LETTER
January 17, 2024      Andrea Uribe  [address] [facility] [address]        To Whom It May Concern:    Adnrea Uribe  was seen on 1/17/2024  Please excuse his absence from work 1/18, 1/23 due to acute medical condition.        Sincerely,        Mere Salas PA-C

## 2024-01-17 NOTE — PROGRESS NOTES
Assessment & Plan     Bilateral shoulder pain, unspecified chronicity  Right shoulder pain is acute on chronic.  Patient is status post right shoulder arthroscopic bicep tendon tenodesis, labral debridement and labral soft tissue repair couple years ago.  This current flare started in the past couple days.  Left shoulder pain is acute.  There has not been any trauma or injury.  X-ray deferred today.  Flexeril is prescribed as needed for muscle spasm.  Given his extensive surgical history in the right shoulder recommended follow-up with orthopedics.  We discussed would likely need advanced imaging for further evaluation.  Ortho referral placed today.  Work excuse note provided for the next couple days.  Recommended resting.  No strenuous activities or heavy lifting.  Follow-up with Ortho as we discussed.  Sooner if any worsening symptoms.  Patient agrees.  - cyclobenzaprine (FLEXERIL) 10 MG tablet  Dispense: 30 tablet; Refill: 0  - Orthopedic  Referral    S/P shoulder surgery  - Orthopedic  Referral    31 minutes spent on the date of the encounter doing chart review, history and exam, patient education, documentation, and further activities per the note.       Return in about 1 week (around 1/24/2024) for follow up with Orthopedics for further evaluation.    Mere Salas PA-C  [facility] [place] Mendez is a 48 year old male who presents to clinic today for the following health issues:  Chief Complaint   Patient presents with    Urgent Care     Hx of shoulder pain bilateral, X3 days flare up, having to lay on stomach in bed, right shoulder pain increased last night and getting worse, feeling like shoulder is grinding, having problem doing daily activities and problems at work, right shoulder cortisone shots and past surgery   Patient reports family hx of arthritis in should ( mom and grandmother)   Needs doctors note for work     HPI  Patient with medical  history significant for TBI, seizure disorder, SANGITA, asthma, HLD, HTN among others presenting to urgent care today with complaints of bilateral shoulder pain, right worse than left.  Onset of symptoms 3 days ago.  Of note patient notes right shoulder pain has been acute on chronic over the years.  Per chart review he is status post right shoulder arthroscopic biceps tenodesis, superior labral debridement, anterior labral Bankart soft tissue repair, in April of 2022. He was seen at [facility]. He denies today any recent fall, trauma or injury.  Reports chronic numbness and tingling in the right hand fingers.  Patient notes he does repetitive motion with his arms at work and  has not able to adequately perform his job duties in the past couple days due to pain.      Review of Systems  Constitutional, HEENT, cardiovascular, pulmonary, GI, , musculoskeletal, neuro, skin, endocrine and psych systems are negative, except as otherwise noted.      Objective    /70   Pulse 102   Temp 99.4  F (37.4  C) (Tympanic)   Wt 121.1 kg (267 lb)   SpO2 95%   BMI 37.24 kg/m    Physical Exam   GENERAL: alert and no distress  MS: Bilateral shoulder exam: No gross deformities, swelling or ecchymosis noted.  Diffuse tenderness to palpation around both shoulders.  His overall range of motion is limited due to pain.

## 2024-01-22 ENCOUNTER — TELEPHONE (OUTPATIENT)
Dept: ORTHOPEDICS | Facility: CLINIC | Age: 49
End: 2024-01-22

## 2024-01-22 ENCOUNTER — OFFICE VISIT (OUTPATIENT)
Dept: ORTHOPEDICS | Facility: CLINIC | Age: 49
End: 2024-01-22
Attending: PHYSICIAN ASSISTANT
Payer: MEDICARE

## 2024-01-22 ENCOUNTER — ANCILLARY PROCEDURE (OUTPATIENT)
Dept: GENERAL RADIOLOGY | Facility: CLINIC | Age: 49
End: 2024-01-22
Attending: STUDENT IN AN ORGANIZED HEALTH CARE EDUCATION/TRAINING PROGRAM
Payer: MEDICARE

## 2024-01-22 VITALS — BODY MASS INDEX: 37.38 KG/M2 | WEIGHT: 267 LBS | HEIGHT: 71 IN

## 2024-01-22 DIAGNOSIS — M25.512 BILATERAL SHOULDER PAIN, UNSPECIFIED CHRONICITY: ICD-10-CM

## 2024-01-22 DIAGNOSIS — Z98.890 S/P SHOULDER SURGERY: ICD-10-CM

## 2024-01-22 DIAGNOSIS — M25.511 BILATERAL SHOULDER PAIN, UNSPECIFIED CHRONICITY: ICD-10-CM

## 2024-01-22 PROCEDURE — 73030 X-RAY EXAM OF SHOULDER: CPT | Mod: LT | Performed by: STUDENT IN AN ORGANIZED HEALTH CARE EDUCATION/TRAINING PROGRAM

## 2024-01-22 PROCEDURE — 99204 OFFICE O/P NEW MOD 45 MIN: CPT | Performed by: STUDENT IN AN ORGANIZED HEALTH CARE EDUCATION/TRAINING PROGRAM

## 2024-01-22 NOTE — LETTER
January 22, 2024      Andrea Uribe  [address] [facility] [place] [facility] [address]        To Whom It May Concern:    Andrea Uribe was seen in our clinic for bilateral shoulders . He may return to work with the following: No overhead lifting, lifting no greater than 5 lbs below the shoulder and patient must be in his wheelchair at all times unless he needs to stand to transfer. Patient will be reevaluated in 3 weeks time       Sincerely,      Cristopher Molina

## 2024-01-22 NOTE — PATIENT INSTRUCTIONS
[facility]   [address], [address]  [place] [address] [address] [place] [address]   Appointments: [phone] Appointments: [phone]   Fax: [phone] Fax: [phone]       1. Bilateral shoulder pain, unspecified chronicity    2. S/P shoulder surgery    For scheduling at [facility] call [phone] or [phone]         For scheduling in the [facility] ([facility]) call [phone]  or   [phone]            Follow up with Dr. Molina in  3 weeks .    Call my office with any questions or concerns, [phone].
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
## 2024-01-29 ENCOUNTER — MEDICAL CORRESPONDENCE (OUTPATIENT)
Dept: HEALTH INFORMATION MANAGEMENT | Facility: CLINIC | Age: 49
End: 2024-01-29
Payer: MEDICARE

## 2024-01-30 ENCOUNTER — PATIENT OUTREACH (OUTPATIENT)
Dept: FAMILY MEDICINE | Facility: CLINIC | Age: 49
End: 2024-01-30
Payer: MEDICARE

## 2024-01-30 ASSESSMENT — PATIENT HEALTH QUESTIONNAIRE - PHQ9
SUM OF ALL RESPONSES TO PHQ QUESTIONS 1-9: 3
5. POOR APPETITE OR OVEREATING: NOT AT ALL

## 2024-01-30 ASSESSMENT — ANXIETY QUESTIONNAIRES
1. FEELING NERVOUS, ANXIOUS, OR ON EDGE: NOT AT ALL
GAD7 TOTAL SCORE: 1
6. BECOMING EASILY ANNOYED OR IRRITABLE: NOT AT ALL
5. BEING SO RESTLESS THAT IT IS HARD TO SIT STILL: NOT AT ALL
3. WORRYING TOO MUCH ABOUT DIFFERENT THINGS: SEVERAL DAYS
IF YOU CHECKED OFF ANY PROBLEMS ON THIS QUESTIONNAIRE, HOW DIFFICULT HAVE THESE PROBLEMS MADE IT FOR YOU TO DO YOUR WORK, TAKE CARE OF THINGS AT HOME, OR GET ALONG WITH OTHER PEOPLE: NOT DIFFICULT AT ALL
7. FEELING AFRAID AS IF SOMETHING AWFUL MIGHT HAPPEN: NOT AT ALL
GAD7 TOTAL SCORE: 1
2. NOT BEING ABLE TO STOP OR CONTROL WORRYING: NOT AT ALL

## 2024-01-30 ASSESSMENT — ASTHMA QUESTIONNAIRES: ACT_TOTALSCORE: 17

## 2024-01-30 NOTE — TELEPHONE ENCOUNTER
Patient Quality Outreach    Patient is due for the following:   Depression  -  PHQ-9 needed    Next Steps:   Completed PHQ9/DAYAMI phone, also completed ACT per provider's request.    Type of outreach:    Phone, spoke to patient/parent. Andrea       Questions for provider review:    None           Tracey Montalvo

## 2024-02-02 ENCOUNTER — OFFICE VISIT (OUTPATIENT)
Dept: URGENT CARE | Facility: URGENT CARE | Age: 49
End: 2024-02-02
Payer: MEDICARE

## 2024-02-02 VITALS
TEMPERATURE: 98.1 F | DIASTOLIC BLOOD PRESSURE: 72 MMHG | RESPIRATION RATE: 18 BRPM | OXYGEN SATURATION: 98 % | HEART RATE: 70 BPM | SYSTOLIC BLOOD PRESSURE: 120 MMHG

## 2024-02-02 DIAGNOSIS — K04.7 DENTAL ABSCESS: Primary | ICD-10-CM

## 2024-02-02 PROCEDURE — 99213 OFFICE O/P EST LOW 20 MIN: CPT | Performed by: PHYSICIAN ASSISTANT

## 2024-02-02 RX ORDER — AMOXICILLIN 500 MG/1
TABLET, FILM COATED ORAL
COMMUNITY
Start: 2024-01-30

## 2024-02-02 NOTE — LETTER
February 2, 2024      Andrea Uribe  [address] [facility] [id number]        To Whom It May Concern:    Adnrea Uribe  was seen here today for evaluation of an acute medical problem. Please excuse him from missed work this week and next week. He will be follow-up with his primary care team next week. Any further work restrictions or work absence will be as per his primary care team.     Sincerely,        Ministerio Garcia PA-C

## 2024-02-02 NOTE — PATIENT INSTRUCTIONS
February 2, 2024 Urgent Care Plan:    -Continue taking the antibiotic your dentist prescribed (sounds like Augmentin-Clavulanate [id number] by your description.     -You can take over the counter Tylenol, as needed, for pain. Read dosing directions carefully. Do not take more than 3,000 mg of Tylenol in 24 hours.     -Make an appointment to see your primary care team next week for a recheck and to discuss your concerns about your total knee and having a root canal. You primary care doctor will let you know if you need more time off work, or if can you can return to work.     -I provided a letter for your missed work this week and next week. If your employer needs additional forms completed (such as FMLA),  you will need to see your primary care provider team.       -Keep your dental appointment for 2/21/24. Contact your dentist sooner if you have any increased pain or new dental symptoms.     -Go To The Emergency Room If You Have any Of The Below:   Unusual drowsiness  Headache or stiff neck  Weakness or fainting  Difficulty swallowing, breathing, or opening your mouth  Swollen eyelids  Your face becomes more swollen or red  Pain gets worse or spreads to your neck
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
## 2024-02-04 DIAGNOSIS — J45.30 MILD PERSISTENT ASTHMA WITHOUT COMPLICATION: ICD-10-CM

## 2024-02-05 RX ORDER — ALBUTEROL SULFATE 90 UG/1
AEROSOL, METERED RESPIRATORY (INHALATION)
OUTPATIENT
Start: 2024-02-05

## 2024-02-09 ENCOUNTER — VIRTUAL VISIT (OUTPATIENT)
Dept: FAMILY MEDICINE | Facility: CLINIC | Age: 49
End: 2024-02-09
Payer: MEDICARE

## 2024-02-09 ENCOUNTER — OFFICE VISIT (OUTPATIENT)
Dept: URGENT CARE | Facility: URGENT CARE | Age: 49
End: 2024-02-09
Payer: MEDICARE

## 2024-02-09 ENCOUNTER — PATIENT OUTREACH (OUTPATIENT)
Dept: FAMILY MEDICINE | Facility: CLINIC | Age: 49
End: 2024-02-09

## 2024-02-09 VITALS
OXYGEN SATURATION: 97 % | BODY MASS INDEX: 37.66 KG/M2 | TEMPERATURE: 100.3 F | HEART RATE: 102 BPM | DIASTOLIC BLOOD PRESSURE: 74 MMHG | SYSTOLIC BLOOD PRESSURE: 118 MMHG | WEIGHT: 270 LBS | RESPIRATION RATE: 16 BRPM

## 2024-02-09 DIAGNOSIS — N18.30 STAGE 3 CHRONIC KIDNEY DISEASE, UNSPECIFIED WHETHER STAGE 3A OR 3B CKD (H): ICD-10-CM

## 2024-02-09 DIAGNOSIS — S06.9X9S TRAUMATIC BRAIN INJURY WITH LOSS OF CONSCIOUSNESS, SEQUELA (H): ICD-10-CM

## 2024-02-09 DIAGNOSIS — G82.22 PARAPLEGIA, INCOMPLETE (H): ICD-10-CM

## 2024-02-09 DIAGNOSIS — S06.9XAS TRAUMATIC BRAIN INJURY, WITH UNKNOWN LOSS OF CONSCIOUSNESS STATUS, SEQUELA (H): ICD-10-CM

## 2024-02-09 DIAGNOSIS — J45.40 MODERATE PERSISTENT ASTHMA WITHOUT COMPLICATION: ICD-10-CM

## 2024-02-09 DIAGNOSIS — F33.42 RECURRENT MAJOR DEPRESSIVE DISORDER, IN FULL REMISSION (H): ICD-10-CM

## 2024-02-09 DIAGNOSIS — E66.01 MORBID OBESITY (H): ICD-10-CM

## 2024-02-09 DIAGNOSIS — F32.1 MODERATE MAJOR DEPRESSION (H): ICD-10-CM

## 2024-02-09 DIAGNOSIS — J45.41 MODERATE PERSISTENT ASTHMA WITH ACUTE EXACERBATION: Primary | ICD-10-CM

## 2024-02-09 DIAGNOSIS — J06.9 VIRAL URI: Primary | ICD-10-CM

## 2024-02-09 DIAGNOSIS — G47.09 OTHER INSOMNIA: ICD-10-CM

## 2024-02-09 DIAGNOSIS — G40.909 SEIZURE DISORDER (H): ICD-10-CM

## 2024-02-09 PROCEDURE — 87804 INFLUENZA ASSAY W/OPTIC: CPT | Performed by: FAMILY MEDICINE

## 2024-02-09 PROCEDURE — 87635 SARS-COV-2 COVID-19 AMP PRB: CPT | Performed by: FAMILY MEDICINE

## 2024-02-09 PROCEDURE — 87651 STREP A DNA AMP PROBE: CPT | Performed by: FAMILY MEDICINE

## 2024-02-09 PROCEDURE — 99207 REFERRAL TO ACUTE AND DIAGNOSTIC SERVICES: CPT | Mod: 95 | Performed by: PHYSICIAN ASSISTANT

## 2024-02-09 PROCEDURE — 99214 OFFICE O/P EST MOD 30 MIN: CPT | Performed by: FAMILY MEDICINE

## 2024-02-09 RX ORDER — ZOLPIDEM TARTRATE 5 MG/1
5 TABLET ORAL
Qty: 3 TABLET | Refills: 0 | Status: SHIPPED | OUTPATIENT
Start: 2024-02-09

## 2024-02-09 RX ORDER — FLUTICASONE FUROATE AND VILANTEROL 200; 25 UG/1; UG/1
1 POWDER RESPIRATORY (INHALATION) DAILY
Qty: 3 EACH | Refills: 0 | Status: SHIPPED | OUTPATIENT
Start: 2024-02-09 | End: 2024-03-04

## 2024-02-09 RX ORDER — BENZONATATE 200 MG/1
200 CAPSULE ORAL 3 TIMES DAILY PRN
Qty: 30 CAPSULE | Refills: 0 | Status: SHIPPED | OUTPATIENT
Start: 2024-02-09

## 2024-02-09 NOTE — PROGRESS NOTES
Referral to Acute and Diagnostic Services    [facility] has been contacted at [phone] ([place]) to confirm patient acceptance. The transition to Acute & Diagnostic Services Clinic has been discussed with patient, and he agrees with next level of care.  Patient understands that evaluation/treatment at [facility] typically takes significantly longer than in clinic/urgent care (>2 hours).        Special issues:  Allergy to contrast dye                Referral placed: Yes  Patient has transportation arranged and will travel to the [facility] without delay: Yes  Patient aware not to eat or drink. Yes      SEEMA Benitesn is a 48 year old who is being evaluated via a billable video visit.      How would you like to obtain your AVS? MyChart  If the video visit is dropped, the invitation should be resent by: Text to cell phone: [phone]  Will anyone else be joining your video visit? No Wife, Gabriella          Assessment & Plan     Moderate persistent asthma with acute exacerbation  Present on visual exam. Unable to fully assess virtually today. Needing office visit. No openings in clinic. Discussed with [facility]. Agrees to see at 11 AM to inform patient.   - Referral to Acute and Diagnostic Services (Day of diagnostic / First order acute); Future    Moderate persistent asthma without complication  Other than below, also has chronically been poorly controlled. Will change arnuity to breo and recheck in 1 month.   - fluticasone-vilanterol (BREO ELLIPTA) 200-25 MCG/ACT inhaler; Inhale 1 puff into the lungs daily  Medication use and side effects discussed with the patient. Patient is in complete understanding and agreement with plan.       Stage 3 chronic kidney disease, unspecified whether stage 3a or 3b CKD (H)  Stable. Labs utd within last year     Creatinine   Date Value Ref Range Status   06/11/2023 1.03 0.67 - 1.17 mg/dL Final   06/01/2021 1.01 0.66 -  "1.25 mg/dL Final         Paraplegia, incomplete (H)  Stable. Follows neurology    Recurrent major depressive disorder, in full remission (H24)  Stable on current regimen.         2/3/2023     9:19 AM 2/15/2023    10:41 AM 1/30/2024     3:31 PM   PHQ   PHQ-9 Total Score 2 1 3   Q9: Thoughts of better off dead/self-harm past 2 weeks Not at all Not at all Not at all         Seizure disorder (H)  Follows neurology.     Traumatic brain injury, with unknown loss of consciousness status, sequela (H24)  Follows neurology.     Morbid obesity (H)  Present due to bmi and asthma     Traumatic brain injury with loss of consciousness, sequela (H24)  As above       BMI  Estimated body mass index is 37.24 kg/m  as calculated from the following:    Height as of 1/22/24: 1.803 m (5' 11\").    Weight as of 1/22/24: 121.1 kg (267 lb).     Alek Mendez is a 48 year old, presenting for the following health issues:  Asthma (Follow up), Cough, and Fever        2/9/2024     9:52 AM   Additional Questions   Roomed by Laura Lind     History of Present Illness     Asthma:  He presents for follow up of asthma.  He has some cough, some wheezing, and some shortness of breath.  He is using a relief medication a few times a week. He does not miss any doses of his controller medication throughout the week. Patient is aware of the following triggers: dust mites, exercise or sports and upper respiratory infections. The patient has not had a visit to the Emergency Room, Urgent Care or Hospital due to asthma since the last clinic visit.     He eats 0-1 servings of fruits and vegetables daily.He consumes 1 sweetened beverage(s) daily.He exercises with enough effort to increase his heart rate 9 or less minutes per day.  He exercises with enough effort to increase his heart rate 3 or less days per week.   He is taking medications regularly.       Acute Illness    Acute illness concerns:   Onset/Duration: 2/8/2024  Symptoms:  Fever: " "YES  Chills/Sweats: YES  Headache (location?): YES  Sinus Pressure: YES  Conjunctivitis:  No  Ear Pain: YES: left  Rhinorrhea: YES  Congestion: No  Sore Throat: No  Cough: YES-productive of green sputum  Wheeze: YES  Decreased Appetite: No  Nausea: No  Vomiting: No  Diarrhea: No  Dysuria/Freq.: No  Dysuria or Hematuria: No  Fatigue/Achiness: YES  Sick/Strep Exposure: YES- son has Influenza       Also history of tbi and seizures with paraplegia followed by neurology. Stable.     History of depression. Stable on current regimen. No side effects.          Objective    Vitals - Patient Reported  Weight (Patient Reported): 119.7 kg (264 lb)  Height (Patient Reported): 180.3 cm (5' 11\")  BMI (Based on Pt Reported Ht/Wt): 36.82  Pain Score: Extreme Pain (8)  Pain Loc: Chest      Vitals:  No vitals were obtained today due to virtual visit.    Physical Exam   GENERAL: alert, no distress, and fatigued and lying in bath.   RESP: No audible wheeze, cough, or visible cyanosis.    SKIN: Visible skin clear. No significant rash, abnormal pigmentation or lesions.  PSYCH: Appropriate affect, tone, and pace of words        Video-Visit Details    Type of service:  Video Visit   Originating Location (pt. Location): Home    Distant Location (provider location):  Off-site  Platform used for Video Visit: Luis Alberto  Signed Electronically by: Alberto Medley PA-C    "

## 2024-02-09 NOTE — TELEPHONE ENCOUNTER
Alberto Medley PA-C- Received a call from Tremaine with [facility] and Andrea did not arrive for scheduled appointment.     -When Tremaine called his wife Gabriella she states that pt is unwilling to go today and she would like to reschedule.     Please review and advise.     Routed to PCP    HUBER Deng (Patient Advocate Liaison)  [facility]

## 2024-02-09 NOTE — TELEPHONE ENCOUNTER
Called pt x 2 at request of PCP to give him the address of [facility] in [place].     -Left message to call back to CARLITOS RN at [phone].     Called and spoke with Gabriella, pt's spouse (ANGEL),     -Gave her the address to [facility] in [place].     Gabriella verbalizes understanding, states she has to get gas and is concerned that they may not make it in time for 11:00 AM appointment.     Called and spoke with Tremaine at [facility] in [place] and informed her that pt may be there closer to 11:15 AM. -Tremaine states that is okay.     HUBER Deng (Patient Advocate Liaison)  [facility]

## 2024-02-10 LAB — GROUP A STREP BY PCR: NOT DETECTED

## 2024-02-10 NOTE — PROGRESS NOTES
"  Assessment & Plan     Viral URI  Patient currently febrile however without evidence of otitis media, peritonsillar abscess or pneumonia.  He did have a positive influenza exposure, his rapid flu today is negative.  With the pharyngitis, he did check for strep which was also negative.  For now recommend he continue Tylenol as needed, unable to take ibuprofen due to allergy.  Can start Tessalon for cough.  - benzonatate (TESSALON) 200 MG capsule  Dispense: 30 capsule; Refill: 0  - Symptomatic COVID-19 Virus (Coronavirus) by PCR Nose  - Streptococcus A Rapid Screen w/Reflex to PCR - Clinic Collect  - Group A Streptococcus PCR Throat Swab    Other insomnia  Symptoms refractory to melatonin and trazodone, 3-day prescription of Ambien given as patient is quite stressed from insomnia due to illness.  - zolpidem (AMBIEN) 5 MG tablet  Dispense: 3 tablet; Refill: 0    Moderate major depression (H)  Please follow-up with PCP    Traumatic brain injury with loss of consciousness, sequela (H24)  Please follow-up with PCP.              BMI  Estimated body mass index is 37.66 kg/m  as calculated from the following:    Height as of 1/22/24: 1.803 m (5' 11\").    Weight as of this encounter: 122.5 kg (270 lb).             Return in about 1 week (around 2/16/2024) for If symptoms do not improve or gets worse..    Alek Mendez is a 48 year old, presenting for the following health issues:  Sick (Phlemy Cough, WHITLEY, Sore Throat, x 3 days ----- Exposed to FLU from his son  -- taking Tylenol, cough medicine  -- HARD time sleeping)    HPI     Patient is a 48-year-old male with history of traumatic brain injury, moderate major depression who presents to clinic with cough congestion and insomnia.  Associated symptoms of sore throat and headache for the last 3 days.  Was exposed to flu from his son and would like to have testing.    Having significant difficulty getting to and maintaining sleep, melatonin and trazodone not helpful.  He is " very stressed about not sleeping.      Objective    /74 (BP Location: Right arm, Patient Position: Chair, Cuff Size: Adult Regular)   Pulse 102   Temp 100.3  F (37.9  C) (Oral)   Resp 16   Wt 122.5 kg (270 lb)   SpO2 97%   BMI 37.66 kg/m    Body mass index is 37.66 kg/m .  Physical Exam  Vitals reviewed.   Constitutional:       General: He is not in acute distress.     Appearance: He is ill-appearing. He is not toxic-appearing or diaphoretic.      Comments: Sitting in a wheelchair   HENT:      Right Ear: Tympanic membrane normal.      Left Ear: Tympanic membrane normal.      Mouth/Throat:      Mouth: Mucous membranes are moist.      Pharynx: Oropharynx is clear. Posterior oropharyngeal erythema present. No oropharyngeal exudate.      Comments: Uvula midline  Cardiovascular:      Rate and Rhythm: Normal rate and regular rhythm.   Pulmonary:      Effort: Pulmonary effort is normal.      Breath sounds: Normal breath sounds.   Abdominal:      Palpations: Abdomen is soft.      Tenderness: There is no abdominal tenderness.   Psychiatric:      Comments: Appears worried and anxious.            Results for orders placed or performed in visit on 02/09/24 (from the past 24 hour(s))   Streptococcus A Rapid Screen w/Reflex to PCR - Clinic Collect    Specimen: Throat; Swab   Result Value Ref Range    Group A Strep antigen Negative Negative   Influenza A/B antigen    Specimen: Nose; Swab   Result Value Ref Range    Influenza A antigen Negative Negative    Influenza B antigen Negative Negative    Narrative    Test results must be correlated with clinical data. If necessary, results should be confirmed by a molecular assay or viral culture.           Signed Electronically by: Naveen Campbell MD

## 2024-02-11 ENCOUNTER — NURSE TRIAGE (OUTPATIENT)
Dept: NURSING | Facility: CLINIC | Age: 49
End: 2024-02-11
Payer: MEDICARE

## 2024-02-11 ENCOUNTER — HOSPITAL ENCOUNTER (EMERGENCY)
Facility: CLINIC | Age: 49
Discharge: HOME | End: 2024-02-11
Attending: SOCIAL WORKER | Admitting: SOCIAL WORKER
Payer: MEDICARE

## 2024-02-11 ENCOUNTER — APPOINTMENT (OUTPATIENT)
Dept: CT IMAGING | Facility: CLINIC | Age: 49
End: 2024-02-11
Attending: SOCIAL WORKER
Payer: MEDICARE

## 2024-02-11 VITALS
WEIGHT: 266 LBS | OXYGEN SATURATION: 95 % | RESPIRATION RATE: 22 BRPM | DIASTOLIC BLOOD PRESSURE: 83 MMHG | HEIGHT: 71 IN | SYSTOLIC BLOOD PRESSURE: 116 MMHG | HEART RATE: 92 BPM | TEMPERATURE: 99.3 F | BODY MASS INDEX: 37.24 KG/M2

## 2024-02-11 DIAGNOSIS — Z87.09 HISTORY OF ASTHMA: ICD-10-CM

## 2024-02-11 DIAGNOSIS — J20.9 ACUTE BRONCHITIS, UNSPECIFIED ORGANISM: ICD-10-CM

## 2024-02-11 DIAGNOSIS — S06.9XAA UNSPECIFIED INTRACRANIAL INJURY WITH LOSS OF CONSCIOUSNESS STATUS UNKNOWN, INITIAL ENCOUNTER (H): ICD-10-CM

## 2024-02-11 LAB
ANION GAP SERPL CALCULATED.3IONS-SCNC: 11 MMOL/L (ref 7–15)
BASOPHILS # BLD AUTO: 0 10E3/UL (ref 0–0.2)
BASOPHILS NFR BLD AUTO: 0 %
BUN SERPL-MCNC: 11.2 MG/DL (ref 6–20)
CALCIUM SERPL-MCNC: 8.6 MG/DL (ref 8.6–10)
CHLORIDE SERPL-SCNC: 102 MMOL/L (ref 98–107)
CREAT SERPL-MCNC: 1.22 MG/DL (ref 0.67–1.17)
D DIMER PPP FEU-MCNC: 2.56 UG/ML FEU (ref 0–0.5)
DEPRECATED HCO3 PLAS-SCNC: 26 MMOL/L (ref 22–29)
EGFRCR SERPLBLD CKD-EPI 2021: 73 ML/MIN/1.73M2
EOSINOPHIL # BLD AUTO: 0 10E3/UL (ref 0–0.7)
EOSINOPHIL NFR BLD AUTO: 1 %
ERYTHROCYTE [DISTWIDTH] IN BLOOD BY AUTOMATED COUNT: 14.1 % (ref 10–15)
GLUCOSE SERPL-MCNC: 90 MG/DL (ref 70–99)
HCT VFR BLD AUTO: 42.3 % (ref 40–53)
HGB BLD-MCNC: 13.5 G/DL (ref 13.3–17.7)
IMM GRANULOCYTES # BLD: 0 10E3/UL
IMM GRANULOCYTES NFR BLD: 0 %
LYMPHOCYTES # BLD AUTO: 1.9 10E3/UL (ref 0.8–5.3)
LYMPHOCYTES NFR BLD AUTO: 37 %
MCH RBC QN AUTO: 30.9 PG (ref 26.5–33)
MCHC RBC AUTO-ENTMCNC: 31.9 G/DL (ref 31.5–36.5)
MCV RBC AUTO: 97 FL (ref 78–100)
MONOCYTES # BLD AUTO: 0.8 10E3/UL (ref 0–1.3)
MONOCYTES NFR BLD AUTO: 16 %
NEUTROPHILS # BLD AUTO: 2.3 10E3/UL (ref 1.6–8.3)
NEUTROPHILS NFR BLD AUTO: 46 %
NRBC # BLD AUTO: 0 10E3/UL
NRBC BLD AUTO-RTO: 0 /100
PLATELET # BLD AUTO: 141 10E3/UL (ref 150–450)
POTASSIUM SERPL-SCNC: 4.3 MMOL/L (ref 3.4–5.3)
RBC # BLD AUTO: 4.37 10E6/UL (ref 4.4–5.9)
SARS-COV-2 RNA RESP QL NAA+PROBE: NEGATIVE
SODIUM SERPL-SCNC: 139 MMOL/L (ref 135–145)
TROPONIN T SERPL HS-MCNC: 6 NG/L
TROPONIN T SERPL HS-MCNC: 7 NG/L
WBC # BLD AUTO: 5 10E3/UL (ref 4–11)

## 2024-02-11 PROCEDURE — 71275 CT ANGIOGRAPHY CHEST: CPT | Mod: ME

## 2024-02-11 PROCEDURE — 36415 COLL VENOUS BLD VENIPUNCTURE: CPT | Performed by: SOCIAL WORKER

## 2024-02-11 PROCEDURE — 250N000011 HC RX IP 250 OP 636: Performed by: SOCIAL WORKER

## 2024-02-11 PROCEDURE — 85004 AUTOMATED DIFF WBC COUNT: CPT | Performed by: SOCIAL WORKER

## 2024-02-11 PROCEDURE — 80048 BASIC METABOLIC PNL TOTAL CA: CPT | Performed by: SOCIAL WORKER

## 2024-02-11 PROCEDURE — 93005 ELECTROCARDIOGRAM TRACING: CPT

## 2024-02-11 PROCEDURE — 84484 ASSAY OF TROPONIN QUANT: CPT | Performed by: SOCIAL WORKER

## 2024-02-11 PROCEDURE — 85379 FIBRIN DEGRADATION QUANT: CPT | Performed by: SOCIAL WORKER

## 2024-02-11 PROCEDURE — 99285 EMERGENCY DEPT VISIT HI MDM: CPT | Mod: 25

## 2024-02-11 PROCEDURE — 250N000013 HC RX MED GY IP 250 OP 250 PS 637: Performed by: SOCIAL WORKER

## 2024-02-11 PROCEDURE — 94640 AIRWAY INHALATION TREATMENT: CPT

## 2024-02-11 RX ORDER — PREDNISONE 20 MG/1
TABLET ORAL
Qty: 10 TABLET | Refills: 0 | Status: SHIPPED | OUTPATIENT
Start: 2024-02-11

## 2024-02-11 RX ORDER — ALBUTEROL SULFATE 90 UG/1
6 AEROSOL, METERED RESPIRATORY (INHALATION) ONCE
Status: COMPLETED | OUTPATIENT
Start: 2024-02-11 | End: 2024-02-11

## 2024-02-11 RX ORDER — IOPAMIDOL 755 MG/ML
500 INJECTION, SOLUTION INTRAVASCULAR ONCE
Status: COMPLETED | OUTPATIENT
Start: 2024-02-11 | End: 2024-02-11

## 2024-02-11 RX ADMIN — ALBUTEROL SULFATE 6 PUFF: 90 AEROSOL, METERED RESPIRATORY (INHALATION) at 14:09

## 2024-02-11 RX ADMIN — IOPAMIDOL 87 ML: 755 INJECTION, SOLUTION INTRAVENOUS at 15:37

## 2024-02-11 ASSESSMENT — ACTIVITIES OF DAILY LIVING (ADL)
ADLS_ACUITY_SCORE: 38

## 2024-02-11 NOTE — ED PROVIDER NOTES
History     Chief Complaint:  Chest Pain and Shortness of Breath     The history is provided by the patient and the spouse.      Andrea Uribe is a 48 year old male with a history of asthma, DVT s/p IVC filter placement not on AC, obstructive sleep apnea, TBI, and hypertension who presents with cough, shortness of breath, and chest pain for the past 4 days. Reports intermittent fevers. He reports difficulty sleeping d/t cough. Reports that he felt chest pain radiating into his shoulders and back when he coughs, and also reports squeezing sensation when he turns to his side. Feels like his muscles aren't coming together. Also reports he has been having trouble sleeping due to cough. Reports chest pain radiates into his stomach into both shoulder blades when he lays on his back. Patient has not taken any Tylenol today. Reports two episodes of diarrhea yesterday.. Denies urinary issues, denies vomiting. He is worried about pneumonia and influenza.     Independent Historian:   Patient's wife reports concern for pneumonia.  She states he has been unable to take cough suppressants prescribed at Urgent Care because they were not covered by insurance and they could not  the prescription.    Review of External Notes:   Reviewed UC visit from 2/9/24, seen for viral URI. Patient tested negative for influenza/covid.     Medications:    Albuterol inhaler   Fosamax  Flexeril  Pepcid  Breo ellipta  Keppra  Dilantin  Zocor  Trazodone   Effexor  Ambien    Past Medical History:    CKD  Hypertension   Osteoarthritis   Obesity  Seizure disorder   TBI  Obstructive sleep apnea   Thyroid disease  Depression   Asthma  DVT    Past Surgical History:    Appendectomy   Total knee arthroplasty, right   Tracheostomy  Excise mass trunk  Herniorrhaphy incisional  Implant stimulator vagus nerve  Cholecystectomy   ORIF femur  Remove hardware knee  Spleen repair  IVC filter     Physical Exam   Patient Vitals for the past 24 hrs:   BP Temp  "Temp src Pulse Resp SpO2 Height Weight   02/11/24 2030 116/83 -- -- 92 -- 95 % -- --   02/11/24 1315 (!) 134/98 99.3  F (37.4  C) Temporal 77 22 92 % 1.803 m (5' 11\") 120.7 kg (266 lb)        Physical Exam  General: Overall stable and nontoxic appearing, using phone in chair   HEENT: Conjunctivae clear, no scleral icterus, mucous membranes moist  Neuro: Alert, moving all extremities equally with intention  CV: Regular rate and rhythm, radial and DP pulses equal  No friction rub appreciated  Respiratory: Speaking in full sentences, no signs of respiratory distress  Trace wheeze noted throughout  Abdomen: Soft, without rigidity or rebound throughout  MSK: Blt trace LE swelling     Emergency Department Course   ECG:  ECG results from 02/11/24   EKG 12 lead     Value    Systolic Blood Pressure     Diastolic Blood Pressure     Ventricular Rate 82    Atrial Rate 82    NY Interval 154    QRS Duration 110        QTc 425    P Axis 53    R AXIS 25    T Axis 38    Interpretation ECG      Sinus rhythm  Normal ECG  When compared with ECG of 08-JUL-2020 11:13,  Nonspecific T wave abnormality now evident in Anterior leads         Imaging:  CT Chest Pulmonary Embolism w Contrast   Final Result   IMPRESSION:   1.  No acute abnormality in the chest. No evidence for pulmonary embolism.   2.  Mild mediastinal and bilateral hilar adenopathy.         Report per radiology    Laboratory:  Labs Ordered and Resulted from Time of ED Arrival to Time of ED Departure   BASIC METABOLIC PANEL - Abnormal       Result Value    Sodium 139      Potassium 4.3      Chloride 102      Carbon Dioxide (CO2) 26      Anion Gap 11      Urea Nitrogen 11.2      Creatinine 1.22 (*)     GFR Estimate 73      Calcium 8.6      Glucose 90     D DIMER QUANTITATIVE - Abnormal    D-Dimer Quantitative 2.56 (*)    CBC WITH PLATELETS AND DIFFERENTIAL - Abnormal    WBC Count 5.0      RBC Count 4.37 (*)     Hemoglobin 13.5      Hematocrit 42.3      MCV 97      MCH 30.9  "     MCHC 31.9      RDW 14.1      Platelet Count 141 (*)     % Neutrophils 46      % Lymphocytes 37      % Monocytes 16      % Eosinophils 1      % Basophils 0      % Immature Granulocytes 0      NRBCs per 100 WBC 0      Absolute Neutrophils 2.3      Absolute Lymphocytes 1.9      Absolute Monocytes 0.8      Absolute Eosinophils 0.0      Absolute Basophils 0.0      Absolute Immature Granulocytes 0.0      Absolute NRBCs 0.0     TROPONIN T, HIGH SENSITIVITY - Normal    Troponin T, High Sensitivity 7     TROPONIN T, HIGH SENSITIVITY - Normal    Troponin T, High Sensitivity 6          Procedures       Emergency Department Course & Assessments:       Interventions:  Medications   albuterol (PROVENTIL HFA/VENTOLIN HFA) inhaler (6 puffs Inhalation $Given 24 1409)   iopamidol (ISOVUE-370) solution 500 mL (87 mLs Intravenous $Given 24 1537)   sodium chloride (PF) 0.9% PF flush 100 mL (99 mLs Intravenous $Given 24 1538)        Independent Interpretation (X-rays, CTs, rhythm strip):  None    Assessments/Consultations/Discussion of Management or Tests:  ED Course as of 24 0016   Sun 2024   1313 EKG is interpreted by myself: Sinus rhythm with normal axis, no ST elevation noted, T wave inversions in V2 and V3 these appear to be new compared to EKG from    1347 I obtained the history and examined the patient as noted above.    1556 Cr 8 months prior was 1.03   1643   IMPRESSION:  1.  No acute abnormality in the chest. No evidence for pulmonary embolism.  2.  Mild mediastinal and bilateral hilar adenopathy.      Reassessed     Confirmed with radiology no signs of infection, shows likely hypoventilatory changes        Social Determinants of Health affecting care:   None    Disposition:  The patient was discharged to home.     Impression & Plan    CMS Diagnoses: None    Medical Decision MakinyM with a hx of TBI, asthma, IVC filter in place, who presented to the emergency department with a  chief complaint of cough, chest pain, shortness of breath and fever at home.  He and wife were also concerned about influenza and COVID results they were not aware that he was negative at his urgent care visit and I discussed this with them.  Patient on exam did not appear to be toxic nor in respiratory distress.  He had trace wheezes in his lung fields for which he received MDI equivalent of a DuoNeb and these improved on reassessment.  His oxygenation remained appropriate on room air.  D-dimer was elevated for which he underwent a CT PE study which did not show any signs of a pulmonary embolism.  It also did not show any signs concerning for pneumonia, I spoke specifically with reading radiologist regarding this.  Patient's EKG did show some T wave inversions that were new compared to EKG from 4 years previously, however he reassuringly had 2 troponins here without any delta troponin and his description of his chest pain is very atypical for ACS. No UT downsloping or diffuse PREETHI that would be c/f pericarditis. No signs of anemia on lab workup, no hemorrhage clinically on exam.  Mildly elevated Cr however able to drink fluids at home. Patient was afebrile here without any prior antipyretic use today.  Overall my suspicion for bacterial pneumonia is lower and I do not see any indication for antibiotics at this time.  Doubt aortic dissection, pericarditis or myocarditis as etiology. Patient was prescribed a brief steroid burst for treatment of asthma exacerbation vs bronchitis with noted wheezes, he stated that he had albuterol at home as needed.  He will call his primary care provider tomorrow to schedule a follow-up appointment.Discussed strict return precautions with patient and he verbalized understanding.  He is discharged in stable condition.    Diagnosis:    ICD-10-CM       1. Acute bronchitis, unspecified organism  J20.9            Discharge Medications:  Discharge Medication List as of 2/11/2024  8:33 PM         START taking these medications    Details   predniSONE (DELTASONE) 20 MG tablet Take two tablets (= 40mg) each day for 5 (five) days, Disp-10 tablet, R-0, Local Print              Scribe Disclosure:  I, Elvira Roberts, am serving as a scribe at 2:12 PM on 2/11/2024 to document services personally performed by Berta Moran MD based on my observations and the provider's statements to me.     2/11/2024   Berta Moran MD Wu Klasek, Connie, MD  02/12/24 0023       Berta Moran MD  02/12/24 0025

## 2024-02-11 NOTE — TELEPHONE ENCOUNTER
Patient calling with chest pain and shortness of breath. Patient reports it feels like he is wearing a tight sweater that is making it hard to breath.   Protocol recommends 911 for an ambulance.   Patient refusing 911 as insurance will not pay for ambulance. Another adult with patient states she will drive to [facility] ED. Informed symptoms are consistent with possible heart attack and to call 911. Still refusing. Informed if patient passes out or symptoms worsen to pull over and call for an ambulance.   Tami Zepeda RN   02/11/24 11:45 AM  [facility] Nurse Advisor    Reason for Disposition   Chest pain lasting longer than 5 minutes and ANY of the following:    history of heart disease  (i.e., heart attack, bypass surgery, angina, angioplasty, CHF; not just a heart murmur)    described as crushing, pressure-like, or heavy    age > 50    age > 30 AND at least one cardiac risk factor (i.e., hypertension, diabetes, obesity, smoker or strong family history of heart disease)    not relieved with nitroglycerin    Additional Information   Negative: SEVERE difficulty breathing (e.g., struggling for each breath, speaks in single words)   Negative: Difficult to awaken or acting confused (e.g., disoriented, slurred speech)   Negative: Shock suspected (e.g., cold/pale/clammy skin, too weak to stand, low BP, rapid pulse)   Negative: Passed out (i.e., lost consciousness, collapsed and was not responding)    Protocols used: Chest Pain-A-AH

## 2024-02-11 NOTE — ED TRIAGE NOTES
Pt comes in with chest pain that started 4 days ago worse with laying down, + productive green blood tinged cough. +Fever, tylenol this am.      Triage Assessment (Adult)       Row Name 02/11/24 1311          Triage Assessment    Airway WDL WDL        Respiratory WDL    Respiratory WDL cough     Cough Frequency frequent     Cough Type productive;good  green and blood tinged sputum        Cardiac WDL    Cardiac WDL all;chest pain  generalized worse with laying down        Chest Pain Assessment    Chest Pain Location midsternal     Chest Pain Radiation --  radiated to bilat shoulders     Character squeezing     Duration chest pain started 4 days ago

## 2024-02-12 ENCOUNTER — TELEPHONE (OUTPATIENT)
Dept: FAMILY MEDICINE | Facility: CLINIC | Age: 49
End: 2024-02-12

## 2024-02-12 LAB
ATRIAL RATE - MUSE: 82 BPM
DIASTOLIC BLOOD PRESSURE - MUSE: NORMAL MMHG
INTERPRETATION ECG - MUSE: NORMAL
P AXIS - MUSE: 53 DEGREES
PR INTERVAL - MUSE: 154 MS
QRS DURATION - MUSE: 110 MS
QT - MUSE: 364 MS
QTC - MUSE: 425 MS
R AXIS - MUSE: 25 DEGREES
SYSTOLIC BLOOD PRESSURE - MUSE: NORMAL MMHG
T AXIS - MUSE: 38 DEGREES
VENTRICULAR RATE- MUSE: 82 BPM

## 2024-02-12 NOTE — DISCHARGE INSTRUCTIONS
You were seen in the emergency department for chest pain, shortness of breath, ongoing cough and concern for pneumonia. Your flu and covid were negative from 2/9/2024. The CT scan showed no blood clot or pneumonia. It is possible that your symptoms are due to coughing so much that you have aggravated your muscles. Please take tylenol at home for your symptoms. I am giving you a prescription for prednisone and keep using your albuterol at home.     Please call your primary care doctor tomorrow to schedule a follow up appointment.     If you start to have a lot of difficulty breathing, if you start to have chest pain that won't go away, if you have vomiting and can't keep anything down, if you have fainting, or other concerning symptoms please come back to the emergency department.

## 2024-02-12 NOTE — LETTER
February 12, 2024      Andrea Uribe  [address] [facility] [address]        Dear ,    We are writing to inform you of your test results.    Your COVID test was negative, if you are not doing better you should follow up with your primary care provider.     If you have any questions or concerns, please call the clinic at the number listed above.       Sincerely,    Naveen Campbell MD

## 2024-02-12 NOTE — TELEPHONE ENCOUNTER
FYI to Orestes Medley PA-C, pt went to urgent care 2/9/24 and ER yesterday    Yuko Hernandez RN, BSN  [facility]

## 2024-02-15 NOTE — TELEPHONE ENCOUNTER
Forms/Letter Request    Type of form/letter: [facility]  Reasonable Accommodation Form    Have you been seen for this request:     Do we have the form/letter: Yes: In PCP in basket    Who is the form from? [facility]  Reasonable Accommodation Form (if other please explain)    Where did/will the form come from? form was faxed in    When is form/letter needed by:     How would you like the form/letter returned: Fax : [phone]    MCKAYLA Childs    
 Faxed to : [phone] and put in [facility] file cabinet.  Ruthann Correa TC    
Form in faxed bin. Ruth Behrens.   
Form placed in PCP in basket as requested.  Ruthann Correa, TC    
Form was faxed back. They will not allow 2 cats over 1 unless there is a specific reason why 2 cats is needed to manage his depression and anxiety over just 1 cat.     Orestes brewer, pac  
In oubox.     Orestes brewer, pac  
Received form back, needs more detail on need for 2 cats. Ruth Behrens.   
Returned fax [phone]. Ruth Behrens.   
Returned faxed form addended and in outbox.     Thanks    Orestes brewer, pac  
See 12/29/23 visit for reference. Please place form back in my inbasket. Thanks    Orestes brewer, pac  
The explanation of the need for each cat was already done on 1/2/24. It was addressed with patient on 12/29/23.     Orestes brewer pac  
quit on own/oral cessation medication

## 2024-03-03 DIAGNOSIS — J45.40 MODERATE PERSISTENT ASTHMA WITHOUT COMPLICATION: ICD-10-CM

## 2024-03-03 DIAGNOSIS — G40.909 SEIZURE DISORDER (H): Primary | ICD-10-CM

## 2024-03-03 DIAGNOSIS — J45.30 MILD PERSISTENT ASTHMA WITHOUT COMPLICATION: ICD-10-CM

## 2024-03-04 RX ORDER — PHENYTOIN SODIUM 100 MG/1
CAPSULE, EXTENDED RELEASE ORAL
Qty: 150 CAPSULE | Refills: 0 | Status: SHIPPED | OUTPATIENT
Start: 2024-03-04 | End: 2024-03-29

## 2024-03-04 RX ORDER — ALBUTEROL SULFATE 90 UG/1
AEROSOL, METERED RESPIRATORY (INHALATION)
Qty: 8.5 G | Refills: 0 | Status: SHIPPED | OUTPATIENT
Start: 2024-03-04 | End: 2024-03-29

## 2024-03-04 RX ORDER — FLUTICASONE FUROATE AND VILANTEROL TRIFENATATE 200; 25 UG/1; UG/1
1 POWDER RESPIRATORY (INHALATION) DAILY
Qty: 28 EACH | Refills: 0 | Status: SHIPPED | OUTPATIENT
Start: 2024-03-04 | End: 2024-05-30

## 2024-03-04 NOTE — TELEPHONE ENCOUNTER
Please call patient. Was due for visit this month. Please help schedule. Sent 30 days to allow for scheduling.    Ministerio Pavon PA-C on 3/4/2024 at 11:42 AM  (covering for Orestes Medley PA-C)

## 2024-03-12 ENCOUNTER — OFFICE VISIT (OUTPATIENT)
Dept: URGENT CARE | Facility: URGENT CARE | Age: 49
End: 2024-03-12
Payer: MEDICARE

## 2024-03-12 ENCOUNTER — NURSE TRIAGE (OUTPATIENT)
Dept: FAMILY MEDICINE | Facility: CLINIC | Age: 49
End: 2024-03-12
Payer: MEDICARE

## 2024-03-12 VITALS
HEART RATE: 84 BPM | WEIGHT: 266 LBS | DIASTOLIC BLOOD PRESSURE: 84 MMHG | BODY MASS INDEX: 37.1 KG/M2 | SYSTOLIC BLOOD PRESSURE: 127 MMHG | TEMPERATURE: 98 F | OXYGEN SATURATION: 100 %

## 2024-03-12 DIAGNOSIS — R09.A2 GLOBUS SENSATION: Primary | ICD-10-CM

## 2024-03-12 PROCEDURE — 99213 OFFICE O/P EST LOW 20 MIN: CPT | Performed by: PHYSICIAN ASSISTANT

## 2024-03-12 ASSESSMENT — PAIN SCALES - GENERAL: PAINLEVEL: WORST PAIN (10)

## 2024-03-12 NOTE — TELEPHONE ENCOUNTER
"  Patient calling with pressure and pain in his throat,  Took too many pills at one time and they feel stuck    Has tried drinking water and soda but unable to dislodge them.    Has no SOB or chest pain, Can speak and drink fluids,    Voice is hoarse .    Recommendation is to go to ED/UC. Patient will have wife take him to  to be evaluated now.    They will go to  in [place].    Reason for Disposition   SEVERE symptoms of pill stuck in throat or esophagus (e.g., severe pain, bleeding, or difficulty swallowing liquids)    Additional Information   Negative: SEVERE difficulty swallowing (e.g., drooling or spitting) and started suddenly after taking a medicine or allergic foods   Negative: Wheezing, stridor, hoarseness, or difficulty breathing   Negative: Swollen tongue and sudden onset   Negative: Sounds like a life-threatening emergency to the triager   Negative: SEVERE difficulty swallowing (e.g., drooling or spitting, can't swallow water)   Negative: Symptoms of food or bone stuck in throat or esophagus (e.g., pain in throat or chest, FB sensation, blood-tinged saliva)    Answer Assessment - Initial Assessment Questions  1. DESCRIPTION: \"Tell me more about this problem.\" \"Are you  having trouble swallowing liquids, solids, or both?\" \"Any trouble with swallowing saliva (spit)?\"      Took a handfull of pills and now some are stuck  2. SEVERITY: \"How bad is the swallowing difficulty?\"  (e.g., Scale 1-10; or mild, moderate, severe)    - MILD (0-3): Occasional swallowing difficulty; has trouble swallowing certain types of foods or liquids.    - MODERATE (4-7): Frequent swallowing difficulty; only able to swallow small amounts of foods and fluids.    - SEVERE (8-10): Unable to swallow any foods, fluids, or saliva; sensation of \"lump in throat\" or \"something stuck in throat\", and frequent drooling or spitting may be present.      Mild .  Can breath , talk ans swallow but feels pressure in throat by rolon apple.  3. " "ONSET: \"When did the swallowing problems begin?\"       1 hour ago  4. CAUSE: \"What do you think is causing the problem?\"  (e.g., dry mouth, food or pill stuck in throat, mouth pain, sore throat, progression of disease process such as dementia or Parkinson's disease).       Took too many pills at one time.    5. CHRONIC or RECURRENT: \"Is this a new problem for you?\"  If No, ask: \"How long have you had this problem?\" (e.g., days, weeks, months)       no  6. OTHER SYMPTOMS: \"Do you have any other symptoms?\" (e.g., chest pain, difficulty breathing, mouth sores, sore throat, swollen tongue, chest pain)      Pain in throat , pressure where pills are stuck  7. PREGNANCY: \"Is there any chance you are pregnant?\" \"When was your last menstrual period?\"      no    Protocols used: Swallowing Difficulty-A-OH    Christine M Klisch, RN    "

## 2024-03-12 NOTE — PATIENT INSTRUCTIONS
No foreign body noted at the back of the throat today. I recommend to keep monitoring symptoms. I recommend taking small sips of water frequently throughout the day. Soft foods only for the rest of the day.      Please go to the Emergency room if any worsening symptoms including difficulty breathing, unable to swallow your own spit.    Please let us know if any questions or concerns.

## 2024-03-12 NOTE — PROGRESS NOTES
Assessment & Plan     Globus sensation  Acute problem.  No foreign body noted posterior pharynx today.  No posterior pharynx edema noted.  On exam patient is in no acute distress.  Patient is nontoxic-appearing. Vitals are stable. Patient was able to successfully drink and swallow water a few times in the exam room without vomiting. I have recommended to keep monitoring symptoms.  Frequent sips of water recommended.  Recommended only soft foods for the rest of the day.  We discussed red flag symptoms and when to seek emergent care including difficulty breathing, severe excruciating pain, inability to swallow saliva etc.... We discussed in the future avoiding swallowing several pills at the same time. Discussed swallowing 2 pills max at a time. Patient agrees. He is discharged in stable condition.        Return in about 2 days (around 3/14/2024) for Symptoms failing to improve.    Mere Salas PA-C  [facility] SCOTT Mendez is a 48 year old male who presents to clinic today for the following health issues:  Chief Complaint   Patient presents with    Urgent Care     Feels like he is choking on a large pill (Keppra- he took this AM with 8 others).  Took it about 3 hours ago and is stuck in his throat.      HPI  Patient is presenting to urgent care today with a concern for having a pill stuck in his throat.  He notes he was taking his Keppra along with other pills, was taking approximately 8 pills in total, felt like one of the large pills got stuck.  He tried vomiting without improvement in his symptoms. He reports pain with swallowing. Pain is on the right side of his throat.  He denies cough, difficulty breathing, hemoptysis.    Review of Systems  Constitutional, HEENT, cardiovascular, pulmonary, GI, , musculoskeletal, neuro, skin, endocrine and psych systems are negative, except as otherwise noted.      Objective    /84 (BP Location: Right arm, Patient Position:  Sitting, Cuff Size: Adult Large)   Pulse 84   Temp 98  F (36.7  C) (Oral)   Wt 120.7 kg (266 lb)   SpO2 100%   BMI 37.10 kg/m    Physical Exam   GENERAL: alert and no distress  HENT: ear canals and TM's normal, mouth without ulcers or lesions, throat is moist and pink, uvula is midline, no foreign body appreciated posterior pharynx, no posterior pharynx edema  RESP: lungs clear to auscultation - no rales, rhonchi or wheezes  CV: regular rate and rhythm, normal S1 S2  MS: no gross musculoskeletal defects noted, no edema

## 2024-03-14 NOTE — LETTER
[facility]  [id number] [place] [id number] [phone]          March 7, 2018    Andrea Uribe                                                                                                                     [address] [facility] [address]            Dear Andrea,    We recently received a call from your pharmacy requesting a refill of your medication (vanlafaxine).    A review of your chart indicates that an appointment is required.  Please contact our office at [phone] to schedule your doctor's appointment for your 6 month medication check.    We have authorized one refill of your medication to allow time for you to schedule your appointment.    Taking care of your health is important to us and ongoing visits with your provider are vital to your care.  We look forward to seeing you in the near future.    Sincerely,         Alberto Medley PA-C/[employer]    
Home

## 2024-03-28 DIAGNOSIS — G40.909 SEIZURE DISORDER (H): ICD-10-CM

## 2024-03-28 DIAGNOSIS — J45.30 MILD PERSISTENT ASTHMA WITHOUT COMPLICATION: ICD-10-CM

## 2024-03-29 RX ORDER — PHENYTOIN SODIUM 100 MG/1
CAPSULE, EXTENDED RELEASE ORAL
Qty: 150 CAPSULE | Refills: 0 | Status: SHIPPED | OUTPATIENT
Start: 2024-03-29

## 2024-03-29 RX ORDER — ALBUTEROL SULFATE 90 UG/1
AEROSOL, METERED RESPIRATORY (INHALATION)
Qty: 18 G | Refills: 2 | Status: SHIPPED | OUTPATIENT
Start: 2024-03-29 | End: 2024-05-14

## 2024-05-10 ENCOUNTER — HOSPITAL ENCOUNTER (EMERGENCY)
Facility: CLINIC | Age: 49
Discharge: HOME | End: 2024-05-10
Attending: EMERGENCY MEDICINE | Admitting: EMERGENCY MEDICINE
Payer: MEDICARE

## 2024-05-10 ENCOUNTER — APPOINTMENT (OUTPATIENT)
Dept: GENERAL RADIOLOGY | Facility: CLINIC | Age: 49
End: 2024-05-10
Payer: MEDICARE

## 2024-05-10 ENCOUNTER — APPOINTMENT (OUTPATIENT)
Dept: GENERAL RADIOLOGY | Facility: CLINIC | Age: 49
End: 2024-05-10
Attending: EMERGENCY MEDICINE
Payer: MEDICARE

## 2024-05-10 VITALS
TEMPERATURE: 98.1 F | WEIGHT: 275.1 LBS | OXYGEN SATURATION: 100 % | DIASTOLIC BLOOD PRESSURE: 91 MMHG | SYSTOLIC BLOOD PRESSURE: 138 MMHG | HEIGHT: 71 IN | RESPIRATION RATE: 18 BRPM | HEART RATE: 75 BPM | BODY MASS INDEX: 38.51 KG/M2
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
## 2024-05-10 DIAGNOSIS — S43.402A SPRAIN OF LEFT SHOULDER, UNSPECIFIED SHOULDER SPRAIN TYPE, INITIAL ENCOUNTER: ICD-10-CM

## 2024-05-10 PROCEDURE — 73030 X-RAY EXAM OF SHOULDER: CPT | Mod: LT

## 2024-05-10 PROCEDURE — 73060 X-RAY EXAM OF HUMERUS: CPT | Mod: LT

## 2024-05-10 PROCEDURE — 250N000013 HC RX MED GY IP 250 OP 250 PS 637: Performed by: EMERGENCY MEDICINE

## 2024-05-10 PROCEDURE — 99283 EMERGENCY DEPT VISIT LOW MDM: CPT

## 2024-05-10 RX ORDER — OXYCODONE HYDROCHLORIDE 5 MG/1
5 TABLET ORAL ONCE
Status: COMPLETED | OUTPATIENT
Start: 2024-05-10 | End: 2024-05-10

## 2024-05-10 RX ADMIN — OXYCODONE HYDROCHLORIDE 5 MG: 5 TABLET ORAL at 14:03

## 2024-05-10 ASSESSMENT — COLUMBIA-SUICIDE SEVERITY RATING SCALE - C-SSRS
1. IN THE PAST MONTH, HAVE YOU WISHED YOU WERE DEAD OR WISHED YOU COULD GO TO SLEEP AND NOT WAKE UP?: NO
2. HAVE YOU ACTUALLY HAD ANY THOUGHTS OF KILLING YOURSELF IN THE PAST MONTH?: NO
6. HAVE YOU EVER DONE ANYTHING, STARTED TO DO ANYTHING, OR PREPARED TO DO ANYTHING TO END YOUR LIFE?: NO

## 2024-05-10 ASSESSMENT — ACTIVITIES OF DAILY LIVING (ADL)
ADLS_ACUITY_SCORE: 38
ADLS_ACUITY_SCORE: 38
ADLS_ACUITY_SCORE: 36

## 2024-05-10 NOTE — DISCHARGE INSTRUCTIONS
Shoulder Sprain: Care Instructions  Overview     A shoulder sprain occurs when you stretch or tear a ligament in your shoulder. Ligaments are tough tissues that connect one bone to another. A sprain can happen during sports, a fall, or projects around the house.  Shoulder sprains usually get better with treatment at home.  Follow-up care is a key part of your treatment and safety. Be sure to make and go to all appointments, and call your doctor if you are having problems. It's also a good idea to know your test results and keep a list of the medicines you take.  How can you care for yourself at home?  Rest and protect your shoulder. Try to stop or reduce any action that causes pain.  If your doctor gave you a sling or immobilizer, wear it as directed. A sling or immobilizer supports your shoulder and may make you more comfortable.  Put ice or a cold pack on your shoulder for 10 to 20 minutes at a time. Try to do this every 1 to 2 hours for the next 3 days (when you are awake) or until the swelling goes down. Put a thin cloth between the ice and your skin. Some doctors suggest alternating between hot and cold.  Be safe with medicines. Read and follow all instructions on the label.  If the doctor gave you a prescription medicine for pain, take it as prescribed.  If you are not taking a prescription pain medicine, ask your doctor if you can take an over-the-counter medicine.  For the first day or two after an injury, avoid things that might increase swelling, such as hot showers, hot tubs, or hot packs.  After 2 or 3 days, if your swelling is gone, apply a heating pad set on low or a warm cloth to your shoulder. This helps keep your shoulder flexible. Some doctors suggest that you go back and forth between hot and cold. Put a thin cloth between the heating pad and your skin.  Follow your doctor's or physical therapist's directions for exercises.  Return to your usual level of activity slowly.  When should you call for  "help?   Call your doctor now or seek immediate medical care if:    Your pain is worse.     You cannot move your shoulder.     Your arm is cool or pale or changes color below the shoulder.     You have tingling, weakness, or numbness in your arm.   Watch closely for changes in your health, and be sure to contact your doctor if:    You do not get better as expected.   Where can you learn more?  Go to https://www.[employer].net/patiented  Enter U672 in the search box to learn more about \"Shoulder Sprain: Care Instructions.\"  Current as of: July 17, 2023               Content Version: 14.0    6803-8346 [employer].   Care instructions adapted under license by your healthcare professional. If you have questions about a medical condition or this instruction, always ask your healthcare professional. [employer] disclaims any warranty or liability for your use of this information.      "

## 2024-05-10 NOTE — ED PROVIDER NOTES
ED ATTENDING PHYSICIAN NOTE:   I evaluated this patient in conjunction with Rosas Cee NP  I have participated in the care of the patient and personally performed key elements of the history, exam, and medical decision making.      HPI:   Andrea Uribe is a 48 year old male with history of a seizure disorder, TBI, hyperlipidemia, hypertension, osteoarthritis, and stage 3 CKD, who presents for evaluation after a fall. Earlier today, the patient reports the he was walking down the stairs where he fell, landed on his buttock, and hit is head on the railing. The patient endorses pain in the left shoulder and upper arm, but not in the elbow, lower forearm, or wrist. Denies any loss of consciousness with head trauma during th fall. Patient notes using Tylenol and icy-hot for pain, but with no relief. States he uses a walker for ambulation at baseline. The patient denotes any head, neck, chest, abdominal, or leg pain.     Independent Historian:   None    Review of External Notes:       EXAM:   Constitutional: Well appearing.  HEENT: Atraumatic.  PERRL.  EOMI.  Moist mucous membranes.  Neck: Soft.  Supple.  No tenderness to palpation.  Cardiac: Regular rate and rhythm.  No murmur or rub.  Respiratory: Clear to auscultation bilaterally.  No respiratory distress.   Abdomen: Soft and nontender. No guarding.  Nondistended.  Musculoskeletal: Left shoulder demonstrates tenderness palpation of the lateral shoulder with no obvious deformity or effusion noted.  No other tenderness outside the lateral shoulder and proximal upper arm.  No tenderness about the elbow, forearm, wrist, or hands.  Radial pulse 2+ on the left.  Cap fill is less than 3 seconds throughout.  No tenderness of the clavicle or scapula area.  No tenderness of the chest wall.  Neurologic: Alert and oriented x3.  Normal tone and bulk.   5/5 strength in bilateral upper extremities with left shoulder limited secondary to injury.  Sensation to light touch intact  throughout.    Skin: No rashes.  No edema.  Psych: Normal affect.  Normal behavior.            Independent Interpretation (X-rays, CTs, rhythm strip):  X rays show no acute fracture or dislocation.     Consultations/Discussion of Management or Tests:  None     Social Determinants of Health affecting care:   None     MEDICAL DECISION MAKING/ASSESSMENT AND PLAN:   Andrea Uribe is a 48-year-old man who is afebrile and hemodynamically stable.  He is neurovascularly intact in the left arm.  No obvious deformity.  X-ray of the left shoulder and humerus demonstrates no acute fracture or dislocation.  He is now moving his arm and feels comfortable discharging home.  Recommended close orthopedic follow-up.  We discussed supportive care at home and strict return precautions were given.  Discussed likely soft tissue injury with no acute osseous abnormality found on x-ray imaging.  He did state he hit his head but has no headache and declined a CT head which is reasonable given the mechanism of injury no external signs of trauma.  He has no neck pain.  No other injuries.  His questions were answered and he was in no distress at time of discharge.     DIAGNOSIS:     ICD-10-CM    1. Sprain of left shoulder, unspecified shoulder sprain type, initial encounter  S43.402A       2. Fall down stairs, initial encounter  W10.8XXA                DISPOSITION:   The patient was discharged home.      Scribe Disclosure:  I, Sabina Tejeda, am serving as a scribe at 12:35 PM on 5/10/2024 to document services personally performed by Gerardo Aguilar MD based on my observations and the provider's statements to me.     Scribe Disclosure:  I, Adele Daniel, am serving as a scribe  at 1:02 PM on 5/10/2024 to document services personally performed by Gerardo Aguilar MD based on my observations and the provider's statements to me.    5/10/2024  [facility] EMERGENCY DEPT       Gerardo Aguilar MD  05/10/24 7848

## 2024-05-10 NOTE — ED TRIAGE NOTES
Patient states he has difficulty with balance, and fell down his stairs 3 different times in one day. Patient states he is now having pain in his left shoulder and into his upper arm.       Triage Assessment (Adult)       Row Name 05/10/24 1046          Triage Assessment    Airway WDL WDL        Respiratory WDL    Respiratory WDL WDL        Peripheral/Neurovascular WDL    Peripheral Neurovascular WDL WDL
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
## 2024-05-14 ENCOUNTER — TELEPHONE (OUTPATIENT)
Dept: FAMILY MEDICINE | Facility: CLINIC | Age: 49
End: 2024-05-14
Payer: MEDICARE

## 2024-05-14 DIAGNOSIS — J45.30 MILD PERSISTENT ASTHMA WITHOUT COMPLICATION: ICD-10-CM

## 2024-05-14 RX ORDER — ALBUTEROL SULFATE 90 UG/1
AEROSOL, METERED RESPIRATORY (INHALATION)
Qty: 18 G | Refills: 0 | Status: SHIPPED | OUTPATIENT
Start: 2024-05-14 | End: 2024-06-04
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
## 2024-05-14 NOTE — TELEPHONE ENCOUNTER
Will provide 1 refill. Looks like patient was supposed to follow up w/ PCP in March to discuss/re evaluate asthma given sounds like poorly controlled. Can we reach out to schedule w/ patient to have this followed up on?    JANNA Pineda CNP (for Orestes Medley PA-C)

## 2024-05-14 NOTE — TELEPHONE ENCOUNTER
Routed to TC, please call pt to schedule asthma appointment, may need PCP, confirm with pt  Yuko Hernandez RN, BSN  [facility]

## 2024-05-21 ENCOUNTER — TELEPHONE (OUTPATIENT)
Dept: FAMILY MEDICINE | Facility: CLINIC | Age: 49
End: 2024-05-21
Payer: MEDICARE

## 2024-05-21 DIAGNOSIS — G40.909 SEIZURE DISORDER (H): ICD-10-CM

## 2024-05-21 RX ORDER — PHENYTOIN SODIUM 100 MG/1
CAPSULE, EXTENDED RELEASE ORAL
Qty: 150 CAPSULE | Refills: 0 | OUTPATIENT
Start: 2024-05-21

## 2024-05-21 NOTE — TELEPHONE ENCOUNTER
Denied.  This appears to have been refilled in the recent past by [facility] however this medication is typically managed by his neurologist.  Refill should be going to his neurologist and not to us    Orestes Medley PA-C

## 2024-05-21 NOTE — TELEPHONE ENCOUNTER
Called and informed patient of message below. He states understanding and will contact his neurologist.

## 2024-05-23 DIAGNOSIS — G40.909 SEIZURE DISORDER (H): ICD-10-CM

## 2024-05-24 RX ORDER — PHENYTOIN SODIUM 100 MG/1
CAPSULE, EXTENDED RELEASE ORAL
Qty: 150 CAPSULE | Refills: 0 | OUTPATIENT
Start: 2024-05-24

## 2024-05-24 NOTE — TELEPHONE ENCOUNTER
"See refill encounter from 5/21/24: medication was denied and patient was informed that he needs to receive refill requests of this medication through his Neurologist.     Per the RN note from this encounter... \"Called and informed patient of message below. He states understanding and will contact his neurologist.\"    Will refuse refill request again to the pharmacy as patient needs to reach out to his Neurologist for this medication.     Melinda Kirby RN  "

## 2024-05-29 DIAGNOSIS — M81.6 LOCALIZED OSTEOPOROSIS WITHOUT CURRENT PATHOLOGICAL FRACTURE: ICD-10-CM

## 2024-05-30 RX ORDER — ALENDRONATE SODIUM 70 MG/1
TABLET ORAL
Qty: 12 TABLET | Refills: 0 | Status: SHIPPED | OUTPATIENT
Start: 2024-05-30 | End: 2024-08-12

## 2024-06-04 DIAGNOSIS — J45.30 MILD PERSISTENT ASTHMA WITHOUT COMPLICATION: ICD-10-CM

## 2024-06-04 RX ORDER — ALBUTEROL SULFATE 90 UG/1
AEROSOL, METERED RESPIRATORY (INHALATION)
Qty: 18 G | Refills: 0 | Status: SHIPPED | OUTPATIENT
Start: 2024-06-04

## 2024-06-07 ENCOUNTER — TELEPHONE (OUTPATIENT)
Dept: FAMILY MEDICINE | Facility: CLINIC | Age: 49
End: 2024-06-07
Payer: MEDICARE

## 2024-06-07 DIAGNOSIS — N18.30 STAGE 3 CHRONIC KIDNEY DISEASE, UNSPECIFIED WHETHER STAGE 3A OR 3B CKD (H): ICD-10-CM

## 2024-06-07 DIAGNOSIS — F51.01 PRIMARY INSOMNIA: ICD-10-CM

## 2024-06-07 DIAGNOSIS — M17.11 PRIMARY OSTEOARTHRITIS OF RIGHT KNEE: ICD-10-CM

## 2024-06-07 DIAGNOSIS — F41.9 ANXIETY: ICD-10-CM

## 2024-06-07 DIAGNOSIS — R26.2 UNABLE TO WALK: ICD-10-CM

## 2024-06-07 DIAGNOSIS — R53.81 PHYSICAL DECONDITIONING: ICD-10-CM

## 2024-06-07 DIAGNOSIS — M67.01: ICD-10-CM

## 2024-06-07 DIAGNOSIS — F33.1 MODERATE EPISODE OF RECURRENT MAJOR DEPRESSIVE DISORDER (H): ICD-10-CM

## 2024-06-07 DIAGNOSIS — S06.9X9D TRAUMATIC BRAIN INJURY WITH LOSS OF CONSCIOUSNESS, SUBSEQUENT ENCOUNTER: ICD-10-CM

## 2024-06-07 DIAGNOSIS — G82.22 PARAPLEGIA, INCOMPLETE (H): ICD-10-CM

## 2024-06-07 DIAGNOSIS — I10 ESSENTIAL HYPERTENSION: ICD-10-CM

## 2024-06-07 DIAGNOSIS — E66.09 OBESITY DUE TO EXCESS CALORIES WITH SERIOUS COMORBIDITY, UNSPECIFIED CLASSIFICATION: ICD-10-CM

## 2024-06-07 DIAGNOSIS — K21.9 GASTROESOPHAGEAL REFLUX DISEASE WITHOUT ESOPHAGITIS: ICD-10-CM

## 2024-06-07 DIAGNOSIS — E78.5 HYPERLIPIDEMIA LDL GOAL <130: ICD-10-CM

## 2024-06-07 DIAGNOSIS — E55.9 VITAMIN D DEFICIENCY: ICD-10-CM

## 2024-06-07 DIAGNOSIS — E53.8 VITAMIN B12 DEFICIENCY (NON ANEMIC): ICD-10-CM

## 2024-06-07 DIAGNOSIS — G40.909 SEIZURE DISORDER (H): Primary | ICD-10-CM

## 2024-06-07 DIAGNOSIS — G47.33 OSA (OBSTRUCTIVE SLEEP APNEA): ICD-10-CM

## 2024-06-07 DIAGNOSIS — J45.30 MILD PERSISTENT ASTHMA WITHOUT COMPLICATION: ICD-10-CM
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
## 2024-06-11 ENCOUNTER — HOSPITAL ENCOUNTER (EMERGENCY)
Facility: CLINIC | Age: 49
Discharge: HOME | End: 2024-06-11
Attending: PHYSICIAN ASSISTANT | Admitting: PHYSICIAN ASSISTANT
Payer: MEDICARE

## 2024-06-11 VITALS
TEMPERATURE: 97.6 F | RESPIRATION RATE: 20 BRPM | SYSTOLIC BLOOD PRESSURE: 165 MMHG | DIASTOLIC BLOOD PRESSURE: 107 MMHG | HEART RATE: 77 BPM | OXYGEN SATURATION: 100 %

## 2024-06-11 DIAGNOSIS — Z87.898 HISTORY OF SEIZURES: ICD-10-CM

## 2024-06-11 DIAGNOSIS — Z76.0 ENCOUNTER FOR MEDICATION REFILL: ICD-10-CM

## 2024-06-11 PROCEDURE — 99283 EMERGENCY DEPT VISIT LOW MDM: CPT

## 2024-06-11 PROCEDURE — 250N000013 HC RX MED GY IP 250 OP 250 PS 637: Performed by: PHYSICIAN ASSISTANT

## 2024-06-11 RX ORDER — LEVETIRACETAM 500 MG/1
2000 TABLET ORAL ONCE
Status: COMPLETED | OUTPATIENT
Start: 2024-06-11 | End: 2024-06-11

## 2024-06-11 RX ADMIN — LEVETIRACETAM 2000 MG: 500 TABLET, FILM COATED ORAL at 22:12

## 2024-06-11 ASSESSMENT — ACTIVITIES OF DAILY LIVING (ADL)
ADLS_ACUITY_SCORE: 36

## 2024-06-11 ASSESSMENT — COLUMBIA-SUICIDE SEVERITY RATING SCALE - C-SSRS
1. IN THE PAST MONTH, HAVE YOU WISHED YOU WERE DEAD OR WISHED YOU COULD GO TO SLEEP AND NOT WAKE UP?: NO
6. HAVE YOU EVER DONE ANYTHING, STARTED TO DO ANYTHING, OR PREPARED TO DO ANYTHING TO END YOUR LIFE?: NO
2. HAVE YOU ACTUALLY HAD ANY THOUGHTS OF KILLING YOURSELF IN THE PAST MONTH?: NO

## 2024-06-12 ENCOUNTER — PATIENT OUTREACH (OUTPATIENT)
Dept: CARE COORDINATION | Facility: CLINIC | Age: 49
End: 2024-06-12
Payer: MEDICARE

## 2024-06-12 NOTE — ED TRIAGE NOTES
Pt arrives from home for a dose of keppra. Patient states he has a refill ordered through his pharmacy but it is out of stock at all [facility] within a 40 mile radius. Was referred to the ED by his PCP to receive a dose. ABCs intact.     BP (!) 165/107   Pulse 77   Temp 97.6  F (36.4  C) (Temporal)   Resp 20   SpO2 100%        Triage Assessment (Adult)       Row Name 06/11/24 1951          Triage Assessment    Airway WDL WDL        Respiratory WDL    Respiratory WDL WDL        Cardiac WDL    Cardiac WDL WDL        Cognitive/Neuro/Behavioral WDL    Cognitive/Neuro/Behavioral WDL WDL

## 2024-06-12 NOTE — PROGRESS NOTES
[facility] Care Resource [employer]  Community Health Worker Initial Outreach    CHW Initial Information Gathering:  Referral Source: ED Follow-Up  CHW Additional Questions  If ED/Hospital discharge, follow-up appointment scheduled as recommended?: No  Patient agreeable to assistance with scheduling?: No  Patient declined (specify): Patient would like to call back later today to schedule ED follow up at the [facility]  [facility] active?: No    Patient accepts CC: No, patient declined at this time. PUnable to send care coordination introduction letter since [facility].  Clinic Care Coordination services remain available via referral if needed.      Nga Sifuentes  Community Health Worker  Connected Care Resource [employer], [facility]    *Connected Care Resource Team does NOT follow patient ongoing. Referrals are identified based on internal discharge reports and the outreach is to ensure patient has an understanding of their discharge instructions.

## 2024-06-12 NOTE — DISCHARGE INSTRUCTIONS
Discharge Instructions  Recurrent Seizure (Convulsion)    You were seen today for a seizure. The most common reason for a recurrent seizure is having missed a dose of your medication or taken it at a different time than normal. Other things that increase the risk of seizures include fever, sleep deprivation, alcohol, and stress. Although anti-seizure medications (anti-epileptic drugs) work for many people with seizure disorders, some people continue to have seizures even after trying several medications.    Generally, every Emergency Department visit should have a follow-up clinic visit with either a primary or a specialty clinic/provider. Please follow-up as instructed by your emergency provider today.    Return to the Emergency Department if:   You develop a fever over 100.4 F.  You feel much more ill, or develop new symptoms like severe headache.  You have trouble walking, seeing, or develop weakness or numbness in your arms or legs.     What can I do to help myself?  Take your medication exactly as directed, at the right times, and the right doses.   If you develop uncomfortable side effects, do not stop taking your anti-seizure medication without first speaking to your provider.   Do not let your prescription run out. Stopping anti-seizure medication abruptly can put you at risk of a seizure.  While taking an anti-seizure medication, do not start taking any other medications including over-the-counter medications and herbal supplements without first checking with your provider because mixing them can be dangerous.  Do not drive until you have been rechecked by your provider and have been told it is safe to drive.  If you have a seizure while driving you may cause a motor vehicle accident with injury or death to yourself or others.   Do not swim, climb ladders, or do anything else that would be dangerous if you had another seizure or spell of loss of consciousness, until you are cleared by your provider.     Check your state driving requirements for patients with seizures on the Epile[employer] Foundation Website at https://www.epilepsy.com/lifestyle/driving-and-transportation/laws.  Do not drink alcohol.  Drinking alcohol increases the risk of seizures and can interfere with the effect of anti-seizure medications.  Start a seizure calendar to record any seizure triggers, such as days when you were sleep-deprived, stressed, drank alcohol, or (if you are a woman) had your period.  Remember, if one medication does not work for you, either because you cannot tolerate the side effects or because you continue to have seizures, your provider can suggest alternate medications or alternate methods of taking the medication.  If you were given a prescription for medicine here today, be sure to read all of the information (including the package insert) that comes with your prescription.  This will include important information about the medicine, its side effects, and any warnings that you need to know about.  The pharmacist who fills the prescription can provide more information and answer questions you may have about the medicine.  If you have questions or concerns that the pharmacist cannot address, please call or return to the Emergency Department.   Remember that you can always come back to the Emergency Department if you are not able to see your regular provider in the amount of time listed above, if you get any new symptoms, or if there is anything that worries you.

## 2024-06-12 NOTE — ED PROVIDER NOTES
Emergency Department Note      History of Present Illness     Chief Complaint  Medication Refill    HPI  Andrea Uribe is a 48 year old male with history of hypertension, hyperlipidemia, stage 3 CKD, and seizures who presents to the ED with his wife for evaluation of a medication refill. The patient reports that he is out of Keppra for his seizures. [facility] called [facility] in [place] and was told they didn't have it. He then called [facility] who didn't have it either, and neither did any other [facility] within a 20 mile radius. His wife adds that she also called the 24 hour [facility] in [place] and was told that they did not have Keppra either. Patient states that he takes 2000 mg twice a day, however he was unable to take his morning dose. Andrea adds that he normally takes all of his medications as prescribed and that he hasn't had a seizure since 2011. He has no symptoms    Independent Historian  Wife as detailed above.    Review of External Notes  None  Past Medical History   Medical History and Problem List  Chronic infection   Stage 3 CKD  Hypertension   MRSA cellulitis  Anemia   Hyperlipidemia  Incisional hernia  Vitamin B12 deficiency  Vitamin D deficiency  Heterotopic ossification   Obesity  Osteoarthritis  Ptosis, right eyelid  Renal insufficiency  Seizure disorder  Obstructive sleep apnea  TBI  Thyroid disease  Asthma  Depression   Anxiety   GERD  Paraplegia  Insomnia   DVT  Epilepsy posttraumatic   Arteriosclerotic vascular disease  Hyperthyroidism   Osteoporosis   Pneumonia   Thromboembolism     Medications  Albuterol  Fosamax  Amoxil  Tessalon  Flexeril  Dilantin   Pepcid  Breo Ellipta  Keppra  Zestril  Singulair  Deltasone  Zocor  Trazodone  Effexor  Ambien  Lunesta   Percocet   Fluoxetine   Flovent   Lovenox   Zofran  Roxicodone     Surgical History   Exploratory laparotomy, appendectomy, remove part IVC filter from duodenum with repair  Right knee arthroplasty  Right knee arthroplasty  revision  Tracheostomy surgery  EGD combined  Excision of abdominal wall ossification   Herniorrhaphy incisional   Vagus nerve stimulator implant  Laparoscopic cholecystectomy  Laparotomy exploratory   Open reduction internal fixation of right distal femur   Orthopedic surgery, removal of femur bone growth  Orthopedic surgery, right   Removal of right knee hardware  Removal of right lower extremity hardware  Spleen surgery   Left femur surgery   Right knee surgery  IVC filter parts removed  Open reduction internal fixation of left hand   Left tibia fracture surgery   Lengthening/repair fever right achilles tendon   Physical Exam   Patient Vitals for the past 24 hrs:   BP Temp Temp src Pulse Resp SpO2   06/11/24 1952 (!) 165/107 97.6  F (36.4  C) Temporal 77 20 100 %     Physical Exam  General: Alert and cooperative with exam.   Head:  Scalp is NC/AT  Eyes:  No scleral icterus, PERRL with normal tracking  ENT:  The external nose and ears are normal.   Neck:  Normal range of motion without rigidity.  Chest:  Normal effort.  No tachypnea or distress.  Skin:  Exposed skin is warm and dry, No rash or lesions noted.  Neuro:  No gross motor deficits.    No facial asymmetry.  GCS: 15.   Psych:  Alert. Normal affect. Cooperative.      Diagnostics   None   ED Course    Medications Administered  Medications   levETIRAcetam (KEPPRA) tablet 2,000 mg (has no administration in time range)     Discussion of Management  None    Social Determinants of Health adding to complexity of care  None    ED Course  ED Course as of 06/11/24 2041 Tue Jun 11, 2024 2032 I obtained history and examined the patient as noted above.      Medical Decision Making / Diagnosis   CMS Diagnoses: None    MIPS     None    [facility]  Andrea Uribe is a 48 year old male who presents for evaluation of needing a dose of Keppra as all of the pharmacies to take his insurance were out.  He takes 2000 mg twice daily.  No symptoms seizures or other concerns.  I gave  him a dose here this evening.  Offered to attempt to send short prescription to another pharmacy to tide him over however states he is working with his primary for this and he does not need this tonight feels confident he will be able to get it tomorrow.  Return precautions discussed.    Disposition  The patient was discharged.     ICD-10 Codes:    ICD-10-CM    1. History of seizures  Z87.898       2. Encounter for medication refill  Z76.0          Discharge Medications  New Prescriptions    No medications on file     Scribe Disclosure:  RIMMA SWEENEY, am serving as a scribe at 8:07 PM on 6/11/2024 to document services personally performed by Gaurav Dodge PA-C based on my observations and the provider's statements to me.      Gaurav Dodge PA-C  06/11/24 [id number]

## 2024-06-28 DIAGNOSIS — J45.40 MODERATE PERSISTENT ASTHMA WITHOUT COMPLICATION: ICD-10-CM

## 2024-06-28 RX ORDER — FLUTICASONE FUROATE AND VILANTEROL TRIFENATATE 200; 25 UG/1; UG/1
1 POWDER RESPIRATORY (INHALATION) DAILY
Qty: 60 EACH | Refills: 0 | Status: SHIPPED | OUTPATIENT
Start: 2024-06-28

## 2024-08-11 DIAGNOSIS — N18.30 STAGE 3 CHRONIC KIDNEY DISEASE, UNSPECIFIED WHETHER STAGE 3A OR 3B CKD (H): ICD-10-CM

## 2024-08-12 RX ORDER — LISINOPRIL 5 MG/1
5 TABLET ORAL DAILY
Qty: 90 TABLET | Refills: 1 | Status: SHIPPED | OUTPATIENT
Start: 2024-08-12

## 2024-12-04 DIAGNOSIS — E78.5 HYPERLIPIDEMIA LDL GOAL <100: ICD-10-CM

## 2024-12-04 DIAGNOSIS — F33.42 RECURRENT MAJOR DEPRESSIVE DISORDER, IN FULL REMISSION (H): ICD-10-CM

## 2024-12-04 DIAGNOSIS — M81.6 LOCALIZED OSTEOPOROSIS WITHOUT CURRENT PATHOLOGICAL FRACTURE: ICD-10-CM

## 2024-12-04 DIAGNOSIS — F41.9 ANXIETY: ICD-10-CM

## 2024-12-05 RX ORDER — ALENDRONATE SODIUM 70 MG/1
TABLET ORAL
Qty: 12 TABLET | Refills: 0 | OUTPATIENT
Start: 2024-12-05

## 2024-12-05 RX ORDER — VENLAFAXINE HYDROCHLORIDE 150 MG/1
150 CAPSULE, EXTENDED RELEASE ORAL DAILY
Qty: 90 CAPSULE | Refills: 0 | OUTPATIENT
Start: 2024-12-05

## 2024-12-05 RX ORDER — SIMVASTATIN 20 MG
TABLET ORAL
Qty: 90 TABLET | Refills: 3 | OUTPATIENT
Start: 2024-12-05

## 2024-12-05 NOTE — TELEPHONE ENCOUNTER
Denied.  See previous refill request in August.  No further refills until he establishes with a new provider    Orestes Medley PA-C

## 2024-12-10 DIAGNOSIS — J45.30 MILD PERSISTENT ASTHMA WITHOUT COMPLICATION: ICD-10-CM

## 2024-12-10 RX ORDER — ALBUTEROL SULFATE 90 UG/1
INHALANT RESPIRATORY (INHALATION)
Refills: 2 | OUTPATIENT
Start: 2024-12-10

## 2025-01-05 DIAGNOSIS — F41.9 ANXIETY: ICD-10-CM

## 2025-01-05 DIAGNOSIS — J45.30 MILD PERSISTENT ASTHMA WITHOUT COMPLICATION: ICD-10-CM

## 2025-01-05 DIAGNOSIS — F33.42 RECURRENT MAJOR DEPRESSIVE DISORDER, IN FULL REMISSION: ICD-10-CM

## 2025-01-06 RX ORDER — VENLAFAXINE HYDROCHLORIDE 150 MG/1
150 CAPSULE, EXTENDED RELEASE ORAL DAILY
Qty: 90 CAPSULE | Refills: 0 | Status: SHIPPED | OUTPATIENT
Start: 2025-01-06

## 2025-01-06 RX ORDER — ALBUTEROL SULFATE 90 UG/1
INHALANT RESPIRATORY (INHALATION)
Qty: 18 G | Refills: 0 | Status: SHIPPED | OUTPATIENT
Start: 2025-01-06

## 2025-01-07 NOTE — TELEPHONE ENCOUNTER
Pt called stating he is feeling sick and will not be able to make appt today. Pt wanting venlafaxine refilled as he only has 4 tablets left. Upon chart review, let pt know that a refill request was sent on 1/6/25 and to contact the [facility] pharmacy to see if it is ready for . Pt states he will call. Pt was rescheduled for his annual med-check appt. Pt only wanted with Galdino Wilkins and after 4pm. Scheduled pt based on these preferences. No further questions or concerns from pt.     Rylie Pino, CAROLEEN, RN     [facility]    01/07/2025 at 9:10 AM

## 2025-01-10 DIAGNOSIS — M81.6 LOCALIZED OSTEOPOROSIS WITHOUT CURRENT PATHOLOGICAL FRACTURE: ICD-10-CM

## 2025-01-13 RX ORDER — ALENDRONATE SODIUM 70 MG/1
TABLET ORAL
Qty: 12 TABLET | Refills: 0 | Status: SHIPPED | OUTPATIENT
Start: 2025-01-13

## 2025-02-18 DIAGNOSIS — E78.5 HYPERLIPIDEMIA LDL GOAL <100: ICD-10-CM

## 2025-02-18 DIAGNOSIS — N18.30 STAGE 3 CHRONIC KIDNEY DISEASE, UNSPECIFIED WHETHER STAGE 3A OR 3B CKD (H): ICD-10-CM

## 2025-02-18 DIAGNOSIS — F33.42 RECURRENT MAJOR DEPRESSIVE DISORDER, IN FULL REMISSION: ICD-10-CM

## 2025-02-18 DIAGNOSIS — F41.9 ANXIETY: ICD-10-CM

## 2025-02-18 RX ORDER — LISINOPRIL 5 MG/1
5 TABLET ORAL DAILY
Qty: 90 TABLET | Refills: 0 | Status: SHIPPED | OUTPATIENT
Start: 2025-02-18

## 2025-02-18 RX ORDER — VENLAFAXINE HYDROCHLORIDE 150 MG/1
150 CAPSULE, EXTENDED RELEASE ORAL DAILY
Qty: 90 CAPSULE | Refills: 0 | OUTPATIENT
Start: 2025-02-18

## 2025-02-18 RX ORDER — SIMVASTATIN 20 MG
TABLET ORAL
Qty: 90 TABLET | Refills: 0 | Status: SHIPPED | OUTPATIENT
Start: 2025-02-18

## 2025-02-18 NOTE — TELEPHONE ENCOUNTER
Future appointment scheduled to establish care with new PCP.  1 refill given.  No further refills until he is seen.  He has not been seen in person since June 2023    Orestes Medley PA-C

## 2025-03-06 DIAGNOSIS — F41.9 ANXIETY: ICD-10-CM

## 2025-03-06 DIAGNOSIS — F33.42 RECURRENT MAJOR DEPRESSIVE DISORDER, IN FULL REMISSION: ICD-10-CM

## 2025-03-06 DIAGNOSIS — E78.5 HYPERLIPIDEMIA LDL GOAL <100: ICD-10-CM

## 2025-03-06 RX ORDER — VENLAFAXINE HYDROCHLORIDE 150 MG/1
150 CAPSULE, EXTENDED RELEASE ORAL DAILY
Qty: 90 CAPSULE | Refills: 0 | Status: SHIPPED | OUTPATIENT
Start: 2025-03-06

## 2025-03-06 RX ORDER — SIMVASTATIN 20 MG
TABLET ORAL
Qty: 90 TABLET | Refills: 0 | OUTPATIENT
Start: 2025-03-06

## 2025-03-06 NOTE — TELEPHONE ENCOUNTER
Appointment scheduled this to the likely establish with new PCP later this month.  Refilled to allow    Orestes Medley PA-C

## 2025-03-12 DIAGNOSIS — K21.9 GASTROESOPHAGEAL REFLUX DISEASE, UNSPECIFIED WHETHER ESOPHAGITIS PRESENT: ICD-10-CM

## 2025-03-12 RX ORDER — FAMOTIDINE 40 MG/1
TABLET, FILM COATED ORAL
Qty: 90 TABLET | Refills: 0 | Status: SHIPPED | OUTPATIENT
Start: 2025-03-12

## 2025-03-19 ENCOUNTER — HOSPITAL ENCOUNTER (EMERGENCY)
Facility: CLINIC | Age: 50
Discharge: HOME | End: 2025-03-19
Attending: EMERGENCY MEDICINE | Admitting: EMERGENCY MEDICINE
Payer: MEDICARE

## 2025-03-19 VITALS
DIASTOLIC BLOOD PRESSURE: 73 MMHG | HEART RATE: 93 BPM | RESPIRATION RATE: 18 BRPM | BODY MASS INDEX: 37.38 KG/M2 | HEIGHT: 71 IN | WEIGHT: 267 LBS | OXYGEN SATURATION: 96 % | SYSTOLIC BLOOD PRESSURE: 134 MMHG | TEMPERATURE: 99.9 F

## 2025-03-19 DIAGNOSIS — J98.01 ACUTE BRONCHOSPASM: ICD-10-CM

## 2025-03-19 DIAGNOSIS — J45.901 ASTHMA WITH ACUTE EXACERBATION, UNSPECIFIED ASTHMA SEVERITY, UNSPECIFIED WHETHER PERSISTENT: ICD-10-CM

## 2025-03-19 DIAGNOSIS — J20.9 ACUTE BRONCHITIS, UNSPECIFIED ORGANISM: ICD-10-CM

## 2025-03-19 LAB
ANION GAP SERPL CALCULATED.3IONS-SCNC: 14 MMOL/L (ref 7–15)
BASOPHILS # BLD AUTO: 0 10E3/UL (ref 0–0.2)
BASOPHILS NFR BLD AUTO: 0 %
BUN SERPL-MCNC: 10.4 MG/DL (ref 6–20)
CALCIUM SERPL-MCNC: 9 MG/DL (ref 8.8–10.4)
CHLORIDE SERPL-SCNC: 102 MMOL/L (ref 98–107)
CREAT SERPL-MCNC: 1.19 MG/DL (ref 0.67–1.17)
EGFRCR SERPLBLD CKD-EPI 2021: 74 ML/MIN/1.73M2
EOSINOPHIL # BLD AUTO: 0 10E3/UL (ref 0–0.7)
EOSINOPHIL NFR BLD AUTO: 0 %
ERYTHROCYTE [DISTWIDTH] IN BLOOD BY AUTOMATED COUNT: 13.6 % (ref 10–15)
FLUAV RNA SPEC QL NAA+PROBE: NEGATIVE
FLUBV RNA RESP QL NAA+PROBE: NEGATIVE
GLUCOSE SERPL-MCNC: 127 MG/DL (ref 70–99)
HCO3 SERPL-SCNC: 21 MMOL/L (ref 22–29)
HCT VFR BLD AUTO: 39.3 % (ref 40–53)
HGB BLD-MCNC: 13.1 G/DL (ref 13.3–17.7)
HOLD SPECIMEN: NORMAL
HOLD SPECIMEN: NORMAL
IMM GRANULOCYTES # BLD: 0 10E3/UL
IMM GRANULOCYTES NFR BLD: 0 %
LYMPHOCYTES # BLD AUTO: 1.4 10E3/UL (ref 0.8–5.3)
LYMPHOCYTES NFR BLD AUTO: 15 %
MCH RBC QN AUTO: 31.4 PG (ref 26.5–33)
MCHC RBC AUTO-ENTMCNC: 33.3 G/DL (ref 31.5–36.5)
MCV RBC AUTO: 94 FL (ref 78–100)
MONOCYTES # BLD AUTO: 0.9 10E3/UL (ref 0–1.3)
MONOCYTES NFR BLD AUTO: 10 %
NEUTROPHILS # BLD AUTO: 7.3 10E3/UL (ref 1.6–8.3)
NEUTROPHILS NFR BLD AUTO: 75 %
NRBC # BLD AUTO: 0 10E3/UL
NRBC BLD AUTO-RTO: 0 /100
PLATELET # BLD AUTO: 163 10E3/UL (ref 150–450)
POTASSIUM SERPL-SCNC: 4.2 MMOL/L (ref 3.4–5.3)
RBC # BLD AUTO: 4.17 10E6/UL (ref 4.4–5.9)
RSV RNA SPEC NAA+PROBE: POSITIVE
SARS-COV-2 RNA RESP QL NAA+PROBE: NEGATIVE
SODIUM SERPL-SCNC: 137 MMOL/L (ref 135–145)
WBC # BLD AUTO: 9.8 10E3/UL (ref 4–11)

## 2025-03-19 PROCEDURE — 250N000013 HC RX MED GY IP 250 OP 250 PS 637: Performed by: EMERGENCY MEDICINE

## 2025-03-19 PROCEDURE — 94640 AIRWAY INHALATION TREATMENT: CPT | Performed by: EMERGENCY MEDICINE

## 2025-03-19 PROCEDURE — 99284 EMERGENCY DEPT VISIT MOD MDM: CPT | Mod: 25 | Performed by: EMERGENCY MEDICINE

## 2025-03-19 PROCEDURE — 250N000012 HC RX MED GY IP 250 OP 636 PS 637: Performed by: EMERGENCY MEDICINE

## 2025-03-19 PROCEDURE — 250N000009 HC RX 250: Performed by: EMERGENCY MEDICINE

## 2025-03-19 RX ORDER — IPRATROPIUM BROMIDE AND ALBUTEROL SULFATE 2.5; .5 MG/3ML; MG/3ML
1 SOLUTION RESPIRATORY (INHALATION) EVERY 6 HOURS PRN
Qty: 90 ML | Refills: 0 | Status: SHIPPED | OUTPATIENT
Start: 2025-03-19

## 2025-03-19 RX ORDER — PREDNISONE 20 MG/1
40 TABLET ORAL DAILY
Qty: 8 TABLET | Refills: 0 | Status: SHIPPED | OUTPATIENT
Start: 2025-03-19 | End: 2025-03-23

## 2025-03-19 RX ORDER — PREDNISONE 20 MG/1
40 TABLET ORAL ONCE
Status: COMPLETED | OUTPATIENT
Start: 2025-03-19 | End: 2025-03-19

## 2025-03-19 RX ORDER — ALBUTEROL SULFATE 90 UG/1
1-2 INHALANT RESPIRATORY (INHALATION) EVERY 4 HOURS PRN
Qty: 18 G | Refills: 1 | Status: SHIPPED | OUTPATIENT
Start: 2025-03-19

## 2025-03-19 RX ORDER — IPRATROPIUM BROMIDE AND ALBUTEROL SULFATE 2.5; .5 MG/3ML; MG/3ML
3 SOLUTION RESPIRATORY (INHALATION) ONCE
Status: COMPLETED | OUTPATIENT
Start: 2025-03-19 | End: 2025-03-19

## 2025-03-19 RX ORDER — ACETAMINOPHEN 500 MG
1000 TABLET ORAL ONCE
Status: COMPLETED | OUTPATIENT
Start: 2025-03-19 | End: 2025-03-19

## 2025-03-19 RX ADMIN — IPRATROPIUM BROMIDE AND ALBUTEROL SULFATE 3 ML: .5; 3 SOLUTION RESPIRATORY (INHALATION) at 15:44

## 2025-03-19 RX ADMIN — ACETAMINOPHEN 1000 MG: 500 TABLET, FILM COATED ORAL at 15:42

## 2025-03-19 RX ADMIN — PREDNISONE 40 MG: 20 TABLET ORAL at 15:42

## 2025-03-19 ASSESSMENT — ACTIVITIES OF DAILY LIVING (ADL)
ADLS_ACUITY_SCORE: 53

## 2025-03-19 NOTE — ED TRIAGE NOTES
Patient arrives reporting shortness of breath for the last three days. Patient was recently around his son who is RSV+. Patient reports increase in headaches. Has history of asthma but is out of his inhalers. ABCs intact without intervention.

## 2025-03-19 NOTE — ED PROVIDER NOTES
Emergency Department Note      History of Present Illness     Chief Complaint   Shortness of Breath    HPI   Andrea Uribe is a 49 year old male with a history of asthma managed with albuterol presenting with shortness of breath for the past three days (3/16/25). The patient was recently exposed to his son who has RSV. The patient endorses head pressure, fever, sore throat, shortness of breath and cough. He is unable to tolerate taking his medication secondary to his symptoms. The last time Andrea used steroids to manage an asthma flare was years ago. He does not have a nebulizer machine at home.     Independent Historian   None    Review of External Notes       Past Medical History     Medical History and Problem List  Chronic infection   Stage 3 CKD  Hypertension   MRSA cellulitis  Anemia   Hyperlipidemia  Incisional hernia  Vitamin B12 deficiency  Vitamin D deficiency  Heterotopic ossification   Obesity  Osteoarthritis  Ptosis, right eyelid  Renal insufficiency  Seizure disorder  Obstructive sleep apnea  TBI  Thyroid disease  Asthma  Depression   Anxiety   GERD  Paraplegia  Insomnia   DVT  Epilepsy posttraumatic   Arteriosclerotic vascular disease  Hyperthyroidism   Osteoporosis   Pneumonia   Thromboembolism      Medications  Albuterol  Fosamax  Amoxil  Tessalon  Flexeril  Dilantin   Pepcid  Breo Ellipta  Keppra  Zestril  Singulair  Deltasone  Zocor  Trazodone  Effexor  Ambien  Lunesta   Percocet   Fluoxetine   Flovent   Lovenox   Zofran  Roxicodone      Surgical History   Exploratory laparotomy, appendectomy, remove part IVC filter from duodenum with repair  Right knee arthroplasty  Right knee arthroplasty revision  Tracheostomy surgery  EGD combined  Excision of abdominal wall ossification   Herniorrhaphy incisional   Vagus nerve stimulator implant  Laparoscopic cholecystectomy  Laparotomy exploratory   Open reduction internal fixation of right distal femur   Orthopedic surgery, removal of femur bone  "growth  Orthopedic surgery, right   Removal of right knee hardware  Removal of right lower extremity hardware  Spleen surgery   Left femur surgery   Right knee surgery  IVC filter parts removed  Open reduction internal fixation of left hand   Left tibia fracture surgery   Lengthening/repair fever right achilles tendon     Physical Exam     Patient Vitals for the past 24 hrs:   BP Temp Pulse Resp SpO2 Height Weight   03/19/25 1823 134/73 -- 93 18 96 % -- --   03/19/25 1557 (!) 140/74 -- 106 -- -- -- --   03/19/25 1542 -- -- -- -- 95 % -- --   03/19/25 1414 (!) 145/81 99.9  F (37.7  C) 105 18 96 % 1.803 m (5' 11\") 121.1 kg (267 lb)     Physical Exam    HENT:  mmm, no rhinorrhea, no posterior oropharyngeal hypertrophy or exudates, there is mild erythema  Eyes: periorbital tissues and sclera normal   Neck: supple, no abnormal swelling  Lungs: Mild tachypnea, slightly prolonged expiratory phase, no significant wheezing, no significant accessory muscle use  CV: rrr, no m/r/g, ppi  Abd: soft, nontender, nondistended, no rebound/masses/guarding/hsm  Ext: no peripheral edema  Skin: warm, dry, well perfused, no rashes/bruising/lesions on exposed skin  Neuro: alert, MAEE, moving all extremities without apparent deficit, gait stable  Psych: Normal mood, normal affect      Diagnostics     Lab Results   Labs Ordered and Resulted from Time of ED Arrival to Time of ED Departure   BASIC METABOLIC PANEL - Abnormal       Result Value    Sodium 137      Potassium 4.2      Chloride 102      Carbon Dioxide (CO2) 21 (*)     Anion Gap 14      Urea Nitrogen 10.4      Creatinine 1.19 (*)     GFR Estimate 74      Calcium 9.0      Glucose 127 (*)    INFLUENZA A/B, RSV AND SARS-COV2 PCR - Abnormal    Influenza A PCR Negative      Influenza B PCR Negative      RSV PCR Positive (*)     SARS CoV2 PCR Negative     CBC WITH PLATELETS AND DIFFERENTIAL - Abnormal    WBC Count 9.8      RBC Count 4.17 (*)     Hemoglobin 13.1 (*)     Hematocrit 39.3 (*)  "    MCV 94      MCH 31.4      MCHC 33.3      RDW 13.6      Platelet Count 163      % Neutrophils 75      % Lymphocytes 15      % Monocytes 10      % Eosinophils 0      % Basophils 0      % Immature Granulocytes 0      NRBCs per 100 WBC 0      Absolute Neutrophils 7.3      Absolute Lymphocytes 1.4      Absolute Monocytes 0.9      Absolute Eosinophils 0.0      Absolute Basophils 0.0      Absolute Immature Granulocytes 0.0      Absolute NRBCs 0.0         Imaging   XR Chest 2 Views   Final Result   IMPRESSION:       Lungs are clear. No focal airspace opacities, pleural effusions, or pneumothorax. Normal pulmonary vascularity.      Nonenlarged cardiac silhouette.      A stimulator device overlies the left chest wall and left lower neck.        Independent Interpretation   Chest radiograph my read review shows no pneumothorax large effusion or lobar opacity.    ED Course      Medications Administered   Medications   ipratropium - albuterol 0.5 mg/2.5 mg/3 mL (DUONEB) neb solution 3 mL (3 mLs Nebulization $Given 3/19/25 1544)   predniSONE (DELTASONE) tablet 40 mg (40 mg Oral $Given 3/19/25 1542)   acetaminophen (TYLENOL) tablet 1,000 mg (1,000 mg Oral $Given 3/19/25 1542)     Procedures   Procedures     Discussion of Management   None    ED Course   ED Course as of 03/19/25 2339   Wed Mar 19, 2025   1529 I obtained the history and examined the patient as noted above.      1639 Chest Radiograph without Pneumothorax, Lobar opacity, nor concerning cardiomegaly or pulm edema/pleural effusion       Additional Documentation  None    Medical Decision Making / Diagnosis     CMS Diagnoses:     MIPS       None    MDM   Andrea Uribe is a 49 year old male     Presenting with respiratory tract infectious symptoms, son recently diagnosed with RSV.  Patient has asthma clearly exacerbation of that eye, no significant hypoxia or respiratory distress.  RSV is positive, no evidence of concomitant bacterial pneumonia.  Did well with  interventions here in the emergency department stable for discharge home.    Disposition   The patient was discharged.     Diagnosis     ICD-10-CM    1. Acute bronchitis, unspecified organism  J20.9       2. Acute bronchospasm  J98.01       3. Asthma with acute exacerbation, unspecified asthma severity, unspecified whether persistent  J45.901            Discharge Medications   Discharge Medication List as of 3/19/2025  6:16 PM        START taking these medications    Details   !! albuterol (PROAIR HFA/PROVENTIL HFA/VENTOLIN HFA) 108 (90 Base) MCG/ACT inhaler Inhale 1-2 puffs into the lungs every 4 hours as needed for shortness of breath or cough., Disp-18 g, R-1, E-PrescribeWITH SPACER      ipratropium - albuterol 0.5 mg/2.5 mg/3 mL (DUONEB) 0.5-2.5 (3) MG/3ML neb solution Take 1 vial (3 mLs) by nebulization every 6 hours as needed for shortness of breath, wheezing or cough., Disp-90 mL, R-0, E-Prescribe      predniSONE (DELTASONE) 20 MG tablet Take 2 tablets (40 mg) by mouth daily for 4 days., Disp-8 tablet, R-0, E-Prescribe       !! - Potential duplicate medications found. Please discuss with provider.        Scribe Disclosure:  I, Taisha Gale, am serving as a scribe at 3:48 PM on 3/19/2025 to document services personally performed by Ricki Rodriguez MD based on my observations and the provider's statements to me.        Ricki Rodriguez MD  03/19/25 9246

## 2025-03-24 ENCOUNTER — OFFICE VISIT (OUTPATIENT)
Dept: URGENT CARE | Facility: URGENT CARE | Age: 50
End: 2025-03-24
Payer: MEDICARE

## 2025-03-24 VITALS
RESPIRATION RATE: 16 BRPM | HEART RATE: 87 BPM | HEIGHT: 71 IN | DIASTOLIC BLOOD PRESSURE: 86 MMHG | OXYGEN SATURATION: 96 % | BODY MASS INDEX: 37.38 KG/M2 | WEIGHT: 267 LBS | SYSTOLIC BLOOD PRESSURE: 138 MMHG | TEMPERATURE: 99.1 F

## 2025-03-24 DIAGNOSIS — H10.33 ACUTE BACTERIAL CONJUNCTIVITIS OF BOTH EYES: ICD-10-CM

## 2025-03-24 DIAGNOSIS — H65.92 LEFT NON-SUPPURATIVE OTITIS MEDIA: ICD-10-CM

## 2025-03-24 DIAGNOSIS — J20.9 ACUTE BRONCHITIS, UNSPECIFIED ORGANISM: ICD-10-CM

## 2025-03-24 DIAGNOSIS — J01.00 ACUTE NON-RECURRENT MAXILLARY SINUSITIS: Primary | ICD-10-CM

## 2025-03-24 PROCEDURE — 3079F DIAST BP 80-89 MM HG: CPT | Performed by: PHYSICIAN ASSISTANT

## 2025-03-24 PROCEDURE — 99214 OFFICE O/P EST MOD 30 MIN: CPT | Performed by: PHYSICIAN ASSISTANT

## 2025-03-24 PROCEDURE — 3075F SYST BP GE 130 - 139MM HG: CPT | Performed by: PHYSICIAN ASSISTANT

## 2025-03-24 RX ORDER — AZITHROMYCIN 250 MG/1
TABLET, FILM COATED ORAL
Qty: 6 TABLET | Refills: 0 | Status: SHIPPED | OUTPATIENT
Start: 2025-03-24 | End: 2025-03-29

## 2025-03-24 RX ORDER — ALBUTEROL SULFATE 0.83 MG/ML
2.5 SOLUTION RESPIRATORY (INHALATION) EVERY 6 HOURS PRN
Qty: 90 ML | Refills: 0 | Status: SHIPPED | OUTPATIENT
Start: 2025-03-24 | End: 2025-03-29

## 2025-03-24 RX ORDER — CODEINE PHOSPHATE AND GUAIFENESIN 10; 100 MG/5ML; MG/5ML
1-2 SOLUTION ORAL
Qty: 50 ML | Refills: 0 | Status: SHIPPED | OUTPATIENT
Start: 2025-03-24 | End: 2025-03-29

## 2025-03-24 RX ORDER — ALBUTEROL SULFATE 90 UG/1
1-2 INHALANT RESPIRATORY (INHALATION) EVERY 6 HOURS
Qty: 8.5 G | Refills: 0 | Status: SHIPPED | OUTPATIENT
Start: 2025-03-24 | End: 2025-03-29

## 2025-03-24 NOTE — PATIENT INSTRUCTIONS
Patient was educated on the natural course of condition. Take medications as directed. Side effects discussed. Conservative measures include rest, increased fluids, humidifier, and teaspoon of honey a few times daily. See your primary care provider if symptoms do not improve in 7 days. Seek emergency care if you develop shortness of breath or fever over 103.
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
## 2025-03-27 ENCOUNTER — TELEPHONE (OUTPATIENT)
Dept: FAMILY MEDICINE | Facility: CLINIC | Age: 50
End: 2025-03-27
Payer: MEDICARE

## 2025-03-27 NOTE — TELEPHONE ENCOUNTER
Pt states he is having a lot of GI issues when he takes his antibiotics.   Having bloating, pressure, some loose stools.   Was started on 2 antibiotics through [facility], on 3/24/25.   On Zpak and Augmentin.     Advised pt to take his antibiotics with food, can try probiotics, yogurt, try taking Gas X or pepto bismol for his pressure bloating symptoms. He has 2 more days with the zpak. Informed him he should start to feel better after he finishes Zpak.     Advised to call back/return to UC, if doesn't improve. Pt agrees with the plan and verbalized understanding.     Pt doesn't have PCP with [facility].

## 2025-03-30 ENCOUNTER — HOSPITAL ENCOUNTER (EMERGENCY)
Facility: CLINIC | Age: 50
Discharge: HOME | End: 2025-03-30
Attending: STUDENT IN AN ORGANIZED HEALTH CARE EDUCATION/TRAINING PROGRAM
Payer: MEDICARE

## 2025-03-30 VITALS
RESPIRATION RATE: 20 BRPM | HEART RATE: 89 BPM | DIASTOLIC BLOOD PRESSURE: 85 MMHG | TEMPERATURE: 98 F | HEIGHT: 71 IN | SYSTOLIC BLOOD PRESSURE: 130 MMHG | BODY MASS INDEX: 37.1 KG/M2 | WEIGHT: 264.99 LBS | OXYGEN SATURATION: 95 %

## 2025-03-30 DIAGNOSIS — J06.9 URI WITH COUGH AND CONGESTION: ICD-10-CM

## 2025-03-30 DIAGNOSIS — R59.0 CERVICAL LYMPHADENOPATHY: ICD-10-CM

## 2025-03-30 DIAGNOSIS — H60.502 ACUTE OTITIS EXTERNA OF LEFT EAR, UNSPECIFIED TYPE: ICD-10-CM

## 2025-03-30 LAB
FLUAV RNA SPEC QL NAA+PROBE: NEGATIVE
FLUBV RNA RESP QL NAA+PROBE: NEGATIVE
RSV RNA SPEC NAA+PROBE: NEGATIVE
S PYO DNA THROAT QL NAA+PROBE: NOT DETECTED
SARS-COV-2 RNA RESP QL NAA+PROBE: NEGATIVE

## 2025-03-30 PROCEDURE — 87651 STREP A DNA AMP PROBE: CPT | Performed by: STUDENT IN AN ORGANIZED HEALTH CARE EDUCATION/TRAINING PROGRAM

## 2025-03-30 PROCEDURE — 87637 SARSCOV2&INF A&B&RSV AMP PRB: CPT | Performed by: STUDENT IN AN ORGANIZED HEALTH CARE EDUCATION/TRAINING PROGRAM

## 2025-03-30 PROCEDURE — 99284 EMERGENCY DEPT VISIT MOD MDM: CPT

## 2025-03-30 RX ORDER — CETIRIZINE HYDROCHLORIDE 10 MG/1
10 TABLET ORAL DAILY
Qty: 30 TABLET | Refills: 0 | Status: SHIPPED | OUTPATIENT
Start: 2025-03-30 | End: 2025-04-29

## 2025-03-30 RX ORDER — NEOMYCIN SULFATE, POLYMYXIN B SULFATE, HYDROCORTISONE 3.5; 10000; 1 MG/ML; [USP'U]/ML; MG/ML
4 SOLUTION/ DROPS AURICULAR (OTIC) 4 TIMES DAILY
Qty: 10 ML | Refills: 0 | Status: SHIPPED | OUTPATIENT
Start: 2025-03-30 | End: 2025-04-06

## 2025-03-30 RX ORDER — BENZONATATE 200 MG/1
200 CAPSULE ORAL 3 TIMES DAILY PRN
Qty: 20 CAPSULE | Refills: 0 | Status: SHIPPED | OUTPATIENT
Start: 2025-03-30

## 2025-03-30 ASSESSMENT — COLUMBIA-SUICIDE SEVERITY RATING SCALE - C-SSRS
2. HAVE YOU ACTUALLY HAD ANY THOUGHTS OF KILLING YOURSELF IN THE PAST MONTH?: NO
6. HAVE YOU EVER DONE ANYTHING, STARTED TO DO ANYTHING, OR PREPARED TO DO ANYTHING TO END YOUR LIFE?: NO
1. IN THE PAST MONTH, HAVE YOU WISHED YOU WERE DEAD OR WISHED YOU COULD GO TO SLEEP AND NOT WAKE UP?: NO

## 2025-03-30 ASSESSMENT — ACTIVITIES OF DAILY LIVING (ADL): ADLS_ACUITY_SCORE: 53

## 2025-03-31 NOTE — ED TRIAGE NOTES
"C/o sore throat, \"lump\" on left side of throat and left ear pain. Seen at  on 3/24 for the same thing. Pt reports he got meds at that time and caused him to be constipated. Tylenol throat and sinus taken today at noon.        "

## 2025-03-31 NOTE — DISCHARGE INSTRUCTIONS
Use eardrops as prescribed  Take daily antihistamine such as Zyrtec, Claritin, or Allegra.  I prescribed 1 of these for you but if it is more expensive than over-the-counter, you can just purchase the generic version at the pharmacy  Remember that this needs a few days to kick in so take it consistently for at least 2 weeks to help dry up mucus  Tessalon Perles/cough suppressant also sent to your pharmacy.  This should not cause constipation    Cough and mucus after bronchitis can last for 4 to 6 weeks.  You can use saline rinses over-the-counter or Flonase nasal spray which is a nasal steroid which will help decrease inflammation in your sinuses and ear tubes  Get plenty of rest and fluids  Follow-up with primary care in a week or so for recheck

## 2025-03-31 NOTE — ED PROVIDER NOTES
"  Emergency Department Note      History of Present Illness     Chief Complaint   Otalgia and Pharyngitis      HPI   Andrea Uribe is a 49 year old male with a history of asthma, SANGITA, stage III CKD, DVT, and seizure disorder who presents with his wife to the Emergency Department for otalgia and sore throat. The patient reports he was diagnosed with RSV last week which has improved, but he now endorses productive cough, left otalgia, pharyngitis, mucus in his throat, and excessive rhinorrhea. He took half of his prescribed azithromycin and amoxicillin before stopping it due to constipation.  He has been having normal bowel movements now.  Denies blood in his stool.  He was also prescribed cough medicine with codeine by PCP. He has been taking Tylenol \"severe cough,\" and using his albuterol inhaler. Denies vomiting, or smoking.      Independent Historian   None    Review of External Notes   I reviewed the patient's 3/24/25 UC note in which he was seen for one week of congestion, productive cough, left otalgia, and bilateral eye mattering. He was prescribed azithromycin, amoxicillin, and guaifenesin at this time.     Past Medical History     Medical History and Problem List   Asthma  ASVD  Anemia   Anxiety   CKD, stage III  Depression   DVT  GERD  Hypertension   Hyperlipidemia  Heterotopic ossification   Hyperthyroidism   Insomnia   Incisional hernia  MRSA cellulitis  Osteoarthritis  Osteoporosis   SANGITA  Paraplegia  Seizure disorder  TBI  Thromboembolism   Vitamin B12 and D deficiency     Medications   Alendronate   Enoxaparin   FLUoxetine   leveTIRACETAM   Lisinopril  Montelukast   Phenytoin  Simvastatin   Venlafaxine      Surgical History   Exploratory laparotomy, appendectomy, remove part IVC filter from duodenum with repair  Right knee arthroplasty  Right knee arthroplasty revision  Tracheostomy surgery  EGD combined  Excision of abdominal wall ossification   Herniorrhaphy incisional   Vagus nerve stimulator " "implant  Laparoscopic cholecystectomy  Laparotomy exploratory   Open reduction internal fixation of right distal femur   Orthopedic surgery, removal of femur bone growth  Orthopedic surgery, right   Removal of right knee hardware  Removal of right lower extremity hardware  Spleen surgery   Left femur surgery   Right knee surgery  IVC filter parts removed  Open reduction internal fixation of left hand   Left tibia fracture surgery   Lengthening/repair fever right achilles tendon      Physical Exam     Patient Vitals for the past 24 hrs:   BP Temp Temp src Pulse Resp SpO2 Height Weight   03/30/25 1923 121/72 -- -- -- -- -- -- --   03/30/25 1921 -- 98  F (36.7  C) Temporal 96 24 95 % 1.803 m (5' 11\") 120.2 kg (264 lb 15.9 oz)     Physical Exam  Vital signs and nursing notes reviewed.    General:  Alert and oriented, no acute distress. Resting on bed with wife at bedside.   Skin: Skin is warm and dry. No rash. No diaphoresis.  HEENT:   Head: Normocephalic, atraumatic. Facial features symmetric.   Eyes: Conjunctiva pink, sclera white. EOMs grossly intact.   Ears: Auricles without lesion, erythema, or edema.   Right TM without erythema or bulging  Left tympanic membrane with mild erythema but no bulging, canal is erythematous and edematous with sloughing  Nose: Symmetric with no discharge.  Mouth and throat: Lips are moist with no lesions or edema  Neck: Normal range of motion. Neck supple with left anterior cervical lymphadenopathy  CV:  Heart RRR. 2+ radial and tibialis posterior pulses bilaterally. No peripheral edema.  Pulm/Chest: Chest wall expansion symmetric with no increased effort of breathing. Lungs clear and equal to auscultation bilaterally.   Abd: Abdomen is soft and nontender to palpation in all 4 quadrants with no guarding or rebound.   M/S: Moves all extremities spontaneously.  Psych: Normal mood and affect. Behavior is normal.     Diagnostics     Lab Results   Labs Ordered and Resulted from Time of ED " Arrival to Time of ED Departure   INFLUENZA A/B, RSV AND SARS-COV2 PCR - Normal       Result Value    Influenza A PCR Negative      Influenza B PCR Negative      RSV PCR Negative      SARS CoV2 PCR Negative     GROUP A STREPTOCOCCUS PCR THROAT SWAB - Normal    Group A strep by PCR Not Detected       Imaging   No orders to display     EKG   None    Independent Interpretation   None    ED Course      Medications Administered   Medications - No data to display    Procedures   None     Discussion of Management   None    ED Course   ED Course as of 03/30/25 1948   Sun Mar 30, 2025   1941 I initially assessed the patient and obtained the above history and physical exam.       Additional Documentation  None    Medical Decision Making / Diagnosis     CMS Diagnoses: None    MIPS       None    MDM   Andrea Uribe is a 49 year old male who presents to the emergency department for evaluation of rhinorrhea, postnasal drip, sore throat.  See HPI.  Vital stable.  He recently had RSV diagnosis as well as sinus infection diagnosis for which he took half a prescription of Azithromycin and Amoxicillin.  On exam, he is nontoxic but does have left anterior cervical lymphadenopathy.  COVID, flu, RSV, and strep testing is negative today.  His lungs are clear to auscultation and his cough has improved and he has no fever to suggest pneumonia.  Will defer chest x-ray at this juncture.  Suspect ongoing URI symptoms and he also has evidence of otitis externa.  He will be started on antibiotic drops and we reviewed supportive cares including saline rinses, Flonase nasal spray, daily antihistamine to dry up mucus.  He also experienced constipation from his codeine cough syrup prescribed by primary care therefore we will trial Tessalon Perles.  Recommend follow-up with primary care later this week.  Return precautions reviewed.  Patient agreeable to plan and had questions answered.    Disposition   The patient was discharged.     Diagnosis      ICD-10-CM    1. URI with cough and congestion  J06.9       2. Acute otitis externa of left ear, unspecified type  H60.502       3. Cervical lymphadenopathy  R59.0            Discharge Medications   Discharge Medication List as of 3/30/2025  8:29 PM        START taking these medications    Details   benzonatate (TESSALON) 200 MG capsule Take 1 capsule (200 mg) by mouth 3 times daily as needed for cough., Disp-20 capsule, R-0, E-Prescribe      cetirizine (ZYRTEC) 10 MG tablet Take 1 tablet (10 mg) by mouth daily., Disp-30 tablet, R-0, E-Prescribe      neomycin-polymyxin-hydrocortisone (CORTISPORIN) 3.5-59842-4 otic solution Place 4 drops Into the left ear 4 times daily for 7 days., Disp-10 mL, R-0, E-Prescribe           Scribe Disclosure:  I, Zulma Venegas, am serving as a scribe at 7:38 PM on 3/30/2025 to document services personally performed by Margarita Potts PA-C based on my observations and the provider's statements to me.     Margarita Potts PA-C on 3/31/2025 at 10:42 PM       Margarita Potts PA-C  03/31/25 [id number]

## 2025-04-05 DIAGNOSIS — E78.5 HYPERLIPIDEMIA LDL GOAL <100: ICD-10-CM

## 2025-04-07 RX ORDER — SIMVASTATIN 20 MG
TABLET ORAL
Qty: 90 TABLET | Refills: 0 | Status: SHIPPED | OUTPATIENT
Start: 2025-04-07

## 2025-05-15 ENCOUNTER — HOSPITAL ENCOUNTER (EMERGENCY)
Facility: CLINIC | Age: 50
Discharge: HOME | End: 2025-05-15
Attending: EMERGENCY MEDICINE | Admitting: EMERGENCY MEDICINE
Payer: MEDICARE

## 2025-05-15 ENCOUNTER — APPOINTMENT (OUTPATIENT)
Dept: GENERAL RADIOLOGY | Facility: CLINIC | Age: 50
End: 2025-05-15
Attending: EMERGENCY MEDICINE
Payer: MEDICARE

## 2025-05-15 VITALS
HEART RATE: 95 BPM | RESPIRATION RATE: 20 BRPM | SYSTOLIC BLOOD PRESSURE: 121 MMHG | WEIGHT: 245 LBS | OXYGEN SATURATION: 98 % | BODY MASS INDEX: 34.17 KG/M2 | TEMPERATURE: 98.1 F | DIASTOLIC BLOOD PRESSURE: 76 MMHG

## 2025-05-15 DIAGNOSIS — M54.41 ACUTE RIGHT-SIDED LOW BACK PAIN WITH RIGHT-SIDED SCIATICA: ICD-10-CM

## 2025-05-15 PROCEDURE — 99284 EMERGENCY DEPT VISIT MOD MDM: CPT

## 2025-05-15 PROCEDURE — 250N000013 HC RX MED GY IP 250 OP 250 PS 637: Performed by: EMERGENCY MEDICINE

## 2025-05-15 PROCEDURE — 72100 X-RAY EXAM L-S SPINE 2/3 VWS: CPT

## 2025-05-15 RX ORDER — OXYCODONE HYDROCHLORIDE 5 MG/1
5 TABLET ORAL EVERY 6 HOURS PRN
Qty: 10 TABLET | Refills: 0 | Status: SHIPPED | OUTPATIENT
Start: 2025-05-15

## 2025-05-15 RX ORDER — ACETAMINOPHEN 500 MG
1000 TABLET ORAL ONCE
Status: COMPLETED | OUTPATIENT
Start: 2025-05-15 | End: 2025-05-15

## 2025-05-15 RX ORDER — LIDOCAINE 4 G/G
1 PATCH TOPICAL EVERY 24 HOURS
Qty: 15 PATCH | Refills: 0 | Status: SHIPPED | OUTPATIENT
Start: 2025-05-15

## 2025-05-15 RX ORDER — OXYCODONE HYDROCHLORIDE 5 MG/1
10 TABLET ORAL ONCE
Refills: 0 | Status: COMPLETED | OUTPATIENT
Start: 2025-05-15 | End: 2025-05-15

## 2025-05-15 RX ADMIN — ACETAMINOPHEN 1000 MG: 500 TABLET ORAL at 12:54

## 2025-05-15 RX ADMIN — OXYCODONE HYDROCHLORIDE 10 MG: 5 TABLET ORAL at 12:54

## 2025-05-15 ASSESSMENT — ACTIVITIES OF DAILY LIVING (ADL)
ADLS_ACUITY_SCORE: 53
ADLS_ACUITY_SCORE: 53

## 2025-05-15 ASSESSMENT — COLUMBIA-SUICIDE SEVERITY RATING SCALE - C-SSRS
6. HAVE YOU EVER DONE ANYTHING, STARTED TO DO ANYTHING, OR PREPARED TO DO ANYTHING TO END YOUR LIFE?: NO
2. HAVE YOU ACTUALLY HAD ANY THOUGHTS OF KILLING YOURSELF IN THE PAST MONTH?: NO
1. IN THE PAST MONTH, HAVE YOU WISHED YOU WERE DEAD OR WISHED YOU COULD GO TO SLEEP AND NOT WAKE UP?: NO

## 2025-05-15 NOTE — ED TRIAGE NOTES
Pt c/o back pain - states yesterday he was working on the car and started to have extreme left side pain and back pain where it was hard to stand and he couldn't get up. Today he woke up and tried to stand - left left felt numb and gave out. 10/10 pain- denies any pain meds taken. VSS Denies any loss of bowel or bladder control.      Triage Assessment (Adult)       Row Name 05/15/25 1122          Triage Assessment    Airway WDL WDL        Respiratory WDL    Respiratory WDL WDL        Cardiac WDL    Cardiac WDL WDL        Peripheral/Neurovascular WDL    Peripheral Neurovascular WDL WDL

## 2025-05-15 NOTE — Clinical Note
Gabriella Uribe accompanied Andrea Uribe to the emergency department on 5/15/2025. They may return to work on 05/16/2025.      If you have any questions or concerns, please don't hesitate to call.      Arben Dangelo MD

## 2025-05-15 NOTE — Clinical Note
Andrea Uribe was seen and treated in our emergency department on 5/15/2025.  He may return to work on 05/22/2025.       If you have any questions or concerns, please don't hesitate to call.      Arben Dangelo MD

## 2025-05-15 NOTE — ED PROVIDER NOTES
Emergency Department Note      History of Present Illness     Chief Complaint   Back Pain      HPI     Andrea Uribe is a 49 year old male with history of hyperlipidemia, hypertension, stage 3 CKD, and TBI with right leg drop foot who presents for evaluation of back pain. The patient states that 2 days ago he had a fall when trying to change the oil on a car. He fell backwards in his garage onto a concrete floor. Yesterday morning the patient felt increasing lower right back pain along with increased weakness and numbness of his right leg when getting out of bed causing him to fall backward. He while going down the stairs onto his behind and scooted the report of the ways down the stairs. The patient reports a history of a TBI with decreased strength in his right leg, but reports that his currently symptoms are worsening beyond what he normally experiences. The back pain continued today feeling like 10/10 pain. He wanted to come in as the numbness and pain is making it much more difficult to walk. When he first fell he reports the pain shot all the way up his right leg. The patient denies any incontinence, abnormal bowel movements, fevers, history of diabetes, history of cancer, or use of blood thinners. The patient has previously been on dialysis, but is not currently. He denies any tylenol or ibuprofen.    Independent Historian   None    Review of External Notes   None    Past Medical History     Medical History and Problem List   Asthma  Thyroid disease  TBI  SANGITA  Seizures  Renal insufficiency  Ptosis  Primary osteoarthritis  MRSA  Hypertension  Blood transfusion  CPAP  Stage 3 CKD  Depression  GERD  Hyperlipidemia  Heterotopic ossification    Medications   Albuterol  Keppra  Lisinopril  Simvastatin  Venlafaxine  Famotidine    Surgical History   Right knee replacement  Appendectomy  Internal femur fixation  Spleen surgery  Tracheostomy  Cholecystectomy  Vagus nerve stimulator placement    Physical Exam      Patient Vitals for the past 24 hrs:   BP Temp Pulse Resp SpO2 Weight   05/15/25 1414 121/76 -- -- 20 98 % --   05/15/25 1121 116/68 98.1  F (36.7  C) 95 20 99 % 111.1 kg (245 lb)     Physical Exam    GEN:  No objective signs of pain at rest in wheelchair  HEENT:   Conjunctiva are normal and without erythema.    NECK:   Supple, no meningismus.     CV:    Regular rate and rhythm     No murmurs, rubs or gallops.      2+ dorsalis pedis pulses bilateral.  PULM:   Clear to auscultation bilateral.      No respiratory distress.  No stridor.  ABD:   Soft, non-tender, non-distendend.      No rebound or guarding.  MSK:   Patient is non-tender over the thoracic or lumbar spine.      Mild tenderness at the right lumbar paraspinal musculature.      No step-off to the bony spine or overlying lesions.   LYMPH:  No cervical lymphadenopathy.  NEURO:  Sensation is intact to the lower extremities bilateral.      Weakness with dorsiflexion to the right foot otherwise strength intact lower extremities     No clonus and down-going Babinski bilateral.    Negative straight leg raise on right  SKIN:   Warm, dry and intact.    PYSCH:   Mood is good and affect is appropriate.      Diagnostics     Lab Results   Labs Ordered and Resulted from Time of ED Arrival to Time of ED Departure - No data to display    Imaging   Lumbar spine XR, 2-3 views   Final Result   IMPRESSION: No acute spine fracture identified. Moderate disc height loss at L5-S1. Background of mild degenerative disc disease. Moderate lower facet arthropathy. L5 pars defects with borderline grade 1/2 anterolisthesis of L5 on S1 cholecystectomy    clips. Inferior vena cava filter with displaced rosalva.          Independent Interpretation   X-ray L spine shows no fracture    ED Course      Medications Administered   Medications   oxyCODONE (ROXICODONE) tablet 10 mg (10 mg Oral $Given 5/15/25 1254)   acetaminophen (TYLENOL) tablet 1,000 mg (1,000 mg Oral $Given 5/15/25 1254)        Procedures   Procedures     Discussion of Management   None    ED Course   ED Course as of 05/15/25 1426   Thu May 15, 2025   1210 I obtained the history and examined the patient as noted above.          Additional Documentation  None    Medical Decision Making / Diagnosis     CMS Diagnoses: None    MIPS   None               [facility]     Andrea Uribe is a 49 year old male presents with acute low back pain after a fall.  X-rays are negative for fracture.  He has no features concerning for cauda equina syndrome, epidural abscess, epidural hematoma, discitis.  Evaluation is most consistent with soft tissue contusion as well as disc herniation/nerve impingement with radicular symptoms to the right lower extremity.  No indication for emergent MRI.  Symptoms are well-controlled.  He was discharged home with limited supply of oxycodone.  Close follow-up primary care physician.  Return to ED for any worsening symptoms.    Disposition   The patient was discharged.     Diagnosis     ICD-10-CM    1. Acute right-sided low back pain with right-sided sciatica  M54.41            Discharge Medications   Discharge Medication List as of 5/15/2025  2:08 PM        START taking these medications    Details   Lidocaine (LIDOCARE) 4 % Patch Place 1 patch over 12 hours onto the skin every 24 hours. To prevent lidocaine toxicity, patient should be patch free for 12 hrs daily.Disp-15 patch, [id number]      oxyCODONE (ROXICODONE) 5 MG tablet Take 1 tablet (5 mg) by mouth every 6 hours as needed for severe pain., Disp-10 tablet, R-0, E-Prescribe           Scribe Disclosure:  I, Roberto Patel, am serving as a scribe at 12:28 PM on 5/15/2025 to document services personally performed by Arben Dangelo MD, based on my observations and the provider's statements to me.        Arben Dangelo MD  05/15/25 1424

## 2025-05-23 ENCOUNTER — TELEPHONE (OUTPATIENT)
Dept: FAMILY MEDICINE | Facility: CLINIC | Age: 50
End: 2025-05-23

## 2025-05-23 ENCOUNTER — HOSPITAL ENCOUNTER (EMERGENCY)
Facility: CLINIC | Age: 50
Discharge: HOME | End: 2025-05-23
Attending: EMERGENCY MEDICINE | Admitting: EMERGENCY MEDICINE
Payer: MEDICARE

## 2025-05-23 ENCOUNTER — APPOINTMENT (OUTPATIENT)
Dept: CT IMAGING | Facility: CLINIC | Age: 50
End: 2025-05-23
Attending: EMERGENCY MEDICINE
Payer: MEDICARE

## 2025-05-23 VITALS
OXYGEN SATURATION: 98 % | RESPIRATION RATE: 16 BRPM | SYSTOLIC BLOOD PRESSURE: 130 MMHG | DIASTOLIC BLOOD PRESSURE: 93 MMHG | TEMPERATURE: 98.2 F | HEART RATE: 70 BPM

## 2025-05-23 DIAGNOSIS — R93.5 ABNORMAL CT OF THE ABDOMEN: ICD-10-CM

## 2025-05-23 DIAGNOSIS — M54.50 ACUTE RIGHT-SIDED LOW BACK PAIN WITHOUT SCIATICA: ICD-10-CM

## 2025-05-23 DIAGNOSIS — R74.01 ELEVATED TRANSAMINASE LEVEL: ICD-10-CM

## 2025-05-23 LAB
ALBUMIN SERPL BCG-MCNC: 4.4 G/DL (ref 3.5–5.2)
ALP SERPL-CCNC: 243 U/L (ref 40–150)
ALT SERPL W P-5'-P-CCNC: 90 U/L (ref 0–70)
ANION GAP SERPL CALCULATED.3IONS-SCNC: 12 MMOL/L (ref 7–15)
AST SERPL W P-5'-P-CCNC: 64 U/L (ref 0–45)
BASOPHILS # BLD AUTO: 0 10E3/UL (ref 0–0.2)
BASOPHILS NFR BLD AUTO: 0 %
BILIRUB DIRECT SERPL-MCNC: <0.08 MG/DL (ref 0–0.3)
BILIRUB SERPL-MCNC: 0.2 MG/DL
BUN SERPL-MCNC: 21.7 MG/DL (ref 6–20)
CALCIUM SERPL-MCNC: 9.1 MG/DL (ref 8.8–10.4)
CHLORIDE SERPL-SCNC: 99 MMOL/L (ref 98–107)
CREAT SERPL-MCNC: 1.03 MG/DL (ref 0.67–1.17)
EGFRCR SERPLBLD CKD-EPI 2021: 88 ML/MIN/1.73M2
EOSINOPHIL # BLD AUTO: 0.1 10E3/UL (ref 0–0.7)
EOSINOPHIL NFR BLD AUTO: 2 %
ERYTHROCYTE [DISTWIDTH] IN BLOOD BY AUTOMATED COUNT: 13.5 % (ref 10–15)
GLUCOSE SERPL-MCNC: 110 MG/DL (ref 70–99)
HCO3 SERPL-SCNC: 26 MMOL/L (ref 22–29)
HCT VFR BLD AUTO: 40.5 % (ref 40–53)
HGB BLD-MCNC: 13.3 G/DL (ref 13.3–17.7)
HOLD SPECIMEN: NORMAL
HOLD SPECIMEN: NORMAL
IMM GRANULOCYTES # BLD: 0 10E3/UL
IMM GRANULOCYTES NFR BLD: 0 %
LIPASE SERPL-CCNC: 45 U/L (ref 13–60)
LYMPHOCYTES # BLD AUTO: 1.2 10E3/UL (ref 0.8–5.3)
LYMPHOCYTES NFR BLD AUTO: 18 %
MCH RBC QN AUTO: 31.1 PG (ref 26.5–33)
MCHC RBC AUTO-ENTMCNC: 32.8 G/DL (ref 31.5–36.5)
MCV RBC AUTO: 95 FL (ref 78–100)
MONOCYTES # BLD AUTO: 0.5 10E3/UL (ref 0–1.3)
MONOCYTES NFR BLD AUTO: 8 %
NEUTROPHILS # BLD AUTO: 4.8 10E3/UL (ref 1.6–8.3)
NEUTROPHILS NFR BLD AUTO: 71 %
NRBC # BLD AUTO: 0 10E3/UL
NRBC BLD AUTO-RTO: 0 /100
PLATELET # BLD AUTO: 187 10E3/UL (ref 150–450)
POTASSIUM SERPL-SCNC: 4.4 MMOL/L (ref 3.4–5.3)
PROT SERPL-MCNC: 7.7 G/DL (ref 6.4–8.3)
RBC # BLD AUTO: 4.28 10E6/UL (ref 4.4–5.9)
SODIUM SERPL-SCNC: 137 MMOL/L (ref 135–145)
WBC # BLD AUTO: 6.8 10E3/UL (ref 4–11)

## 2025-05-23 PROCEDURE — 80048 BASIC METABOLIC PNL TOTAL CA: CPT | Performed by: EMERGENCY MEDICINE

## 2025-05-23 PROCEDURE — 74177 CT ABD & PELVIS W/CONTRAST: CPT

## 2025-05-23 PROCEDURE — 250N000013 HC RX MED GY IP 250 OP 250 PS 637: Performed by: EMERGENCY MEDICINE

## 2025-05-23 PROCEDURE — 85004 AUTOMATED DIFF WBC COUNT: CPT | Performed by: EMERGENCY MEDICINE

## 2025-05-23 PROCEDURE — 96360 HYDRATION IV INFUSION INIT: CPT | Mod: 59

## 2025-05-23 PROCEDURE — 250N000009 HC RX 250: Performed by: EMERGENCY MEDICINE

## 2025-05-23 PROCEDURE — 83690 ASSAY OF LIPASE: CPT | Performed by: EMERGENCY MEDICINE

## 2025-05-23 PROCEDURE — 82040 ASSAY OF SERUM ALBUMIN: CPT | Performed by: EMERGENCY MEDICINE

## 2025-05-23 PROCEDURE — 250N000011 HC RX IP 250 OP 636: Performed by: EMERGENCY MEDICINE

## 2025-05-23 PROCEDURE — 96361 HYDRATE IV INFUSION ADD-ON: CPT

## 2025-05-23 PROCEDURE — 36415 COLL VENOUS BLD VENIPUNCTURE: CPT | Performed by: EMERGENCY MEDICINE

## 2025-05-23 PROCEDURE — 99285 EMERGENCY DEPT VISIT HI MDM: CPT | Mod: 25

## 2025-05-23 PROCEDURE — 258N000003 HC RX IP 258 OP 636: Performed by: EMERGENCY MEDICINE

## 2025-05-23 RX ORDER — CYCLOBENZAPRINE HCL 10 MG
10 TABLET ORAL ONCE
Status: COMPLETED | OUTPATIENT
Start: 2025-05-23 | End: 2025-05-23

## 2025-05-23 RX ORDER — CYCLOBENZAPRINE HCL 10 MG
10 TABLET ORAL 3 TIMES DAILY PRN
Qty: 20 TABLET | Refills: 0 | Status: SHIPPED | OUTPATIENT
Start: 2025-05-23

## 2025-05-23 RX ORDER — METHYLPREDNISOLONE 4 MG/1
TABLET ORAL
Qty: 21 TABLET | Refills: 0 | Status: SHIPPED | OUTPATIENT
Start: 2025-05-23

## 2025-05-23 RX ORDER — ACETAMINOPHEN 500 MG
1000 TABLET ORAL ONCE
Status: COMPLETED | OUTPATIENT
Start: 2025-05-23 | End: 2025-05-23

## 2025-05-23 RX ORDER — IOPAMIDOL 755 MG/ML
500 INJECTION, SOLUTION INTRAVASCULAR ONCE
Status: COMPLETED | OUTPATIENT
Start: 2025-05-23 | End: 2025-05-23

## 2025-05-23 RX ORDER — ONDANSETRON 4 MG/1
4 TABLET, ORALLY DISINTEGRATING ORAL EVERY 6 HOURS PRN
Qty: 10 TABLET | Refills: 0 | Status: SHIPPED | OUTPATIENT
Start: 2025-05-23

## 2025-05-23 RX ORDER — OXYCODONE HYDROCHLORIDE 5 MG/1
5 TABLET ORAL ONCE
Refills: 0 | Status: COMPLETED | OUTPATIENT
Start: 2025-05-23 | End: 2025-05-23

## 2025-05-23 RX ADMIN — ACETAMINOPHEN 1000 MG: 500 TABLET ORAL at 07:48

## 2025-05-23 RX ADMIN — IOPAMIDOL 100 ML: 755 INJECTION, SOLUTION INTRAVENOUS at 07:57

## 2025-05-23 RX ADMIN — SODIUM CHLORIDE 100 ML: 9 INJECTION, SOLUTION INTRAVENOUS at 07:57

## 2025-05-23 RX ADMIN — CYCLOBENZAPRINE 10 MG: 10 TABLET, FILM COATED ORAL at 07:48

## 2025-05-23 RX ADMIN — OXYCODONE HYDROCHLORIDE 5 MG: 5 TABLET ORAL at 07:48

## 2025-05-23 RX ADMIN — SODIUM CHLORIDE 1000 ML: 0.9 INJECTION, SOLUTION INTRAVENOUS at 07:48

## 2025-05-23 ASSESSMENT — ACTIVITIES OF DAILY LIVING (ADL)
ADLS_ACUITY_SCORE: 53

## 2025-05-23 ASSESSMENT — COLUMBIA-SUICIDE SEVERITY RATING SCALE - C-SSRS
2. HAVE YOU ACTUALLY HAD ANY THOUGHTS OF KILLING YOURSELF IN THE PAST MONTH?: NO
1. IN THE PAST MONTH, HAVE YOU WISHED YOU WERE DEAD OR WISHED YOU COULD GO TO SLEEP AND NOT WAKE UP?: NO
6. HAVE YOU EVER DONE ANYTHING, STARTED TO DO ANYTHING, OR PREPARED TO DO ANYTHING TO END YOUR LIFE?: NO

## 2025-05-23 NOTE — Clinical Note
Andrea Uribe was seen and treated in our emergency department on 5/23/2025.    Andrea is ill and, in order to avoid hospitalization, needs his wife to be at home with him today, 5/23/25, to help care for him and bring him back to the ER if he is worsening.      Sincerely,     [facility] Emergency Dept

## 2025-05-23 NOTE — Clinical Note
May 23, 2025      To Whom It May Concern:      Andrea Uribe was seen in our Emergency Department today, 05/23/25.  I expect his condition to improve over the next *** days.  He may return to work/school when improved.    Sincerely,        Seema Hines RN  Electronically signed

## 2025-05-23 NOTE — DISCHARGE INSTRUCTIONS
Bowel rest today with fluids only.  Slowly advance if your abdominal pain and vomiting improves starting tomorrow.  Zofran as needed for nausea and vomiting.  For your back you can continue the lidocaine patches (purchased over-the-counter) and we will start a steroid pack and muscle relaxers as needed.  For severe pain you can use oxycodone that you have remaining from your last visit.  If your symptoms are uncontrolled you need to return to the emergency department.  Otherwise, please follow-up with your primary care provider.

## 2025-05-23 NOTE — ED PROVIDER NOTES
"  Emergency Department Note      History of Present Illness     Chief Complaint   Back Pain      HPI   Andrea Uribe is a 49 year old male with a history of TBI and seizures on Dilantin and Keppra and VTE status post IVC filter presenting with his wife for evaluation of a couple of concerns.  First, he has been struggling with right low back pain since a fall 8 days ago.  He was seen in the emergency department and had an x-ray.  He was discharged with oxycodone and lidocaine patches but still finds the pain to be difficult to tolerate, particularly difficult to sleep.  He has not had any urinary incontinence, difficulty voiding, or fecal incontinence.  He last had a bowel movement yesterday.  He does not have new numbness or tingling.  He does not have fever.    Secondarily, he is concerned his IVC filter has migrated and \"punctured\" his intestine as this happened in 2012 and required surgical intervention.  He reports he has firmness to his belly that was not there before with focal pain and a feeling that \"something is wrong.\"  He also \"tasted blood last night\" that was exactly the same as when this happened previously.  He did have nonbloody vomiting this morning.    Independent Historian   Wife as detailed above.    Review of External Notes   I reviewed the ED note from 05/15, the patient was seen for back pain after a fall. The patient had an x-ray and was discharged with lidocaine patches and oxycodone.  I reviewed the surgical note from 2012, the patient's ICV filter had punctured his intestine.     Past Medical History     Medical History and Problem List   Asthma  Anemia   Anxiety   Ataxic gait   Bilateral knee pain   CKD, stage 3  CPAP dependence  DVT   GERD   Hypertension  Hyperlipidemia   Hyperthyroidism  Heterotopic ossification   IVC filter   Insomnia   Incisional hernia  Interrogation of vagal nerve stimulator  MRSA infection  Malnutrition   MDD  Obesity  Osteoporosis   Osteoarthritis  Paraplegia "   Pneumonia   Ptosis, right eyelid  Renal insufficiency  Sequela of intracranial injury  Seasonal allergic rhinitis  Short heel cord, right   Seizure disorder  Sleep apnea  TBI   Thromboembolism   Vitamin B12 deficiency   Vitamin D deficiency     Medications   Duoneb   Dilantin   Effexor   Keppra   Pepcid   Proventil   Roxicodone   Tessalon   Zestril   Zocor     Surgical History   Past Surgical History:   Procedure Laterality Date    APPENDECTOMY OPEN  08/11/2012    Procedure: APPENDECTOMY OPEN;  Exploratory Laparotomy, Appendectomy, Remove part IVC filter from duodenum with repair;  Surgeon: Michael Jimenez MD;  Location:  OR    ARTHROPLASTY KNEE Right 08/27/2014    Procedure: ARTHROPLASTY KNEE;  Surgeon: David Mckay MD;  Location:  OR    ARTHROPLASTY REVISION KNEE Right 12/13/2016    Procedure: ARTHROPLASTY REVISION KNEE;  Surgeon: David Mckay MD;  Location:  OR    ESOPHAGOSCOPY, GASTROSCOPY, DUODENOSCOPY (EGD), COMBINED  08/11/2012    Procedure: COMBINED ESOPHAGOSCOPY, GASTROSCOPY, DUODENOSCOPY (EGD);   ESOPHAGOSCOPY, GASTROSCOPY, DUODENOSCOPY (EGD);  Surgeon: Holland Lawson MD;  Location:  GI    EXCISE MASS TRUNK N/A 08/20/2020    Procedure: EXCISION OF ABDOMINAL WALL OSSIFICATION;  Surgeon: John Mcfarlane MD;  Location:  OR    HERNIORRHAPHY INCISIONAL (LOCATION) N/A 05/26/2016    Procedure: HERNIORRHAPHY INCISIONAL (LOCATION);  Surgeon: John Mcfarlane MD;  Location:  OR    IMPLANT STIMULATOR VAGUS NERVE  01/16/2012    Procedure:IMPLANT STIMULATOR VAGUS NERVE; VAGUS NERVE STIMULATOR IMPLANT; Surgeon:LEILANI CORTÉS; Location: [facility]    IR IVC FILTER PLACEMENT      IVC FILTER RETRIEVAL      LAPAROSCOPIC CHOLECYSTECTOMY N/A 05/04/2017    Procedure: LAPAROSCOPIC CHOLECYSTECTOMY;  LAPAROSCOPIC CHOLECYSTECTOMY ;  Surgeon: John Mcfarlane MD;  Location:  OR    LAPAROTOMY EXPLORATORY  08/11/2012    Procedure: LAPAROTOMY EXPLORATORY;;  Surgeon: Michael Jimenez  MD;  Location: [facility] OR    OPEN REDUCTION INTERNAL FIXATION FEMUR DISTAL  01/22/2014    Procedure: OPEN REDUCTION INTERNAL FIXATION FEMUR DISTAL;  right distal femur Open reduction internal fixation       OPEN REDUCTION INTERNAL FIXATION HAND Left     OPEN REDUCTION INTERNAL FIXATION TIBIAL PLATEAU Right     ORIF FEMUR FRACTURE Left     ORTHOPEDIC SURGERY Right 10/30/2017    enlonging muscle    Removal of femur bone growth      REMOVE HARDWARE KNEE Right 08/27/2014    Procedure: REMOVE HARDWARE KNEE;  Surgeon: David Mckay MD;  Location: [facility] OR    REMOVE HARDWARE LOWER EXTREMITY Right 10/14/2016    Procedure: REMOVE HARDWARE LOWER EXTREMITY;  Surgeon: David Mckay MD;  Location: [facility] OR    spleen surgery      repaired    TRACHEOSTOMY       Physical Exam     Patient Vitals for the past 24 hrs:   BP Temp Temp src Pulse Resp SpO2   05/23/25 0930 -- -- -- -- 16 98 %   05/23/25 0920 -- -- -- -- 16 95 %   05/23/25 0900 (!) 130/93 -- -- 70 -- 92 %   05/23/25 0835 -- -- -- -- -- 96 %   05/23/25 0830 124/68 -- -- 70 16 97 %   05/23/25 0820 -- -- -- -- -- 99 %   05/23/25 0730 (!) 147/83 -- -- 76 18 94 %   05/23/25 0720 -- -- -- -- -- 92 %   05/23/25 0700 (!) 140/80 -- -- 74 -- 93 %   05/23/25 0658 -- -- -- -- -- 93 %   05/23/25 0657 -- -- -- -- -- 93 %   05/23/25 0656 -- -- -- -- -- 94 %   05/23/25 0655 -- -- -- -- -- 96 %   05/23/25 0654 -- -- -- -- -- 94 %   05/23/25 0653 (!) 151/81 -- -- 82 -- --   05/23/25 0607 129/86 98.2  F (36.8  C) Temporal 82 18 98 %     Physical Exam  General: Well-developed and well-nourished. Well appearing middle-age  man sitting in a wheelchair. Cooperative.  Head:  Atraumatic.  Eyes:  Conjunctivae, lids, and sclerae are normal.  ENT:    Normal nose. Moist mucous membranes.  Neck:  Supple. Normal range of motion.  CV:  Regular rate and rhythm. Normal heart sounds with no murmurs, rubs, or gallops detected.  Resp:  No respiratory distress. Clear to auscultation  bilaterally without decreased breath sounds, wheezing, rales, or rhonchi.  GI:  Soft. Non-distended.  No focal tenderness.  Local firmness at the site of a well-healed laparotomy incision without rigidity or peritonitis.    MS:  Tenderness to palpation over the right lumbar paraspinal musculature without midline tenderness.  Lidocaine patch in place.  Skin:  Warm. Non-diaphoretic. No pallor.  Neuro:  Awake. A&Ox3.  Left 5 out of 5 plantarflexion and dorsiflexion.  Right baseline foot drop and PRAFO.  Sensation intact to light touch.  Psych: Normal mood and affect. Normal speech.  Vitals reviewed.    Diagnostics     Lab Results   Labs Ordered and Resulted from Time of ED Arrival to Time of ED Departure   BASIC METABOLIC PANEL - Abnormal       Result Value    Sodium 137      Potassium 4.4      Chloride 99      Carbon Dioxide (CO2) 26      Anion Gap 12      Urea Nitrogen 21.7 (*)     Creatinine 1.03      GFR Estimate 88      Calcium 9.1      Glucose 110 (*)    CBC WITH PLATELETS AND DIFFERENTIAL - Abnormal    WBC Count 6.8      RBC Count 4.28 (*)     Hemoglobin 13.3      Hematocrit 40.5      MCV 95      MCH 31.1      MCHC 32.8      RDW 13.5      Platelet Count 187      % Neutrophils 71      % Lymphocytes 18      % Monocytes 8      % Eosinophils 2      % Basophils 0      % Immature Granulocytes 0      NRBCs per 100 WBC 0      Absolute Neutrophils 4.8      Absolute Lymphocytes 1.2      Absolute Monocytes 0.5      Absolute Eosinophils 0.1      Absolute Basophils 0.0      Absolute Immature Granulocytes 0.0      Absolute NRBCs 0.0     HEPATIC FUNCTION PANEL - Abnormal    Protein Total 7.7      Albumin 4.4      Bilirubin Total 0.2      Alkaline Phosphatase 243 (*)     AST 64 (*)     ALT 90 (*)     Bilirubin Direct <0.08     LIPASE - Normal    Lipase 45       Imaging   CT Abdomen Pelvis w Contrast   Final Result   IMPRESSION:    1.  A few small bowel loops are mildly distended with fluid. Correlate with an enteritis versus  "ileus or early developing bowel obstruction.   2.  Stable positioning of the IVC filter.        Independent Interpretation   I independently interpreted the CT and see no free abdominal air.    ED Course      Medications Administered   Medications   sodium chloride 0.9% BOLUS 1,000 mL (0 mLs Intravenous Stopped 5/23/25 0920)   cyclobenzaprine (FLEXERIL) tablet 10 mg (10 mg Oral $Given 5/23/25 0748)   acetaminophen (TYLENOL) tablet 1,000 mg (1,000 mg Oral $Given 5/23/25 0748)   oxyCODONE (ROXICODONE) tablet 5 mg (5 mg Oral $Given 5/23/25 0748)     ED Course   ED Course as of 05/23/25 1202   Fri May 23, 2025   0722 I obtained history and examined the patient as noted above.    0904 I rechecked and updated the patient. We discussed observation admission versus going home. The patient would like to be discharged home, the patient's wife is okay with that as long as she can get a note excusing her from work.      Additional Documentation  Supportive wife.    Medical Decision Making / Diagnosis   YAIR Mendez is a 49 year old man with seizure disorder and VTE with IVC who has been struggling with right low back pain which continues to bother him and he mentions today.  Fortunately, there are no red flag symptoms associated with this and no evidence of cauda equina syndrome or other emergent condition that would require imaging.  His exam is reassuring with baseline right foot drop in a PRAFO boot.  Ultimately we discussed continued lidocaine patches and home oxycodone though I also prescribed him Flexeril and a Medrol Dosepak.  Secondarily, he is concerned about possible migration of his IVC filter causing an intestinal \"puncture\" as this has happened before and he is now having some focal pain and firmness to his belly.  He did have an episode of nonbloody vomiting.  He is generally well-appearing, however, without peritonitis on exam.    Laboratory studies are significant for very mild elevations in AST (64) and ALT (90) " only.  CT of the abdomen and pelvis reveals stable positioning of his IVC filter although there are few mildly distended small bowel loops which could represent enteritis versus ileitis or early developing small bowel obstruction.  This would be consistent with his abdominal pain and episode of vomiting.  Fortunately, he did not have further vomiting in the emergency department.  He was treated with IV fluids, Flexeril, Tylenol, and oxycodone.    On repeat evaluation I discussed findings and offered observation admission as he could have worsening abdominal pain and nausea/vomiting if this is an early small bowel obstruction.  However, he prefers not to be hospitalized and would prefer to be discharged.  His wife is at bedside and is willing to stay home from work to watch him today.  I think this is a reasonable course of action.  He was prescribed Zofran and I recommended liquid diet today with slow advancement as tolerated starting tomorrow.  We discussed indications to return both in regards to his back pain and his abdominal plain with abnormal CT.  Otherwise he will follow-up with his primary care provider.  All questions answered.  Discharged at his request.    Disposition   The patient was discharged.     Diagnosis     ICD-10-CM    1. Abnormal CT of the abdomen  R93.5     Dilated small bowel loops      2. Elevated transaminase level  R74.01       3. Acute right-sided low back pain without sciatica  M54.50          Discharge Medications   Discharge Medication List as of 5/23/2025  9:20 AM        START taking these medications    Details   cyclobenzaprine (FLEXERIL) 10 MG tablet Take 1 tablet (10 mg) by mouth 3 times daily as needed for muscle spasms., Disp-20 tablet, R-0, E-Prescribe      methylPREDNISolone (MEDROL DOSEPAK) 4 MG tablet therapy pack Follow Package Directions, Disp-21 tablet, R-0, E-Prescribe      ondansetron (ZOFRAN ODT) 4 MG ODT tab Take 1 tablet (4 mg) by mouth every 6 hours as needed for  nausea or vomiting., Disp-10 tablet, R-0, E-Prescribe           I, Taisha Bernard, am serving as a scribe at 7:19 AM on 5/23/2025 to document services personally performed by Nathalie Dubose MD based on my observations and the provider's statements to me.        Nathalie Dubose MD  05/23/25 [id number]

## 2025-05-23 NOTE — ED TRIAGE NOTES
Here for concern of tasting blood in his mouth. Stated that he has an IVC filtered, concern that it moved. Stated similar movement of the IVC filter in the past that cut into his bowels. Also c/o right lower back pain. ABCs intact.      Triage Assessment (Adult)       Row Name 05/23/25 0605          Triage Assessment    Airway WDL WDL        Respiratory WDL    Respiratory WDL WDL        Cardiac WDL    Cardiac WDL WDL

## 2025-05-23 NOTE — TELEPHONE ENCOUNTER
Forms received regarding patient.  In outbox.  I have not seen patient since February 2024 for a virtual visit and I have not seen patient in person since February 2023.  I am also no longer listed as his PCP.  It is unclear who his primary care provider is.  He will need to have this form filled out by his PCP.  If this is to remain myself, he will need to be seen in person    Orestes Medley PA-C

## 2025-05-27 NOTE — TELEPHONE ENCOUNTER
I spoke with patient, he wants to go to the [facility] Location. He will call there to schedule an Est Care Visit to get his form filled out. Please fax form to [facility] location and then route this message to them.

## 2025-06-28 DIAGNOSIS — F41.9 ANXIETY: ICD-10-CM

## 2025-06-28 DIAGNOSIS — N18.30 STAGE 3 CHRONIC KIDNEY DISEASE, UNSPECIFIED WHETHER STAGE 3A OR 3B CKD (H): ICD-10-CM

## 2025-06-28 DIAGNOSIS — F33.42 RECURRENT MAJOR DEPRESSIVE DISORDER, IN FULL REMISSION: ICD-10-CM

## 2025-06-30 RX ORDER — VENLAFAXINE HYDROCHLORIDE 150 MG/1
150 CAPSULE, EXTENDED RELEASE ORAL DAILY
Qty: 90 CAPSULE | Refills: 0 | OUTPATIENT
Start: 2025-06-30

## 2025-06-30 RX ORDER — LISINOPRIL 5 MG/1
5 TABLET ORAL DAILY
Qty: 90 TABLET | Refills: 0 | OUTPATIENT
Start: 2025-06-30

## 2025-07-03 DIAGNOSIS — K21.9 GASTROESOPHAGEAL REFLUX DISEASE, UNSPECIFIED WHETHER ESOPHAGITIS PRESENT: ICD-10-CM

## 2025-07-03 DIAGNOSIS — J45.20 INTERMITTENT ASTHMA, UNCOMPLICATED: ICD-10-CM

## 2025-07-03 DIAGNOSIS — E78.5 HYPERLIPIDEMIA LDL GOAL <100: ICD-10-CM

## 2025-07-03 DIAGNOSIS — J45.30 MILD PERSISTENT ASTHMA WITHOUT COMPLICATION: ICD-10-CM

## 2025-07-03 DIAGNOSIS — F51.01 PRIMARY INSOMNIA: ICD-10-CM

## 2025-07-03 RX ORDER — MONTELUKAST SODIUM 10 MG/1
1 TABLET ORAL AT BEDTIME
Qty: 90 TABLET | Refills: 0 | OUTPATIENT
Start: 2025-07-03

## 2025-07-03 RX ORDER — ALBUTEROL SULFATE 90 UG/1
INHALANT RESPIRATORY (INHALATION)
OUTPATIENT
Start: 2025-07-03

## 2025-07-03 RX ORDER — FAMOTIDINE 40 MG/1
40 TABLET, FILM COATED ORAL AT BEDTIME
Qty: 90 TABLET | Refills: 0 | OUTPATIENT
Start: 2025-07-03

## 2025-07-03 RX ORDER — SIMVASTATIN 20 MG
20 TABLET ORAL AT BEDTIME
Qty: 90 TABLET | Refills: 0 | OUTPATIENT
Start: 2025-07-03

## 2025-07-03 RX ORDER — TRAZODONE HYDROCHLORIDE 50 MG/1
50 TABLET ORAL AT BEDTIME
Qty: 90 TABLET | Refills: 0 | OUTPATIENT
Start: 2025-07-03

## 2025-07-08 DIAGNOSIS — J20.9 ACUTE BRONCHITIS, UNSPECIFIED ORGANISM: ICD-10-CM

## 2025-07-08 DIAGNOSIS — F33.42 RECURRENT MAJOR DEPRESSIVE DISORDER, IN FULL REMISSION: ICD-10-CM

## 2025-07-08 DIAGNOSIS — F41.9 ANXIETY: ICD-10-CM

## 2025-07-08 DIAGNOSIS — G40.909 SEIZURE DISORDER (H): ICD-10-CM

## 2025-07-08 DIAGNOSIS — K21.9 GASTROESOPHAGEAL REFLUX DISEASE, UNSPECIFIED WHETHER ESOPHAGITIS PRESENT: ICD-10-CM

## 2025-07-08 DIAGNOSIS — E78.5 HYPERLIPIDEMIA LDL GOAL <100: ICD-10-CM

## 2025-07-08 RX ORDER — VENLAFAXINE HYDROCHLORIDE 150 MG/1
150 CAPSULE, EXTENDED RELEASE ORAL DAILY
Qty: 90 CAPSULE | Refills: 0 | Status: SHIPPED | OUTPATIENT
Start: 2025-07-08

## 2025-07-08 RX ORDER — SIMVASTATIN 20 MG
TABLET ORAL
Qty: 90 TABLET | Refills: 0 | Status: SHIPPED | OUTPATIENT
Start: 2025-07-08

## 2025-07-08 RX ORDER — FAMOTIDINE 40 MG/1
40 TABLET, FILM COATED ORAL AT BEDTIME
Qty: 90 TABLET | Refills: 0 | Status: SHIPPED | OUTPATIENT
Start: 2025-07-08

## 2025-07-08 NOTE — TELEPHONE ENCOUNTER
Received call from patient. Patient calling to request refills on all of his medications.     RN advised that patient is overdue for an appointment. Patient requesting to re-establish care within the [facility].     Assisted with scheduling appointment as specified below:    Appointments in Next Year      Jul 17, 2025 2:30 PM  (Arrive by 2:10 PM)  Annual Wellness Visit with JANNA Stephen CNP  [facility] ([facility] [place])            Patient does not have a listed PCP at this time, routing refill requests for aarti period refill to previous prescriber to please advise.    PENNY Singleton RN  [facility]

## 2025-07-28 ENCOUNTER — PATIENT OUTREACH (OUTPATIENT)
Dept: CARE COORDINATION | Facility: CLINIC | Age: 50
End: 2025-07-28
Payer: MEDICARE

## 2025-07-30 ENCOUNTER — PATIENT OUTREACH (OUTPATIENT)
Dept: CARE COORDINATION | Facility: CLINIC | Age: 50
End: 2025-07-30
Payer: MEDICARE

## 2025-08-30 DIAGNOSIS — N18.30 STAGE 3 CHRONIC KIDNEY DISEASE, UNSPECIFIED WHETHER STAGE 3A OR 3B CKD (H): ICD-10-CM

## 2025-08-30 DIAGNOSIS — J45.20 INTERMITTENT ASTHMA, UNCOMPLICATED: ICD-10-CM

## 2025-09-02 DIAGNOSIS — N18.30 STAGE 3 CHRONIC KIDNEY DISEASE, UNSPECIFIED WHETHER STAGE 3A OR 3B CKD (H): ICD-10-CM

## 2025-09-02 RX ORDER — LISINOPRIL 5 MG/1
5 TABLET ORAL DAILY
Qty: 90 TABLET | Refills: 0 | OUTPATIENT
Start: 2025-09-02

## 2025-09-02 RX ORDER — MONTELUKAST SODIUM 10 MG/1
1 TABLET ORAL AT BEDTIME
Qty: 90 TABLET | Refills: 0 | OUTPATIENT
Start: 2025-09-02

## 2025-09-03 RX ORDER — LISINOPRIL 5 MG/1
5 TABLET ORAL DAILY
Qty: 90 TABLET | Refills: 0 | OUTPATIENT
Start: 2025-09-03

## (undated) DEVICE — LINEN HALF SHEET 5512

## (undated) DEVICE — SU VICRYL 3-0 SH 27" UND J416H

## (undated) DEVICE — DECANTER VIAL 2006S

## (undated) DEVICE — SOL NACL 0.9% IRRIG 1000ML BOTTLE 2F7124

## (undated) DEVICE — GLOVE PROTEXIS POWDER FREE 8.0 ORTHOPEDIC 2D73ET80

## (undated) DEVICE — SUCTION CANISTER MEDIVAC LINER 3000ML W/LID 65651-530

## (undated) DEVICE — ESU GROUND PAD ADULT W/CORD E7507

## (undated) DEVICE — LINEN FULL SHEET 5511

## (undated) DEVICE — GLOVE PROTEXIS POWDER FREE SMT 7.5  2D72PT75X

## (undated) DEVICE — SUCTION TIP YANKAUER W/O VENT K86

## (undated) DEVICE — BAG CLEAR TRASH 1.3M 39X33" P4040C

## (undated) DEVICE — ENDO TROCAR 05MM VERSAONE BLADELESS W/STD FIX CAN NONB5STF

## (undated) DEVICE — GOWN IMPERVIOUS ZONED XLG 9041

## (undated) DEVICE — ESU PENCIL W/HOLSTER E2350H

## (undated) DEVICE — SU VICRYL 4-0 P-3 18" UND J494G

## (undated) DEVICE — LINEN TOWEL PACK X5 5464

## (undated) DEVICE — PREP CHLORAPREP 26ML TINTED ORANGE  260815

## (undated) DEVICE — LINEN POUCH DBL 5427

## (undated) DEVICE — PACK MINOR CUSTOM RIDGES SBA32RMRMA

## (undated) DEVICE — ESU ELEC BLADE 2.75" COATED/INSULATED E1455

## (undated) DEVICE — DRAPE LAP W/ARMBOARD 29410

## (undated) DEVICE — SUCTION MANIFOLD NEPTUNE 2 SYS 4 PORT 0702-020-000

## (undated) DEVICE — SU PDS II 0 CTX 60" Z990G

## (undated) DEVICE — GOWN XLG DISP 9545

## (undated) DEVICE — DRSG STERI STRIP 1/2X4" R1547

## (undated) DEVICE — DRSG GAUZE 4X4" 8044

## (undated) DEVICE — SU VICRYL 4-0 PS-2 18" UND J496H

## (undated) DEVICE — SOL NACL 0.9% IRRIG 3000ML BAG 2B7477

## (undated) DEVICE — TUBING SUCTION 12"X1/4" N612

## (undated) DEVICE — SUCTION CANISTER STRAW 65652-008

## (undated) DEVICE — LINEN TOWEL PACK X10 5473

## (undated) DEVICE — ENDO TROCAR BLUNT 100MM W/THRD ANCHOR BLUNTPORT BPT12STS

## (undated) DEVICE — ENDO CANNULA 05MM VERSAONE UNIVERSAL UNVCA5STF

## (undated) DEVICE — SU VICRYL 0 CT-2 CR 8X18" J727D

## (undated) DEVICE — Device

## (undated) DEVICE — ENDO POUCH GOLD 10MM ECATCH 173050G

## (undated) DEVICE — ESU CORD MONOPOLAR 10'  E0510

## (undated) DEVICE — SUCTION IRR STRYKERFLOW II W/TIP 250-070-520

## (undated) RX ORDER — BUPIVACAINE HYDROCHLORIDE 5 MG/ML
INJECTION, SOLUTION EPIDURAL; INTRACAUDAL
Status: DISPENSED
Start: 2020-08-20

## (undated) RX ORDER — CEFOTETAN DISODIUM 2 G/20ML
INJECTION, POWDER, FOR SOLUTION INTRAMUSCULAR; INTRAVENOUS
Status: DISPENSED
Start: 2020-08-20

## (undated) RX ORDER — FENTANYL CITRATE 50 UG/ML
INJECTION, SOLUTION INTRAMUSCULAR; INTRAVENOUS
Status: DISPENSED
Start: 2020-08-20

## (undated) RX ORDER — FENTANYL CITRATE 50 UG/ML
INJECTION, SOLUTION INTRAMUSCULAR; INTRAVENOUS
Status: DISPENSED
Start: 2017-05-04

## (undated) RX ORDER — PROPOFOL 10 MG/ML
INJECTION, EMULSION INTRAVENOUS
Status: DISPENSED
Start: 2020-08-20

## (undated) RX ORDER — GLYCOPYRROLATE 0.2 MG/ML
INJECTION INTRAMUSCULAR; INTRAVENOUS
Status: DISPENSED
Start: 2020-08-20

## (undated) RX ORDER — NEOSTIGMINE METHYLSULFATE 1 MG/ML
VIAL (ML) INJECTION
Status: DISPENSED
Start: 2020-08-20

## (undated) RX ORDER — VANCOMYCIN HYDROCHLORIDE 1 G/200ML
INJECTION, SOLUTION INTRAVENOUS
Status: DISPENSED
Start: 2017-05-04

## (undated) RX ORDER — NEOSTIGMINE METHYLSULFATE 5 MG/5 ML
SYRINGE (ML) INTRAVENOUS
Status: DISPENSED
Start: 2017-05-04

## (undated) RX ORDER — ONDANSETRON 2 MG/ML
INJECTION INTRAMUSCULAR; INTRAVENOUS
Status: DISPENSED
Start: 2020-08-20

## (undated) RX ORDER — DEXAMETHASONE SODIUM PHOSPHATE 4 MG/ML
INJECTION, SOLUTION INTRA-ARTICULAR; INTRALESIONAL; INTRAMUSCULAR; INTRAVENOUS; SOFT TISSUE
Status: DISPENSED
Start: 2020-08-20

## (undated) RX ORDER — LIDOCAINE HYDROCHLORIDE 10 MG/ML
INJECTION, SOLUTION EPIDURAL; INFILTRATION; INTRACAUDAL; PERINEURAL
Status: DISPENSED
Start: 2020-08-20

## (undated) RX ORDER — BUPIVACAINE HYDROCHLORIDE AND EPINEPHRINE 5; 5 MG/ML; UG/ML
INJECTION, SOLUTION EPIDURAL; INTRACAUDAL; PERINEURAL
Status: DISPENSED
Start: 2017-05-04

## (undated) RX ORDER — GLYCOPYRROLATE 0.2 MG/ML
INJECTION INTRAMUSCULAR; INTRAVENOUS
Status: DISPENSED
Start: 2017-05-04

## (undated) RX ORDER — OXYCODONE AND ACETAMINOPHEN 5; 325 MG/1; MG/1
TABLET ORAL
Status: DISPENSED
Start: 2017-05-04

## (undated) RX ORDER — HYDROMORPHONE HYDROCHLORIDE 1 MG/ML
INJECTION, SOLUTION INTRAMUSCULAR; INTRAVENOUS; SUBCUTANEOUS
Status: DISPENSED
Start: 2020-08-20

## (undated) RX ORDER — PHENYLEPHRINE HCL IN 0.9% NACL 1 MG/10 ML
SYRINGE (ML) INTRAVENOUS
Status: DISPENSED
Start: 2017-05-04